# Patient Record
Sex: FEMALE | Race: BLACK OR AFRICAN AMERICAN | HISPANIC OR LATINO | URBAN - METROPOLITAN AREA
[De-identification: names, ages, dates, MRNs, and addresses within clinical notes are randomized per-mention and may not be internally consistent; named-entity substitution may affect disease eponyms.]

---

## 2022-05-27 ENCOUNTER — EMERGENCY (EMERGENCY)
Facility: HOSPITAL | Age: 52
LOS: 1 days | Discharge: HOME | End: 2022-05-27
Attending: EMERGENCY MEDICINE | Admitting: EMERGENCY MEDICINE
Payer: COMMERCIAL

## 2022-05-27 VITALS
TEMPERATURE: 98 F | SYSTOLIC BLOOD PRESSURE: 111 MMHG | HEART RATE: 71 BPM | DIASTOLIC BLOOD PRESSURE: 60 MMHG | OXYGEN SATURATION: 100 % | RESPIRATION RATE: 17 BRPM

## 2022-05-27 VITALS
SYSTOLIC BLOOD PRESSURE: 111 MMHG | TEMPERATURE: 97 F | DIASTOLIC BLOOD PRESSURE: 76 MMHG | HEART RATE: 88 BPM | RESPIRATION RATE: 18 BRPM | OXYGEN SATURATION: 98 %

## 2022-05-27 LAB
ALBUMIN SERPL ELPH-MCNC: 4.3 G/DL — SIGNIFICANT CHANGE UP (ref 3.3–5)
ALP SERPL-CCNC: 121 U/L — HIGH (ref 40–120)
ALT FLD-CCNC: 28 U/L — SIGNIFICANT CHANGE UP (ref 4–33)
ANION GAP SERPL CALC-SCNC: 9 MMOL/L — SIGNIFICANT CHANGE UP (ref 7–14)
AST SERPL-CCNC: 28 U/L — SIGNIFICANT CHANGE UP (ref 4–32)
BASOPHILS # BLD AUTO: 0.05 K/UL — SIGNIFICANT CHANGE UP (ref 0–0.2)
BASOPHILS NFR BLD AUTO: 0.6 % — SIGNIFICANT CHANGE UP (ref 0–2)
BILIRUB SERPL-MCNC: <0.2 MG/DL — SIGNIFICANT CHANGE UP (ref 0.2–1.2)
BUN SERPL-MCNC: 9 MG/DL — SIGNIFICANT CHANGE UP (ref 7–23)
CALCIUM SERPL-MCNC: 8.9 MG/DL — SIGNIFICANT CHANGE UP (ref 8.4–10.5)
CHLORIDE SERPL-SCNC: 110 MMOL/L — HIGH (ref 98–107)
CK SERPL-CCNC: 385 U/L — HIGH (ref 25–170)
CO2 SERPL-SCNC: 23 MMOL/L — SIGNIFICANT CHANGE UP (ref 22–31)
CREAT SERPL-MCNC: 0.66 MG/DL — SIGNIFICANT CHANGE UP (ref 0.5–1.3)
EGFR: 107 ML/MIN/1.73M2 — SIGNIFICANT CHANGE UP
EOSINOPHIL # BLD AUTO: 0.27 K/UL — SIGNIFICANT CHANGE UP (ref 0–0.5)
EOSINOPHIL NFR BLD AUTO: 3.3 % — SIGNIFICANT CHANGE UP (ref 0–6)
GLUCOSE SERPL-MCNC: 97 MG/DL — SIGNIFICANT CHANGE UP (ref 70–99)
HCT VFR BLD CALC: 37.9 % — SIGNIFICANT CHANGE UP (ref 34.5–45)
HGB BLD-MCNC: 12.4 G/DL — SIGNIFICANT CHANGE UP (ref 11.5–15.5)
IANC: 4.05 K/UL — SIGNIFICANT CHANGE UP (ref 1.8–7.4)
IMM GRANULOCYTES NFR BLD AUTO: 0.6 % — SIGNIFICANT CHANGE UP (ref 0–1.5)
LYMPHOCYTES # BLD AUTO: 3.31 K/UL — HIGH (ref 1–3.3)
LYMPHOCYTES # BLD AUTO: 40.9 % — SIGNIFICANT CHANGE UP (ref 13–44)
MCHC RBC-ENTMCNC: 29.3 PG — SIGNIFICANT CHANGE UP (ref 27–34)
MCHC RBC-ENTMCNC: 32.7 GM/DL — SIGNIFICANT CHANGE UP (ref 32–36)
MCV RBC AUTO: 89.6 FL — SIGNIFICANT CHANGE UP (ref 80–100)
MONOCYTES # BLD AUTO: 0.37 K/UL — SIGNIFICANT CHANGE UP (ref 0–0.9)
MONOCYTES NFR BLD AUTO: 4.6 % — SIGNIFICANT CHANGE UP (ref 2–14)
NEUTROPHILS # BLD AUTO: 4.05 K/UL — SIGNIFICANT CHANGE UP (ref 1.8–7.4)
NEUTROPHILS NFR BLD AUTO: 50 % — SIGNIFICANT CHANGE UP (ref 43–77)
NRBC # BLD: 0 /100 WBCS — SIGNIFICANT CHANGE UP
NRBC # FLD: 0 K/UL — SIGNIFICANT CHANGE UP
PLATELET # BLD AUTO: 313 K/UL — SIGNIFICANT CHANGE UP (ref 150–400)
POTASSIUM SERPL-MCNC: 4.4 MMOL/L — SIGNIFICANT CHANGE UP (ref 3.5–5.3)
POTASSIUM SERPL-SCNC: 4.4 MMOL/L — SIGNIFICANT CHANGE UP (ref 3.5–5.3)
PROT SERPL-MCNC: 7.4 G/DL — SIGNIFICANT CHANGE UP (ref 6–8.3)
RBC # BLD: 4.23 M/UL — SIGNIFICANT CHANGE UP (ref 3.8–5.2)
RBC # FLD: 15.6 % — HIGH (ref 10.3–14.5)
SODIUM SERPL-SCNC: 142 MMOL/L — SIGNIFICANT CHANGE UP (ref 135–145)
WBC # BLD: 8.1 K/UL — SIGNIFICANT CHANGE UP (ref 3.8–10.5)
WBC # FLD AUTO: 8.1 K/UL — SIGNIFICANT CHANGE UP (ref 3.8–10.5)

## 2022-05-27 PROCEDURE — 99285 EMERGENCY DEPT VISIT HI MDM: CPT

## 2022-05-27 PROCEDURE — 93971 EXTREMITY STUDY: CPT | Mod: 26,LT

## 2022-05-27 NOTE — ED PROVIDER NOTE - NSFOLLOWUPCLINICS_GEN_ALL_ED_FT
[facility]  Neurology  [address] [place] [address] [address] [place] [address]  Phone: [phone]  Fax:

## 2022-05-27 NOTE — ED PROVIDER NOTE - PHYSICAL EXAMINATION
Const: Well-nourished, Well-developed, appearing stated age.  Eyes: no conjunctival injection, and symmetrical lids.  HEENT: Head NCAT, no lesions. Atraumatic external nose and ears. Moist MM.  Neck: Symmetric, trachea midline.   RESP: Unlabored respiratory effort.  GI: Nontender/Nondistended,   MSK and skin: Large well healing bruise to left thigh with ttp to touch. Full rom of knee, no ttp, no swelling. Pt able to ambulate normally.   Neuro: CNs II-XII grossly intact. Motor & Sensation grossly intact.  Psych: Awake, Alert, & Oriented (AAO) x3. Appropriate mood and affect.

## 2022-05-27 NOTE — ED PROVIDER NOTE - MUSCULOSKELETAL, MLM
Spine appears normal, range of motion is not limited, no muscle or joint tenderness. Ecchymosis on dorsal aspect of left thigh, 15cm x 5 cm.  Non tender, soft, no fluctuance, no erythema by ecchymosis. FROM at all joints, neurovasc intact.

## 2022-05-27 NOTE — ED PROVIDER NOTE - OBJECTIVE STATEMENT
Pt is a 50 yo f with no pmh, on xarelto who presents to ed with 1 wk of left leg pain after pt fell while staking. Pt has been able to ambulate, but has been having thigh pain and bruising on the left since. 1 wk prior to this, pt was given Xarelto for possible dvt by pmd after pt complained of b/l feet numbness that resolved when she skates or runs. Otherwise denies cp, sob, no n/v/f/c. Pt is a 52 yo f with non contrib pmh who was recently started by her PMD on xarelto who presents to ed with 1 wk of left leg pain after pt fell while skating. Pt has been able to ambulate, but has been having thigh pain and bruising on the left since. 1 wk prior to this, pt was given Xarelto for possible dvt by pmd after pt complained of chronic b/l feet numbness that resolved when she skates or runs. Otherwise denies cp, sob, no n/v/f/c.  Apparently pt never had an official ultrasound done, and has been on AC without dx'd DVT. AC/leg pain (bilateral) predate fall.

## 2022-05-27 NOTE — ED PROVIDER NOTE - CLINICAL SUMMARY MEDICAL DECISION MAKING FREE TEXT BOX
Pt is a 50 yo f with no pmh, on xarelto who presents to ed with 1 wk of left leg pain after pt fell while staking. r/o dvt. 51 yr old F with non contrib pmh, on xarelto for apparently suspected DVT by PMD, who presents to ED with 1 wk of left leg pain after pt fell while skating. No e/o fracture.  Aside from ecchymosis exam is WNL. Unlikely DVT but will obtain duplex to support decision to stop AC since she has never had objective DVT findings. Pain control, reassess. Recc outpt vasc and neuro f/u for possible PVD vs neuropathy

## 2022-05-27 NOTE — ED ADULT TRIAGE NOTE - CHIEF COMPLAINT QUOTE
Patient fell on Sunday after she was roller skaTING. pT HAS C/O NUMBNESS IN FEET AND COLD. PT ALSO HAS A BRUISE OT HER LEFT THIGH SHE WANTS TO BE EVALUATED, PT IS ON XARELTO.

## 2022-05-27 NOTE — ED PROVIDER NOTE - PATIENT PORTAL LINK FT
You can access the FollowMyHealth Patient Portal offered by [facility] by registering at the following website: [email or web address]. By joining [employer]’s FollowMyHealth portal, you will also be able to view your health information using other applications (apps) compatible with our system.

## 2022-05-27 NOTE — ED PROVIDER NOTE - NS ED ROS FT
CONST: no fevers, no chills, +trauma  EYES: no pain, no blurry vision   ENT: no sore throat, no epistaxis, no rhinorrhea  CV: no chest pain, no palpitations, no orthopnea, no extremity pain or swelling  RESP: no shortness of breath, no cough, no sputum, no pleurisy, no wheezing  ABD: no abdominal pain, no nausea, no vomiting, no diarrhea, no black or bloody stool  : no dysuria, no hematuria, no frequency, no urgency  MSK: no back pain, no neck pain, no extremity pain  NEURO: no headache, no sensory disturbances, no focal weakness, no dizziness  SKIN: no diaphoresis, no rash

## 2022-05-27 NOTE — ED PROVIDER NOTE - PROGRESS NOTE DETAILS
Hiram Tomas, PGY1 Pt continues to feel well. no dvt on studies. discussed with pt to hold on Xarelto and follow with primary care. Hiram Tomas, PGY1 Pt continues to feel well. no dvt on studies. discussed with pt to hold on Xarelto and follow with primary care. Also to see vascular surgeon and/or neuro if BL LE sx continue.

## 2022-05-27 NOTE — ED PROVIDER NOTE - ATTENDING CONTRIBUTION TO CARE
Attending Attestation: Dr. Dye  I have personally performed a history and physical examination of the patient and discussed management with the resident as well as the patient.  I reviewed the resident's note and agree with the documented findings and plan of care.  I have authored and modified critical sections of the Provider Note, including but not limited to HPI, Physical Exam and MDM. 51 yr old F with non contrib pmh, on xarelto for apparently suspected DVT by PMD, who presents to ED with 1 wk of left leg pain after pt fell while skating. No e/o fracture.  Aside from ecchymosis exam is WNL. Unlikely DVT but will obtain duplex to support decision to stop AC since she has never had objective DVT findings. Pain control, reassess. Recc outpt vasc and neuro f/u for possible PVD vs neuropathy

## 2022-05-27 NOTE — ED PROVIDER NOTE - NSFOLLOWUPINSTRUCTIONS_ED_ALL_ED_FT
You were seen in the hospital for left leg pain.   While here your labs and studies showed no acute findings and no clots.   Please follow up with a primary care doctor. The hospital will call to help you make an appointment.   Please see your primary doctor in 2-3 days for follow-up care. Return to ER for any new or worsening symptoms including worsening pain, inability to walk, increased loss of sensation in your feet.   The number for neurology is also provided in case your symptoms do not resolve.

## 2022-05-27 NOTE — ED ADULT NURSE NOTE - OBJECTIVE STATEMENT
Break Shift RN: Pt received to rm 21 presents with bruising noted to back of left leg s/p fall on may 13th. Pt endorsing sensation of numbness to b/l feet. PT a&ox3, ambulatory at baseline, skin intact, respirations even and unlabored, abd soft and non-distended nontender to palpation. Pt reports being placed on blood thinners after experiencing numbness sensation. Pt denies hx blood clots, CP, SOB, dizziness, blurry vision, or any other symptoms. Pt + capillary refill <2 sec in b/l feet. Pt ft feel warm, upon palpation pt denies numbness. VSS, 20G IV placed in left arm, will await further orders and continue to monitor.

## 2022-07-13 ENCOUNTER — INPATIENT (INPATIENT)
Facility: HOSPITAL | Age: 52
LOS: 4 days | Discharge: HOME | End: 2022-07-18
Attending: HOSPITALIST | Admitting: HOSPITALIST

## 2022-07-13 VITALS
OXYGEN SATURATION: 100 % | DIASTOLIC BLOOD PRESSURE: 87 MMHG | TEMPERATURE: 97 F | HEART RATE: 61 BPM | SYSTOLIC BLOOD PRESSURE: 119 MMHG | RESPIRATION RATE: 18 BRPM

## 2022-07-13 DIAGNOSIS — Z90.49 ACQUIRED ABSENCE OF OTHER SPECIFIED PARTS OF DIGESTIVE TRACT: Chronic | ICD-10-CM

## 2022-07-13 DIAGNOSIS — Z98.84 BARIATRIC SURGERY STATUS: Chronic | ICD-10-CM

## 2022-07-13 LAB
ALBUMIN SERPL ELPH-MCNC: 4.2 G/DL — SIGNIFICANT CHANGE UP (ref 3.3–5)
ALP SERPL-CCNC: 115 U/L — SIGNIFICANT CHANGE UP (ref 40–120)
ALT FLD-CCNC: 19 U/L — SIGNIFICANT CHANGE UP (ref 4–33)
ANION GAP SERPL CALC-SCNC: 11 MMOL/L — SIGNIFICANT CHANGE UP (ref 7–14)
AST SERPL-CCNC: 21 U/L — SIGNIFICANT CHANGE UP (ref 4–32)
BASOPHILS # BLD AUTO: 0.05 K/UL — SIGNIFICANT CHANGE UP (ref 0–0.2)
BASOPHILS NFR BLD AUTO: 0.6 % — SIGNIFICANT CHANGE UP (ref 0–2)
BILIRUB SERPL-MCNC: <0.2 MG/DL — SIGNIFICANT CHANGE UP (ref 0.2–1.2)
BUN SERPL-MCNC: 11 MG/DL — SIGNIFICANT CHANGE UP (ref 7–23)
CALCIUM SERPL-MCNC: 8.9 MG/DL — SIGNIFICANT CHANGE UP (ref 8.4–10.5)
CHLORIDE SERPL-SCNC: 107 MMOL/L — SIGNIFICANT CHANGE UP (ref 98–107)
CO2 SERPL-SCNC: 24 MMOL/L — SIGNIFICANT CHANGE UP (ref 22–31)
CREAT SERPL-MCNC: 0.57 MG/DL — SIGNIFICANT CHANGE UP (ref 0.5–1.3)
CRP SERPL-MCNC: <3 MG/L — SIGNIFICANT CHANGE UP
EGFR: 111 ML/MIN/1.73M2 — SIGNIFICANT CHANGE UP
EOSINOPHIL # BLD AUTO: 0.15 K/UL — SIGNIFICANT CHANGE UP (ref 0–0.5)
EOSINOPHIL NFR BLD AUTO: 1.7 % — SIGNIFICANT CHANGE UP (ref 0–6)
ERYTHROCYTE [SEDIMENTATION RATE] IN BLOOD: 28 MM/HR — HIGH (ref 4–25)
GLUCOSE SERPL-MCNC: 89 MG/DL — SIGNIFICANT CHANGE UP (ref 70–99)
HCT VFR BLD CALC: 39 % — SIGNIFICANT CHANGE UP (ref 34.5–45)
HGB BLD-MCNC: 12.4 G/DL — SIGNIFICANT CHANGE UP (ref 11.5–15.5)
IANC: 4.42 K/UL — SIGNIFICANT CHANGE UP (ref 1.8–7.4)
IMM GRANULOCYTES NFR BLD AUTO: 0.2 % — SIGNIFICANT CHANGE UP (ref 0–1.5)
LYMPHOCYTES # BLD AUTO: 3.58 K/UL — HIGH (ref 1–3.3)
LYMPHOCYTES # BLD AUTO: 41.4 % — SIGNIFICANT CHANGE UP (ref 13–44)
MCHC RBC-ENTMCNC: 28.5 PG — SIGNIFICANT CHANGE UP (ref 27–34)
MCHC RBC-ENTMCNC: 31.8 GM/DL — LOW (ref 32–36)
MCV RBC AUTO: 89.7 FL — SIGNIFICANT CHANGE UP (ref 80–100)
MONOCYTES # BLD AUTO: 0.43 K/UL — SIGNIFICANT CHANGE UP (ref 0–0.9)
MONOCYTES NFR BLD AUTO: 5 % — SIGNIFICANT CHANGE UP (ref 2–14)
NEUTROPHILS # BLD AUTO: 4.42 K/UL — SIGNIFICANT CHANGE UP (ref 1.8–7.4)
NEUTROPHILS NFR BLD AUTO: 51.1 % — SIGNIFICANT CHANGE UP (ref 43–77)
NRBC # BLD: 0 /100 WBCS — SIGNIFICANT CHANGE UP
NRBC # FLD: 0 K/UL — SIGNIFICANT CHANGE UP
PLATELET # BLD AUTO: 289 K/UL — SIGNIFICANT CHANGE UP (ref 150–400)
POTASSIUM SERPL-MCNC: 3.9 MMOL/L — SIGNIFICANT CHANGE UP (ref 3.5–5.3)
POTASSIUM SERPL-SCNC: 3.9 MMOL/L — SIGNIFICANT CHANGE UP (ref 3.5–5.3)
PROT SERPL-MCNC: 8 G/DL — SIGNIFICANT CHANGE UP (ref 6–8.3)
RBC # BLD: 4.35 M/UL — SIGNIFICANT CHANGE UP (ref 3.8–5.2)
RBC # FLD: 14.9 % — HIGH (ref 10.3–14.5)
SODIUM SERPL-SCNC: 142 MMOL/L — SIGNIFICANT CHANGE UP (ref 135–145)
WBC # BLD: 8.65 K/UL — SIGNIFICANT CHANGE UP (ref 3.8–10.5)
WBC # FLD AUTO: 8.65 K/UL — SIGNIFICANT CHANGE UP (ref 3.8–10.5)

## 2022-07-13 PROCEDURE — 99220: CPT

## 2022-07-13 PROCEDURE — 71046 X-RAY EXAM CHEST 2 VIEWS: CPT | Mod: 26

## 2022-07-13 RX ORDER — NICOTINE POLACRILEX 2 MG
1 GUM BUCCAL DAILY
Refills: 0 | Status: DISCONTINUED | OUTPATIENT
Start: 2022-07-13 | End: 2022-07-18

## 2022-07-13 RX ORDER — ASPIRIN/CALCIUM CARB/MAGNESIUM 324 MG
81 TABLET ORAL DAILY
Refills: 0 | Status: DISCONTINUED | OUTPATIENT
Start: 2022-07-13 | End: 2022-07-14

## 2022-07-13 NOTE — ED CDU PROVIDER INITIAL DAY NOTE - OBJECTIVE STATEMENT
50 Y/O F PMH migraines states she has had blurry vision in her R eye one day prior to ED presentation which progressed to the loss of her upper visual field today. Pt was seen by Ophthalmology, CRP normal, pt otherwise neurologically intact. Pt has noted back pain and numbness of the R foot previously. Plan is CDU for MRI, Ophthalmology re-eval. Pt is a smoker (10 cigarettes per day on average as per Pt).

## 2022-07-13 NOTE — ED ADULT NURSE NOTE - OBJECTIVE STATEMENT
Patient A&Ox4 ambulatory c/o right eye blurriness and partial visual loss. Patient states she woke up with these symptoms but has been getting progressively worse through out the day. Respirations even and unlabored. Smile symmetrical, no facial droop observed. Patient with steady gait observed. Sensation and strength equal to B/L arms and legs. Patient denies cp, sob, dizziness, n/v/d, fevers and chills. 20G IV placed to right antecubital, labs collected and sent. Patient awaiting CT. Stretcher in lowest position, wheels locked, appropriate side rails in place, call bell in reach.

## 2022-07-13 NOTE — CONSULT NOTE ADULT - SUBJECTIVE AND OBJECTIVE BOX
[facility] DEPARTMENT OF OPHTHALMOLOGY - INITIAL ADULT CONSULT  ----------------------------------------------------------------------------------------------------  Blake Reynalibradoben, PGY-2  [phone], available on teams  ----------------------------------------------------------------------------------------------------    HPI:  50 yo F with PMH chronic migraines, no POH, pw vision changes right eye. Pt endorse noticing yesterday that she has blurry vision in her right eye. The vision progressively become worse today when she noticed she lost her superior VF OD. Endorse having a migraine H/A yesterday but not today. +floaters today but not flashes. Endorse stretching pain in EOM OD only 7/10 became worse today. Denies focal weakness, denies fever, chills, weight loss, night sweats. Denies double vision. No eye pain without EOM.     PAST MEDICAL & SURGICAL HISTORY:    Past Ocular History: wears progressives  Ophthalmic Medications: none  FAMILY HISTORY: none ophthalmic  Social History: smokes 0.5 PPD, denies alcohol    MEDICATIONS  (STANDING):    MEDICATIONS  (PRN):    Allergies & Intolerances: NKDA    Review of Systems:  Constitutional: No fever, chills  Eyes: ++blurry vision, NO flashes, +floaters, no FBS, erythema, discharge, double vision, OU  Neuro: No tremors  Cardiovascular: No chest pain, palpitations  Respiratory: No SOB, no cough  GI: No nausea, vomiting, abdominal pain  : No dysuria  Skin: no rash  Psych: no depression  Endocrine: no polyuria, polydipsia  Heme/lymph: no swelling    VITALS: T(C): 36.3 (07-13-22 @ 18:22)  T(F): 97.3 (07-13-22 @ 18:22), Max: 97.3 (07-13-22 @ 18:22)  HR: 61 (07-13-22 @ 18:22) (61 - 61)  BP: 119/87 (07-13-22 @ 18:22) (119/87 - 119/87)  RR:  (18 - 18)  SpO2:  (100% - 100%)  General: AAO x 3, appropriate mood and affect    Ophthalmology Exam:  Visual acuity (cc): OD 20/30 only in inferior VF, OS 20/20  Pupils: OD sluggish, ++RAPD, OS brisk  Ttono: OD 12 OS 13  Extraocular movements (EOMs): Full OU,  +pain, no diplopia  Confrontational Visual Field (CVF): OD superior altitudinal defect OS full  Color Plates: OD 12/12 in inferior VF, slow; OS 12/12  Red desaturation: OD 20% of OS    Pen Light Exam (PLE)  External: Flat OU  Lids/Lashes/Lacrimal Ducts: Flat OU    Sclera/Conjunctiva: W+Q OU  Cornea: Cl OU  Anterior Chamber: D+F OU    Iris: Flat OU  Lens: Cl OU    Fundus Exam: dilated with 1% tropicamide and 2.5% phenylephrine  Approval obtained from primary team for dilation  Patient aware that pupils can remained dilated for at least 4-6 hours  Exam performed with 20D lens    Vitreous: wnl OU  Disc, cup/disc: sharp and pink, 0.4 OU  Macula: wnl OU  Vessels: wnl OU  Periphery: wnl OU    Labs/Imaging:  ***   [facility] DEPARTMENT OF OPHTHALMOLOGY - INITIAL ADULT CONSULT  ----------------------------------------------------------------------------------------------------  Blake Reynalibradoben, PGY-2  [phone], available on teams  ----------------------------------------------------------------------------------------------------    HPI:  52 yo F with PMH chronic migraines, no POH, pw vision changes right eye. Pt endorse noticing yesterday that she has blurry vision in her right eye. The vision progressively become worse today when she noticed she lost her superior VF OD. Endorse having a migraine H/A yesterday but not today. +floaters today but not flashes. Endorse stretching pain in EOM OD only 7/10 became worse today. Denies focal weakness, denies fever, chills, weight loss, night sweats. Denies double vision. No eye pain without EOM.     PAST MEDICAL & SURGICAL HISTORY:    Past Ocular History: wears progressives  Ophthalmic Medications: none  FAMILY HISTORY: none ophthalmic  Social History: smokes 0.5 PPD, denies alcohol    MEDICATIONS  (STANDING):    MEDICATIONS  (PRN):    Allergies & Intolerances: NKDA    Review of Systems:  Constitutional: No fever, chills  Eyes: ++blurry vision, NO flashes, +floaters, no FBS, erythema, discharge, double vision, OU  Neuro: No tremors  Cardiovascular: No chest pain, palpitations  Respiratory: No SOB, no cough  GI: No nausea, vomiting, abdominal pain  : No dysuria  Skin: no rash  Psych: no depression  Endocrine: no polyuria, polydipsia  Heme/lymph: no swelling    VITALS: T(C): 36.3 (07-13-22 @ 18:22)  T(F): 97.3 (07-13-22 @ 18:22), Max: 97.3 (07-13-22 @ 18:22)  HR: 61 (07-13-22 @ 18:22) (61 - 61)  BP: 119/87 (07-13-22 @ 18:22) (119/87 - 119/87)  RR:  (18 - 18)  SpO2:  (100% - 100%)  General: AAO x 3, appropriate mood and affect    Ophthalmology Exam:  Visual acuity (cc): OD 20/30 only in inferior VF, OS 20/20  Pupils: OD sluggish, ++RAPD, OS brisk  Ttono: OD 12 OS 13  Extraocular movements (EOMs): Full OU,  +pain, no diplopia  Confrontational Visual Field (CVF): OD superior altitudinal defect OS full  Color Plates: OD 12/12 in inferior VF, slow; OS 12/12  Red desaturation: OD 20% of OS    Pen Light Exam (PLE)  External: Flat OU  Lids/Lashes/Lacrimal Ducts: Flat OU    Sclera/Conjunctiva: W+Q OU  Cornea: Cl OU  Anterior Chamber: D+F OU    Iris: Flat OU  Lens: Cl OU    Fundus Exam: dilated with 1% tropicamide and 2.5% phenylephrine  Approval obtained from primary team for dilation  Patient aware that pupils can remained dilated for at least 4-6 hours  Exam performed with 20D lens    Vitreous: wnl OU  Disc, cup/disc: sharp and pink, 0.1 OU, trace elevation OD  Macula: wnl OU  Vessels: mildly tortuous OU  Periphery: wnl OU    Labs/Imaging:  ***   [facility] DEPARTMENT OF OPHTHALMOLOGY - INITIAL ADULT CONSULT  ----------------------------------------------------------------------------------------------------  Blake Reynalibradoben, PGY-2  [phone], available on teams  ----------------------------------------------------------------------------------------------------    HPI:  50 yo F with PMH chronic migraines, HLD no POH, pw vision changes right eye. Pt endorse noticing yesterday that she has blurry vision in her right eye. The vision progressively become worse today when she noticed she lost her superior VF OD. Endorse having a migraine H/A yesterday but not today. +floaters today but not flashes. Endorse stretching pain in EOM OD only 7/10 became worse today. Denies focal weakness, denies fever, chills, weight loss, night sweats. Denies double vision. No eye pain without EOM. Denies jaw claudication, scalp tenderness, joint pain. Pt noted that her PMD started her on Xarelto after complaining of toe numbness for 3 weeks in May. On ASA and crestor too.     PAST MEDICAL & SURGICAL HISTORY:    Past Ocular History: wears progressives  Ophthalmic Medications: none  FAMILY HISTORY: none ophthalmic  Social History: smokes 0.5 PPD, denies alcohol    MEDICATIONS  (STANDING):    MEDICATIONS  (PRN):    Allergies & Intolerances: NKDA    Review of Systems:  Constitutional: No fever, chills  Eyes: ++blurry vision, NO flashes, +floaters, no FBS, erythema, discharge, double vision, OU  Neuro: No tremors  Cardiovascular: No chest pain, palpitations  Respiratory: No SOB, no cough  GI: No nausea, vomiting, abdominal pain  : No dysuria  Skin: no rash  Psych: no depression  Endocrine: no polyuria, polydipsia  Heme/lymph: no swelling    VITALS: T(C): 36.3 (07-13-22 @ 18:22)  T(F): 97.3 (07-13-22 @ 18:22), Max: 97.3 (07-13-22 @ 18:22)  HR: 61 (07-13-22 @ 18:22) (61 - 61)  BP: 119/87 (07-13-22 @ 18:22) (119/87 - 119/87)  RR:  (18 - 18)  SpO2:  (100% - 100%)  General: AAO x 3, appropriate mood and affect    Ophthalmology Exam:  Visual acuity (cc): OD 20/30 only in inferior VF, OS 20/20  Pupils: OD sluggish, ++RAPD, OS brisk  Ttono: OD 12 OS 13  Extraocular movements (EOMs): Full OU,  +pain, no diplopia  Confrontational Visual Field (CVF): OD superior altitudinal defect OS full  Color Plates: OD 12/12 in inferior VF, slow; OS 12/12  Red desaturation: OD 20% of OS    Pen Light Exam (PLE)  External: Flat OU, mild tenderness temp  Lids/Lashes/Lacrimal Ducts: Flat OU    Sclera/Conjunctiva: W+Q OU  Cornea: Cl OU  Anterior Chamber: D+F OU    Iris: Flat OU  Lens: Cl OU    Fundus Exam: dilated with 1% tropicamide and 2.5% phenylephrine  Approval obtained from primary team for dilation  Patient aware that pupils can remained dilated for at least 4-6 hours  Exam performed with 20D lens    Vitreous: wnl OU  Disc, cup/disc: sharp and pink, 0.1 OU, trace elevation OD  Macula: wnl OU  Vessels: mildly tortuous OU  Periphery: wnl OU    Labs/Imaging:    C-Reactive Protein, Serum (07.13.22 @ 20:28)    C-Reactive Protein, Serum: <3.0 mg/L    Platelet Count - Automated: 289 K/uL (07.13.22 @ 20:28)

## 2022-07-13 NOTE — ED ADULT TRIAGE NOTE - CHIEF COMPLAINT QUOTE
pt c/o right eye pain an partial visual loss. states woke up with these symptoms but has been getting progressively worse through out the day. sent by ophthalmologist to r/o mass vs lesions.

## 2022-07-13 NOTE — ED CDU PROVIDER INITIAL DAY NOTE - NSICDXFAMILYHX_GEN_ALL_CORE_FT
FAMILY HISTORY:  Mother  Still living? Unknown  Family history of type 2 diabetes mellitus, Age at diagnosis: Age Unknown  FH: hypertension, Age at diagnosis: Age Unknown

## 2022-07-13 NOTE — ED CDU PROVIDER INITIAL DAY NOTE - ATTENDING APP SHARED VISIT CONTRIBUTION OF CARE
Attending note:   After face to face evaluation of this patient, I concur with above noted hx, pe, and care plan for this patient.  Bejarano: 51 yof with hx of migraines complaining of one day of right sided headache and inability to see out of top of right eye. Pt normally wears glasses. Previous migraines are frontal and bilateral and not associated with visual issues. Pt seen by optometrist and sent to ED. Pt denies fever, trauma but has some pain with eye movement. PE shows pain with upward gaze of right eye and decreased vision from medial and lateral right upper fields. Pt seen by ophtho/neuro and recommend MRI for optic neuritis or other mass. Pt agreeable to CDU.

## 2022-07-13 NOTE — ED CDU PROVIDER INITIAL DAY NOTE - CRANIAL NERVE AND PUPILLARY EXAM
5/5 strength bilaterally in the upper and lower extremity. R eye with reduced visual acuity./cranial nerves 2-12 intact

## 2022-07-13 NOTE — ED PROVIDER NOTE - OBJECTIVE STATEMENT
51 yof with hx of migraines complaining of one day of right sided headache and inability to see out of top of right eye. Pt normally wears glasses. Previous migraines are frontal and bilateral and not associated with visual issues. Pt seen by optometrist and sent to ED. Pt denies fever, trauma but has some pain with eye movement.

## 2022-07-13 NOTE — ED ADULT NURSE NOTE - INTERVENTIONS DEFINITIONS
[place] to call system/Call bell, personal items and telephone within reach/Instruct patient to call for assistance/Physically safe environment: no spills, clutter or unnecessary equipment/Stretcher in lowest position, wheels locked, appropriate side rails in place/Monitor gait and stability/Reinforce activity limits and safety measures with patient and family

## 2022-07-13 NOTE — ED CDU PROVIDER INITIAL DAY NOTE - MEDICAL DECISION MAKING DETAILS
52 Y/O F PMH migraines states she has had blurry vision in her R eye one day prior to ED presentation which progressed to the loss of her upper visual field today. Pt was seen by Ophthalmology, CRP normal, pt otherwise neurologically intact. Plan is CDU for MRI, Neuroophthalmology eval, reassessment.

## 2022-07-13 NOTE — ED PROVIDER NOTE - CLINICAL SUMMARY MEDICAL DECISION MAKING FREE TEXT BOX
Visual field cut - right upper fields, ocular migraine vs optic neuritis vs retinal detachment  Ophtho called and evaluated pt, recommend CDU for MRI and labs.

## 2022-07-13 NOTE — CONSULT NOTE ADULT - ASSESSMENT
50 yo F with PMH chronic migraines, no POH, pw vision changes right eye. On exam, OD 20/30 only in inferior VF, OS 20/20, OD pupil sluggish, ++RAPD, OS brisk, IOP OD 12 OS 13, Extraocular movements Full OU, +pain, no diplopia, Confrontational Visual Field OD superior altitudinal defect OS full, Color Plates: OD 12/12 in inferior VF, slow; OS 12/12, Red desaturation: OD 20% of OS. Anterior exam unremarkable. Fundus exam notable for CDR 0.1 OU, possible trace disc elevation OD. Retina wnl OU.     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 Also on the ddx: atypical optic neuritis.  -Denies persistent headache, ROS for GCA negative; temporal arteries non-tender but also non palpable OU  -CRP wnl, ESR pending. Less likely AION (GCA) d/t age and neg ROS  -Pt is a smoker which puts her at risk for NAION, in addition to small cups (0.1) "disc at risk". Otherwise no SARTHAK.  -Would obtain MRI of the brain and orbits w/wo contrast  -Obtain serologies that can cause atypical optic neuritis: syphilis screen, Lyme, ACE, chest XR (sarcoid r/o)  -Discussed with patient the natural history and prognosis, pt will see his primary care physician for risk factor optimization (HTN, DM, SARTHAK, HLD)  -Ophthalmology will follow up workup    Discussed with Dr. Maharaj. Will DW neuro-ophthalmology in the AM.     Outpatient follow-up: Patient should follow-up with his/her ophthalmologist or with [facility] Department of Ophthalmology upon discharge at the address below     [facility] Department of Ophthalmology  [address] [place] [address]. [address]  [place] [address]  [phone]   52 yo F with PMH chronic migraines, HLD no POH, pw vision changes right eye. On exam, OD 20/30 only in inferior VF, OS 20/20, OD pupil sluggish, ++RAPD, OS brisk, IOP OD 12 OS 13, Extraocular movements Full OU, +pain, no diplopia, Confrontational Visual Field OD superior altitudinal defect OS full, Color Plates: OD 12/12 in inferior VF, slow; OS 12/12, Red desaturation: OD 20% of OS. Anterior exam unremarkable. Fundus exam notable for CDR 0.1 OU, possible trace disc elevation OD. Retina wnl OU.     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 Also on the ddx: atypical optic neuritis.  -Denies persistent headache, ROS for GCA negative; only mild tenderness right temple, pulses not palpated OU.  -CRP wnl, PLT wnl, ESR pending. Less likely AION (GCA) d/t age and neg ROS  -Pt is a smoker and has HLD which puts her at risk for NAION, in addition to small cups (0.1) "disc at risk". Otherwise no SARTHAK.  -Would obtain MRI of the brain and orbits + spine w/wo contrast  -Obtain serologies that can cause atypical optic neuritis: syphilis screen, Lyme, ACE, chest XR (sarcoid r/o)  -Discussed with patient the natural history and prognosis, pt will see his primary care physician for risk factor optimization (HTN, DM, SARTHAK, HLD)  -Ophthalmology will follow up workup    Discussed with Dr. Maharaj and Dr. Baum, neuro-ophthalmology.    Outpatient follow-up: Patient should follow-up with his/her ophthalmologist or with [facility] Department of Ophthalmology upon discharge at the address below     [facility] Department of Ophthalmology  [address] [place] [address]. [address]  [place] [address]  [phone]

## 2022-07-14 DIAGNOSIS — H46.9 UNSPECIFIED OPTIC NEURITIS: ICD-10-CM

## 2022-07-14 DIAGNOSIS — H54.7 UNSPECIFIED VISUAL LOSS: ICD-10-CM

## 2022-07-14 DIAGNOSIS — H53.9 UNSPECIFIED VISUAL DISTURBANCE: ICD-10-CM

## 2022-07-14 DIAGNOSIS — E78.5 HYPERLIPIDEMIA, UNSPECIFIED: ICD-10-CM

## 2022-07-14 DIAGNOSIS — G43.909 MIGRAINE, UNSPECIFIED, NOT INTRACTABLE, WITHOUT STATUS MIGRAINOSUS: ICD-10-CM

## 2022-07-14 DIAGNOSIS — Z29.9 ENCOUNTER FOR PROPHYLACTIC MEASURES, UNSPECIFIED: ICD-10-CM

## 2022-07-14 LAB
B BURGDOR C6 AB SER-ACNC: NEGATIVE — SIGNIFICANT CHANGE UP
B BURGDOR IGG+IGM SER-ACNC: 0.16 INDEX — SIGNIFICANT CHANGE UP (ref 0.01–0.89)
FOLATE SERPL-MCNC: 15.8 NG/ML — SIGNIFICANT CHANGE UP (ref 3.1–17.5)
HCG SERPL-ACNC: <5 MIU/ML — SIGNIFICANT CHANGE UP
SARS-COV-2 RNA SPEC QL NAA+PROBE: SIGNIFICANT CHANGE UP
VIT B12 SERPL-MCNC: 668 PG/ML — SIGNIFICANT CHANGE UP (ref 200–900)

## 2022-07-14 PROCEDURE — 99223 1ST HOSP IP/OBS HIGH 75: CPT | Mod: GC

## 2022-07-14 PROCEDURE — 72158 MRI LUMBAR SPINE W/O & W/DYE: CPT | Mod: 26,MA

## 2022-07-14 PROCEDURE — 99223 1ST HOSP IP/OBS HIGH 75: CPT

## 2022-07-14 PROCEDURE — 72157 MRI CHEST SPINE W/O & W/DYE: CPT | Mod: 26,MA

## 2022-07-14 PROCEDURE — 99221 1ST HOSP IP/OBS SF/LOW 40: CPT

## 2022-07-14 PROCEDURE — 99217: CPT

## 2022-07-14 PROCEDURE — 72156 MRI NECK SPINE W/O & W/DYE: CPT | Mod: 26,MA

## 2022-07-14 PROCEDURE — 70553 MRI BRAIN STEM W/O & W/DYE: CPT | Mod: 26,MA

## 2022-07-14 PROCEDURE — 70543 MRI ORBT/FAC/NCK W/O &W/DYE: CPT | Mod: 26,MA

## 2022-07-14 RX ORDER — QUETIAPINE FUMARATE 200 MG/1
50 TABLET, FILM COATED ORAL ONCE
Refills: 0 | Status: COMPLETED | OUTPATIENT
Start: 2022-07-14 | End: 2022-07-15

## 2022-07-14 RX ORDER — QUETIAPINE FUMARATE 200 MG/1
50 TABLET, FILM COATED ORAL ONCE
Refills: 0 | Status: COMPLETED | OUTPATIENT
Start: 2022-07-14 | End: 2022-07-14

## 2022-07-14 RX ORDER — NICOTINE POLACRILEX 2 MG
1 GUM BUCCAL DAILY
Refills: 0 | Status: DISCONTINUED | OUTPATIENT
Start: 2022-07-14 | End: 2022-07-15

## 2022-07-14 RX ORDER — LANOLIN ALCOHOL/MO/W.PET/CERES
3 CREAM (GRAM) TOPICAL ONCE
Refills: 0 | Status: COMPLETED | OUTPATIENT
Start: 2022-07-14 | End: 2022-07-14

## 2022-07-14 RX ORDER — PANTOPRAZOLE SODIUM 20 MG/1
40 TABLET, DELAYED RELEASE ORAL
Refills: 0 | Status: DISCONTINUED | OUTPATIENT
Start: 2022-07-14 | End: 2022-07-18

## 2022-07-14 RX ADMIN — Medication 116 MILLIGRAM(S): at 14:57

## 2022-07-14 RX ADMIN — Medication 1 TABLET(S): at 11:41

## 2022-07-14 RX ADMIN — Medication 81 MILLIGRAM(S): at 11:41

## 2022-07-14 RX ADMIN — Medication 1 PATCH: at 03:39

## 2022-07-14 RX ADMIN — Medication 3 MILLIGRAM(S): at 23:48

## 2022-07-14 RX ADMIN — QUETIAPINE FUMARATE 50 MILLIGRAM(S): 200 TABLET, FILM COATED ORAL at 03:38

## 2022-07-14 NOTE — H&P ADULT - PROBLEM SELECTOR PLAN 1
-IV Solumedrol 1000mg qd - Day 1 of 3  -Will continue to monitor's patient's visual loss and pain with eye movement  -Planning for lumbar Puncture for further w/u

## 2022-07-14 NOTE — H&P ADULT - ATTENDING COMMENTS
MRI reviewed - appreciate neuro and ophtho recs - highly suspicious for optic neuritis due to MS - will start solumedrol.  Hold ASA for now for LP.

## 2022-07-14 NOTE — ED ADULT NURSE REASSESSMENT NOTE - NS ED NURSE REASSESS COMMENT FT1
Patient resting on stretcher, respirations even and unlabored. Report given to MRI. Patient aware. Nicotine patch removed for safety. IV line intact and patent. Awaiting transport to MRI. Stretcher in lowest position, wheels locked, appropriate side rails in place, call bell in reach.

## 2022-07-14 NOTE — ED CDU PROVIDER SUBSEQUENT DAY NOTE - NEUROLOGICAL PRONATOR DRIFT
Left leg angiogram    Findings: High grade common femoral stenosis. Long segment occl;usion of SFA with reconstitution of above knee popliteal artery.  PT and peroneal run -off, occluded AT none

## 2022-07-14 NOTE — MEDICAL STUDENT ADULT H&P (EDUCATION) - NS MD HP STUD ROS GU FT
Pt denies: pain while urinating, changes in urine color, changes in urinary frequency, vaginal bleeding

## 2022-07-14 NOTE — PATIENT PROFILE ADULT - FALL HARM RISK - RISK INTERVENTIONS
Assistance OOB with selected safe patient handling equipment/Assistance with ambulation/Communicate Fall Risk and Risk Factors to all staff, patient, and family/Reinforce activity limits and safety measures with patient and family/Reorient to person, place and time as needed/Review medications for side effects contributing to fall risk/Sit up slowly, dangle for a short time, stand at bedside before walking/Toileting schedule using arm’s reach rule for commode and bathroom/Use of alarms - bed, chair and/or voice tab/Visual Cue: Yellow wristband/Bed in lowest position, wheels locked, appropriate side rails in place/Call bell, personal items and telephone in reach/Instruct patient to call for assistance before getting out of bed or chair/Non-slip footwear when patient is out of bed/[place] to call system/Physically safe environment - no spills, clutter or unnecessary equipment/Purposeful Proactive Rounding/Room/bathroom lighting operational, light cord in reach

## 2022-07-14 NOTE — H&P ADULT - NSHPSOCIALHISTORY_GEN_ALL_CORE
-Drinks alcohol socially   -Smoked for 30 years x 1/2 pack per day = 15 pack years  -No recreational drugs  -Works as   -Lives with family  -Is sexually active

## 2022-07-14 NOTE — CONSULT NOTE ADULT - SUBJECTIVE AND OBJECTIVE BOX
HPI: 52 yo F with PMH chronic migraines, HLD no POH, pw vision changes right eye. Pt endorse noticing yesterday that she has blurry vision in her right eye. The vision progressively become worse today when she noticed she lost her superior VF OD. Endorse having a migraine H/A yesterday but not today. +floaters today but not flashes. Endorse stretching pain in EOM OD only 7/10 became worse today. Denies numbness, tingling, focal weakness, double vision, urinary incontinence, no eye pain without EOM, jaw claudication, scalp tenderness, joint pain. Pt noted that her PMD started her on Xarelto after complaining of toe numbness for 3 weeks in May. Also, on ASA.      PAST MEDICAL & SURGICAL HISTORY:  Migraine      S/P gastric bypass      S/P cholecystectomy        FAMILY HISTORY:  FH: hypertension (Mother)    Family history of type 2 diabetes mellitus (Mother)        SOCIAL HISTORY: SOCIAL HISTORY:     Marital Status: (  )   (  ) Single  (  )   (  )      Occupation:      Lives: (  ) alone  (  ) with children   (  ) with spouse  (  ) with parents  (  ) other     Illicit Drug Use: (  ) never used  (  ) other _____     Tobacco Use:  (  ) never smoked  (  ) former smoker  (  ) current smoker  (  ) pack year  (  ) last cigarette date     Alcohol Use:      Sexual History:        MEDICATIONS  Home Medications:      MEDICATIONS  (STANDING):  aspirin enteric coated 81 milliGRAM(s) Oral daily  methylPREDNISolone sodium succinate IVPB 1000 milliGRAM(s) IV Intermittent daily  multivitamin 1 Tablet(s) Oral daily  nicotine -  14 mG/24Hr(s) Patch 1 Patch Transdermal daily    MEDICATIONS  (PRN):      ALLERGIES/INTOLERANCES:  Allergies  No Known Allergies    Intolerances      OBJECTIVE:  VITALS   Vital Signs Last 24 Hrs  T(C): 36.6 (14 Jul 2022 12:05), Max: 36.6 (14 Jul 2022 02:52)  T(F): 97.9 (14 Jul 2022 12:05), Max: 97.9 (14 Jul 2022 02:52)  HR: 69 (14 Jul 2022 12:05) (61 - 69)  BP: 100/64 (14 Jul 2022 12:05) (100/64 - 133/72)  BP(mean): --  RR: 16 (14 Jul 2022 12:05) (15 - 18)  SpO2: 98% (14 Jul 2022 12:05) (98% - 100%)    Parameters below as of 14 Jul 2022 12:05  Patient On (Oxygen Delivery Method): room air        PHYSICAL EXAM:  Neurological Exam:  MS: Awake, alert, oriented to person, place, month, year, president. Normal affect. Follows all commands.    Language: Speech is clear, fluent with good repetition & comprehension (able to name objects___)    CNs: PERRLA (R = 3mm, L = 3mm). VFF. EOMI no nystagmus, no diplopia. V1-3 intact to LT/pinprick, well developed masseter muscles b/l. No facial asymmetry b/l, Hearing grossly normal (rubbing fingers) b/l. Symmetric palate elevation in midline. Gag reflex deferred. Head turning & shoulder shrug intact b/l. Tongue midline, normal movements, no atrophy.    Fundoscopic: pale w/ sharp discs margins No vascular changes.      Motor: Normal muscle bulk & tone. No noticeable tremor or seizure.               Deltoid	Biceps	Triceps 	   R	5	5	5	5	 	  L	5	5	5	5			    	H-Flex	H-Ext	K-Flex	K-Ext	D-Flex	P-Flex  R	5	5	5	5	5	5		   L	5	5	5	5	5	5		     Sensation: Intact to LT/PP/Temp/Vibration/Position b/l throughout.     Cortical: Extinction on DSS (neglect): none    Reflexes:              Biceps(C5)       BR(C6)     Triceps(C7)               Patellar(L4)    Achilles(S1)    Plantar Resp  R	2	          2	             2		        2		    2		Down   L	2	          2	             2		        2		    2		Down     Coordination: intact rapid-alt movements. No dysmetria to FTN/HTS    Gait:  Normal Romberg. No postural instability. Normal stance and tandem gait.     LABORATORY:  CBC                       12.4   8.65  )-----------( 289      ( 13 Jul 2022 20:28 )             39.0     Chem 07-13    142  |  107  |  11  ----------------------------<  89  3.9   |  24  |  0.57    Ca    8.9      13 Jul 2022 20:28    TPro  8.0  /  Alb  4.2  /  TBili  <0.2  /  DBili  x   /  AST  21  /  ALT  19  /  AlkPhos  115  07-13    LFTs LIVER FUNCTIONS - ( 13 Jul 2022 20:28 )  Alb: 4.2 g/dL / Pro: 8.0 g/dL / ALK PHOS: 115 U/L / ALT: 19 U/L / AST: 21 U/L / GGT: x           Coagulopathy   Lipid Panel   A1c   Cardiac enzymes     U/A   CSF  Immunological  Other    STUDIES & IMAGING:  Studies (EKG, EEG, EMG, etc):     Radiology (XR, CT, MR, U/S, TTE/ARIANA):    MR brain:   Normal sulci and ventricles for patient's stated age. There multiple   FLAIR hyperintense lesions within the subcortical and periventricular   white matter. Several of the lesions appear ovoid in configuration,   perpendicular to the body of the lateral ventricles, and perivenular in   orientation, which is suspicious for a demyelinating process.  There is   no acute intraparenchymal hematoma, mass effect or midline shift. No   abnormal parenchymal enhancement. There is bilateral caudate   susceptibility signal which is nonspecific and may represent chronic   blood products or mineral deposition.    There is no diffusion abnormality to suggest acute or subacute   infarction. Major flow-voids at the base of the brain follow expected   course and contour.    No abnormal extra-axial fluid collections or leptomeningeal enhancement.    The calvarium is intact. Paranasal and tympanomastoid cavities appear   free of acute disease.      MRI ORBITS:    The visualized bony walls of both orbits are grossly normal.    The globes are normal in configuration. There is no area of abnormal   signal intensity or enhancement in the either eye, the optic nerves, the   extraocular muscles, or the lacrimal glands. The superior ophthalmic   veins are normal. The extra conal fat is intact. No mass is seen in   either orbit and the orbital apices are normal. The visualized optic   chiasm and suprasellar region was within normal limits. No gross   abnormality is seen in either cavernous sinus or in the sella turcica   region.    IMPRESSION:    BRAIN MRI: Mild to moderate extent of FLAIR hyperintense white matter   lesions in the periventricular and subcortical white matter. Several   lesions demonstrate perivenular configuration which is relatively   specific for multiple sclerosis. Differential diagnosis includes chronic   microvascular ischemic changes, migraine headaches or other infectious   inflammatory process.    MRI ORBITS: THE MR EVALUATION OF THE ORBITS IS UNREMARKABLE     HPI: 52 yo F with PMH chronic migraines, HLD no POH, pw vision changes right eye. Pt endorse noticing yesterday that she has blurry vision in her right eye. The vision progressively become worse today when she noticed she lost her superior VF OD. Endorse having a migraine H/A yesterday but not today. +floaters today but not flashes. Endorse stretching pain in EOM OD only 7/10 became worse today. Denies numbness, tingling, focal weakness, double vision, urinary incontinence, no eye pain without EOM, jaw claudication, scalp tenderness, joint pain. Pt noted that her PMD started her on Xarelto after complaining of toe numbness for 3 weeks in May. Also, on ASA.      PAST MEDICAL & SURGICAL HISTORY:  Migraine      S/P gastric bypass      S/P cholecystectomy        FAMILY HISTORY:  FH: hypertension (Mother)    Family history of type 2 diabetes mellitus (Mother)        SOCIAL HISTORY: SOCIAL HISTORY:     Marital Status: (  )   (  ) Single  (  )   (  )      Occupation:      Lives: (  ) alone  (  ) with children   (  ) with spouse  (  ) with parents  (  ) other     Illicit Drug Use: (  ) never used  (  ) other _____     Tobacco Use:  (  ) never smoked  (  ) former smoker  (  ) current smoker  (  ) pack year  (  ) last cigarette date     Alcohol Use:      Sexual History:        MEDICATIONS  Home Medications:      MEDICATIONS  (STANDING):  aspirin enteric coated 81 milliGRAM(s) Oral daily  methylPREDNISolone sodium succinate IVPB 1000 milliGRAM(s) IV Intermittent daily  multivitamin 1 Tablet(s) Oral daily  nicotine -  14 mG/24Hr(s) Patch 1 Patch Transdermal daily    MEDICATIONS  (PRN):      ALLERGIES/INTOLERANCES:  Allergies  No Known Allergies    Intolerances      OBJECTIVE:  VITALS   Vital Signs Last 24 Hrs  T(C): 36.6 (14 Jul 2022 12:05), Max: 36.6 (14 Jul 2022 02:52)  T(F): 97.9 (14 Jul 2022 12:05), Max: 97.9 (14 Jul 2022 02:52)  HR: 69 (14 Jul 2022 12:05) (61 - 69)  BP: 100/64 (14 Jul 2022 12:05) (100/64 - 133/72)  BP(mean): --  RR: 16 (14 Jul 2022 12:05) (15 - 18)  SpO2: 98% (14 Jul 2022 12:05) (98% - 100%)    Parameters below as of 14 Jul 2022 12:05  Patient On (Oxygen Delivery Method): room air        PHYSICAL EXAM:  Neurological Exam:  MS: Awake, alert, oriented to person, place, month, year, president. Normal affect. Follows all commands.    Language: Speech is clear, fluent with good repetition & comprehension (able to name objects)    CNs: PERRLA (R = 3mm, L = 3mm). R eye upper half visual field loss. EOMI, R eye pain with EOM, no nystagmus, no diplopia. V1-3 intact to LT/pinprick, well developed masseter muscles b/l. No facial asymmetry b/l, Hearing grossly normal (rubbing fingers) b/l. Symmetric palate elevation in midline. Gag reflex deferred. Head turning & shoulder shrug intact b/l. Tongue midline, normal movements, no atrophy.    Very mild tenderness of R temple region.     Fundoscopic: difficult to visualize    Motor: Normal muscle bulk & tone. No noticeable tremor or seizure.               Deltoid	Biceps	Triceps 	   R	5	5	5	5	 	  L	5	5	5	5			    	H-Flex	H-Ext	K-Flex	K-Ext	D-Flex	P-Flex  R	5	5	5	5	5	5		   L	5	5	5	5	5	5		     Sensation: Intact to LT/PP/Temp/Vibration/Position b/l throughout.     Reflexes:              Biceps(C5)       BR(C6)     Triceps(C7)               Patellar(L4)    Achilles(S1)    Plantar Resp  R	2	          2	             2		        2		    2		Down   L	2	          2	             2		        2		    2		Down     Coordination:. No dysmetria to FTN/HTS    Gait:  Normal Romberg. No postural instability. Normal stance and tandem gait.     LABORATORY:  CBC                       12.4   8.65  )-----------( 289      ( 13 Jul 2022 20:28 )             39.0     Chem 07-13    142  |  107  |  11  ----------------------------<  89  3.9   |  24  |  0.57    Ca    8.9      13 Jul 2022 20:28    TPro  8.0  /  Alb  4.2  /  TBili  <0.2  /  DBili  x   /  AST  21  /  ALT  19  /  AlkPhos  115  07-13    LFTs LIVER FUNCTIONS - ( 13 Jul 2022 20:28 )  Alb: 4.2 g/dL / Pro: 8.0 g/dL / ALK PHOS: 115 U/L / ALT: 19 U/L / AST: 21 U/L / GGT: x           Coagulopathy   Lipid Panel   A1c   Cardiac enzymes     U/A   CSF  Immunological  Other    STUDIES & IMAGING:  Studies (EKG, EEG, EMG, etc):     Radiology (XR, CT, MR, U/S, TTE/ARIANA):    MR brain:   Normal sulci and ventricles for patient's stated age. There multiple   FLAIR hyperintense lesions within the subcortical and periventricular   white matter. Several of the lesions appear ovoid in configuration,   perpendicular to the body of the lateral ventricles, and perivenular in   orientation, which is suspicious for a demyelinating process.  There is   no acute intraparenchymal hematoma, mass effect or midline shift. No   abnormal parenchymal enhancement. There is bilateral caudate   susceptibility signal which is nonspecific and may represent chronic   blood products or mineral deposition.    There is no diffusion abnormality to suggest acute or subacute   infarction. Major flow-voids at the base of the brain follow expected   course and contour.    No abnormal extra-axial fluid collections or leptomeningeal enhancement.    The calvarium is intact. Paranasal and tympanomastoid cavities appear   free of acute disease.      MRI ORBITS:    The visualized bony walls of both orbits are grossly normal.    The globes are normal in configuration. There is no area of abnormal   signal intensity or enhancement in the either eye, the optic nerves, the   extraocular muscles, or the lacrimal glands. The superior ophthalmic   veins are normal. The extra conal fat is intact. No mass is seen in   either orbit and the orbital apices are normal. The visualized optic   chiasm and suprasellar region was within normal limits. No gross   abnormality is seen in either cavernous sinus or in the sella turcica   region.    IMPRESSION:    BRAIN MRI: Mild to moderate extent of FLAIR hyperintense white matter   lesions in the periventricular and subcortical white matter. Several   lesions demonstrate perivenular configuration which is relatively   specific for multiple sclerosis. Differential diagnosis includes chronic   microvascular ischemic changes, migraine headaches or other infectious   inflammatory process.    MRI ORBITS: THE MR EVALUATION OF THE ORBITS IS UNREMARKABLE     HPI: 52 yo F with PMH chronic migraines, HLD, pw vision changes right eye. Pt states she noticed yesterday that she has blurry vision in her right eye and she noticed she lost her superior VF in R eye associated with floaters today. Endorse stretching pain in R eye with EOM 7/10 pain since yesterday. No prior hx of such symptoms.  Pt had her typical migraine yesterday but today states she had a regular frontal HA (not her typical migraine symptoms). Typical presentation of her migraines is posterior neck pain that radiates upwards bilaterally with associated photophobia, floaters bilaterally in eyes, with no loss of vision. Pt states 1 month ago she has a frontal HA (no migraine like symptoms) with bilateral feet numbness for about 1 weeks. Went away for few days and then came back again in both feet, eventually localized to R feet- intermittent symptoms. Pt saw her PCP who prescribed her xarelto (thinking bilateral feet tingling was a stroke) which she took for a few days during which time she rolled bladder (complicated by L leg hematoma) and fractured her L 2nd toe and had L foot surgery. Pt currently has numbness in L 2nd, 3rd and 4th toes 2/2 recent surgery but denies any other numbness, tingling, focal weakness,  urinary incontinence, no eye pain without EOM, jaw claudication, scalp tenderness, joint pain. Pt currently takes baby ASA daily for stroke prophylaxis. No hx of strokes in the past.     PAST MEDICAL & SURGICAL HISTORY:  Migraine      S/P gastric bypass      S/P cholecystectomy        FAMILY HISTORY:  FH: hypertension (Mother)    Family history of type 2 diabetes mellitus (Mother)        SOCIAL HISTORY: SOCIAL HISTORY:     Marital Status: (  )   (  ) Single  (  )   (  )      Occupation:      Lives: (  ) alone  (  ) with children   (  ) with spouse  (  ) with parents  (  ) other     Illicit Drug Use: (  ) never used  (  ) other _____     Tobacco Use:  (  ) never smoked  (  ) former smoker  (  ) current smoker  (  ) pack year  (  ) last cigarette date     Alcohol Use:      Sexual History:        MEDICATIONS  Home Medications:      MEDICATIONS  (STANDING):  aspirin enteric coated 81 milliGRAM(s) Oral daily  methylPREDNISolone sodium succinate IVPB 1000 milliGRAM(s) IV Intermittent daily  multivitamin 1 Tablet(s) Oral daily  nicotine -  14 mG/24Hr(s) Patch 1 Patch Transdermal daily    MEDICATIONS  (PRN):      ALLERGIES/INTOLERANCES:  Allergies  No Known Allergies    Intolerances      OBJECTIVE:  VITALS   Vital Signs Last 24 Hrs  T(C): 36.6 (14 Jul 2022 12:05), Max: 36.6 (14 Jul 2022 02:52)  T(F): 97.9 (14 Jul 2022 12:05), Max: 97.9 (14 Jul 2022 02:52)  HR: 69 (14 Jul 2022 12:05) (61 - 69)  BP: 100/64 (14 Jul 2022 12:05) (100/64 - 133/72)  BP(mean): --  RR: 16 (14 Jul 2022 12:05) (15 - 18)  SpO2: 98% (14 Jul 2022 12:05) (98% - 100%)    Parameters below as of 14 Jul 2022 12:05  Patient On (Oxygen Delivery Method): room air        PHYSICAL EXAM:  Neurological Exam:  MS: Awake, alert, oriented to person, place, month, year, president. Normal affect. Follows all commands.    Language: Speech is clear, fluent with good repetition & comprehension (able to name objects)    CNs: Pupils 5mm bilaterally dilated by optho. R eye upper half visual field loss. Red color desaturation in R eye. EOMI, R eye pain with EOM, no nystagmus, diplopia in all direction except in center of visual field when looking with both eyes. V1-3 intact to LT/pinprick, well developed masseter muscles b/l. No facial asymmetry b/l, Hearing grossly normal (rubbing fingers) b/l. Symmetric palate elevation in midline. Gag reflex deferred. Head turning & shoulder shrug intact b/l. Tongue midline, normal movements, no atrophy.    Very mild tenderness of R temple region.     Fundoscopic: Difficult to visualize.     Motor: Normal muscle bulk & tone. No noticeable tremor or seizure.               Deltoid	Biceps	Triceps 	   R	5	5	5	5	 	  L	5	5	5	5			    	H-Flex	H-Ext	K-Flex	K-Ext	D-Flex	P-Flex  R	5	5	5	5	5	5		   L	5	5	5	5	5	5		     Sensation: Intact to LT/PP/Temp/Vibration/Position b/l throughout.     Reflexes:              Biceps(C5)       BR(C6)     Triceps(C7)               Patellar(L4)    Achilles(S1)    Plantar Resp  R	2	          2	             2		        2		    2		Down   L	2	          2	             2		        2		    2		Down     Coordination:. No dysmetria to FTN/HTS    Gait:  Deferred    LABORATORY:  CBC                       12.4   8.65  )-----------( 289      ( 13 Jul 2022 20:28 )             39.0     Chem 07-13    142  |  107  |  11  ----------------------------<  89  3.9   |  24  |  0.57    Ca    8.9      13 Jul 2022 20:28    TPro  8.0  /  Alb  4.2  /  TBili  <0.2  /  DBili  x   /  AST  21  /  ALT  19  /  AlkPhos  115  07-13    LFTs LIVER FUNCTIONS - ( 13 Jul 2022 20:28 )  Alb: 4.2 g/dL / Pro: 8.0 g/dL / ALK PHOS: 115 U/L / ALT: 19 U/L / AST: 21 U/L / GGT: x           Coagulopathy   Lipid Panel   A1c   Cardiac enzymes     U/A   CSF  Immunological  Other    STUDIES & IMAGING:  Studies (EKG, EEG, EMG, etc):     Radiology (XR, CT, MR, U/S, TTE/ARIANA):    MR brain:   Normal sulci and ventricles for patient's stated age. There multiple   FLAIR hyperintense lesions within the subcortical and periventricular   white matter. Several of the lesions appear ovoid in configuration,   perpendicular to the body of the lateral ventricles, and perivenular in   orientation, which is suspicious for a demyelinating process.  There is   no acute intraparenchymal hematoma, mass effect or midline shift. No   abnormal parenchymal enhancement. There is bilateral caudate   susceptibility signal which is nonspecific and may represent chronic   blood products or mineral deposition.    There is no diffusion abnormality to suggest acute or subacute   infarction. Major flow-voids at the base of the brain follow expected   course and contour.    No abnormal extra-axial fluid collections or leptomeningeal enhancement.    The calvarium is intact. Paranasal and tympanomastoid cavities appear   free of acute disease.      MRI ORBITS:    The visualized bony walls of both orbits are grossly normal.    The globes are normal in configuration. There is no area of abnormal   signal intensity or enhancement in the either eye, the optic nerves, the   extraocular muscles, or the lacrimal glands. The superior ophthalmic   veins are normal. The extra conal fat is intact. No mass is seen in   either orbit and the orbital apices are normal. The visualized optic   chiasm and suprasellar region was within normal limits. No gross   abnormality is seen in either cavernous sinus or in the sella turcica   region.    IMPRESSION:    BRAIN MRI: Mild to moderate extent of FLAIR hyperintense white matter   lesions in the periventricular and subcortical white matter. Several   lesions demonstrate perivenular configuration which is relatively   specific for multiple sclerosis. Differential diagnosis includes chronic   microvascular ischemic changes, migraine headaches or other infectious   inflammatory process.    MRI ORBITS: THE MR EVALUATION OF THE ORBITS IS UNREMARKABLE    MR CTL spine w/o contrast:   MR cervical spine: No high-grade central canal stenosis. No abnormal cord   signal abnormality or enhancement.  MR thoracic spine: No high-grade central canal stenosis. Focal short   segment abnormal cord signal with possible enhancement at T9 which may   represent a focus of active demyelination.  MR lumbar spine: No high-grade central canal stenosis.   HPI: 52 yo F with PMH chronic migraines, HLD, pw vision changes right eye. Pt states she noticed yesterday that she has blurry vision in her right eye and she noticed she lost her superior VF in R eye associated with floaters today. Endorse stretching pain in R eye with EOM 7/10 pain since yesterday. No prior hx of such symptoms.  Pt had her typical migraine yesterday but today states she had a regular frontal HA (not her typical migraine symptoms). Typical presentation of her migraines is posterior neck pain that radiates upwards bilaterally with associated photophobia, floaters bilaterally in eyes, with no loss of vision. Pt states 1 month ago she has a frontal HA (no migraine like symptoms) with bilateral feet numbness for about 1 weeks. Went away for few days and then came back again in both feet, eventually localized to R feet- intermittent symptoms. Pt saw her PCP who prescribed her xarelto (thinking bilateral feet tingling was a stroke) which she took for a few days during which time she rolled bladder (complicated by L leg hematoma) and fractured her L 2nd toe and had L foot surgery. Pt currently has numbness in L 2nd, 3rd and 4th toes 2/2 recent surgery but denies any other numbness, tingling, focal weakness,  urinary incontinence, no eye pain without EOM, jaw claudication, scalp tenderness, joint pain. Pt currently takes baby ASA daily for stroke prophylaxis. No hx of strokes in the past.     PAST MEDICAL & SURGICAL HISTORY:  Migraine      S/P gastric bypass      S/P cholecystectomy        FAMILY HISTORY:  FH: hypertension (Mother)    Family history of type 2 diabetes mellitus (Mother)        SOCIAL HISTORY: SOCIAL HISTORY:     Marital Status: (  )   (  ) Single  (  )   (  )      Occupation:      Lives: (  ) alone  (  ) with children   (  ) with spouse  (  ) with parents  (  ) other     Illicit Drug Use: (  ) never used  (  ) other _____     Tobacco Use:  (  ) never smoked  (  ) former smoker  (  ) current smoker  (  ) pack year  (  ) last cigarette date     Alcohol Use:      Sexual History:        MEDICATIONS  Home Medications:      MEDICATIONS  (STANDING):  aspirin enteric coated 81 milliGRAM(s) Oral daily  methylPREDNISolone sodium succinate IVPB 1000 milliGRAM(s) IV Intermittent daily  multivitamin 1 Tablet(s) Oral daily  nicotine -  14 mG/24Hr(s) Patch 1 Patch Transdermal daily    MEDICATIONS  (PRN):      ALLERGIES/INTOLERANCES:  Allergies  No Known Allergies    Intolerances      OBJECTIVE:  VITALS   Vital Signs Last 24 Hrs  T(C): 36.6 (14 Jul 2022 12:05), Max: 36.6 (14 Jul 2022 02:52)  T(F): 97.9 (14 Jul 2022 12:05), Max: 97.9 (14 Jul 2022 02:52)  HR: 69 (14 Jul 2022 12:05) (61 - 69)  BP: 100/64 (14 Jul 2022 12:05) (100/64 - 133/72)  BP(mean): --  RR: 16 (14 Jul 2022 12:05) (15 - 18)  SpO2: 98% (14 Jul 2022 12:05) (98% - 100%)    Parameters below as of 14 Jul 2022 12:05  Patient On (Oxygen Delivery Method): room air        PHYSICAL EXAM:  Neurological Exam:  MS: Awake, alert, oriented to person, place, month, year, president. Normal affect. Follows all commands.    Language: Speech is clear, fluent with good repetition & comprehension (able to name objects)    CNs: Pupils 5mm bilaterally dilated by optho. R eye upper half visual field loss. Red color desaturation in R eye. EOMI, R eye pain with EOM, no nystagmus, diplopia in all direction except in center of visual field when looking with both eyes. V1-3 intact to LT/pinprick, well developed masseter muscles b/l. No facial asymmetry b/l, Hearing grossly normal (rubbing fingers) b/l. Symmetric palate elevation in midline. Gag reflex deferred. Head turning & shoulder shrug intact b/l. Tongue midline, normal movements, no atrophy.    Very mild tenderness of R temple region.     Fundoscopic: Difficult to visualize.     Motor: Normal muscle bulk & tone. No noticeable tremor or seizure.               Deltoid	Biceps	Triceps 	   R	5	5	5	5	 	  L	5	5	5	5			    	H-Flex	H-Ext	K-Flex	K-Ext	D-Flex	P-Flex  R	5	5	5	5	5	5		   L	5	5	5	5	5	5		     Sensation: Intact to LT/PP/Temp/Vibration/Position b/l throughout.     Reflexes:              Biceps(C5)       BR(C6)     Triceps(C7)               Patellar(L4)    Achilles(S1)    Plantar Resp  R	2	          2	             2		        2		    2		Down   L	2	          2	             2		        2		    2		Down     Coordination:. No dysmetria to FTN/HTS    Gait:  Deferred    LABORATORY:  CBC                       12.4   8.65  )-----------( 289      ( 13 Jul 2022 20:28 )             39.0     Chem 07-13    142  |  107  |  11  ----------------------------<  89  3.9   |  24  |  0.57    Ca    8.9      13 Jul 2022 20:28    TPro  8.0  /  Alb  4.2  /  TBili  <0.2  /  DBili  x   /  AST  21  /  ALT  19  /  AlkPhos  115  07-13    LFTs LIVER FUNCTIONS - ( 13 Jul 2022 20:28 )  Alb: 4.2 g/dL / Pro: 8.0 g/dL / ALK PHOS: 115 U/L / ALT: 19 U/L / AST: 21 U/L / GGT: x           Coagulopathy   Lipid Panel   A1c   Cardiac enzymes     U/A   CSF  Immunological  Other    STUDIES & IMAGING:  Studies (EKG, EEG, EMG, etc):     Radiology (XR, CT, MR, U/S, TTE/ARIANA):    MR brain:   Normal sulci and ventricles for patient's stated age. There multiple   FLAIR hyperintense lesions within the subcortical and periventricular   white matter. Several of the lesions appear ovoid in configuration,   perpendicular to the body of the lateral ventricles, and perivenular in   orientation, which is suspicious for a demyelinating process.  There is   no acute intraparenchymal hematoma, mass effect or midline shift. No   abnormal parenchymal enhancement. There is bilateral caudate   susceptibility signal which is nonspecific and may represent chronic   blood products or mineral deposition.    There is no diffusion abnormality to suggest acute or subacute   infarction. Major flow-voids at the base of the brain follow expected   course and contour.    No abnormal extra-axial fluid collections or leptomeningeal enhancement.    The calvarium is intact. Paranasal and tympanomastoid cavities appear   free of acute disease.      MRI ORBITS:    The visualized bony walls of both orbits are grossly normal.    The globes are normal in configuration. There is no area of abnormal   signal intensity or enhancement in the either eye, the optic nerves, the   extraocular muscles, or the lacrimal glands. The superior ophthalmic   veins are normal. The extra conal fat is intact. No mass is seen in   either orbit and the orbital apices are normal. The visualized optic   chiasm and suprasellar region was within normal limits. No gross   abnormality is seen in either cavernous sinus or in the sella turcica   region.    IMPRESSION:    BRAIN MRI: Mild to moderate extent of FLAIR hyperintense white matter   lesions in the periventricular and subcortical white matter. Several   lesions demonstrate perivenular configuration which is relatively   specific for multiple sclerosis. Differential diagnosis includes chronic   microvascular ischemic changes, migraine headaches or other infectious   inflammatory process.    MRI ORBITS:  Upon further review there is T2 signal hyperintensity and enhancement   involving the nerve sheath andnerve of the intraorbital segment of the   right optic nerve consistent with optic neuritis and perineuritis.      MR CTL spine w and  w/o contrast:   MR cervical spine: No high-grade central canal stenosis. No abnormal cord   signal abnormality or enhancement.  MR thoracic spine: No high-grade central canal stenosis. Focal short   segment abnormal cord signal with possible enhancement at T9 which may   represent a focus of active demyelination.  MR lumbar spine: No high-grade central canal stenosis.   HPI: 52 yo F with PMH chronic migraines, HLD, pw vision changes right eye. Pt states she noticed yesterday that she has blurry vision in her right eye and she noticed she lost her superior VF in R eye associated with floaters today. Endorse stretching pain in R eye with EOM 7/10 pain since yesterday. No prior hx of such symptoms.  Pt had her typical migraine yesterday but today states she had a regular frontal HA (not her typical migraine symptoms). Typical presentation of her migraines is posterior neck pain that radiates upwards bilaterally with associated photophobia, floaters bilaterally in eyes, with no loss of vision. Pt states 1 month ago she has a frontal HA (no migraine like symptoms) with bilateral feet numbness for about 1 weeks. Went away for few days and then came back again in both feet, eventually localized to R feet- intermittent symptoms. Pt saw her PCP who prescribed her xarelto (thinking bilateral feet tingling was a stroke) which she took for a few days during which time she rolled bladder (complicated by L leg hematoma) and fractured her L 2nd toe and had L foot surgery. Pt currently has numbness in L 2nd, 3rd and 4th toes 2/2 recent surgery but denies any other numbness, tingling, focal weakness,  urinary incontinence, no eye pain without EOM, jaw claudication, scalp tenderness, joint pain. Pt currently takes baby ASA daily for stroke prophylaxis. No hx of strokes in the past.     PAST MEDICAL & SURGICAL HISTORY:  Migraine      S/P gastric bypass      S/P cholecystectomy        FAMILY HISTORY:  FH: hypertension (Mother)    Family history of type 2 diabetes mellitus (Mother)        SOCIAL HISTORY: SOCIAL HISTORY:     Marital Status: (  x)   (  ) Single  (  )   (  )      Occupation:      Lives: (  ) alone  (  ) with children   (  x) with spouse  (  ) with parents  (  ) other      MEDICATIONS  Home Medications:      MEDICATIONS  (STANDING):  aspirin enteric coated 81 milliGRAM(s) Oral daily  methylPREDNISolone sodium succinate IVPB 1000 milliGRAM(s) IV Intermittent daily  multivitamin 1 Tablet(s) Oral daily  nicotine -  14 mG/24Hr(s) Patch 1 Patch Transdermal daily    MEDICATIONS  (PRN):      ALLERGIES/INTOLERANCES:  Allergies  No Known Allergies    Intolerances      OBJECTIVE:  VITALS   Vital Signs Last 24 Hrs  T(C): 36.6 (14 Jul 2022 12:05), Max: 36.6 (14 Jul 2022 02:52)  T(F): 97.9 (14 Jul 2022 12:05), Max: 97.9 (14 Jul 2022 02:52)  HR: 69 (14 Jul 2022 12:05) (61 - 69)  BP: 100/64 (14 Jul 2022 12:05) (100/64 - 133/72)  BP(mean): --  RR: 16 (14 Jul 2022 12:05) (15 - 18)  SpO2: 98% (14 Jul 2022 12:05) (98% - 100%)    Parameters below as of 14 Jul 2022 12:05  Patient On (Oxygen Delivery Method): room air        PHYSICAL EXAM:  Constitutional: Female, appears stated age, in no apparent distress including pain  Head: Normocephalic & Atraumatic.  Respiratory: Breathing comfortably.  Extremities: No cyanosis, clubbing, or edema  Skin: no rash, no bruising     Neurological Exam:  MS: Awake, alert, oriented to person, place, month, year, president. Normal affect. Follows all commands.    Language: Speech is clear, fluent with good repetition & comprehension (able to name objects)    CNs: Pupils 5mm bilaterally dilated by optho. R eye upper half visual field loss. Red color desaturation in R eye. EOMI, R eye pain with EOM, no nystagmus, diplopia in all direction except in center of visual field when looking with both eyes. V1-3 intact to LT/pinprick, well developed masseter muscles b/l. No facial asymmetry b/l, Hearing grossly normal (rubbing fingers) b/l. Symmetric palate elevation in midline. Gag reflex deferred. Head turning & shoulder shrug intact b/l. Tongue midline, normal movements, no atrophy.    Very mild tenderness of R temple region.     Fundoscopic: Difficult to visualize.     Motor: Normal muscle bulk & tone. No noticeable tremor or seizure.               Deltoid	Biceps	Triceps 	   R	5	5	5	5	 	  L	5	5	5	5			    	H-Flex	H-Ext	K-Flex	K-Ext	D-Flex	P-Flex  R	5	5	5	5	5	5		   L	5	5	5	5	5	5		     Sensation: Intact to LT/PP/Temp/Vibration/Position b/l throughout.     Reflexes:              Biceps(C5)       BR(C6)     Triceps(C7)               Patellar(L4)    Achilles(S1)    Plantar Resp  R	2	          2	             2		        2		    2		Down   L	2	          2	             2		        2		    2		Down     Coordination:. No dysmetria to FTN/HTS    Gait:  Deferred    LABORATORY:  CBC                       12.4   8.65  )-----------( 289      ( 13 Jul 2022 20:28 )             39.0     Chem 07-13    142  |  107  |  11  ----------------------------<  89  3.9   |  24  |  0.57    Ca    8.9      13 Jul 2022 20:28    TPro  8.0  /  Alb  4.2  /  TBili  <0.2  /  DBili  x   /  AST  21  /  ALT  19  /  AlkPhos  115  07-13    LFTs LIVER FUNCTIONS - ( 13 Jul 2022 20:28 )  Alb: 4.2 g/dL / Pro: 8.0 g/dL / ALK PHOS: 115 U/L / ALT: 19 U/L / AST: 21 U/L / GGT: x           Coagulopathy   Lipid Panel   A1c   Cardiac enzymes     U/A   CSF  Immunological  Other    STUDIES & IMAGING:  Studies (EKG, EEG, EMG, etc):     Radiology (XR, CT, MR, U/S, TTE/ARIANA):    MR brain:   Normal sulci and ventricles for patient's stated age. There multiple   FLAIR hyperintense lesions within the subcortical and periventricular   white matter. Several of the lesions appear ovoid in configuration,   perpendicular to the body of the lateral ventricles, and perivenular in   orientation, which is suspicious for a demyelinating process.  There is   no acute intraparenchymal hematoma, mass effect or midline shift. No   abnormal parenchymal enhancement. There is bilateral caudate   susceptibility signal which is nonspecific and may represent chronic   blood products or mineral deposition.    There is no diffusion abnormality to suggest acute or subacute   infarction. Major flow-voids at the base of the brain follow expected   course and contour.    No abnormal extra-axial fluid collections or leptomeningeal enhancement.    The calvarium is intact. Paranasal and tympanomastoid cavities appear   free of acute disease.      MRI ORBITS:    The visualized bony walls of both orbits are grossly normal.    The globes are normal in configuration. There is no area of abnormal   signal intensity or enhancement in the either eye, the optic nerves, the   extraocular muscles, or the lacrimal glands. The superior ophthalmic   veins are normal. The extra conal fat is intact. No mass is seen in   either orbit and the orbital apices are normal. The visualized optic   chiasm and suprasellar region was within normal limits. No gross   abnormality is seen in either cavernous sinus or in the sella turcica   region.    IMPRESSION:    BRAIN MRI: Mild to moderate extent of FLAIR hyperintense white matter   lesions in the periventricular and subcortical white matter. Several   lesions demonstrate perivenular configuration which is relatively   specific for multiple sclerosis. Differential diagnosis includes chronic   microvascular ischemic changes, migraine headaches or other infectious   inflammatory process.    MRI ORBITS:  Upon further review there is T2 signal hyperintensity and enhancement   involving the nerve sheath andnerve of the intraorbital segment of the   right optic nerve consistent with optic neuritis and perineuritis.      MR CTL spine w and  w/o contrast:   MR cervical spine: No high-grade central canal stenosis. No abnormal cord   signal abnormality or enhancement.  MR thoracic spine: No high-grade central canal stenosis. Focal short   segment abnormal cord signal with possible enhancement at T9 which may   represent a focus of active demyelination.  MR lumbar spine: No high-grade central canal stenosis.

## 2022-07-14 NOTE — ED CDU PROVIDER SUBSEQUENT DAY NOTE - ATTENDING APP SHARED VISIT CONTRIBUTION OF CARE
I have personally performed a history and physical examination of the patient and discussed management with the PAUL as well as the patient.  I reviewed the PAUL's note and agree with the documented findings and plan of care.  I have authored and modified critical sections of the Provider Note, including but not limited to HPI, Physical Exam and MDM.    52 Y/O F PMH migraines states she has had blurry vision in her R eye one day prior to ED presentation which progressed to the loss of her upper visual field today. Pt was seen by Ophthalmology, CRP normal, pt otherwise neurologically intact. Pt has noted back pain and numbness of the R foot previously. Plan is CDU for MRI, Ophthalmology re-eval. Pt is a smoker (10 cigarettes per day on average as per Pt). Pt has been stable while in CDU, awaiting MRI.    52 Y/O F PMH migraines states she has had blurry vision in her R eye one day prior to ED presentation which progressed to the loss of her upper visual field today. Pt was seen by Ophthalmology, CRP normal, ESR 28. Pt otherwise neurologically intact, however complaining of intermittent paresthesias in alternating limbs. Pt has noted back pain and numbness of the R foot previously. No acute events during stay. MRI performed, pending results. Pt seen by Opthalmology and recommending solu-medrol 1gram qd x 3d. Concern for optic neuritis on their exam, recommended neurology consult and additional labs. Given neuro complaints and findings, concern for MS. Will discuss with neurology and likely admit.

## 2022-07-14 NOTE — ED CDU PROVIDER SUBSEQUENT DAY NOTE - MEDICAL DECISION MAKING DETAILS
50 Y/O F PMH migraines states she has had blurry vision in her R eye one day prior to ED presentation which progressed to the loss of her upper visual field today. Pt was seen by Ophthalmology, CRP normal, pt otherwise neurologically intact. Pt has noted back pain and numbness of the R foot previously. Plan is CDU for MRI, Ophthalmology re-eval. Pt is a smoker (10 cigarettes per day on average as per Pt). Plan is CDU placement for MRI, Ophthalmology reassessment. CXR with no signs of sarcoidosis.

## 2022-07-14 NOTE — MEDICAL STUDENT ADULT H&P (EDUCATION) - NS MD HP STUD MEDICATIONS FT
Aspirin 81 mg daily  Crestor 20 mg at night  Seroquel 50 mg  Dulcolax for Women  Vitamin C  Vitamin D  Zinc  Omega-3

## 2022-07-14 NOTE — H&P ADULT - NSHPLABSRESULTS_GEN_ALL_CORE
LABS:  cret                        12.4   8.65  )-----------( 289      ( 13 Jul 2022 20:28 )             39.0     07-13    142  |  107  |  11  ----------------------------<  89  3.9   |  24  |  0.57    Ca    8.9      13 Jul 2022 20:28    TPro  8.0  /  Alb  4.2  /  TBili  <0.2  /  DBili  x   /  AST  21  /  ALT  19  /  AlkPhos  115  07-13     BRAIN MRI: Mild to moderate extent of FLAIR hyperintense white matter   lesions in the periventricular and subcortical white matter. Several   lesions demonstrate perivenular configuration which is relatively   specific for multiple sclerosis. Differential diagnosis includes chronic   microvascular ischemic changes, migraine headaches or other infectious   inflammatory process.    Upon further review there is T2 signal hyperintensity and enhancement   involving the nerve sheath andnerve of the intraorbital segment of the   right optic nerve consistent with optic neuritis and perineuritis.

## 2022-07-14 NOTE — ED CDU PROVIDER DISPOSITION NOTE - CLINICAL COURSE
52 y/o female pmh migraines, hld c/o L eye blurry vision. Pt admits to blurry vision to L eye which progressed to visual loss in upper half of her field of vision. Pt was seen by Optho who rec to place pt in CDU for MRI brain/orbit. Pt was found to have signs concerning for optic neuritis on dilated exam and MR found white matter lesions concerning for MS. Pt received 1G solumedrol and is to be admitted for further workup

## 2022-07-14 NOTE — H&P ADULT - NSHPREVIEWOFSYSTEMS_GEN_ALL_CORE
REVIEW OF SYSTEMS  General: No weight loss	  HEENT: +diplopia, no neck stiffness  Respiratory and Thorax: No dyspnea, no increased work of breathing  Cardiovascular: No chest pain, no palpitations	  Gastrointestinal: No nausea, vomiting, diarrhea, constipation, or abdominal pain  Genitourinary: No dysuria, hematuria, or polyuria	  Musculoskeletal: No myalgias, no arthralgias	  Neurological: +headache, +bilateral numbness/tingling in feet, no weakness, no dizziness, no ataxia		  Hematology/Lymphatics: No fever, no chills, no night sweats [facility]

## 2022-07-14 NOTE — ED CDU PROVIDER SUBSEQUENT DAY NOTE - PROGRESS NOTE DETAILS
Dr. Dominguez: Case discussed with neurology for evaluation of optic neuritis after opthalmology's evaluation. Pending bedside eval. MRI concerning for signs of MS, pt given 1G solumedrol and will be admitted to medicine for further work up

## 2022-07-14 NOTE — CONSULT NOTE ADULT - ASSESSMENT
INCOMPLETE    HPI: 50 yo F with PMH chronic migraines, HLD no POH, pw vision changes right eye. Pt endorse noticing yesterday that she has blurry vision in her right eye. The vision progressively become worse today when she noticed she lost her superior VF OD. Endorse having a migraine H/A yesterday but not today. +floaters today but not flashes. Endorse stretching pain in EOM OD only 7/10 became worse today. Denies numbness, tingling, focal weakness, double vision, urinary incontinence, no eye pain without EOM, jaw claudication, scalp tenderness, joint pain. Pt noted that her PMD started her on Xarelto after complaining of toe numbness for 3 weeks in May. Also, on ASA.     Neuro exam revealed    BRAIN MRI: Mild to moderate extent of FLAIR hyperintense white matter lesions in the periventricular and subcortical white matter. Several lesions demonstrate perivenular configuration which is relatively specific for multiple sclerosis. Differential diagnosis includes chronic microvascular ischemic changes, migraine headaches or other infectious inflammatory process.  MRI ORBITS: unremarkable.    Impression: R eye pain with EOM and R eye vision loss may be 2/2 possible optic neuritis vs. other etiology. There is concern for MS given white matter changes in the periventricular and subcortical white matter.    Recommendations:  [ ] Appreciate optho consult  [ ] LP if attending approves: glucose, protein, cell count, cryptococcal, IgG,  MOG, myelin basic protein, NMO, NMO/aqp4 fACS, oligoclonal bands, CSF electrophoresis  [ ] Bloodwork: NMO ab, MOG ab, dsDNA, TOMMY, ESR, CRP  [ ] IV solumedrol 1g 3-5 days if neurology attending approves.   [ ] vitamin D, Ca supplementation, protonix 40mg daily if started on steroids,   [ ] monitor FS daily, avoid use of concurrent NSAIDS  [ ] PT/OT,   [ ] Fall precautions    To be discussed with neurology attending and will be formally staffed by attending during morning rounds. Recommendations will be finalized once signed by attending.        INCOMPLETE    HPI: 50 yo F with PMH chronic migraines, HLD no POH, pw vision changes right eye. Pt endorse noticing yesterday that she has blurry vision in her right eye. The vision progressively become worse today when she noticed she lost her superior VF OD. Endorse having a migraine H/A yesterday but not today. +floaters today but not flashes. Endorse stretching pain in EOM OD only 7/10 became worse today. Denies numbness, tingling, focal weakness, double vision, urinary incontinence, no eye pain without EOM, jaw claudication, scalp tenderness, joint pain. Pt noted that her PMD started her on Xarelto after complaining of toe numbness for 3 weeks in May. Also, on ASA.     Neuro exam revealed    BRAIN MRI: Mild to moderate extent of FLAIR hyperintense white matter lesions in the periventricular and subcortical white matter. Several lesions demonstrate perivenular configuration which is relatively specific for multiple sclerosis. Differential diagnosis includes chronic microvascular ischemic changes, migraine headaches or other infectious inflammatory process.  MRI ORBITS: unremarkable.    Impression: R eye pain with EOM and R eye vision loss may be 2/2 possible NAION (non-arteritic ischemic optic neuropathy) vs. atypical optic neuritis. There is concern for MS given white matter changes in the periventricular and subcortical white matter in brain.    CRP wnl,     Recommendations:  [ ] Appreciate optho consult- Pt is a smoker and has HLD which puts her at risk for NAION, in addition to small cups (0.1) "disc at risk". Otherwise no SARTHAK. Obtain serologies that can cause atypical optic neuritis: syphilis screen, Lyme, ACE, chest XR (sarcoid r/o)  [ ] MR T and L spine with and w/o contrast  [ ] LP if attending approves: glucose, protein, cell count, cryptococcal, IgG,  MOG, myelin basic protein, NMO, NMO/aqp4 fACS, oligoclonal bands, CSF electrophoresis  [ ] Bloodwork: NMO ab, MOG ab, dsDNA, TOMMY, ESR, CRP  [ ] IV solumedrol 1g 3-5 days if neurology attending approves.   [ ] vitamin D, Ca supplementation, protonix 40mg daily if started on steroids,   [ ] monitor FS daily, avoid use of concurrent NSAIDS  [ ] PT/OT,   [ ] Fall precautions    To be discussed with neurology attending and will be formally staffed by attending during morning rounds. Recommendations will be finalized once signed by attending.    INCOMPLETE    HPI: 52 yo F with PMH chronic migraines, HLD no POH, pw vision changes right eye. Pt endorse noticing yesterday that she has blurry vision in her right eye. The vision progressively become worse today when she noticed she lost her superior VF OD. Endorse having a migraine H/A yesterday but not today. +floaters today but not flashes. Endorse stretching pain in EOM OD only 7/10 became worse today. Denies numbness, tingling, focal weakness, double vision, urinary incontinence, no eye pain without EOM, jaw claudication, scalp tenderness, joint pain. Pt noted that her PMD started her on Xarelto after complaining of toe numbness for 3 weeks in May. Also, on ASA.     Neuro exam revealed PERRLA (R = 3mm, L = 3mm). R eye upper half visual field loss.  Very mild tenderness of R temple region.     BRAIN MRI: Mild to moderate extent of FLAIR hyperintense white matter lesions in the periventricular and subcortical white matter. Several lesions demonstrate perivenular configuration which is relatively specific for multiple sclerosis. Differential diagnosis includes chronic microvascular ischemic changes, migraine headaches or other infectious inflammatory process.  MRI ORBITS: unremarkable.    Impression: R eye pain with EOM and R eye vision loss may be 2/2 possible NAION (non-arteritic ischemic optic neuropathy) vs. atypical optic neuritis. There is concern for MS given white matter changes in the periventricular and subcortical white matter in brain.    CRP wnl,     Recommendations:  [ ] Appreciate optho consult- Pt is a smoker and has HLD which puts her at risk for NAION, in addition to small cups (0.1) "disc at risk". Otherwise no SARTHAK. Obtain serologies that can cause atypical optic neuritis: syphilis screen, Lyme, ACE, chest XR (sarcoid r/o)  [ ] MR T and L spine with and w/o contrast  [ ] LP if attending approves: glucose, protein, cell count, cryptococcal, IgG,  MOG, myelin basic protein, NMO, NMO/aqp4 fACS, oligoclonal bands, CSF electrophoresis  [ ] Bloodwork: NMO ab, MOG ab, dsDNA, TOMMY, ESR, CRP  [ ] IV solumedrol 1g 3-5 days if neurology attending approves.   [ ] vitamin D, Ca supplementation, protonix 40mg daily if started on steroids,   [ ] monitor FS daily, avoid use of concurrent NSAIDS  [ ] PT/OT,   [ ] Fall precautions    To be discussed with neurology attending and will be formally staffed by attending during morning rounds. Recommendations will be finalized once signed by attending.    HPI: HPI: 52 yo F with PMH chronic migraines, HLD, pw vision changes right eye. Pt states she noticed yesterday that she has blurry vision in her right eye and she noticed she lost her superior VF in R eye associated with floaters today. Endorse stretching pain in R eye with EOM 7/10 pain since yesterday. No prior hx of such symptoms.  Pt had her typical migraine yesterday but today states she had a regular frontal HA (not her typical migraine symptoms). Typical presentation of her migraines is posterior neck pain that radiates upwards bilaterally with associated photophobia, floaters bilaterally in eyes, with no loss of vision. Pt states 1 month ago she has a frontal HA (no migraine like symptoms) with bilateral feet numbness for about 1 weeks. Went away for few days and then came back again in both feet, eventually localized to R feet- intermittent symptoms. Pt saw her PCP who prescribed her xarelto (thinking bilateral feet tingling was a stroke) which she took for a few days during which time she rolled bladder (complicated by L leg hematoma) and fractured her L 2nd toe and had L foot surgery. Pt currently has numbness in L 2nd, 3rd and 4th toes 2/2 recent surgery but denies any other numbness, tingling, focal weakness,  urinary incontinence, no eye pain without EOM, jaw claudication, scalp tenderness, joint pain. Pt currently takes baby ASA daily for stroke prophylaxis. No hx of strokes in the past.    Neuro exam revealed PERRLA (R = 3mm, L = 3mm). R eye upper half visual field loss.  Very mild tenderness of R temple region.     BRAIN MRI: Mild to moderate extent of FLAIR hyperintense white matter lesions in the periventricular and subcortical white matter. Several lesions demonstrate perivenular configuration which is relatively specific for multiple sclerosis. Differential diagnosis includes chronic microvascular ischemic changes, migraine headaches or other infectious inflammatory process.  MRI ORBITS: unremarkable.    MR CTL spine w/o contrast:   MR cervical spine: No high-grade central canal stenosis. No abnormal cord signal abnormality or enhancement.  MR thoracic spine: No high-grade central canal stenosis. Focal short segment abnormal cord signal with possible enhancement at T9 which may represent a focus of active demyelination.  MR lumbar spine: No high-grade central canal stenosis.    Impression: R eye pain with EOM and R eye vision loss may be 2/2 possible NAION (non-arteritic ischemic optic neuropathy) vs. atypical optic neuritis. There is concern for MS given white matter changes in the periventricular and subcortical white matter in brain.    CRP wnl,     Recommendations:  [ ] Appreciate optho consult- Pt is a smoker and has HLD which puts her at risk for NAION, in addition to small cups (0.1) "disc at risk". Otherwise no SARTHAK. Obtain serologies that can cause atypical optic neuritis: syphilis screen, Lyme, ACE, chest XR (sarcoid r/o)  [ ] MR C T and L spine with and w/o contrast  [ ] LP if attending approves: glucose, protein, cell count, cryptococcal, IgG,  MOG, myelin basic protein, NMO, NMO/aqp4 fACS, oligoclonal bands, CSF electrophoresis  [ ] Bloodwork: NMO ab, MOG ab, dsDNA, TOMMY, ESR, CRP  [ ] IV solumedrol 1g 3-5 days.   [ ] vitamin D, Ca supplementation, protonix 40mg daily if started on steroids,   [ ] monitor FS daily, avoid use of concurrent NSAIDS  [ ] PT/OT,   [ ] Fall precautions    To be discussed with neurology attending and will be formally staffed by attending during morning rounds. Recommendations will be finalized once signed by attending.    HPI: HPI: 50 yo F with PMH chronic migraines, HLD, pw vision changes right eye. Pt states she noticed yesterday that she has blurry vision in her right eye and she noticed she lost her superior VF in R eye associated with floaters today. Endorse stretching pain in R eye with EOM 7/10 pain since yesterday. No prior hx of such symptoms.  Pt had her typical migraine yesterday but today states she had a regular frontal HA (not her typical migraine symptoms). Typical presentation of her migraines is posterior neck pain that radiates upwards bilaterally with associated photophobia, floaters bilaterally in eyes, with no loss of vision. Pt states 1 month ago she has a frontal HA (no migraine like symptoms) with bilateral feet numbness for about 1 weeks. Went away for few days and then came back again in both feet, eventually localized to R feet- intermittent symptoms. Pt saw her PCP who prescribed her xarelto (thinking bilateral feet tingling was a stroke) which she took for a few days during which time she rolled bladder (complicated by L leg hematoma) and fractured her L 2nd toe and had L foot surgery. Pt currently has numbness in L 2nd, 3rd and 4th toes 2/2 recent surgery but denies any other numbness, tingling, focal weakness,  urinary incontinence, no eye pain without EOM, jaw claudication, scalp tenderness, joint pain. Pt currently takes baby ASA daily for stroke prophylaxis. No hx of strokes in the past.    Neuro exam revealedPupils 5mm bilaterally dilated by optho. R eye upper half visual field loss. Red color desaturation in R eye. EOMI, R eye pain with EOM, no nystagmus, diplopia in all direction except in center of visual field when looking with both eyes.  Very mild tenderness of R temple region.     BRAIN MRI: Mild to moderate extent of FLAIR hyperintense white matter lesions in the periventricular and subcortical white matter. Several lesions demonstrate perivenular configuration which is relatively specific for multiple sclerosis. Differential diagnosis includes chronic microvascular ischemic changes, migraine headaches or other infectious inflammatory process.  MRI ORBITS: unremarkable.    MR CTL spine w/o contrast:   MR cervical spine: No high-grade central canal stenosis. No abnormal cord signal abnormality or enhancement.  MR thoracic spine: No high-grade central canal stenosis. Focal short segment abnormal cord signal with possible enhancement at T9 which may represent a focus of active demyelination.  MR lumbar spine: No high-grade central canal stenosis.    Impression: R eye pain with EOM and R eye vision loss may be 2/2 possible NAION (non-arteritic ischemic optic neuropathy) vs. atypical optic neuritis. There is concern for MS given white matter changes in the periventricular and subcortical white matter in brain.    CRP wnl,     Recommendations:  [ ] Appreciate optho consult- Pt is a smoker and has HLD which puts her at risk for NAION, in addition to small cups (0.1) "disc at risk". Otherwise no SARTHAK. Obtain serologies that can cause atypical optic neuritis: syphilis screen, Lyme, ACE, chest XR (sarcoid r/o)  [ ] MR C T and L spine with and w/o contrast  [ ] LP if attending approves: glucose, protein, cell count, cryptococcal, IgG,  MOG, myelin basic protein, NMO, NMO/aqp4 fACS, oligoclonal bands, CSF electrophoresis  [ ] Bloodwork: NMO ab, MOG ab, dsDNA, TOMMY, ESR, CRP  [ ] IV solumedrol 1g 3-5 days.   [ ] vitamin D, Ca supplementation, protonix 40mg daily if started on steroids,   [ ] monitor FS daily, avoid use of concurrent NSAIDS  [ ] PT/OT,   [ ] Fall precautions    To be discussed with neurology attending and will be formally staffed by attending during morning rounds. Recommendations will be finalized once signed by attending.    HPI: HPI: 52 yo F with PMH chronic migraines, HLD, pw vision changes right eye. Pt states she noticed yesterday that she has blurry vision in her right eye and she noticed she lost her superior VF in R eye associated with floaters today. Endorse stretching pain in R eye with EOM 7/10 pain since yesterday. No prior hx of such symptoms.  Pt had her typical migraine yesterday but today states she had a regular frontal HA (not her typical migraine symptoms). Typical presentation of her migraines is posterior neck pain that radiates upwards bilaterally with associated photophobia, floaters bilaterally in eyes, with no loss of vision. Pt states 1 month ago she has a frontal HA (no migraine like symptoms) with bilateral feet numbness for about 1 weeks. Went away for few days and then came back again in both feet, eventually localized to R feet- intermittent symptoms. Pt saw her PCP who prescribed her xarelto (thinking bilateral feet tingling was a stroke) which she took for a few days during which time she rolled bladder (complicated by L leg hematoma) and fractured her L 2nd toe and had L foot surgery. Pt currently has numbness in L 2nd, 3rd and 4th toes 2/2 recent surgery but denies any other numbness, tingling, focal weakness,  urinary incontinence, no eye pain without EOM, jaw claudication, scalp tenderness, joint pain. Pt currently takes baby ASA daily for stroke prophylaxis. No hx of strokes in the past.    Neuro exam revealedPupils 5mm bilaterally dilated by optho. R eye upper half visual field loss. Red color desaturation in R eye. EOMI, R eye pain with EOM, no nystagmus, diplopia in all direction except in center of visual field when looking with both eyes.  Very mild tenderness of R temple region.     BRAIN MRI: Mild to moderate extent of FLAIR hyperintense white matter lesions in the periventricular and subcortical white matter. Several lesions demonstrate perivenular configuration which is relatively specific for multiple sclerosis. Differential diagnosis includes chronic microvascular ischemic changes, migraine headaches or other infectious inflammatory process.  MRI ORBITS: unremarkable.    MR CTL spine w/o contrast:   MR cervical spine: No high-grade central canal stenosis. No abnormal cord signal abnormality or enhancement.  MR thoracic spine: No high-grade central canal stenosis. Focal short segment abnormal cord signal with possible enhancement at T9 which may represent a focus of active demyelination.  MR lumbar spine: No high-grade central canal stenosis.    Impression: R eye pain with EOM and R eye vision loss may be 2/2 possible NAION (non-arteritic ischemic optic neuropathy) vs. atypical optic neuritis. There is concern for MS given white matter changes in the periventricular and subcortical white matter in brain.    CRP wnl,     Recommendations:  [ ] Appreciate optho consult- Pt is a smoker and has HLD which puts her at risk for NAION, in addition to small cups (0.1) "disc at risk". Otherwise no SARTHAK. Obtain serologies that can cause atypical optic neuritis: syphilis screen, Lyme, ACE, chest XR (sarcoid r/o)  [ ] MR C T and L spine with contrast  [ ] LP if attending approves: glucose, protein, cell count, cryptococcal, IgG,  MOG, myelin basic protein, NMO, NMO/aqp4 fACS, oligoclonal bands, CSF electrophoresis  [ ] Bloodwork: NMO ab, MOG ab, dsDNA, TOMMY, ESR, CRP  [ ] IV solumedrol 1g 3-5 days.   [ ] vitamin D, Ca supplementation, protonix 40mg daily if started on steroids,   [ ] monitor FS daily, avoid use of concurrent NSAIDS  [ ] PT/OT,   [ ] Fall precautions    To be discussed with neurology attending and will be formally staffed by attending during morning rounds. Recommendations will be finalized once signed by attending.    HPI: HPI: 52 yo F with PMH chronic migraines, HLD, pw vision changes right eye. Pt states she noticed yesterday that she has blurry vision in her right eye and she noticed she lost her superior VF in R eye associated with floaters today. Endorse stretching pain in R eye with EOM 7/10 pain since yesterday. No prior hx of such symptoms.  Pt had her typical migraine yesterday but today states she had a regular frontal HA (not her typical migraine symptoms). Typical presentation of her migraines is posterior neck pain that radiates upwards bilaterally with associated photophobia, floaters bilaterally in eyes, with no loss of vision. Pt states 1 month ago she has a frontal HA (no migraine like symptoms) with bilateral feet numbness for about 1 weeks. Went away for few days and then came back again in both feet, eventually localized to R feet- intermittent symptoms. Pt saw her PCP who prescribed her xarelto (thinking bilateral feet tingling was a stroke) which she took for a few days during which time she rolled bladder (complicated by L leg hematoma) and fractured her L 2nd toe and had L foot surgery. Pt currently has numbness in L 2nd, 3rd and 4th toes 2/2 recent surgery but denies any other numbness, tingling, focal weakness,  urinary incontinence, no eye pain without EOM, jaw claudication, scalp tenderness, joint pain. Pt currently takes baby ASA daily for stroke prophylaxis. No hx of strokes in the past.    Neuro exam revealedPupils 5mm bilaterally dilated by optho. R eye upper half visual field loss. Red color desaturation in R eye. EOMI, R eye pain with EOM, no nystagmus, diplopia in all direction except in center of visual field when looking with both eyes.  Very mild tenderness of R temple region.     BRAIN MRI: Mild to moderate extent of FLAIR hyperintense white matter lesions in the periventricular and subcortical white matter. Several lesions demonstrate perivenular configuration which is relatively specific for multiple sclerosis. Differential diagnosis includes chronic microvascular ischemic changes, migraine headaches or other infectious inflammatory process.  MRI ORBITS: unremarkable.    MR CTL spine w/o contrast:   MR cervical spine: No high-grade central canal stenosis. No abnormal cord signal abnormality or enhancement.  MR thoracic spine: No high-grade central canal stenosis. Focal short segment abnormal cord signal with possible enhancement at T9 which may represent a focus of active demyelination.  MR lumbar spine: No high-grade central canal stenosis.    Impression: R eye pain with EOM and R eye vision loss may be 2/2 possible NAION (non-arteritic ischemic optic neuropathy) vs. atypical optic neuritis. There is concern for MS given white matter changes in the periventricular and subcortical white matter in brain. Will need with MR with contrast to check for active demyelination.     CRP wnl,     Recommendations:  [ ] Appreciate optho consult- Pt is a smoker and has HLD which puts her at risk for NAION, in addition to small cups (0.1) "disc at risk". Otherwise no SARTHAK. Obtain serologies that can cause atypical optic neuritis: syphilis screen, Lyme, ACE, chest XR (sarcoid r/o)  [ ] MR C T and L spine with contrast  [ ] LP if attending approves: glucose, protein, cell count, cryptococcal, IgG,  MOG, myelin basic protein, NMO, NMO/aqp4 fACS, oligoclonal bands, CSF electrophoresis  [ ] Bloodwork: NMO ab, MOG ab, dsDNA, TOMMY, ESR, CRP  [ ] IV solumedrol 1g 3-5 days.   [ ] vitamin D, Ca supplementation, protonix 40mg daily if started on steroids,   [ ] monitor FS daily, avoid use of concurrent NSAIDS  [ ] PT/OT,   [ ] Fall precautions    To be discussed with neurology attending and will be formally staffed by attending during morning rounds. Recommendations will be finalized once signed by attending.    HPI: HPI: 50 yo F with PMH chronic migraines, HLD, pw vision changes right eye. Pt states she noticed yesterday that she has blurry vision in her right eye and she noticed she lost her superior VF in R eye associated with floaters today. Endorse stretching pain in R eye with EOM 7/10 pain since yesterday. No prior hx of such symptoms.  Pt had her typical migraine yesterday but today states she had a regular frontal HA (not her typical migraine symptoms). Typical presentation of her migraines is posterior neck pain that radiates upwards bilaterally with associated photophobia, floaters bilaterally in eyes, with no loss of vision. Pt states 1 month ago she has a frontal HA (no migraine like symptoms) with bilateral feet numbness for about 1 weeks. Went away for few days and then came back again in both feet, eventually localized to R feet- intermittent symptoms. Pt saw her PCP who prescribed her xarelto (thinking bilateral feet tingling was a stroke) which she took for a few days during which time she rolled bladder (complicated by L leg hematoma) and fractured her L 2nd toe and had L foot surgery. Pt currently has numbness in L 2nd, 3rd and 4th toes 2/2 recent surgery but denies any other numbness, tingling, focal weakness,  urinary incontinence, no eye pain without EOM, jaw claudication, scalp tenderness, joint pain. Pt currently takes baby ASA daily for stroke prophylaxis. No hx of strokes in the past.    Neuro exam revealedPupils 5mm bilaterally dilated by optho. R eye upper half visual field loss. Red color desaturation in R eye. EOMI, R eye pain with EOM, no nystagmus, diplopia in all direction except in center of visual field when looking with both eyes.  Very mild tenderness of R temple region.     BRAIN MRI: Mild to moderate extent of FLAIR hyperintense white matter lesions in the periventricular and subcortical white matter. Several lesions demonstrate perivenular configuration which is relatively specific for multiple sclerosis. Differential diagnosis includes chronic microvascular ischemic changes, migraine headaches or other infectious inflammatory process.  MRI ORBITS: Upon further review there is T2 signal hyperintensity and enhancement involving the nerve sheath andnerve of the intraorbital segment of the right optic nerve consistent with optic neuritis and perineuritis.      MR CTL spine w and w/o contrast:   MR cervical spine: No high-grade central canal stenosis. No abnormal cord signal abnormality or enhancement.  MR thoracic spine: No high-grade central canal stenosis. Focal short segment abnormal cord signal with possible enhancement at T9 which may represent a focus of active demyelination.  MR lumbar spine: No high-grade central canal stenosis.    Impression: R eye pain with EOM and R eye vision loss may be 2/2 possible NAION (non-arteritic ischemic optic neuropathy) vs. atypical optic neuritis. There is concern for MS given white matter changes in the periventricular and subcortical white matter in brain.     CRP wnl,     Recommendations:  [ ] Appreciate optho consult- Pt is a smoker and has HLD which puts her at risk for NAION, in addition to small cups (0.1) "disc at risk". Otherwise no SARTHAK. Obtain serologies that can cause atypical optic neuritis: syphilis screen, Lyme, ACE, chest XR (sarcoid r/o)  [ ] LP if attending approves: glucose, protein, cell count, cryptococcal, IgG,  MOG, myelin basic protein, NMO, NMO/aqp4 fACS, oligoclonal bands, CSF electrophoresis  [ ] Bloodwork: NMO ab, MOG ab, dsDNA, TOMMY, ESR, CRP  [ ] IV solumedrol 1g 3-5 days.   [ ] vitamin D, Ca supplementation, protonix 40mg daily if started on steroids,   [ ] monitor FS daily, avoid use of concurrent NSAIDS  [ ] PT/OT,   [ ] Fall precautions    To be discussed with neurology attending and will be formally staffed by attending during morning rounds. Recommendations will be finalized once signed by attending.    HPI: HPI: 52 yo F with PMH chronic migraines, HLD, pw vision changes right eye. Pt states she noticed yesterday that she has blurry vision in her right eye and she noticed she lost her superior VF in R eye associated with floaters today. Endorse stretching pain in R eye with EOM 7/10 pain since yesterday. No prior hx of such symptoms.  Pt had her typical migraine yesterday but today states she had a regular frontal HA (not her typical migraine symptoms). Typical presentation of her migraines is posterior neck pain that radiates upwards bilaterally with associated photophobia, floaters bilaterally in eyes, with no loss of vision. Pt states 1 month ago she has a frontal HA (no migraine like symptoms) with bilateral feet numbness for about 1 weeks. Went away for few days and then came back again in both feet, eventually localized to R feet- intermittent symptoms. Pt saw her PCP who prescribed her xarelto (thinking bilateral feet tingling was a stroke) which she took for a few days during which time she rolled bladder (complicated by L leg hematoma) and fractured her L 2nd toe and had L foot surgery. Pt currently has numbness in L 2nd, 3rd and 4th toes 2/2 recent surgery but denies any other numbness, tingling, focal weakness,  urinary incontinence, no eye pain without EOM, jaw claudication, scalp tenderness, joint pain. Pt currently takes baby ASA daily for stroke prophylaxis. No hx of strokes in the past.    Neuro exam revealedPupils 5mm bilaterally dilated by optho. R eye upper half visual field loss. Red color desaturation in R eye. EOMI, R eye pain with EOM, no nystagmus, diplopia in all direction except in center of visual field when looking with both eyes.  Very mild tenderness of R temple region.     BRAIN MRI: Mild to moderate extent of FLAIR hyperintense white matter lesions in the periventricular and subcortical white matter. Several lesions demonstrate perivenular configuration which is relatively specific for multiple sclerosis. Differential diagnosis includes chronic microvascular ischemic changes, migraine headaches or other infectious inflammatory process.  MRI ORBITS: Upon further review there is T2 signal hyperintensity and enhancement involving the nerve sheath andnerve of the intraorbital segment of the right optic nerve consistent with optic neuritis and perineuritis.      MR CTL spine w and w/o contrast:   MR cervical spine: No high-grade central canal stenosis. No abnormal cord signal abnormality or enhancement.  MR thoracic spine: No high-grade central canal stenosis. Focal short segment abnormal cord signal with possible enhancement at T9 which may represent a focus of active demyelination.  MR lumbar spine: No high-grade central canal stenosis.    Impression: R eye pain with EOM and R eye vision loss may be 2/2 possible NAION (non-arteritic ischemic optic neuropathy) vs. atypical optic neuritis. There is concern for MS given white matter changes in the periventricular and subcortical white matter in brain.     CRP wnl,     Recommendations:  [ ] Appreciate optho consult- Pt is a smoker and has HLD which puts her at risk for NAION, in addition to small cups (0.1) "disc at risk". Otherwise no SARTHAK. Obtain serologies that can cause atypical optic neuritis: syphilis screen, Lyme, ACE, chest XR (sarcoid r/o)  [ ] LP if attending approves: glucose, protein, cell count, cryptococcal, IgG,  MOG, myelin basic protein, NMO, NMO/aqp4 fACS, oligoclonal bands, CSF electrophoresis  [ ] Bloodwork: NMO ab, MOG ab, dsDNA, TOMMY, ESR, CRP  [ ] IV solumedrol 1g 5 days.   [ ] vitamin D serum level, Ca supplementation, protonix 40mg daily if started on steroids,   [ ] monitor FS daily, avoid use of concurrent NSAIDS  [ ] PT/OT,   [ ] Fall precautions    To be discussed with neurology attending and will be formally staffed by attending during morning rounds. Recommendations will be finalized once signed by attending.    HPI: HPI: 52 yo F with PMH chronic migraines, HLD, pw vision changes right eye. Pt states she noticed yesterday that she has blurry vision in her right eye and she noticed she lost her superior VF in R eye associated with floaters today. Endorse stretching pain in R eye with EOM 7/10 pain since yesterday. No prior hx of such symptoms.  Pt had her typical migraine yesterday but today states she had a regular frontal HA (not her typical migraine symptoms). Typical presentation of her migraines is posterior neck pain that radiates upwards bilaterally with associated photophobia, floaters bilaterally in eyes, with no loss of vision. Pt states 1 month ago she has a frontal HA (no migraine like symptoms) with bilateral feet numbness for about 1 weeks. Went away for few days and then came back again in both feet, eventually localized to R feet- intermittent symptoms. Pt saw her PCP who prescribed her xarelto (thinking bilateral feet tingling was a stroke) which she took for a few days during which time she rolled bladder (complicated by L leg hematoma) and fractured her L 2nd toe and had L foot surgery. Pt currently has numbness in L 2nd, 3rd and 4th toes 2/2 recent surgery but denies any other numbness, tingling, focal weakness,  urinary incontinence, no eye pain without EOM, jaw claudication, scalp tenderness, joint pain. Pt currently takes baby ASA daily for stroke prophylaxis. No hx of strokes in the past.    Neuro exam revealedPupils 5mm bilaterally dilated by optho. R eye upper half visual field loss. Red color desaturation in R eye. EOMI, R eye pain with EOM, no nystagmus, diplopia in all direction except in center of visual field when looking with both eyes.  Very mild tenderness of R temple region.     BRAIN MRI: Mild to moderate extent of FLAIR hyperintense white matter lesions in the periventricular and subcortical white matter. Several lesions demonstrate perivenular configuration which is relatively specific for multiple sclerosis. Differential diagnosis includes chronic microvascular ischemic changes, migraine headaches or other infectious inflammatory process.  MRI ORBITS: Upon further review there is T2 signal hyperintensity and enhancement involving the nerve sheath andnerve of the intraorbital segment of the right optic nerve consistent with optic neuritis and perineuritis.      MR CTL spine w and w/o contrast:   MR cervical spine: No high-grade central canal stenosis. No abnormal cord signal abnormality or enhancement.  MR thoracic spine: No high-grade central canal stenosis. Focal short segment abnormal cord signal with possible enhancement at T9 which may represent a focus of active demyelination.  MR lumbar spine: No high-grade central canal stenosis.    Impression: R eye pain with EOM and R eye vision loss may be 2/2 possible NAION (non-arteritic ischemic optic neuropathy) vs. atypical optic neuritis. There is concern for MS given white matter changes in the periventricular and subcortical white matter in brain.     CRP wnl,     Recommendations:  [ ] Appreciate optho consult- Pt is a smoker and has HLD which puts her at risk for NAION, in addition to small cups (0.1) "disc at risk". Otherwise no SARTHAK. Obtain serologies that can cause atypical optic neuritis: syphilis screen, Lyme, ACE, chest XR (sarcoid r/o)  [] Will need dedicated MR brain T2 flair with sagittal, coronal and axial cross sections to assess lesions in brain.    [ ] Will defer LP for now (lets wait for MR brain T2 flair study) but if needed later the following LP studies: glucose, protein, cell count, cryptococcal, IgG,  MOG, myelin basic protein, NMO, NMO/aqp4 fACS, oligoclonal bands, CSF electrophoresis  [ ] Bloodwork: NMO ab, MOG ab, dsDNA, TOMMY, ESR, CRP  [ ] IV solumedrol 1g 5 days.   [ ] vitamin D serum level, Ca supplementation, protonix 40mg daily if started on steroids,   [ ] monitor FS daily, avoid use of concurrent NSAIDS  [ ] PT/OT,   [ ] Fall precautions    To be discussed with neurology attending and will be formally staffed by attending during morning rounds. Recommendations will be finalized once signed by attending.    HPI: HPI: 50 yo F with PMH chronic migraines, HLD, pw vision changes right eye. Pt states she noticed yesterday that she has blurry vision in her right eye and she noticed she lost her superior VF in R eye associated with floaters today. Endorse stretching pain in R eye with EOM 7/10 pain since yesterday. No prior hx of such symptoms.  Pt had her typical migraine yesterday but today states she had a regular frontal HA (not her typical migraine symptoms). Typical presentation of her migraines is posterior neck pain that radiates upwards bilaterally with associated photophobia, floaters bilaterally in eyes, with no loss of vision. Pt states 1 month ago she has a frontal HA (no migraine like symptoms) with bilateral feet numbness for about 1 weeks. Went away for few days and then came back again in both feet, eventually localized to R feet- intermittent symptoms. Pt saw her PCP who prescribed her xarelto (thinking bilateral feet tingling was a stroke) which she took for a few days during which time she rolled bladder (complicated by L leg hematoma) and fractured her L 2nd toe and had L foot surgery. Pt currently has numbness in L 2nd, 3rd and 4th toes 2/2 recent surgery but denies any other numbness, tingling, focal weakness,  urinary incontinence, no eye pain without EOM, jaw claudication, scalp tenderness, joint pain. Pt currently takes baby ASA daily for stroke prophylaxis. No hx of strokes in the past.    Neuro exam revealedPupils 5mm bilaterally dilated by optho. R eye upper half visual field loss. Red color desaturation in R eye. EOMI, R eye pain with EOM, no nystagmus, diplopia in all direction except in center of visual field when looking with both eyes.  Very mild tenderness of R temple region.     BRAIN MRI: Mild to moderate extent of FLAIR hyperintense white matter lesions in the periventricular and subcortical white matter. Several lesions demonstrate perivenular configuration which is relatively specific for multiple sclerosis. Differential diagnosis includes chronic microvascular ischemic changes, migraine headaches or other infectious inflammatory process.  MRI ORBITS: Upon further review there is T2 signal hyperintensity and enhancement involving the nerve sheath andnerve of the intraorbital segment of the right optic nerve consistent with optic neuritis and perineuritis.      MR CTL spine w and w/o contrast:   MR cervical spine: No high-grade central canal stenosis. No abnormal cord signal abnormality or enhancement.  MR thoracic spine: No high-grade central canal stenosis. Focal short segment abnormal cord signal with possible enhancement at T9 which may represent a focus of active demyelination.  MR lumbar spine: No high-grade central canal stenosis.    Impression: R eye pain with EOM and R eye vision loss may be 2/2 possible NAION (non-arteritic ischemic optic neuropathy) vs. atypical optic neuritis. There is concern for MS given white matter changes in the periventricular and subcortical white matter in brain.     CRP wnl,     Recommendations:  [ ] Appreciate optho consult- Pt is a smoker and has HLD which puts her at risk for NAION, in addition to small cups (0.1) "disc at risk". Otherwise no SARTHAK. Obtain serologies that can cause atypical optic neuritis: syphilis screen, Lyme, ACE, chest XR (sarcoid r/o)  [] Will need dedicated MR brain T2 flair with sagittal, coronal and axial cross sections to assess lesions in brain.    [ ] Will defer LP for now (lets wait for MR brain T2 flair study) but if needed later the following LP studies: glucose, protein, cell count, cryptococcal, IgG,  MOG, myelin basic protein, NMO, NMO/aqp4 fACS, oligoclonal bands, CSF electrophoresis  [ ] Bloodwork: NMO ab, MOG ab, dsDNA, TOMMY, ESR, CRP  [ ] IV solumedrol 1g 5 days.   [ ] vitamin D serum level, Ca supplementation, protonix 40mg daily if started on steroids,   [ ] monitor FS daily, avoid use of concurrent NSAIDS  [ ] PT/OT,   [ ] Fall precautions    Patient can follow up with MS specialist, Dr. Trujillo, at [address] [place] [address] after discharge. Please instruct the patient to call [phone] to schedule this appointment.     To be discussed with neurology attending and will be formally staffed by attending during morning rounds. Recommendations will be finalized once signed by attending.

## 2022-07-14 NOTE — H&P ADULT - HISTORY OF PRESENT ILLNESS
Pt is 51yF with pmhx migraines and HLD and a paternal family history of vision loss presenting with acute onset R eye pain and blurry vision loss. She awoke 7/12 w/cloudy vision, first believing it to be "sleep in [her] eyes." She awoke 7/13 with the same thing, and her vision became darker and darker, until her vision became so blurry that she could not see. Her vision loss affected her ability to see at her job, and she came to the ED today. She described the feeling as soreness, rating it at a 7 of 10. The pain does not radiate anywhere. Moving her R eye makes the pain worse, up to 10/10. In her left eye she has no vision loss or pain. She has also been experiencing headaches, which began this morning.

## 2022-07-14 NOTE — MEDICAL STUDENT ADULT H&P (EDUCATION) - NS MD HP STUD HX OF PRESENT ILLNESS FT
Isabel Dangelo is a 52 yo Woman with minimal medical history and a paternal family history of vision loss and corneal leaking presenting with acute onset R eye pain and blurry vision loss. She awoke 7/12 w/cloudy vision, first believing it to be "sleep in [her] eyes." She awoke 7/13 with the same thing, and her vision became darker and darker, until her vision became so blurry that she could not see. Her vision loss affected her ability to see at her job, and she came to the ED today. She described the feeling as soreness, rating it at a 7 of 10. The pain does not radiate anywhere. Moving her  R eye makes the pain worse. She has also been experiencing headaches, which began this morning. She is concerned that she lost her vision, but is unsure what could be causing her symptoms.

## 2022-07-14 NOTE — CONSULT NOTE ADULT - ATTENDING COMMENTS
Exam:  Visual acuity: OD 20/400; OS 20/25.  Unable to check for RAPD - mydriatics given.  +Red desaturation - OD.  Fundus - discs normal color, disc margins sharp; no papillitis, No disc pallor.       A/P  Ms. Dangelo is a 52 yo woman with right optic neuritis - retrobulbar neuritis.  IV methylprednisolone 1000 mg daily for 5 days.   Work up:  Anti-MOG Ab  NMO/AQP4 Ab  SSA/SSB  TOMMY  ds-DNA  ANCA  ESR  CRP  Complement C3, C4  Cryoglobulins  Urine and serum Ca++  Lyme  ACE level  Syphilis screen  Hepatitis panel  Vitamin B12  Vitamin D    Thank you    D/W patient and family

## 2022-07-14 NOTE — PROGRESS NOTE ADULT - SUBJECTIVE AND OBJECTIVE BOX
[facility] DEPARTMENT OF OPHTHALMOLOGY  ------------------------------------------------------------------------------    Interval History: No acute events overnight. Today patient continuing to complain of superior visual field loss OD. Also has no improvement in OD pain with eye movements.     MEDICATIONS  (STANDING):  methylPREDNISolone sodium succinate IVPB 1000 milliGRAM(s) IV Intermittent every 24 hours  multivitamin 1 Tablet(s) Oral daily  nicotine -  14 mG/24Hr(s) Patch 1 Patch Transdermal daily  pantoprazole    Tablet 40 milliGRAM(s) Oral before breakfast    MEDICATIONS  (PRN):      VITALS: T(C): 36.6 (07-14-22 @ 18:32)  T(F): 97.8 (07-14-22 @ 18:32), Max: 97.9 (07-14-22 @ 02:52)  HR: 70 (07-14-22 @ 18:32) (62 - 74)  BP: 108/63 (07-14-22 @ 18:32) (100/64 - 133/72)  RR:  (15 - 18)  SpO2:  (98% - 100%)  Wt(kg): --  General: AAO x 3, appropriate mood and affect    Ophthalmology Exam:  Visual acuity (sc): 20/60 OD, NIPH; 20/20 OS  Pupils: + APD OD, Round and reactive OS  Ttono: 11 OD, 12 OS  Extraocular movements (EOMs): Full OU, pain with EOM in all gazes OD  Confrontational Visual Field (CVF): VF deficit in superior quadrants OD, Full OS  Color Plates: 6/12 OD, 12/12 OS    Pen Light Exam (PLE)  External: Flat OU  Lids/Lashes/Lacrimal Ducts: Flat OU    Sclera/Conjunctiva: W+Q OU  Cornea: Cl OU  Anterior Chamber: D+F OU    Iris: Flat OU  Lens: Cl OU    Fundus Exam: dilated with 1% tropicamide and 2.5% phenylephrine  Approval obtained from primary team for dilation  Patient aware that pupils can remained dilated for at least 4-6 hours  Exam performed with 20D lens    Vitreous: wnl OU  Disc, cup/disc: sharp and pink, 0.1 OU, trace elevation OD  Macula: wnl OU  Vessels: mildly tortuous OU  Periphery: wnl OU    IMAGING:    ACC: [id number] EXAM:  MR SPINE LUMBAR WAW                         ACC: [id number] EXAM:  MR SPINE CERVICAL WAW IC                        ACC: [id number] EXAM:  MR SPINE THORACIC WAW IC                          PROCEDURE DATE:  07/14/2022      IMPRESSION:  MR cervical spine: No high-grade central canal stenosis. No abnormal cord   signal abnormality or enhancement.  MR thoracic spine: No high-grade central canal stenosis. Focal short   segment abnormal cord signal with possible enhancement at T9 which may   represent a focus of active demyelination.  MR lumbar spine: No high-grade central canal stenosis.        ACC: [id number] EXAM:  MR BRAIN WAW                         ACC: [id number] EXAM:  MR ORBITS ONLY WAWI              IMPRESSION:    BRAIN MRI: Mild to moderate extent of FLAIR hyperintense white matter   lesions in the periventricular and subcortical white matter. Several   lesions demonstrate perivenular configuration which is relatively   specific for multiple sclerosis. Differential diagnosis includes chronic   microvascular ischemic changes, migraine headaches or other infectious   inflammatory process.                  *** ADDENDUM***    Upon further review there is T2 signal hyperintensity and enhancement   involving the nerve sheath andnerve of the intraorbital segment of the   right optic nerve consistent with optic neuritis and perineuritis.   [facility] DEPARTMENT OF OPHTHALMOLOGY  ------------------------------------------------------------------------------    Interval History: No acute events overnight. Today patient continuing to complain of superior visual field loss OD. Also has no improvement in OD pain with eye movements.  Follow up for possible optic neuritis OD.    MEDICATIONS  (STANDING):  methylPREDNISolone sodium succinate IVPB 1000 milliGRAM(s) IV Intermittent every 24 hours  multivitamin 1 Tablet(s) Oral daily  nicotine -  14 mG/24Hr(s) Patch 1 Patch Transdermal daily  pantoprazole    Tablet 40 milliGRAM(s) Oral before breakfast    MEDICATIONS  (PRN):      VITALS: T(C): 36.6 (07-14-22 @ 18:32)  T(F): 97.8 (07-14-22 @ 18:32), Max: 97.9 (07-14-22 @ 02:52)  HR: 70 (07-14-22 @ 18:32) (62 - 74)  BP: 108/63 (07-14-22 @ 18:32) (100/64 - 133/72)  RR:  (15 - 18)  SpO2:  (98% - 100%)  Wt(kg): --  General: AAO x 3, appropriate mood and affect    Ophthalmology Exam:  Visual acuity (sc): 20/60 OD, NIPH; 20/20 OS  Pupils: + APD OD, Round and reactive OS  Ttono: 11 OD, 12 OS  Extraocular movements (EOMs): Full OU, pain with EOM in all gazes OD  Confrontational Visual Field (CVF): VF deficit in superior quadrants, full inferiorly OD, Full OS  Color Plates: 6/12 OD, 12/12 OS    Pen Light Exam (PLE)  External: Flat OU  Lids/Lashes/Lacrimal Ducts: Flat OU    Sclera/Conjunctiva: W+Q OU  Cornea: Cl OU  Anterior Chamber: D+F OU    Iris: Flat OU  Lens: Cl OU    Fundus Exam: dilated with 1% tropicamide and 2.5% phenylephrine  Approval obtained from primary team for dilation  Patient aware that pupils can remained dilated for at least 4-6 hours  Exam performed with 20D lens    Vitreous: wnl OU  Disc, cup/disc: sharp and pink, 0.1 OU, trace elevation OD  Macula: wnl OU  Vessels: mildly tortuous OU  Periphery: wnl OU    IMAGING:    ACC: [id number] EXAM:  MR SPINE LUMBAR WAW IC                        ACC: [id number] EXAM:  MR SPINE CERVICAL WAW IC                        ACC: [id number] EXAM:  MR SPINE THORACIC WAW IC                          PROCEDURE DATE:  07/14/2022      IMPRESSION:  MR cervical spine: No high-grade central canal stenosis. No abnormal cord   signal abnormality or enhancement.  MR thoracic spine: No high-grade central canal stenosis. Focal short   segment abnormal cord signal with possible enhancement at T9 which may   represent a focus of active demyelination.  MR lumbar spine: No high-grade central canal stenosis.        ACC: [id number] EXAM:  MR BRAIN WAW IC                        ACC: [id number] EXAM:  MR ORBITS ONLY WAWI              IMPRESSION:    BRAIN MRI: Mild to moderate extent of FLAIR hyperintense white matter   lesions in the periventricular and subcortical white matter. Several   lesions demonstrate perivenular configuration which is relatively   specific for multiple sclerosis. Differential diagnosis includes chronic   microvascular ischemic changes, migraine headaches or other infectious   inflammatory process.                  *** ADDENDUM***    Upon further review there is T2 signal hyperintensity and enhancement   involving the nerve sheath andnerve of the intraorbital segment of the   right optic nerve consistent with optic neuritis and perineuritis.

## 2022-07-14 NOTE — PROGRESS NOTE ADULT - ATTENDING COMMENTS
I have interviewed and examined the patient and reviewed the residents note including the history, exam, assessment, and plan.  I agree with the residents assessment and plan.    52 yo F with PMH chronic migraines, HLD no POH, pw vision changes right eye. Pt complaining of superior VF deficit OD and pain with EOM OD. + APD OD with color vision deficit OD. MR imaging consistent with optic neuritis and perineuritis OD and demyelinating neurological disease in brain and spine.     1. R optic neuritis in setting of demyelinating neurological disease  - Pt with above symptoms indicative of optic neuritis  - Denies persistent headache, ROS for GCA negative; only mild tenderness right temple.  - CRP wnl, ESR wnl corrected for age.  - MRI imaging revealing lesions c/w optic neuritis and perineuritis OD. Demyelinating lesions noted in brain and thoracic spine  - Appreciate neurology consult. Management of demyelinating lesions in brain and spine per neurology  - S/p 1 dose IV solumedrol 1g, further steroid course per neurology. Decision re: LP per neurology  - F/u NMO and MOG Ab  - F/u atypical optic neuritis workup: syphilis screen, Lyme, ACE, chest XR (sarcoid r/o)  - Start GI prophylaxis for high dose steroids  - Pt instructed to inform primary team if she notices any change in vision  - Findings and recommendations d/w patient and primary team and Dr. Baum (neuro ophtho), plan per neuro ophtho/neuro/primary team  - will follow    Patricia Sena MD

## 2022-07-14 NOTE — ED CDU PROVIDER SUBSEQUENT DAY NOTE - HISTORY
52 Y/O F PMH migraines states she has had blurry vision in her R eye one day prior to ED presentation which progressed to the loss of her upper visual field today. Pt was seen by Ophthalmology, CRP normal, pt otherwise neurologically intact. Pt has noted back pain and numbness of the R foot previously. Plan is CDU for MRI, Ophthalmology re-eval. Pt is a smoker (10 cigarettes per day on average as per Pt). Pt has been stable while in CDU, awaiting MRI.

## 2022-07-14 NOTE — H&P ADULT - ASSESSMENT
Pt is 51yF w/ pmhx migraines and HLD p/w two days of R eye pain and vision loss. Physical exam significant for R eye visual field deficits and R eye pain with extraocular movement. MR Brain showing periventricular hyperintensity and perivenular configuration consistent with MS, and MR Orbits showing enhancement of R optic nerve consistent with optic neuritis. Patient's presentation and objective findings consistent with Atypical Optic Neuritis, with ddx also including NAION.

## 2022-07-14 NOTE — MEDICAL STUDENT ADULT H&P (EDUCATION) - NS MD HP STUD SOCIAL HX FT
Up to date w/vaccinations  COVID vaccine 2x Pfizer, no interest in booster  Pt does not own firearms and wears her seatbelt while in vehicle

## 2022-07-14 NOTE — PROGRESS NOTE ADULT - ASSESSMENT
52 yo F with PMH chronic migraines, HLD no POH, pw vision changes right eye. Pt complaining of superior VF deficit OD and pain with EOM OD. + APD OD with color vision deficit OD. MR imaging consistent with optic neuritis and perineuritis OD and demyelinating neurological disease in brain and spine.     1. Optic neuritis in setting of demyelinating neurological disease  - Pt with above symptoms indicative of optic neuritis  - Denies persistent headache, ROS for GCA negative; only mild tenderness right temple.  - CRP wnl, ESR wnl corrected for age.  - MRI imaging revealing lesions c/w optic neuritis and perineuritis OD. Demyelinating lesions noted in brain and thoracic spine  - Appreciate neurology consult. Management of demyelinating lesions in brain and spine per neurology  - S/p 1 dose IV solumedrol 1g, further steroid course per neurology. Decision re: LP per neurology  - F/u NMO and MOG Ab  - F/u atypical optic neuritis workup: syphilis screen, Lyme, ACE, chest XR (sarcoid r/o)  - Start GI prophylaxis for high dose steroids  - Pt instructed to inform primary team if she notices any change in vision  - Findings and recommendations d/w patient and primary team.     Seen and discussed with Dr. Sena, attending. D/w Dr. Baum, neuro-ophthalmology.    Outpatient follow-up: Patient should follow-up with his/her ophthalmologist or with [facility] Department of Ophthalmology upon discharge at the address below     [facility] Department of Ophthalmology  [address] [place] [address]. [address]  [place] [address]  [phone] 52 yo F with PMH chronic migraines, HLD no POH, pw vision changes right eye. Pt complaining of superior VF deficit OD and pain with EOM OD. + APD OD with color vision deficit OD. MR imaging consistent with optic neuritis and perineuritis OD and demyelinating neurological disease in brain and spine.     1. R optic neuritis in setting of demyelinating neurological disease  - Pt with above symptoms indicative of optic neuritis  - Denies persistent headache, ROS for GCA negative; only mild tenderness right temple.  - CRP wnl, ESR wnl corrected for age.  - MRI imaging revealing lesions c/w optic neuritis and perineuritis OD. Demyelinating lesions noted in brain and thoracic spine  - Appreciate neurology consult. Management of demyelinating lesions in brain and spine per neurology  - S/p 1 dose IV solumedrol 1g, further steroid course per neurology. Decision re: LP per neurology  - F/u NMO and MOG Ab  - F/u atypical optic neuritis workup: syphilis screen, Lyme, ACE, chest XR (sarcoid r/o)  - Start GI prophylaxis for high dose steroids  - Pt instructed to inform primary team if she notices any change in vision  - Findings and recommendations d/w patient and primary team.     Seen and discussed with Dr. Sena, attending. D/w Dr. Baum, neuro-ophthalmology.    Outpatient follow-up: Patient should follow-up with his/her ophthalmologist or with [facility] Department of Ophthalmology upon discharge at the address below     [facility] Department of Ophthalmology  [address] [place] [address]. [address]  [place] [address]  [phone] 52 yo F with PMH chronic migraines, HLD no POH, pw vision changes right eye. Pt complaining of superior VF deficit OD and pain with EOM OD. + APD OD with color vision deficit OD. MR imaging consistent with optic neuritis and perineuritis OD and demyelinating neurological disease in brain and spine.     1. R optic neuritis in setting of demyelinating neurological disease  - Pt with above symptoms indicative of optic neuritis  - Denies persistent headache, ROS for GCA negative; only mild tenderness right temple.  - CRP wnl, ESR wnl corrected for age.  - MRI imaging revealing lesions c/w optic neuritis and perineuritis OD. Demyelinating lesions noted in brain and thoracic spine  - Appreciate neurology consult. Management of demyelinating lesions in brain and spine per neurology  - S/p 1 dose IV solumedrol 1g, further steroid course per neurology. Decision re: LP per neurology  - F/u NMO and MOG Ab  - F/u atypical optic neuritis workup: syphilis screen, Lyme, ACE, chest XR (sarcoid r/o)  - Start GI prophylaxis for high dose steroids  - Pt instructed to inform primary team if she notices any change in vision  - Findings and recommendations d/w patient and primary team and Dr. Baum (neuro ophtho), plan per neuro ophtho/neuro/primary team  - will follow    Seen and discussed with Dr. Sena, attending. D/w Dr. Baum, neuro-ophthalmology.    Outpatient follow-up: Patient should follow-up with his/her ophthalmologist or with [facility] Department of Ophthalmology upon discharge at the address below     [facility] Department of Ophthalmology  [address] [place] [address]. [address]  [place] [address]  [phone]

## 2022-07-14 NOTE — ED CDU PROVIDER SUBSEQUENT DAY NOTE - CRANIAL NERVE AND PUPILLARY EXAM
5/5 strength and intact sensation bilaterally in upper and lower extremity/cranial nerves 2-12 intact 5/5 strength and intact sensation bilaterally in upper and lower extremity. R upper, outer visual field deficit/cranial nerves 2-12 intact/PERIPHERAL VISION NOT INTACT/extra-ocular movements intact

## 2022-07-14 NOTE — ED ADULT NURSE REASSESSMENT NOTE - NS ED NURSE REASSESS COMMENT FT1
pt a&ox4 with no new complaints at this time. pt medicated as per md orders. pt respirations even and unlabored with no accessory muscle use. 20g to the RAC with no redness or swelling. pt in NAD and resting in stretcher.

## 2022-07-14 NOTE — H&P ADULT - NSHPPHYSICALEXAM_GEN_ALL_CORE
Physical Exam:  · Constitutional	NAD  · Eyes	EOMI, 10/10 pain upon movement of R eye in all directions, 5/5 eyelid opening resistance b/l  · ENMT	Equal tongue movement equal b/l, b/l soft palate elevation  · Respiratory	Lungs CTA  · Cardiovascular	RRR  · GI	Bowel sounds present in all 4 quad, NT/ND, no pain on palpation, midline scar visible  · Vascular	B/l DP pulses appreciated  · Neurological	5/5 strength b/l UE  · Musculoskeletal	L knee pain from prior fall  · Psychiatric	Appropriate affect Physical Exam:  · Constitutional - NAD  · HEENT - EOMI, 10/10 pain upon movement of R eye in all directions, 5/5 eyelid opening resistance b/l Equal tongue movement equal b/l, b/l soft palate elevation  · Respiratory: CTAB b/l  · Cardiovascular: RRR, no M/R/G  · GI: Midline scar visible, NTND, no palpable masses  · Extremities: No LE edema b/l  · Neurological- AAOX4, eyes dilated (from earlier ophthalmic evaluation) 5/5 strength b/l UE and LE  · Psychiatric - Appropriate affect  · Skin - No cyanosis or pallor

## 2022-07-15 LAB
ACE SERPL-CCNC: 31 U/L — SIGNIFICANT CHANGE UP (ref 14–82)
ANION GAP SERPL CALC-SCNC: 12 MMOL/L — SIGNIFICANT CHANGE UP (ref 7–14)
BUN SERPL-MCNC: 12 MG/DL — SIGNIFICANT CHANGE UP (ref 7–23)
CALCIUM SERPL-MCNC: 9.2 MG/DL — SIGNIFICANT CHANGE UP (ref 8.4–10.5)
CHLORIDE SERPL-SCNC: 112 MMOL/L — HIGH (ref 98–107)
CO2 SERPL-SCNC: 19 MMOL/L — LOW (ref 22–31)
CREAT SERPL-MCNC: 0.43 MG/DL — LOW (ref 0.5–1.3)
EGFR: 118 ML/MIN/1.73M2 — SIGNIFICANT CHANGE UP
GLUCOSE SERPL-MCNC: 145 MG/DL — HIGH (ref 70–99)
HCT VFR BLD CALC: 36.4 % — SIGNIFICANT CHANGE UP (ref 34.5–45)
HGB BLD-MCNC: 11.9 G/DL — SIGNIFICANT CHANGE UP (ref 11.5–15.5)
MAGNESIUM SERPL-MCNC: 1.9 MG/DL — SIGNIFICANT CHANGE UP (ref 1.6–2.6)
MCHC RBC-ENTMCNC: 29 PG — SIGNIFICANT CHANGE UP (ref 27–34)
MCHC RBC-ENTMCNC: 32.7 GM/DL — SIGNIFICANT CHANGE UP (ref 32–36)
MCV RBC AUTO: 88.6 FL — SIGNIFICANT CHANGE UP (ref 80–100)
NRBC # BLD: 0 /100 WBCS — SIGNIFICANT CHANGE UP
NRBC # FLD: 0 K/UL — SIGNIFICANT CHANGE UP
PHOSPHATE SERPL-MCNC: 3.2 MG/DL — SIGNIFICANT CHANGE UP (ref 2.5–4.5)
PLATELET # BLD AUTO: 277 K/UL — SIGNIFICANT CHANGE UP (ref 150–400)
POTASSIUM SERPL-MCNC: 3.9 MMOL/L — SIGNIFICANT CHANGE UP (ref 3.5–5.3)
POTASSIUM SERPL-SCNC: 3.9 MMOL/L — SIGNIFICANT CHANGE UP (ref 3.5–5.3)
RBC # BLD: 4.11 M/UL — SIGNIFICANT CHANGE UP (ref 3.8–5.2)
RBC # FLD: 14.7 % — HIGH (ref 10.3–14.5)
SODIUM SERPL-SCNC: 143 MMOL/L — SIGNIFICANT CHANGE UP (ref 135–145)
WBC # BLD: 18.58 K/UL — HIGH (ref 3.8–10.5)
WBC # FLD AUTO: 18.58 K/UL — HIGH (ref 3.8–10.5)

## 2022-07-15 PROCEDURE — 99233 SBSQ HOSP IP/OBS HIGH 50: CPT | Mod: GC

## 2022-07-15 RX ORDER — DEXTROSE 50 % IN WATER 50 %
12.5 SYRINGE (ML) INTRAVENOUS ONCE
Refills: 0 | Status: DISCONTINUED | OUTPATIENT
Start: 2022-07-15 | End: 2022-07-18

## 2022-07-15 RX ORDER — IBUPROFEN 200 MG
400 TABLET ORAL EVERY 4 HOURS
Refills: 0 | Status: DISCONTINUED | OUTPATIENT
Start: 2022-07-15 | End: 2022-07-15

## 2022-07-15 RX ORDER — SODIUM CHLORIDE 9 MG/ML
1000 INJECTION, SOLUTION INTRAVENOUS
Refills: 0 | Status: DISCONTINUED | OUTPATIENT
Start: 2022-07-15 | End: 2022-07-18

## 2022-07-15 RX ORDER — DEXTROSE 50 % IN WATER 50 %
25 SYRINGE (ML) INTRAVENOUS ONCE
Refills: 0 | Status: DISCONTINUED | OUTPATIENT
Start: 2022-07-15 | End: 2022-07-18

## 2022-07-15 RX ORDER — INSULIN LISPRO 100/ML
VIAL (ML) SUBCUTANEOUS
Refills: 0 | Status: DISCONTINUED | OUTPATIENT
Start: 2022-07-15 | End: 2022-07-18

## 2022-07-15 RX ORDER — DEXTROSE 50 % IN WATER 50 %
15 SYRINGE (ML) INTRAVENOUS ONCE
Refills: 0 | Status: DISCONTINUED | OUTPATIENT
Start: 2022-07-15 | End: 2022-07-18

## 2022-07-15 RX ORDER — GLUCAGON INJECTION, SOLUTION 0.5 MG/.1ML
1 INJECTION, SOLUTION SUBCUTANEOUS ONCE
Refills: 0 | Status: DISCONTINUED | OUTPATIENT
Start: 2022-07-15 | End: 2022-07-18

## 2022-07-15 RX ORDER — IBUPROFEN 200 MG
400 TABLET ORAL EVERY 4 HOURS
Refills: 0 | Status: DISCONTINUED | OUTPATIENT
Start: 2022-07-15 | End: 2022-07-18

## 2022-07-15 RX ORDER — INSULIN LISPRO 100/ML
VIAL (ML) SUBCUTANEOUS AT BEDTIME
Refills: 0 | Status: DISCONTINUED | OUTPATIENT
Start: 2022-07-15 | End: 2022-07-18

## 2022-07-15 RX ADMIN — Medication 400 MILLIGRAM(S): at 13:30

## 2022-07-15 RX ADMIN — PANTOPRAZOLE SODIUM 40 MILLIGRAM(S): 20 TABLET, DELAYED RELEASE ORAL at 05:39

## 2022-07-15 RX ADMIN — Medication 400 MILLIGRAM(S): at 07:41

## 2022-07-15 RX ADMIN — Medication 116 MILLIGRAM(S): at 13:00

## 2022-07-15 RX ADMIN — Medication 400 MILLIGRAM(S): at 13:00

## 2022-07-15 RX ADMIN — Medication 2: at 21:42

## 2022-07-15 RX ADMIN — QUETIAPINE FUMARATE 50 MILLIGRAM(S): 200 TABLET, FILM COATED ORAL at 21:33

## 2022-07-15 RX ADMIN — Medication 400 MILLIGRAM(S): at 08:20

## 2022-07-15 RX ADMIN — Medication 1 PATCH: at 19:12

## 2022-07-15 RX ADMIN — Medication 1 TABLET(S): at 12:11

## 2022-07-15 RX ADMIN — Medication 1 PATCH: at 18:07

## 2022-07-15 RX ADMIN — Medication 3: at 18:06

## 2022-07-15 RX ADMIN — Medication 2: at 13:00

## 2022-07-15 NOTE — PROGRESS NOTE ADULT - SUBJECTIVE AND OBJECTIVE BOX
Interval History:    Vital Signs (24 Hrs):  T(C): 36.3 (07-15-22 @ 05:17), Max: 36.6 (07-14-22 @ 12:05)  HR: 84 (07-15-22 @ 05:17) (69 - 89)  BP: 113/60 (07-15-22 @ 05:17) (100/64 - 121/75)  RR: 17 (07-15-22 @ 05:17) (15 - 17)  SpO2: 98% (07-15-22 @ 05:17) (98% - 100%)      LABS:  cret                        12.4   8.65  )-----------( 289      ( 13 Jul 2022 20:28 )             39.0     07-13    142  |  107  |  11  ----------------------------<  89  3.9   |  24  |  0.57    Ca    8.9      13 Jul 2022 20:28    TPro  8.0  /  Alb  4.2  /  TBili  <0.2  /  DBili  x   /  AST  21  /  ALT  19  /  AlkPhos  115  07-13       Interval History:    Vital Signs (24 Hrs):  T(C): 36.3 (07-15-22 @ 05:17), Max: 36.6 (07-14-22 @ 12:05)  HR: 84 (07-15-22 @ 05:17) (69 - 89)  BP: 113/60 (07-15-22 @ 05:17) (100/64 - 121/75)  RR: 17 (07-15-22 @ 05:17) (15 - 17)  SpO2: 98% (07-15-22 @ 05:17) (98% - 100%)    Physical Exam:  Constitutional - NAD  · HEENT - EOMI but in all movement directions  · Respiratory: CTAB b/l  · Cardiovascular: RRR, no M/R/G  · GI: Midline scar visible, NTND, no palpable masses  · Extremities: No LE edema b/l  · Neurological- AAOX4, Afferent R pupillary defect, Direct and Consensual Response when light shone into L pupil, diminished visual fields in R eye, 5/5 strength b/l UE and LE  · Psychiatric - Appropriate affect  · Skin - No cyanosis or pallor    LABS:  cret                        11.9   18.58 )-----------( 277      ( 15 Jul 2022 07:43 )             36.4     07-15    143  |  112<H>  |  12  ----------------------------<  145<H>  3.9   |  19<L>  |  0.43<L>    Ca    9.2      15 Jul 2022 07:43  Phos  3.2     07-15  Mg     1.90     07-15    TPro  8.0  /  Alb  4.2  /  TBili  <0.2  /  DBili  x   /  AST  21  /  ALT  19  /  AlkPhos  115  07-13     BRAIN MRI: Mild to moderate extent of FLAIR hyperintense white matter   lesions in the periventricular and subcortical white matter. Several   lesions demonstrate perivenular configuration which is relatively   specific for multiple sclerosis. Differential diagnosis includes chronic   microvascular ischemic changes, migraine headaches or other infectious   inflammatory process.    Upon further review there is T2 signal hyperintensity and enhancement   involving the nerve sheath andnerve of the intraorbital segment of the   right optic nerve consistent with optic neuritis and perineuritis.

## 2022-07-15 NOTE — PROGRESS NOTE ADULT - ASSESSMENT
Pt is 51yF w/ pmhx migraines and HLD p/w two days of R eye pain and vision loss. Physical exam significant for R eye visual field deficits and R eye pain with extraocular movement. MR Brain showing periventricular hyperintensity and perivenular configuration consistent with MS, and MR Orbits showing enhancement of R optic nerve consistent with optic neuritis. Patient's presentation and objective findings consistent with Atypical Optic Neuritis, with ddx also including NAION. Patient is on Solumedrol 1000 Day 2 of 5 and has been symptomatically improving.

## 2022-07-15 NOTE — MEDICAL STUDENT PROGRESS NOTE(EDUCATION) - NS MD HP STUD ASPLAN PLAN FT
Problem 1: Optic neuritis, likely 2/2 MS  Plan:  ·	Continue Solumedrol 1000 mg in 50 mL NaCl 0.9%  ·	Dedicated MR brain T2/FLAIR to assess brain lesions  ·	LP after MR for: glucose, protein, cell count, cryptococcal, IgG,  MOG, myelin basic protein, NMO, NMO/aqp4 fACS, oligoclonal bands, CSF electrophoresis (per Neuro)  ·	Pending antibodies for r/o NMO/MOG  ·	Neuro outpt for MS regimen    Problem 2: Migraines  Plan:  ·	Motrin 400 mg PRN q4  ·	Continue Rizatriptan 10 mg per home regimen    Problem 3: Hyperglycemia  Plan:  ·	Likely increased from Solumedrol, administer ISS    Problem 3: Hypertriglyceridemia  Plan:  ·	Order statin     Problem 4: Dry eye/dry mouth  Plan:  ·	Pending Sjogrens syndrome antibodies    Problem 5: Smoking history  Plan:   ·	Nicotine patches

## 2022-07-15 NOTE — OCCUPATIONAL THERAPY INITIAL EVALUATION ADULT - VISUAL ACUITY
Pt. states when Left eye occluded, pt with difficulty seeing from Right eye- blurry and double vision at times. RN aware.

## 2022-07-15 NOTE — OCCUPATIONAL THERAPY INITIAL EVALUATION ADULT - ADDITIONAL COMMENTS
(See continued from above) MR Head w/wo IV Contrast on 7/14/22 displayed mild to moderate extent of FLAIR hyperintense white matter lesions in the periventricular and subcortical white matter. Several lesions demonstrate perivenular configuration which is relatively specific for multiple sclerosis. Differential diagnosis includes chronic microvascular ischemic changes, migraine headaches or other infectious inflammatory process.

## 2022-07-15 NOTE — OCCUPATIONAL THERAPY INITIAL EVALUATION ADULT - MD ORDER
Occupational Therapy (OT) to evaluate and treat. Occupational Therapy (OT) to evaluate and treat. Per RIMMA Tse, pt is okay to participate in OT evaluation and perform activity as tolerated.

## 2022-07-15 NOTE — MEDICAL STUDENT PROGRESS NOTE(EDUCATION) - NS MD HP STUD ASPLAN ASSES FT
Isabel Dangelo is a 52 yo Woman with a history of migraines who presented with blurry R-sided vision loss, headache, and R eye pain worsened by EOM movement admitted with optic neuritis 2/2 a probable MS diagnosis.

## 2022-07-15 NOTE — PROGRESS NOTE ADULT - ASSESSMENT
52 yo F with PMH chronic migraines, HLD no POH, pw vision changes right eye. Pt complaining of superior VF deficit OD and pain with EOM OD. + APD OD with color vision deficit OD. MR imaging consistent with optic neuritis and perineuritis OD and demyelinating neurological disease in brain and spine.     1. R optic neuritis in setting of demyelinating neurological disease  - Pt with above symptoms indicative of optic neuritis  - VA is improved by one line OD today, color plates also slightly improved OD today  - Denies persistent headache, ROS for GCA negative; only intermittent mild headache near right temple. Pt has hx of migraines  - CRP wnl, ESR wnl corrected for age.  - MRI imaging revealing lesions c/w optic neuritis and perineuritis OD. Demyelinating lesions noted in brain and thoracic spine  - Appreciate neurology consult. Management of demyelinating lesions in brain and spine per neurology  - C/w IV solumedrol per neurology. Decision re: LP per neurology  - F/u NMO and MOG Ab  - F/u atypical optic neuritis workup: syphilis screen, Lyme, ACE, chest XR (sarcoid r/o). So far, ACE wnl, Lyme negative  - Start GI prophylaxis for high dose steroids  - Pt instructed to inform primary team if she notices any change in vision  - Findings and recommendations d/w patient and primary team.     To be d/w Dr. Baum, neuro-ophthalmology.    Outpatient follow-up: Patient should follow-up with his/her ophthalmologist or with [facility] Department of Ophthalmology upon discharge at the address below     [facility] Department of Ophthalmology  [address] [place] [address]. [address]  [place] [address]  [phone]

## 2022-07-15 NOTE — PROGRESS NOTE ADULT - PROBLEM SELECTOR PLAN 1
-IV Solumedrol 1000mg qd - Day 2 of 5  -Will continue to monitor's patient's visual loss and pain with eye movement  -Planning for dedicated T2 FlAIR MR Brain - per neuro, will consider LP based on results

## 2022-07-15 NOTE — MEDICAL STUDENT PROGRESS NOTE(EDUCATION) - SUBJECTIVE AND OBJECTIVE BOX
Rick Garcia, MS3  ---------------------------------------------------------------------------------------------  Patient is a 51y old Woman who presents with a chief complaint of R-sided blurry vision loss, headache, and R eye pain worsened with EOM movement.      SUBJECTIVE / OVERNIGHT EVENTS:   Patient began experiencing jaw pain around 6:30 AM, consistent with the jaw pain she experiences during migraines. She requested pain medication for the impending migraine. She also shared that she typically takes Rizatriptan 10 mg when she has migraines.   Additionally, she stated that her vision has improved in her R eye.   ADDITIONAL REVIEW OF SYSTEMS: Pt was not currently experiencing toe numbness, but says it typically occurs upon sitting down. It feels like pins and needles. It is not associated with color changes and does not occur more frequently in cold weather.    MEDICATIONS  (STANDING):  dextrose 5%. 1000 milliLiter(s) (50 mL/Hr) IV Continuous <Continuous>  dextrose 5%. 1000 milliLiter(s) (100 mL/Hr) IV Continuous <Continuous>  dextrose 50% Injectable 25 Gram(s) IV Push once  dextrose 50% Injectable 12.5 Gram(s) IV Push once  dextrose 50% Injectable 25 Gram(s) IV Push once  glucagon  Injectable 1 milliGRAM(s) IntraMuscular once  insulin lispro (ADMELOG) corrective regimen sliding scale   SubCutaneous three times a day before meals  insulin lispro (ADMELOG) corrective regimen sliding scale   SubCutaneous at bedtime  methylPREDNISolone sodium succinate IVPB 1000 milliGRAM(s) IV Intermittent every 24 hours  multivitamin 1 Tablet(s) Oral daily  nicotine -  14 mG/24Hr(s) Patch 1 Patch Transdermal daily  pantoprazole    Tablet 40 milliGRAM(s) Oral before breakfast  QUEtiapine 50 milliGRAM(s) Oral once    MEDICATIONS  (PRN):  dextrose Oral Gel 15 Gram(s) Oral once PRN Blood Glucose LESS THAN 70 milliGRAM(s)/deciliter  ibuprofen  Tablet. 400 milliGRAM(s) Oral every 4 hours PRN Mild Pain (1 - 3), Moderate Pain (4 - 6), Severe Pain (7 - 10)      CAPILLARY BLOOD GLUCOSE      POCT Blood Glucose.: 202 mg/dL (15 Jul 2022 12:25)    PHYSICAL EXAM:  Vital Signs Last 24 Hrs  T(C): 36.3 (15 Jul 2022 05:17), Max: 36.6 (14 Jul 2022 15:08)  T(F): 97.4 (15 Jul 2022 05:17), Max: 97.8 (14 Jul 2022 15:08)  HR: 84 (15 Jul 2022 05:17) (70 - 89)  BP: 113/60 (15 Jul 2022 05:17) (104/66 - 121/75)  BP(mean): --  RR: 17 (15 Jul 2022 05:17) (15 - 17)  SpO2: 98% (15 Jul 2022 05:17) (98% - 100%)    Parameters below as of 15 Jul 2022 05:17  Patient On (Oxygen Delivery Method): room air    General: in visible discomfort due to pain  HEENT: pain on horizontal eye movement  Chest/Lung: CTA  Heart: RRR, subjective extra sounds that will be rechecked with resident  Abdomen: NT/ND, no pain on palpation     LABS:                        11.9   18.58 )-----------( 277      ( 15 Jul 2022 07:43 )             36.4     07-15    143  |  112<H>  |  12  ----------------------------<  145<H>  3.9   |  19<L>  |  0.43<L>    Ca    9.2      15 Jul 2022 07:43  Phos  3.2     07-15  Mg     1.90     07-15    TPro  8.0  /  Alb  4.2  /  TBili  <0.2  /  DBili  x   /  AST  21  /  ALT  19  /  AlkPhos  115  07-13

## 2022-07-15 NOTE — PROGRESS NOTE ADULT - SUBJECTIVE AND OBJECTIVE BOX
[facility] DEPARTMENT OF OPHTHALMOLOGY  ------------------------------------------------------------------------------    Interval History: No acute events overnight. Today patient reporting continued pain with EOM OD and superior VF loss OD.     MEDICATIONS  (STANDING):  dextrose 5%. 1000 milliLiter(s) (50 mL/Hr) IV Continuous <Continuous>  dextrose 5%. 1000 milliLiter(s) (100 mL/Hr) IV Continuous <Continuous>  dextrose 50% Injectable 25 Gram(s) IV Push once  dextrose 50% Injectable 12.5 Gram(s) IV Push once  dextrose 50% Injectable 25 Gram(s) IV Push once  glucagon  Injectable 1 milliGRAM(s) IntraMuscular once  insulin lispro (ADMELOG) corrective regimen sliding scale   SubCutaneous three times a day before meals  insulin lispro (ADMELOG) corrective regimen sliding scale   SubCutaneous at bedtime  methylPREDNISolone sodium succinate IVPB 1000 milliGRAM(s) IV Intermittent every 24 hours  multivitamin 1 Tablet(s) Oral daily  nicotine -  14 mG/24Hr(s) Patch 1 Patch Transdermal daily  pantoprazole    Tablet 40 milliGRAM(s) Oral before breakfast  QUEtiapine 50 milliGRAM(s) Oral once    MEDICATIONS  (PRN):  dextrose Oral Gel 15 Gram(s) Oral once PRN Blood Glucose LESS THAN 70 milliGRAM(s)/deciliter  ibuprofen  Tablet. 400 milliGRAM(s) Oral every 4 hours PRN Mild Pain (1 - 3), Moderate Pain (4 - 6), Severe Pain (7 - 10)      VITALS: T(C): 36.3 (07-15-22 @ 05:17)  T(F): 97.4 (07-15-22 @ 05:17), Max: 97.8 (07-14-22 @ 15:08)  HR: 84 (07-15-22 @ 05:17) (70 - 89)  BP: 113/60 (07-15-22 @ 05:17) (104/66 - 121/75)  RR:  (15 - 17)  SpO2:  (98% - 100%)  Wt(kg): --  General: AAO x 3, appropriate mood and affect    Ophthalmology Exam:  Visual acuity (sc): 20/40 OD, NIPH; 20/20 OS  Pupils: + APD OD, round and reactive OS  Extraocular movements (EOMs): Full OU, pain in all gazes OD  Confrontational Visual Field (CVF): Superior VF deficit OD, Full OS  Color Plates: 9/12 OD, 12/12 OS    Pen Light Exam (PLE)  External: Flat OU  Lids/Lashes/Lacrimal Ducts: Flat OU    Sclera/Conjunctiva: W+Q OU  Cornea: Cl OU  Anterior Chamber: D+F OU    Iris: Flat OU  Lens: Cl OU   [facility] DEPARTMENT OF OPHTHALMOLOGY  ------------------------------------------------------------------------------    Interval History: No acute events overnight. Today patient reporting continued pain with EOM OD and superior VF loss OD.     MEDICATIONS  (STANDING):  dextrose 5%. 1000 milliLiter(s) (50 mL/Hr) IV Continuous <Continuous>  dextrose 5%. 1000 milliLiter(s) (100 mL/Hr) IV Continuous <Continuous>  dextrose 50% Injectable 25 Gram(s) IV Push once  dextrose 50% Injectable 12.5 Gram(s) IV Push once  dextrose 50% Injectable 25 Gram(s) IV Push once  glucagon  Injectable 1 milliGRAM(s) IntraMuscular once  insulin lispro (ADMELOG) corrective regimen sliding scale   SubCutaneous three times a day before meals  insulin lispro (ADMELOG) corrective regimen sliding scale   SubCutaneous at bedtime  methylPREDNISolone sodium succinate IVPB 1000 milliGRAM(s) IV Intermittent every 24 hours  multivitamin 1 Tablet(s) Oral daily  nicotine -  14 mG/24Hr(s) Patch 1 Patch Transdermal daily  pantoprazole    Tablet 40 milliGRAM(s) Oral before breakfast  QUEtiapine 50 milliGRAM(s) Oral once    MEDICATIONS  (PRN):  dextrose Oral Gel 15 Gram(s) Oral once PRN Blood Glucose LESS THAN 70 milliGRAM(s)/deciliter  ibuprofen  Tablet. 400 milliGRAM(s) Oral every 4 hours PRN Mild Pain (1 - 3), Moderate Pain (4 - 6), Severe Pain (7 - 10)      VITALS: T(C): 36.3 (07-15-22 @ 05:17)  T(F): 97.4 (07-15-22 @ 05:17), Max: 97.8 (07-14-22 @ 15:08)  HR: 84 (07-15-22 @ 05:17) (70 - 89)  BP: 113/60 (07-15-22 @ 05:17) (104/66 - 121/75)  RR:  (15 - 17)  SpO2:  (98% - 100%)  Wt(kg): --  General: AAO x 3, appropriate mood and affect    Ophthalmology Exam:  Visual acuity (sc): 20/40 OD, NIPH; 20/20 OS  Pupils: + APD OD, round and reactive OS  Extraocular movements (EOMs): Full OU, pain in all gazes OD  Confrontational Visual Field (CVF): Superior VF deficit OD, Full OS  Color Plates: 9/12 OD, 12/12 OS    Pen Light Exam (PLE)  External: Flat OU  Lids/Lashes/Lacrimal Ducts: Flat OU    Sclera/Conjunctiva: W+Q OU  Cornea: Cl OU  Anterior Chamber: D+F OU    Iris: Flat OU  Lens: Cl OU    IMAGING:    ACC: [id number] EXAM:  MR SPINE LUMBAR WAW IC                        ACC: [id number] EXAM:  MR SPINE CERVICAL WAW IC                        ACC: [id number] EXAM:  MR SPINE THORACIC WAW IC                          PROCEDURE DATE:  07/14/2022      IMPRESSION:  MR cervical spine: No high-grade central canal stenosis. No abnormal cord   signal abnormality or enhancement.  MR thoracic spine: No high-grade central canal stenosis. Focal short   segment abnormal cord signal with possible enhancement at T9 which may   represent a focus of active demyelination.  MR lumbar spine: No high-grade central canal stenosis.        ACC: [id number] EXAM:  MR BRAIN WAW IC                        ACC: [id number] EXAM:  MR ORBITS ONLY WAWI              IMPRESSION:    BRAIN MRI: Mild to moderate extent of FLAIR hyperintense white matter   lesions in the periventricular and subcortical white matter. Several   lesions demonstrate perivenular configuration which is relatively   specific for multiple sclerosis. Differential diagnosis includes chronic   microvascular ischemic changes, migraine headaches or other infectious   inflammatory process.                  *** ADDENDUM***    Upon further review there is T2 signal hyperintensity and enhancement   involving the nerve sheath andnerve of the intraorbital segment of the   right optic nerve consistent with optic neuritis and perineuritis.

## 2022-07-15 NOTE — OCCUPATIONAL THERAPY INITIAL EVALUATION ADULT - VISUAL ASSESSMENT: TRACKING
Pt able to visually track and scan; however, pt reports when "eye fatigue and pain" onset it becomes more challenging and can feel like a 'strain.' RN aware.

## 2022-07-15 NOTE — PHYSICAL THERAPY INITIAL EVALUATION ADULT - GROSSLY INTACT, SENSORY
reports occasional numbness in feet/toes which was happening prior to admission/Left UE/Right UE/Left LE/Right LE/Grossly Intact

## 2022-07-15 NOTE — PHYSICAL THERAPY INITIAL EVALUATION ADULT - PERTINENT HX OF CURRENT PROBLEM, REHAB EVAL
52 y/o Female with PMH chronic migraines, HLD, p/w vision changes right eye. A/w concern for MS and optic neuritis

## 2022-07-16 LAB
ANION GAP SERPL CALC-SCNC: 12 MMOL/L — SIGNIFICANT CHANGE UP (ref 7–14)
BUN SERPL-MCNC: 11 MG/DL — SIGNIFICANT CHANGE UP (ref 7–23)
CALCIUM SERPL-MCNC: 9 MG/DL — SIGNIFICANT CHANGE UP (ref 8.4–10.5)
CHLORIDE SERPL-SCNC: 112 MMOL/L — HIGH (ref 98–107)
CO2 SERPL-SCNC: 20 MMOL/L — LOW (ref 22–31)
CREAT SERPL-MCNC: 0.48 MG/DL — LOW (ref 0.5–1.3)
DSDNA AB FLD-ACNC: <0.2 AI — SIGNIFICANT CHANGE UP
EGFR: 115 ML/MIN/1.73M2 — SIGNIFICANT CHANGE UP
ENA SS-A AB FLD IA-ACNC: <0.2 AI — SIGNIFICANT CHANGE UP
GLUCOSE SERPL-MCNC: 118 MG/DL — HIGH (ref 70–99)
HCT VFR BLD CALC: 37.1 % — SIGNIFICANT CHANGE UP (ref 34.5–45)
HGB BLD-MCNC: 12 G/DL — SIGNIFICANT CHANGE UP (ref 11.5–15.5)
MAGNESIUM SERPL-MCNC: 2.2 MG/DL — SIGNIFICANT CHANGE UP (ref 1.6–2.6)
MCHC RBC-ENTMCNC: 28.8 PG — SIGNIFICANT CHANGE UP (ref 27–34)
MCHC RBC-ENTMCNC: 32.3 GM/DL — SIGNIFICANT CHANGE UP (ref 32–36)
MCV RBC AUTO: 89 FL — SIGNIFICANT CHANGE UP (ref 80–100)
NRBC # BLD: 0 /100 WBCS — SIGNIFICANT CHANGE UP
NRBC # FLD: 0 K/UL — SIGNIFICANT CHANGE UP
PHOSPHATE SERPL-MCNC: 3.1 MG/DL — SIGNIFICANT CHANGE UP (ref 2.5–4.5)
PLATELET # BLD AUTO: 310 K/UL — SIGNIFICANT CHANGE UP (ref 150–400)
POTASSIUM SERPL-MCNC: 3.9 MMOL/L — SIGNIFICANT CHANGE UP (ref 3.5–5.3)
POTASSIUM SERPL-SCNC: 3.9 MMOL/L — SIGNIFICANT CHANGE UP (ref 3.5–5.3)
RBC # BLD: 4.17 M/UL — SIGNIFICANT CHANGE UP (ref 3.8–5.2)
RBC # FLD: 15.2 % — HIGH (ref 10.3–14.5)
SODIUM SERPL-SCNC: 144 MMOL/L — SIGNIFICANT CHANGE UP (ref 135–145)
T PALLIDUM AB TITR SER: NEGATIVE — SIGNIFICANT CHANGE UP
WBC # BLD: 23.37 K/UL — HIGH (ref 3.8–10.5)
WBC # FLD AUTO: 23.37 K/UL — HIGH (ref 3.8–10.5)

## 2022-07-16 PROCEDURE — 99233 SBSQ HOSP IP/OBS HIGH 50: CPT | Mod: GC

## 2022-07-16 PROCEDURE — 99232 SBSQ HOSP IP/OBS MODERATE 35: CPT

## 2022-07-16 RX ORDER — SENNA PLUS 8.6 MG/1
2 TABLET ORAL AT BEDTIME
Refills: 0 | Status: DISCONTINUED | OUTPATIENT
Start: 2022-07-16 | End: 2022-07-18

## 2022-07-16 RX ORDER — LANOLIN ALCOHOL/MO/W.PET/CERES
3 CREAM (GRAM) TOPICAL AT BEDTIME
Refills: 0 | Status: DISCONTINUED | OUTPATIENT
Start: 2022-07-16 | End: 2022-07-18

## 2022-07-16 RX ADMIN — Medication 2: at 08:37

## 2022-07-16 RX ADMIN — Medication 1 PATCH: at 17:25

## 2022-07-16 RX ADMIN — Medication 2: at 18:02

## 2022-07-16 RX ADMIN — Medication 116 MILLIGRAM(S): at 14:01

## 2022-07-16 RX ADMIN — Medication 1 TABLET(S): at 14:02

## 2022-07-16 RX ADMIN — Medication 1 PATCH: at 17:57

## 2022-07-16 RX ADMIN — SENNA PLUS 2 TABLET(S): 8.6 TABLET ORAL at 21:39

## 2022-07-16 RX ADMIN — Medication 1 PATCH: at 07:30

## 2022-07-16 RX ADMIN — Medication 400 MILLIGRAM(S): at 06:00

## 2022-07-16 RX ADMIN — PANTOPRAZOLE SODIUM 40 MILLIGRAM(S): 20 TABLET, DELAYED RELEASE ORAL at 06:01

## 2022-07-16 RX ADMIN — Medication 3 MILLIGRAM(S): at 21:45

## 2022-07-16 RX ADMIN — Medication 400 MILLIGRAM(S): at 06:50

## 2022-07-16 RX ADMIN — Medication 1 PATCH: at 19:30

## 2022-07-16 NOTE — PROGRESS NOTE ADULT - ASSESSMENT
INCOMPLETE >>    51 year old female w/ PMHx chronic migraines, HLD, p/w vision changes in right eye. Patient c/o superior visual field deficits in right eye and pain w/ EOM in right eye. 7/16/22 OD eye examination notable for +RAPD, red color desaturation, and ~20/50 visual acuity. MRI head and orbit w/wo 7/14/22: several lesions w/ periventricular configuration c/f multiple sclerosis, unremarkable orbits. MRI C/T/L w/wo 7/14/22: dorsal short segment signal abnormality T9 w/ possible enhancement c/f active demyelination.     Impression: Right eye pain w/ EOM w/ red color desaturation and APD c/w right optic neuritis -- retrobulbar neuritis. Will evaluate for underlying demyelinating disease, including multiple sclerosis.    Recommendations:    Diagnostics:  [] Anti-MOG Ab, NMO/AQP4 Ab, SSA/SSB, TOMMY, ds-DNA, ANCA, ESR, CRP, Complement C3, C4, Cryoglobulins, Urine and serum Ca++, Lyme, ACE level, Syphilis screen, Hepatitis panel, Vitamin B12, Vitamin D  [] LP studies: glucose, protein, cell count, cryptococcal, IgG,  MOG, myelin basic protein, NMO, NMO/aqp4 fACS, oligoclonal bands, CSF electrophoresis    Medications:  [] IV methylprednisolone 1,000mg QD for 5 days  [] Followed by oral prednisone (1 mg/kg per day) for 11 days with a four-day taper << INCOMPLETE  [] GI prophylaxis as per primary team    Management:  [] PT/OT  [] f/u Ophthalmology recommendations      Patient can follow up with MS specialist, Dr. Trujillo, at [address] [place] [address] after discharge. Please instruct the patient to call [phone] to schedule this appointment.     << INCOMPLETE 51 year old female w/ PMHx chronic migraines, HLD, p/w vision changes in right eye. Patient c/o superior visual field deficits in right eye and pain w/ EOM in right eye. 7/16/22 OD eye examination notable for +RAPD, red color desaturation, and ~20/50 visual acuity. MRI head and orbit w/wo 7/14/22: several lesions w/ periventricular configuration c/f multiple sclerosis, unremarkable orbits. MRI C/T/L w/wo 7/14/22: dorsal short segment signal abnormality T9 w/ possible enhancement c/f active demyelination.     Impression: Right eye pain w/ EOM w/ red color desaturation and APD c/w right optic neuritis -- retrobulbar neuritis. Will evaluate for underlying demyelinating disease, including multiple sclerosis.    Recommendations:    Diagnostics:  [] Anti-MOG Ab, NMO/AQP4 Ab, SSA/SSB, TOMMY, ds-DNA, ANCA, ESR, CRP, Complement C3, C4, Cryoglobulins, Urine and serum Ca++, Lyme, ACE level, Syphilis screen, Hepatitis panel, Vitamin B12, Vitamin D  [] LP studies: glucose, protein, cell count, cryptococcal, IgG,  MOG, myelin basic protein, NMO, NMO/aqp4 fACS, oligoclonal bands, CSF electrophoresis    Medications:  [] IV methylprednisolone 1,000mg QD for 5 days  [] INCOMPLETE >> Followed by oral prednisone (1 mg/kg per day) for 11 days with a four-day taper << INCOMPLETE  [] GI prophylaxis as per primary team    Management:  [] PT/OT  [] f/u Ophthalmology recommendations      Patient can follow up with MS specialist, Dr. Trujillo, at [address] [place] [address] after discharge. Please instruct the patient to call [phone] to schedule this appointment.  51 year old female w/ PMHx chronic migraines, HLD, p/w vision changes in right eye. Patient c/o superior visual field deficits in right eye and pain w/ EOM in right eye. 7/16/22 OD eye examination notable for +RAPD, red color desaturation, and ~20/50 visual acuity. MRI head and orbit w/wo 7/14/22: several lesions w/ periventricular configuration c/f multiple sclerosis, unremarkable orbits. MRI C/T/L w/wo 7/14/22: dorsal short segment signal abnormality T9 w/ possible enhancement c/f active demyelination.     Impression: Right eye pain w/ EOM w/ red color desaturation and APD c/w right optic neuritis -- retrobulbar neuritis. Will evaluate for underlying demyelinating disease, including multiple sclerosis.    Recommendations:    Diagnostics:  [] Anti-MOG Ab, NMO/AQP4 Ab, SSA/SSB, TOMMY, ds-DNA, ANCA, ESR, CRP, Complement C3, C4, Cryoglobulins, Urine and serum Ca++, Lyme, ACE level, Syphilis screen, Hepatitis panel, Vitamin B12, Vitamin D.  [] Will need dedicated MR brain T2 flair with sagittal, coronal and axial cross sections to assess lesions in brain.    [] Will defer LP for now (let's wait for MR brain T2 flair study) but if needed later the following LP studies: glucose, protein, cell count, cryptococcal, IgG, MOG, myelin basic protein, NMO, NMO/AQP4 FACS, oligoclonal bands, CSF electrophoresis.    Medications:  [] IV methylprednisolone 1,000mg QD for 5 days (7/14-7/18)  [] Followed by oral prednisone (1 mg/kg per day) for 11 days with a four-day taper  [] GI prophylaxis as per primary team    Management:  [] PT/OT  [] f/u Ophthalmology recommendations  [] Monitor FS daily      Patient can follow up with MS specialist, Dr. Trujillo, at [address] [place] [address] after discharge. Please instruct the patient to call [phone] to schedule this appointment.  51 year old female w/ PMHx chronic migraines, HLD, p/w vision changes in right eye. Patient c/o superior visual field deficits in right eye and pain w/ EOM in right eye. 7/16/22 OD eye examination notable for +RAPD, red color desaturation, and ~20/50 visual acuity. MRI head and orbit w/wo 7/14/22: several lesions w/ periventricular configuration c/f multiple sclerosis, unremarkable orbits. MRI C/T/L w/wo 7/14/22: dorsal short segment signal abnormality T9 w/ possible enhancement c/f active demyelination.     Impression: Right eye pain w/ EOM w/ red color desaturation and APD c/w right optic neuritis -- retrobulbar neuritis. Will evaluate for underlying demyelinating disease, including multiple sclerosis.    Recommendations:    Diagnostics:  [] Anti-MOG Ab, NMO/AQP4 Ab, SSA/SSB, TOMMY, ds-DNA, ANCA, ESR, CRP, Complement C3, C4, Cryoglobulins, Urine and serum Ca++, Lyme, ACE level, Syphilis screen, Hepatitis panel, Vitamin B12, Vitamin D.  [] Will need dedicated MR brain T2 flair with sagittal, coronal and axial cross sections to assess for the presence of corpus callosal lesions.    [] Will defer LP for now (let's wait for MR brain T2 flair study) but if needed later the following LP studies: glucose, protein, cell count, cryptococcal, IgG, MOG, myelin basic protein, NMO, NMO/AQP4 FACS, oligoclonal bands, CSF electrophoresis.    Medications:  [] IV methylprednisolone 1,000mg QD for 5 days (7/14-7/18)  [] Followed by oral prednisone (1 mg/kg per day) for 11 days with a four-day taper  [] GI prophylaxis as per primary team    Management:  [] PT/OT  [] f/u Ophthalmology recommendations  [] Monitor FS daily      Patient can follow up with MS specialist, Dr. Trujillo, at [address] [place] [address] after discharge. Please instruct the patient to call [phone] to schedule this appointment.  51 year old female w/ PMHx chronic migraines, HLD, p/w vision changes in right eye. Patient c/o superior visual field deficits in right eye and pain w/ EOM in right eye. 7/16/22 OD eye examination notable for +RAPD, red color desaturation, and ~20/50 visual acuity. MRI head and orbit w/wo 7/14/22: several lesions w/ periventricular configuration c/f multiple sclerosis, unremarkable orbits. MRI C/T/L w/wo 7/14/22: dorsal short segment signal abnormality T9 w/ possible enhancement c/f active demyelination.     Impression: Right eye pain w/ EOM w/ red color desaturation and APD c/w right optic neuritis -- retrobulbar neuritis. Will evaluate for underlying demyelinating disease, including multiple sclerosis.    Recommendations:    Diagnostics:  [] Anti-MOG Ab, NMO/AQP4 Ab, SSA/SSB, TOMMY, ds-DNA, ANCA, ESR, CRP, Complement C3, C4, Cryoglobulins, Urine and serum Ca++, Lyme, ACE level, Syphilis screen, Hepatitis panel, Vitamin B12, Vitamin D.  [] Will need dedicated MR brain T2 flair with sagittal, coronal and axial cross sections to assess for the presence of corpus callosal lesions.    [] Will defer LP for now (let's wait for MR brain T2 flair study) but if needed later the following LP studies: glucose, protein, cell count, cryptococcal, IgG, MOG, myelin basic protein, NMO, NMO/AQP4 FACS, oligoclonal bands, CSF electrophoresis.    Medications:  [] IV methylprednisolone 1,000mg QD for 5 days (7/14-7/18)  [] Followed by oral prednisone 60mg x 4 days, 40mg x 4 days, 20mg x 2 days (total 12 days)  [] GI prophylaxis as per primary team    Management:  [] PT/OT  [] f/u Ophthalmology recommendations  [] Monitor FS daily      Patient can follow up with MS specialist, Dr. Trujillo, at [address] [place] [address] after discharge. Please instruct the patient to call [phone] to schedule this appointment.

## 2022-07-16 NOTE — PROGRESS NOTE ADULT - PROBLEM SELECTOR PLAN 2
-Patient not on migraine ppx at home - will continue to monitor patient for migraines -Patient not on migraine ppx at home - will continue to monitor patient for migraines  -PRN Rizatriptan 10 q2 (max dose 30/24 hr) for migraine pain

## 2022-07-16 NOTE — PROGRESS NOTE ADULT - SUBJECTIVE AND OBJECTIVE BOX
Neurology  Progress Note  07-16-22    Name: YOCASTA zurita    Interval History / ROS: ***    Medications:  MEDICATIONS  (STANDING):  dextrose 5%. 1000 milliLiter(s) (50 mL/Hr) IV Continuous <Continuous>  dextrose 5%. 1000 milliLiter(s) (100 mL/Hr) IV Continuous <Continuous>  dextrose 50% Injectable 25 Gram(s) IV Push once  dextrose 50% Injectable 12.5 Gram(s) IV Push once  dextrose 50% Injectable 25 Gram(s) IV Push once  glucagon  Injectable 1 milliGRAM(s) IntraMuscular once  insulin lispro (ADMELOG) corrective regimen sliding scale   SubCutaneous three times a day before meals  insulin lispro (ADMELOG) corrective regimen sliding scale   SubCutaneous at bedtime  multivitamin 1 Tablet(s) Oral daily  nicotine -  14 mG/24Hr(s) Patch 1 Patch Transdermal daily  pantoprazole    Tablet 40 milliGRAM(s) Oral before breakfast    MEDICATIONS  (PRN):  dextrose Oral Gel 15 Gram(s) Oral once PRN Blood Glucose LESS THAN 70 milliGRAM(s)/deciliter  ibuprofen  Tablet. 400 milliGRAM(s) Oral every 4 hours PRN Mild Pain (1 - 3), Moderate Pain (4 - 6), Severe Pain (7 - 10)  Rizatriptan 10 mg tablet 10 milliGRAM(s)   Oral every 2 hours PRN Migraine Headache      Vitals:  T(C): 36.4 (07-16-22 @ 12:28), Max: 36.6 (07-15-22 @ 20:34)  HR: 77 (07-16-22 @ 12:28) (67 - 77)  BP: 124/78 (07-16-22 @ 12:28) (124/78 - 136/82)  RR: 17 (07-16-22 @ 12:28) (16 - 17)  SpO2: 100% (07-16-22 @ 12:28) (98% - 100%)      Neurologic Examination: INCOMPLETE  ***    Labs:                        12.0   23.37 )-----------( 310      ( 16 Jul 2022 07:00 )             37.1     07-16    144  |  112<H>  |  11  ----------------------------<  118<H>  3.9   |  20<L>  |  0.48<L>    Ca    9.0      16 Jul 2022 07:00  Phos  3.1     07-16  Mg     2.20     07-16      CAPILLARY BLOOD GLUCOSE      POCT Blood Glucose.: 89 mg/dL (16 Jul 2022 12:15)     MR Head and Orbits w/wo IV Cont (07.14.22 @ 06:54)    FINDINGS:    Normal sulci and ventricles for patient's stated age. There multiple   FLAIR hyperintense lesions within the subcortical and periventricular   white matter. Several of the lesions appear ovoid in configuration,   perpendicular to the body of the lateral ventricles, and perivenular in   orientation, which is suspicious for a demyelinating process.  There is   no acute intraparenchymal hematoma, mass effect or midline shift. No   abnormal parenchymal enhancement. There is bilateral caudate   susceptibility signal which is nonspecific and may represent chronic   blood products or mineral deposition.    There is no diffusion abnormality to suggest acute or subacute   infarction. Major flow-voids at the base of the brain follow expected   course and contour.    No abnormal extra-axial fluid collections or leptomeningeal enhancement.    The calvarium is intact. Paranasal and tympanomastoid cavities appear   free of acute disease.      MRI ORBITS:    The visualized bony walls of both orbits are grossly normal.    The globes are normal in configuration. There is no area of abnormal   signal intensity or enhancement in the either eye, the optic nerves, the   extraocular muscles, or the lacrimal glands. The superior ophthalmic   veins are normal. The extra conal fat is intact. No mass is seen in   either orbit and the orbital apices are normal. The visualized optic   chiasm and suprasellar region was within normal limits. No gross   abnormality is seen in either cavernous sinus or in the sella turcica   region.    IMPRESSION:    BRAIN MRI: Mild to moderate extent of FLAIR hyperintense white matter   lesions in the periventricular and subcortical white matter. Several   lesions demonstrate perivenular configuration which is relatively   specific for multiple sclerosis. Differential diagnosis includes chronic   microvascular ischemic changes, migraine headaches or other infectious   inflammatory process.    MRI ORBITS: THE MR EVALUATION OF THE ORBITS IS UNREMARKABLE.       Radiology:    MR Cervical, Thoracic, Lumbar Spine w/wo IV Cont (07.14.22 @ 06:54)    ACC: [id number] EXAM:  MR SPINE LUMBAR WAW IC                        ACC: [id number] EXAM:  MR SPINE CERVICAL WAW IC                        ACC: [id number] EXAM:  MR SPINE THORACIC WAW IC                          PROCEDURE DATE:  07/14/2022          INTERPRETATION:  MR CERVICAL SPINE WITHOUT CONTRAST  MR THORACIC SPINE WITHOUT CONTRAST  MR LUMBAR SPINE WITHOUT CONTRAST    CLINICAL HISTORY: Demyelinating disease. Right visual field defect.    TECHNIQUE: Multiphasic sagittal non-contrast magnetic resonance imaging   of the cervical, thoracic, and lumbar spine was performed. Axial   sequences were also obtained.    CONTRAST: 7.5 mL of Gadavist. 0 mL was discarded.    COMPARISON: None.    FINDINGS:    Cervical Spine:  Normal cervical lordosis is maintained. The vertebral   body heights and alignment are maintained. No focal STIR hyperintense or   enhancing marrow replacing lesion. There is no significant disc bulge or   herniation. There is no significant cervical spinal canal stenosis. There   is no significant cervical spinal cord signal abnormality or enhancement.   Small perineural cysts at the right C6-C7 and C7-T1 neural foramina.    Thoracic Spine:  Normal thoracic kyphosis is maintained. The vertebral   body heights and alignment are maintained. No focal STIR hyperintense or   enhancing marrow replacing lesion. A few mild multilevel disc herniations   are present which indents the thecal sac. No associated high-grade   central canal stenosis. There is dorsal short segment cord signal   abnormality at T9 (7; 10 with possible corresponding enhancement (11; 9)   which may represent an area of active demyelination.    Lumbar Spine:  Normal lumbar lordosis is maintained. The vertebral body   heights and alignment are maintained. Nofocal STIR hyperintense or   enhancing marrow replacing lesion. There is no significant disc bulge or   herniation. There is no significant lumbar spinal canal stenosis. A   normal-appearing conus medullaris terminates at T12-L1. The cauda equina   demonstrates a normal configuration.    There is a 1.0 cm T2 hyperintense right hepatic lesion which may   represent hepatic cysts versus hemangioma.    IMPRESSION:  MR cervical spine: No high-grade central canal stenosis. No abnormal cord   signal abnormality or enhancement.    MR thoracic spine: No high-grade central canal stenosis. Focal short   segment abnormal cord signal with possible enhancement at T9 which may   represent a focus of active demyelination.    MR lumbar spine: No high-grade central canal stenosis.  Neurology  Progress Note  07-16-22    Name: YOCASTA Olivarezy    Interval History / ROS: no acute events overnight, still has some pain w/ EOM in right eye.    Medications:  MEDICATIONS  (STANDING):  dextrose 5%. 1000 milliLiter(s) (50 mL/Hr) IV Continuous <Continuous>  dextrose 5%. 1000 milliLiter(s) (100 mL/Hr) IV Continuous <Continuous>  dextrose 50% Injectable 25 Gram(s) IV Push once  dextrose 50% Injectable 12.5 Gram(s) IV Push once  dextrose 50% Injectable 25 Gram(s) IV Push once  glucagon  Injectable 1 milliGRAM(s) IntraMuscular once  insulin lispro (ADMELOG) corrective regimen sliding scale   SubCutaneous three times a day before meals  insulin lispro (ADMELOG) corrective regimen sliding scale   SubCutaneous at bedtime  multivitamin 1 Tablet(s) Oral daily  nicotine -  14 mG/24Hr(s) Patch 1 Patch Transdermal daily  pantoprazole    Tablet 40 milliGRAM(s) Oral before breakfast    MEDICATIONS  (PRN):  dextrose Oral Gel 15 Gram(s) Oral once PRN Blood Glucose LESS THAN 70 milliGRAM(s)/deciliter  ibuprofen  Tablet. 400 milliGRAM(s) Oral every 4 hours PRN Mild Pain (1 - 3), Moderate Pain (4 - 6), Severe Pain (7 - 10)  Rizatriptan 10 mg tablet 10 milliGRAM(s)   Oral every 2 hours PRN Migraine Headache      Vitals:  T(C): 36.4 (07-16-22 @ 12:28), Max: 36.6 (07-15-22 @ 20:34)  HR: 77 (07-16-22 @ 12:28) (67 - 77)  BP: 124/78 (07-16-22 @ 12:28) (124/78 - 136/82)  RR: 17 (07-16-22 @ 12:28) (16 - 17)  SpO2: 100% (07-16-22 @ 12:28) (98% - 100%)    Neurological Examination:    Mental status: Awake, alert, fluent speech, follows commands.  Cranial Nerves: EOM grossly intact; visual acuity 20/50 in OD; red color desaturation in OD; pupils equal round and reactive bilaterally; relative afferent pupillary defect in OD; V1-3 intact, no facial asymmetry, no dysarthria  Motor: No drift x 4. 5/5 strength to b/l deltoids, biceps, triceps, hand , hip flexion.  Sensation: Intact to LT x 4      Labs:                        12.0   23.37 )-----------( 310      ( 16 Jul 2022 07:00 )             37.1     07-16    144  |  112<H>  |  11  ----------------------------<  118<H>  3.9   |  20<L>  |  0.48<L>    Ca    9.0      16 Jul 2022 07:00  Phos  3.1     07-16  Mg     2.20     07-16      CAPILLARY BLOOD GLUCOSE      POCT Blood Glucose.: 89 mg/dL (16 Jul 2022 12:15)     MR Head and Orbits w/wo IV Cont (07.14.22 @ 06:54)    FINDINGS:    Normal sulci and ventricles for patient's stated age. There multiple   FLAIR hyperintense lesions within the subcortical and periventricular   white matter. Several of the lesions appear ovoid in configuration,   perpendicular to the body of the lateral ventricles, and perivenular in   orientation, which is suspicious for a demyelinating process.  There is   no acute intraparenchymal hematoma, mass effect or midline shift. No   abnormal parenchymal enhancement. There is bilateral caudate   susceptibility signal which is nonspecific and may represent chronic   blood products or mineral deposition.    There is no diffusion abnormality to suggest acute or subacute   infarction. Major flow-voids at the base of the brain follow expected   course and contour.    No abnormal extra-axial fluid collections or leptomeningeal enhancement.    The calvarium is intact. Paranasal and tympanomastoid cavities appear   free of acute disease.      MRI ORBITS:    The visualized bony walls of both orbits are grossly normal.    The globes are normal in configuration. There is no area of abnormal   signal intensity or enhancement in the either eye, the optic nerves, the   extraocular muscles, or the lacrimal glands. The superior ophthalmic   veins are normal. The extra conal fat is intact. No mass is seen in   either orbit and the orbital apices are normal. The visualized optic   chiasm and suprasellar region was within normal limits. No gross   abnormality is seen in either cavernous sinus or in the sella turcica   region.    IMPRESSION:    BRAIN MRI: Mild to moderate extent of FLAIR hyperintense white matter   lesions in the periventricular and subcortical white matter. Several   lesions demonstrate perivenular configuration which is relatively   specific for multiple sclerosis. Differential diagnosis includes chronic   microvascular ischemic changes, migraine headaches or other infectious   inflammatory process.    MRI ORBITS: THE MR EVALUATION OF THE ORBITS IS UNREMARKABLE.       Radiology:    MR Cervical, Thoracic, Lumbar Spine w/wo IV Cont (07.14.22 @ 06:54)    ACC: [id number] EXAM:  MR SPINE LUMBAR WAW IC                        ACC: [id number] EXAM:  MR SPINE CERVICAL WAW IC                        ACC: [id number] EXAM:  MR SPINE THORACIC WAW IC                          PROCEDURE DATE:  07/14/2022          INTERPRETATION:  MR CERVICAL SPINE WITHOUT CONTRAST  MR THORACIC SPINE WITHOUT CONTRAST  MR LUMBAR SPINE WITHOUT CONTRAST    CLINICAL HISTORY: Demyelinating disease. Right visual field defect.    TECHNIQUE: Multiphasic sagittal non-contrast magnetic resonance imaging   of the cervical, thoracic, and lumbar spine was performed. Axial   sequences were also obtained.    CONTRAST: 7.5 mL of Gadavist. 0 mL was discarded.    COMPARISON: None.    FINDINGS:    Cervical Spine:  Normal cervical lordosis is maintained. The vertebral   body heights and alignment are maintained. No focal STIR hyperintense or   enhancing marrow replacing lesion. There is no significant disc bulge or   herniation. There is no significant cervical spinal canal stenosis. There   is no significant cervical spinal cord signal abnormality or enhancement.   Small perineural cysts at the right C6-C7 and C7-T1 neural foramina.    Thoracic Spine:  Normal thoracic kyphosis is maintained. The vertebral   body heights and alignment are maintained. No focal STIR hyperintense or   enhancing marrow replacing lesion. A few mild multilevel disc herniations   are present which indents the thecal sac. No associated high-grade   central canal stenosis. There is dorsal short segment cord signal   abnormality at T9 (7; 10 with possible corresponding enhancement (11; 9)   which may represent an area of active demyelination.    Lumbar Spine:  Normal lumbar lordosis is maintained. The vertebral body   heights and alignment are maintained. Nofocal STIR hyperintense or   enhancing marrow replacing lesion. There is no significant disc bulge or   herniation. There is no significant lumbar spinal canal stenosis. A   normal-appearing conus medullaris terminates at T12-L1. The cauda equina   demonstrates a normal configuration.    There is a 1.0 cm T2 hyperintense right hepatic lesion which may   represent hepatic cysts versus hemangioma.    IMPRESSION:  MR cervical spine: No high-grade central canal stenosis. No abnormal cord   signal abnormality or enhancement.    MR thoracic spine: No high-grade central canal stenosis. Focal short   segment abnormal cord signal with possible enhancement at T9 which may   represent a focus of active demyelination.    MR lumbar spine: No high-grade central canal stenosis.  Neurology  Progress Note  07-16-22    Name: YOCASTA Olivarezy    Interval History / ROS: no acute events overnight, still has some pain w/ EOM in right eye.  Blurred vision is improving. No paresthesias.     Medications:  MEDICATIONS  (STANDING):  dextrose 5%. 1000 milliLiter(s) (50 mL/Hr) IV Continuous <Continuous>  dextrose 5%. 1000 milliLiter(s) (100 mL/Hr) IV Continuous <Continuous>  dextrose 50% Injectable 25 Gram(s) IV Push once  dextrose 50% Injectable 12.5 Gram(s) IV Push once  dextrose 50% Injectable 25 Gram(s) IV Push once  glucagon  Injectable 1 milliGRAM(s) IntraMuscular once  insulin lispro (ADMELOG) corrective regimen sliding scale   SubCutaneous three times a day before meals  insulin lispro (ADMELOG) corrective regimen sliding scale   SubCutaneous at bedtime  multivitamin 1 Tablet(s) Oral daily  nicotine -  14 mG/24Hr(s) Patch 1 Patch Transdermal daily  pantoprazole    Tablet 40 milliGRAM(s) Oral before breakfast    MEDICATIONS  (PRN):  dextrose Oral Gel 15 Gram(s) Oral once PRN Blood Glucose LESS THAN 70 milliGRAM(s)/deciliter  ibuprofen  Tablet. 400 milliGRAM(s) Oral every 4 hours PRN Mild Pain (1 - 3), Moderate Pain (4 - 6), Severe Pain (7 - 10)  Rizatriptan 10 mg tablet 10 milliGRAM(s)   Oral every 2 hours PRN Migraine Headache      Vitals:  T(C): 36.4 (07-16-22 @ 12:28), Max: 36.6 (07-15-22 @ 20:34)  HR: 77 (07-16-22 @ 12:28) (67 - 77)  BP: 124/78 (07-16-22 @ 12:28) (124/78 - 136/82)  RR: 17 (07-16-22 @ 12:28) (16 - 17)  SpO2: 100% (07-16-22 @ 12:28) (98% - 100%)    Neurological Examination:    Mental status: Awake, alert, fluent speech, follows commands.  Cranial Nerves: EOM grossly intact; visual acuity 20/50 in OD; red color desaturation in OD; pupils equal round and reactive bilaterally; relative afferent pupillary defect in OD; V1-3 intact, no facial asymmetry, no dysarthria  Motor: No drift x 4. 5/5 strength to b/l deltoids, biceps, triceps, hand , hip flexion.  Sensation: Intact to LT x 4      Labs:                        12.0   23.37 )-----------( 310      ( 16 Jul 2022 07:00 )             37.1     07-16    144  |  112<H>  |  11  ----------------------------<  118<H>  3.9   |  20<L>  |  0.48<L>    Ca    9.0      16 Jul 2022 07:00  Phos  3.1     07-16  Mg     2.20     07-16      CAPILLARY BLOOD GLUCOSE      POCT Blood Glucose.: 89 mg/dL (16 Jul 2022 12:15)     MR Head and Orbits w/wo IV Cont (07.14.22 @ 06:54)    FINDINGS:    Normal sulci and ventricles for patient's stated age. There multiple   FLAIR hyperintense lesions within the subcortical and periventricular   white matter. Several of the lesions appear ovoid in configuration,   perpendicular to the body of the lateral ventricles, and perivenular in   orientation, which is suspicious for a demyelinating process.  There is   no acute intraparenchymal hematoma, mass effect or midline shift. No   abnormal parenchymal enhancement. There is bilateral caudate   susceptibility signal which is nonspecific and may represent chronic   blood products or mineral deposition.    There is no diffusion abnormality to suggest acute or subacute   infarction. Major flow-voids at the base of the brain follow expected   course and contour.    No abnormal extra-axial fluid collections or leptomeningeal enhancement.    The calvarium is intact. Paranasal and tympanomastoid cavities appear   free of acute disease.      MRI ORBITS:    The visualized bony walls of both orbits are grossly normal.    The globes are normal in configuration. There is no area of abnormal   signal intensity or enhancement in the either eye, the optic nerves, the   extraocular muscles, or the lacrimal glands. The superior ophthalmic   veins are normal. The extra conal fat is intact. No mass is seen in   either orbit and the orbital apices are normal. The visualized optic   chiasm and suprasellar region was within normal limits. No gross   abnormality is seen in either cavernous sinus or in the sella turcica   region.    IMPRESSION:    BRAIN MRI: Mild to moderate extent of FLAIR hyperintense white matter   lesions in the periventricular and subcortical white matter. Several   lesions demonstrate perivenular configuration which is relatively   specific for multiple sclerosis. Differential diagnosis includes chronic   microvascular ischemic changes, migraine headaches or other infectious   inflammatory process.    MRI ORBITS: THE MR EVALUATION OF THE ORBITS IS UNREMARKABLE.       Radiology:    MR Cervical, Thoracic, Lumbar Spine w/wo IV Cont (07.14.22 @ 06:54)    ACC: [id number] EXAM:  MR SPINE LUMBAR WAW IC                        ACC: [id number] EXAM:  MR SPINE CERVICAL WAW IC                        ACC: [id number] EXAM:  MR SPINE THORACIC WAW IC                          PROCEDURE DATE:  07/14/2022          INTERPRETATION:  MR CERVICAL SPINE WITHOUT CONTRAST  MR THORACIC SPINE WITHOUT CONTRAST  MR LUMBAR SPINE WITHOUT CONTRAST    CLINICAL HISTORY: Demyelinating disease. Right visual field defect.    TECHNIQUE: Multiphasic sagittal non-contrast magnetic resonance imaging   of the cervical, thoracic, and lumbar spine was performed. Axial   sequences were also obtained.    CONTRAST: 7.5 mL of Gadavist. 0 mL was discarded.    COMPARISON: None.    FINDINGS:    Cervical Spine:  Normal cervical lordosis is maintained. The vertebral   body heights and alignment are maintained. No focal STIR hyperintense or   enhancing marrow replacing lesion. There is no significant disc bulge or   herniation. There is no significant cervical spinal canal stenosis. There   is no significant cervical spinal cord signal abnormality or enhancement.   Small perineural cysts at the right C6-C7 and C7-T1 neural foramina.    Thoracic Spine:  Normal thoracic kyphosis is maintained. The vertebral   body heights and alignment are maintained. No focal STIR hyperintense or   enhancing marrow replacing lesion. A few mild multilevel disc herniations   are present which indents the thecal sac. No associated high-grade   central canal stenosis. There is dorsal short segment cord signal   abnormality at T9 (7; 10 with possible corresponding enhancement (11; 9)   which may represent an area of active demyelination.    Lumbar Spine:  Normal lumbar lordosis is maintained. The vertebral body   heights and alignment are maintained. Nofocal STIR hyperintense or   enhancing marrow replacing lesion. There is no significant disc bulge or   herniation. There is no significant lumbar spinal canal stenosis. A   normal-appearing conus medullaris terminates at T12-L1. The cauda equina   demonstrates a normal configuration.    There is a 1.0 cm T2 hyperintense right hepatic lesion which may   represent hepatic cysts versus hemangioma.    IMPRESSION:  MR cervical spine: No high-grade central canal stenosis. No abnormal cord   signal abnormality or enhancement.    MR thoracic spine: No high-grade central canal stenosis. Focal short   segment abnormal cord signal with possible enhancement at T9 which may   represent a focus of active demyelination.    MR lumbar spine: No high-grade central canal stenosis.

## 2022-07-16 NOTE — PROGRESS NOTE ADULT - SUBJECTIVE AND OBJECTIVE BOX
[facility] DEPARTMENT OF OPHTHALMOLOGY  ------------------------------------------------------------------------------  Jorge Lai MD PGY 3  [phone]  ------------------------------------------------------------------------------    Interval History: No acute events overnight. Reports subjective improvement in superior field loss in right eye. Still with pain with EOMs    MEDICATIONS  (STANDING):  dextrose 5%. 1000 milliLiter(s) (50 mL/Hr) IV Continuous <Continuous>  dextrose 5%. 1000 milliLiter(s) (100 mL/Hr) IV Continuous <Continuous>  dextrose 50% Injectable 25 Gram(s) IV Push once  dextrose 50% Injectable 12.5 Gram(s) IV Push once  dextrose 50% Injectable 25 Gram(s) IV Push once  glucagon  Injectable 1 milliGRAM(s) IntraMuscular once  insulin lispro (ADMELOG) corrective regimen sliding scale   SubCutaneous three times a day before meals  insulin lispro (ADMELOG) corrective regimen sliding scale   SubCutaneous at bedtime  methylPREDNISolone sodium succinate IVPB 1000 milliGRAM(s) IV Intermittent every 24 hours  multivitamin 1 Tablet(s) Oral daily  nicotine -  14 mG/24Hr(s) Patch 1 Patch Transdermal daily  pantoprazole    Tablet 40 milliGRAM(s) Oral before breakfast    MEDICATIONS  (PRN):  dextrose Oral Gel 15 Gram(s) Oral once PRN Blood Glucose LESS THAN 70 milliGRAM(s)/deciliter  ibuprofen  Tablet. 400 milliGRAM(s) Oral every 4 hours PRN Mild Pain (1 - 3), Moderate Pain (4 - 6), Severe Pain (7 - 10)      VITALS: T(C): 36.6 (07-16-22 @ 05:47)  T(F): 97.8 (07-16-22 @ 05:47), Max: 97.8 (07-15-22 @ 20:34)  HR: 72 (07-16-22 @ 05:47) (67 - 89)  BP: 136/82 (07-16-22 @ 05:47) (114/64 - 136/82)  RR:  (16 - 17)  SpO2:  (98% - 99%)  Wt(kg): --  General: AAO x 3, appropriate mood and affect    Ophthalmology Exam:  Visual acuity (sc): 20/30 OD, 29/20 OS  Pupils: PERRL OU, no APD  Ttono: 16 OU  Extraocular movements (EOMs): Full OU, +pain, no diplopia  Confrontational Visual Field (CVF): Superior field loss OD, Full OS  Color Plates: 9/12 OD, 12/12 OS    Pen Light Exam (PLE)  External: Flat OU  Lids/Lashes/Lacrimal Ducts: Flat OU    Sclera/Conjunctiva: W+Q OU  Cornea: Cl OU  Anterior Chamber: D+F OU    Iris: Flat OU  Lens: Cl OU   [facility] DEPARTMENT OF OPHTHALMOLOGY  ------------------------------------------------------------------------------  Jorge Lai MD PGY 3  [phone]  ------------------------------------------------------------------------------    Interval History: No acute events overnight. Reports subjective improvement in superior field loss in right eye. Still with pain with EOMs    MEDICATIONS  (STANDING):  dextrose 5%. 1000 milliLiter(s) (50 mL/Hr) IV Continuous <Continuous>  dextrose 5%. 1000 milliLiter(s) (100 mL/Hr) IV Continuous <Continuous>  dextrose 50% Injectable 25 Gram(s) IV Push once  dextrose 50% Injectable 12.5 Gram(s) IV Push once  dextrose 50% Injectable 25 Gram(s) IV Push once  glucagon  Injectable 1 milliGRAM(s) IntraMuscular once  insulin lispro (ADMELOG) corrective regimen sliding scale   SubCutaneous three times a day before meals  insulin lispro (ADMELOG) corrective regimen sliding scale   SubCutaneous at bedtime  methylPREDNISolone sodium succinate IVPB 1000 milliGRAM(s) IV Intermittent every 24 hours  multivitamin 1 Tablet(s) Oral daily  nicotine -  14 mG/24Hr(s) Patch 1 Patch Transdermal daily  pantoprazole    Tablet 40 milliGRAM(s) Oral before breakfast    MEDICATIONS  (PRN):  dextrose Oral Gel 15 Gram(s) Oral once PRN Blood Glucose LESS THAN 70 milliGRAM(s)/deciliter  ibuprofen  Tablet. 400 milliGRAM(s) Oral every 4 hours PRN Mild Pain (1 - 3), Moderate Pain (4 - 6), Severe Pain (7 - 10)      VITALS: T(C): 36.6 (07-16-22 @ 05:47)  T(F): 97.8 (07-16-22 @ 05:47), Max: 97.8 (07-15-22 @ 20:34)  HR: 72 (07-16-22 @ 05:47) (67 - 89)  BP: 136/82 (07-16-22 @ 05:47) (114/64 - 136/82)  RR:  (16 - 17)  SpO2:  (98% - 99%)  Wt(kg): --  General: AAO x 3, appropriate mood and affect    Ophthalmology Exam:  Visual acuity (sc): 20/30 OD, 20/20 OS  Pupils: PERRL OU, +APD OD  Ttono: 16 OU  Extraocular movements (EOMs): Full OU, +pain, no diplopia  Confrontational Visual Field (CVF): Superior field loss OD, Full OS  Color Plates: 9/12 OD, 12/12 OS    Pen Light Exam (PLE)  External: Flat OU  Lids/Lashes/Lacrimal Ducts: Flat OU    Sclera/Conjunctiva: W+Q OU  Cornea: Cl OU  Anterior Chamber: D+F OU    Iris: Flat OU  Lens: Cl OU

## 2022-07-16 NOTE — PROGRESS NOTE ADULT - SUBJECTIVE AND OBJECTIVE BOX
Interval History: No acute events overnight    Vital Signs (24 Hrs):  T(C): 36.3 (07-15-22 @ 05:17), Max: 36.6 (07-14-22 @ 12:05)  HR: 84 (07-15-22 @ 05:17) (69 - 89)  BP: 113/60 (07-15-22 @ 05:17) (100/64 - 121/75)  RR: 17 (07-15-22 @ 05:17) (15 - 17)  SpO2: 98% (07-15-22 @ 05:17) (98% - 100%)    Physical Exam:  Constitutional - NAD  · HEENT - EOMI but in all movement directions  · Respiratory: CTAB b/l  · Cardiovascular: RRR, no M/R/G  · GI: Midline scar visible, NTND, no palpable masses  · Extremities: No LE edema b/l  · Neurological- AAOX4, Afferent R pupillary defect, Direct and Consensual Response when light shone into L pupil, diminished visual fields in R eye, 5/5 strength b/l UE and LE  · Psychiatric - Appropriate affect  · Skin - No cyanosis or pallor    LABS:  cret                        11.9   18.58 )-----------( 277      ( 15 Jul 2022 07:43 )             36.4     07-15    143  |  112<H>  |  12  ----------------------------<  145<H>  3.9   |  19<L>  |  0.43<L>    Ca    9.2      15 Jul 2022 07:43  Phos  3.2     07-15  Mg     1.90     07-15    TPro  8.0  /  Alb  4.2  /  TBili  <0.2  /  DBili  x   /  AST  21  /  ALT  19  /  AlkPhos  115  07-13     BRAIN MRI: Mild to moderate extent of FLAIR hyperintense white matter   lesions in the periventricular and subcortical white matter. Several   lesions demonstrate perivenular configuration which is relatively   specific for multiple sclerosis. Differential diagnosis includes chronic   microvascular ischemic changes, migraine headaches or other infectious   inflammatory process.    Upon further review there is T2 signal hyperintensity and enhancement   involving the nerve sheath andnerve of the intraorbital segment of the   right optic nerve consistent with optic neuritis and perineuritis.       Interval History: No acute events overnight. Patient reports improved right eye vision (bottom half of visual field is better than upper half) and less pain with right eye movement. She denies fever, chills, headache, weakness, tremors, numbness/tingling, chest pain, dyspnea, or polyuria/dysuria/hematuria    Vital Signs (24 Hrs):  T(C): 36.5 (07-16-22 @ 21:30), Max: 36.6 (07-16-22 @ 05:47)  HR: 88 (07-16-22 @ 21:30) (72 - 88)  BP: 124/74 (07-16-22 @ 21:30) (124/74 - 136/82)  RR: 16 (07-16-22 @ 21:30) (16 - 17)  SpO2: 98% (07-16-22 @ 21:30) (98% - 100%)      Physical Exam:  Constitutional - NAD  · HEENT - EOMI but in all movement directions  · Respiratory: CTAB b/l  · Cardiovascular: RRR, no M/R/G  · GI: Midline scar visible, NTND, no palpable masses  · Extremities: No LE edema b/l  · Neurological- AAOX4, Afferent R pupillary defect, Direct and Consensual Response when light shone into L pupil, diminished visual fields in R eye, 5/5 strength b/l UE and LE  · Psychiatric - Appropriate affect  · Skin - No cyanosis or pallor    LABS:  cret                        12.0   23.37 )-----------( 310      ( 16 Jul 2022 07:00 )             37.1     07-16    144  |  112<H>  |  11  ----------------------------<  118<H>  3.9   |  20<L>  |  0.48<L>    Ca    9.0      16 Jul 2022 07:00  Phos  3.1     07-16  Mg     2.20     07-16     BRAIN MRI: Mild to moderate extent of FLAIR hyperintense white matter   lesions in the periventricular and subcortical white matter. Several   lesions demonstrate perivenular configuration which is relatively   specific for multiple sclerosis. Differential diagnosis includes chronic   microvascular ischemic changes, migraine headaches or other infectious   inflammatory process.    Upon further review there is T2 signal hyperintensity and enhancement   involving the nerve sheath andnerve of the intraorbital segment of the   right optic nerve consistent with optic neuritis and perineuritis.

## 2022-07-16 NOTE — PROGRESS NOTE ADULT - ASSESSMENT
Pt is 51yF w/ pmhx migraines and HLD p/w two days of R eye pain and vision loss. Physical exam significant for R eye visual field deficits and R eye pain with extraocular movement. MR Brain showing periventricular hyperintensity and perivenular configuration consistent with MS, and MR Orbits showing enhancement of R optic nerve consistent with optic neuritis. Patient's presentation and objective findings consistent with Atypical Optic Neuritis, with ddx also including NAION. Patient is on Solumedrol 1000 Day 2 of 5 and has been symptomatically improving. Pt is 51yF w/ pmhx migraines and HLD p/w two days of R eye pain and vision loss. Physical exam significant for R eye visual field deficits and R eye pain with extraocular movement. MR Brain showing periventricular hyperintensity and perivenular configuration consistent with MS, and MR Orbits showing enhancement of R optic nerve consistent with optic neuritis. Patient's presentation and objective findings consistent with Atypical Optic Neuritis, with ddx also including NAION. Patient is on Solumedrol 1000 Day 3 of 5 and has been symptomatically improving. Awaiting dedicated T2 FLAIR MR BRAIN to determine further management recs.

## 2022-07-16 NOTE — PROGRESS NOTE ADULT - ATTENDING COMMENTS
Ms. Dangelo is a 52 yo woman with right optic neuritis - retrobulbar neuritis.  DDx: first attach of multiple sclerosis and will work up for other causes as above.     Thank you

## 2022-07-16 NOTE — PROGRESS NOTE ADULT - PROBLEM SELECTOR PLAN 1
-IV Solumedrol 1000mg qd - Day 2 of 5  -Will continue to monitor's patient's visual loss and pain with eye movement  -Planning for dedicated T2 FlAIR MR Brain - per neuro, will consider LP based on results -IV Solumedrol 1000mg qd - Day 3 of 5 (7/14-7/18 planned course)  -Will continue to monitor's patient's visual loss and pain with eye movement  -Planning for dedicated T2 FlAIR MR Brain - per neuro, will consider LP based on results

## 2022-07-16 NOTE — PROGRESS NOTE ADULT - ASSESSMENT
50 yo F with PMH chronic migraines, HLD no POH, pw vision changes right eye. Pt complaining of superior VF deficit OD and pain with EOM OD. + APD OD with color vision deficit OD. MR imaging consistent with optic neuritis and perineuritis OD and demyelinating neurological disease in brain and spine.     1. R optic neuritis in setting of demyelinating neurological disease  - Pt with above symptoms indicative of optic neuritis  - VA is improved by one line OD today to 20/30, color plates stable at 9/12. Subjective improvement in superior field loss  - Denies persistent headache, ROS for GCA negative; only intermittent mild headache near right temple. Pt has hx of migraines  - CRP wnl, ESR wnl corrected for age.  - MRI imaging revealing lesions c/w optic neuritis and perineuritis OD. Demyelinating lesions noted in brain and thoracic spine  - Appreciate neurology consult. Management of demyelinating lesions in brain and spine per neurology  - C/w IV solumedrol per neurology. Decision re: LP per neurology  - F/u NMO and MOG Ab  - F/u atypical optic neuritis workup: ACE, chest XR (sarcoid r/o).   - Currently negative:, ACE wnl, Lyme negative, syphilis  - GI prophylaxis for high dose steroids  - Pt instructed to inform primary team if she notices any change in vision  - Findings and recommendations d/w patient and primary team.     Discussed with Dr. Baum, neuro-ophthalmology.    Outpatient follow-up: Patient should follow-up with his/her ophthalmologist or with [facility] Department of Ophthalmology upon discharge at the address below     [facility] Department of Ophthalmology  [address] [place] [address]. [place] [address]  [phone]

## 2022-07-16 NOTE — PROGRESS NOTE ADULT - ATTENDING COMMENTS
vision with slight improvement - continue with steroids, monitor FS and provide ISS.  await MRI and LP early next week.

## 2022-07-17 ENCOUNTER — TRANSCRIPTION ENCOUNTER (OUTPATIENT)
Age: 52
End: 2022-07-17

## 2022-07-17 DIAGNOSIS — R07.9 CHEST PAIN, UNSPECIFIED: ICD-10-CM

## 2022-07-17 LAB
ANION GAP SERPL CALC-SCNC: 12 MMOL/L — SIGNIFICANT CHANGE UP (ref 7–14)
BUN SERPL-MCNC: 13 MG/DL — SIGNIFICANT CHANGE UP (ref 7–23)
CALCIUM SERPL-MCNC: 8.5 MG/DL — SIGNIFICANT CHANGE UP (ref 8.4–10.5)
CHLORIDE SERPL-SCNC: 109 MMOL/L — HIGH (ref 98–107)
CO2 SERPL-SCNC: 21 MMOL/L — LOW (ref 22–31)
CREAT SERPL-MCNC: 0.51 MG/DL — SIGNIFICANT CHANGE UP (ref 0.5–1.3)
EGFR: 114 ML/MIN/1.73M2 — SIGNIFICANT CHANGE UP
GLUCOSE SERPL-MCNC: 116 MG/DL — HIGH (ref 70–99)
HCT VFR BLD CALC: 33.9 % — LOW (ref 34.5–45)
HGB BLD-MCNC: 10.9 G/DL — LOW (ref 11.5–15.5)
MAGNESIUM SERPL-MCNC: 2.2 MG/DL — SIGNIFICANT CHANGE UP (ref 1.6–2.6)
MCHC RBC-ENTMCNC: 28.4 PG — SIGNIFICANT CHANGE UP (ref 27–34)
MCHC RBC-ENTMCNC: 32.2 GM/DL — SIGNIFICANT CHANGE UP (ref 32–36)
MCV RBC AUTO: 88.3 FL — SIGNIFICANT CHANGE UP (ref 80–100)
NRBC # BLD: 0 /100 WBCS — SIGNIFICANT CHANGE UP
NRBC # FLD: 0 K/UL — SIGNIFICANT CHANGE UP
PHOSPHATE SERPL-MCNC: 3.7 MG/DL — SIGNIFICANT CHANGE UP (ref 2.5–4.5)
PLATELET # BLD AUTO: 267 K/UL — SIGNIFICANT CHANGE UP (ref 150–400)
POTASSIUM SERPL-MCNC: 3.9 MMOL/L — SIGNIFICANT CHANGE UP (ref 3.5–5.3)
POTASSIUM SERPL-SCNC: 3.9 MMOL/L — SIGNIFICANT CHANGE UP (ref 3.5–5.3)
RBC # BLD: 3.84 M/UL — SIGNIFICANT CHANGE UP (ref 3.8–5.2)
RBC # FLD: 15.6 % — HIGH (ref 10.3–14.5)
SODIUM SERPL-SCNC: 142 MMOL/L — SIGNIFICANT CHANGE UP (ref 135–145)
WBC # BLD: 17.39 K/UL — HIGH (ref 3.8–10.5)
WBC # FLD AUTO: 17.39 K/UL — HIGH (ref 3.8–10.5)

## 2022-07-17 PROCEDURE — 99233 SBSQ HOSP IP/OBS HIGH 50: CPT | Mod: GC

## 2022-07-17 PROCEDURE — 93010 ELECTROCARDIOGRAM REPORT: CPT

## 2022-07-17 RX ORDER — QUETIAPINE FUMARATE 200 MG/1
50 TABLET, FILM COATED ORAL AT BEDTIME
Refills: 0 | Status: DISCONTINUED | OUTPATIENT
Start: 2022-07-17 | End: 2022-07-18

## 2022-07-17 RX ORDER — POLYETHYLENE GLYCOL 3350 17 G/17G
17 POWDER, FOR SOLUTION ORAL
Refills: 0 | Status: DISCONTINUED | OUTPATIENT
Start: 2022-07-17 | End: 2022-07-18

## 2022-07-17 RX ADMIN — Medication 1 TABLET(S): at 11:56

## 2022-07-17 RX ADMIN — Medication 400 MILLIGRAM(S): at 05:10

## 2022-07-17 RX ADMIN — SENNA PLUS 2 TABLET(S): 8.6 TABLET ORAL at 21:54

## 2022-07-17 RX ADMIN — Medication 400 MILLIGRAM(S): at 04:16

## 2022-07-17 RX ADMIN — PANTOPRAZOLE SODIUM 40 MILLIGRAM(S): 20 TABLET, DELAYED RELEASE ORAL at 06:11

## 2022-07-17 RX ADMIN — POLYETHYLENE GLYCOL 3350 17 GRAM(S): 17 POWDER, FOR SOLUTION ORAL at 17:55

## 2022-07-17 RX ADMIN — Medication 1: at 17:55

## 2022-07-17 RX ADMIN — Medication 1 PATCH: at 11:56

## 2022-07-17 RX ADMIN — Medication 58 MILLIGRAM(S): at 16:05

## 2022-07-17 RX ADMIN — Medication 1 PATCH: at 18:34

## 2022-07-17 RX ADMIN — QUETIAPINE FUMARATE 50 MILLIGRAM(S): 200 TABLET, FILM COATED ORAL at 21:49

## 2022-07-17 NOTE — DISCHARGE NOTE PROVIDER - NSDCCPCAREPLAN_GEN_ALL_CORE_FT
PRINCIPAL DISCHARGE DIAGNOSIS  Diagnosis: Optic neuritis  Assessment and Plan of Treatment: You came to the hospital because you had trouble seeing out of your right eye and pain with right eye movement. You were seen by our neurology and ophthalmology consult teams, and had imaging of the brain and spine performed. MRI of the brain showed inflammation of the optic nerve, called optic neuritis. We treated you with 5 days of intravenous Solumedrol 1000mg (a steroid) to control the inflammation. You shold follow up with your primary doctor and neurologist in 1-2 weeks.       PRINCIPAL DISCHARGE DIAGNOSIS  Diagnosis: Optic neuritis  Assessment and Plan of Treatment: You came to the hospital because you had trouble seeing out of your right eye and pain with right eye movement. You were seen by our neurology and ophthalmology consult teams, and had imaging of the brain and spine performed. MRI of the brain showed inflammation of the optic nerve, called optic neuritis. We treated you with 5 days of intravenous Solumedrol 1000mg (a steroid) to control the inflammation. You will be discharged with a steriod taper that will last 10 day. For the first 4 days (7/19-7/22) you should take 3 pills (total of 60mg). The next 4 days (7/23-7/26) you should take 2 pills (total of 40mg). The last 2 days (7/27-7/28) you should take 1 pill (20mg). You should also take pantaprazole and Vitamin D daily. You shold follow up with your primary doctor, opthamologist, and neurologist in 1-2 weeks.       PRINCIPAL DISCHARGE DIAGNOSIS  Diagnosis: Optic neuritis  Assessment and Plan of Treatment: You came to the hospital because you had trouble seeing out of your right eye and pain with right eye movement. You were seen by our neurology and ophthalmology consult teams, and had imaging of the brain and spine performed. MRI of the brain showed inflammation of the optic nerve, called optic neuritis. We treated you with 5 days of intravenous Solumedrol 1000mg (a steroid) to control the inflammation. You will be discharged with a steriod taper that will last 10 days as follows:  7/19-7/22: Prednisone 20mg 3 pills daily (total of 60mg per day). The 7/23-7/26: Prednisone 20mg  2 pills (total of 40mg per day).  7/27-7/28: Prednisone 20mg  1 pill (total 20mg per day.   You should also take pantaprazole and Vitamin D daily. You shold follow up with your primary doctor, opthamologist, and neurologist in 1-2 weeks for ongoing management.       PRINCIPAL DISCHARGE DIAGNOSIS  Diagnosis: Optic neuritis  Assessment and Plan of Treatment: You came to the hospital because you had trouble seeing out of your right eye and pain with right eye movement. You were seen by our neurology and ophthalmology consult teams, and had imaging of the brain and spine performed. MRI of the brain showed inflammation of the optic nerve, called optic neuritis. We treated you with 5 days of intravenous Solumedrol 1000mg (a steroid) to control the inflammation, and you had improvement in your vision.. You will be discharged with a steriod taper that will last 10 days as follows:  7/19-7/22: Prednisone 20mg 3 pills daily (total of 60mg per day). The 7/23-7/26: Prednisone 20mg  2 pills (total of 40mg per day).  7/27-7/28: Prednisone 20mg  1 pill (total 20mg per day.   You should also take pantaprazole and Vitamin D daily. You shold follow up with your primary doctor, opthamologist, and neurologist in 1-2 weeks for ongoing management.

## 2022-07-17 NOTE — PROVIDER CONTACT NOTE (OTHER) - ACTION/TREATMENT ORDERED:
pt is examined by Dr. Victoria at bedside. EKG done and evaluated by MD. given prn Motrin 400 mg po. will continue monitoring.

## 2022-07-17 NOTE — DISCHARGE NOTE PROVIDER - NSDCFUADDAPPT_GEN_ALL_CORE_FT
Follow up with Neurology and Ophthalmology within 2 weeks. Contact information is provided for scheduling.

## 2022-07-17 NOTE — PROGRESS NOTE ADULT - ATTENDING COMMENTS
continue with solumedrol - continue to monitor FS.  chest pressure likely related to anxiety - resume home dose of seroquel.  MRI pending.

## 2022-07-17 NOTE — DISCHARGE NOTE PROVIDER - NSDCMRMEDTOKEN_GEN_ALL_CORE_FT
aspirin 81 mg oral tablet: 1 tab(s) orally once a day  Crestor 20 mg oral tablet: 1 tab(s) orally once a day (at bedtime)  Dulcolax Laxative 5 mg oral delayed release tablet: 1 tab(s) orally once a day, As Needed  rizatriptan 10 mg oral tablet, disintegrating: 10 milligram(s) orally every 2 hours, As Needed  SEROquel 50 mg oral tablet: 1 tab(s) orally once a day (at bedtime), As Needed   aspirin 81 mg oral tablet: 1 tab(s) orally once a day  Crestor 20 mg oral tablet: 1 tab(s) orally once a day (at bedtime)  Dulcolax Laxative 5 mg oral delayed release tablet: 1 tab(s) orally once a day, As Needed  Multiple Vitamins oral tablet: 1 tab(s) orally once a day  pantoprazole 40 mg oral delayed release tablet: 1 tab(s) orally once a day (before a meal)  pantoprazole 40 mg oral delayed release tablet: 1 tab(s) orally once a day   predniSONE 20 mg oral tablet: 2 tab(s) orally once a day (40mg total)    From 7/23 - 7/26/22  predniSONE 20 mg oral tablet: 1 tab(s) orally once a day (total 20mg)    7/27 - 7/28/22  predniSONE 20 mg oral tablet: Take 3 tabs once daily (total of 60 mg/day)    From 7/19-7/22/22    rizatriptan 10 mg oral tablet, disintegrating: 10 milligram(s) orally every 2 hours, As Needed  SEROquel 50 mg oral tablet: 1 tab(s) orally once a day (at bedtime), As Needed  Vitamin D3 50 mcg (2000 intl units) oral tablet: 1 tab(s) orally once a day    aspirin 81 mg oral tablet: 1 tab(s) orally once a day  Crestor 20 mg oral tablet: 1 tab(s) orally once a day (at bedtime)  Dulcolax Laxative 5 mg oral delayed release tablet: 1 tab(s) orally once a day, As Needed  Multiple Vitamins oral tablet: 1 tab(s) orally once a day  nicotine 14 mg/24 hr transdermal film, extended release: 1 patch transdermal once a day   pantoprazole 40 mg oral delayed release tablet: 1 tab(s) orally once a day   polyethylene glycol 3350 oral powder for reconstitution: 17 gram(s) orally 2 times a day  predniSONE 20 mg oral tablet: 2 tab(s) orally once a day (40mg total)    From 7/23 - 7/26/22  predniSONE 20 mg oral tablet: 1 tab(s) orally once a day (total 20mg)    7/27 - 7/28/22  predniSONE 20 mg oral tablet: Take 3 tabs once daily (total of 60 mg/day)    From 7/19-7/22/22    rizatriptan 10 mg oral tablet, disintegrating: 10 milligram(s) orally every 2 hours, As Needed  senna leaf extract oral tablet: 2 tab(s) orally once a day (at bedtime)  SEROquel 50 mg oral tablet: 1 tab(s) orally once a day (at bedtime), As Needed  Vitamin D3 50 mcg (2000 intl units) oral tablet: 1 tab(s) orally once a day

## 2022-07-17 NOTE — DISCHARGE NOTE PROVIDER - NSDCQMPCI_CARD_ALL_CORE
Please see ultrasound report under imaging tab for details on ultrasound performed today.    Lizzeth Fuentes MD  , OB/GYN  Maternal-Fetal Medicine  [email or web address]  [phone] (Academic office)  [phone] (Pager)     No

## 2022-07-17 NOTE — DISCHARGE NOTE PROVIDER - YES NO FOR MLM POSITIVE OR NEGATIVE COVID RESULT
Quality 130: Documentation Of Current Medications In The Medical Record: Current Medications Documented
Quality 111:Pneumonia Vaccination Status For Older Adults: Pneumococcal Vaccination Previously Received
Detail Level: Detailed
,

## 2022-07-17 NOTE — DISCHARGE NOTE PROVIDER - NSFOLLOWUPCLINICS_GEN_ALL_ED_FT
[facility] - Ophthalmology  Ophthalmology  [address], [facility] [address] [facility] [address]  [place] [address]  Phone: [phone]  Fax:   Follow Up Time: 2 weeks

## 2022-07-17 NOTE — PROGRESS NOTE ADULT - PROBLEM SELECTOR PLAN 1
-IV Solumedrol 1000mg qd - Day 3 of 5 (7/14-7/18 planned course)  -Will continue to monitor's patient's visual loss and pain with eye movement  -Planning for dedicated T2 FlAIR MR Brain - per neuro, will consider LP based on results

## 2022-07-17 NOTE — PROGRESS NOTE ADULT - ASSESSMENT
Pt is 51yF w/ pmhx migraines and HLD p/w two days of R eye pain and vision loss. Physical exam significant for R eye visual field deficits and R eye pain with extraocular movement. MR Brain showing periventricular hyperintensity and perivenular configuration consistent with MS, and MR Orbits showing enhancement of R optic nerve consistent with optic neuritis. Patient's presentation and objective findings consistent with Atypical Optic Neuritis, with ddx also including NAION. Patient is on Solumedrol 1000 Day 3 of 5 and has been symptomatically improving. Awaiting dedicated T2 FLAIR MR BRAIN to determine further management recs.

## 2022-07-17 NOTE — DISCHARGE NOTE PROVIDER - NSDCCPTREATMENT_GEN_ALL_CORE_FT
PRINCIPAL PROCEDURE  Procedure: MRI head wo then w contrast  Findings and Treatment: NTERPRETATION:  Clinical indication: Right eye vision loss. Follow-up   lesions.  MRI of the brain was performed using sagittal T1, axial T1 T2 T2 FLAIR   diffusion and susceptibility weighted sequence. Coronal T2 FLAIR sequence   was performed as well. The patient was injected with approximately 90 cc   of gadavist IV with 1 cc of contrast discarded. Sagittal coronal and   axial T1-weighted sequence performed.  This exam is compared with prior brain MRI performed on July 17, 2022.  Abnormal T2 prolongation in the periventricular white matter region is   again seen. Quite a few these lesions have a Ronquillo finger type of   appearance which is very suggestive of underlying demyelinating disease   such as MS. Other possibilities include areas infection inflammation or   ischemia. No abnormal enhancing component or restricted diffusion is seen   to suggest active MS lesions.  Evaluation of the diffusion weighted sequence demonstrates no abnormal   areas of restricted diffusion to suggest acute infarct.  There is no acute hemorrhage identified.  No significant shift or herniation seen.  The large vessels demonstrate normal flow voids.  The visualized paranasal sinuses mastoid and middle ear regions appear   clear.  IMPRESSION: Abnormal T2 prolongation in the periventricular white matter   region as described above.        SECONDARY PROCEDURE  Procedure: MRI of orbit with contrast  Findings and Treatment: NTERPRETATION:  CLINICAL INFORMATION: History of migraines. Right   superior visual field loss. Demyelinating disease.  TECHNIQUE: Multiplanar, multisequence MR imaging of the brain and orbits   was performed.  7.5 cc of Gadavist was injected. 0 mL was discarded.  COMPARISON: None  FINDINGS:  Normal sulci and ventricles for patient's stated age. There multiple   FLAIR hyperintense lesions within the subcortical and periventricular   white matter. Several of the lesions appear ovoid in configuration,   perpendicular to the body of the lateral ventricles, and perivenular in   orientation, which is suspicious for a demyelinating process.  There is   no acute intraparenchymal hematoma, mass effect or midline shift. No   abnormal parenchymal enhancement. There is bilateral caudate   susceptibility signal which is nonspecific and may represent chronic   blood products or mineral deposition.  There is no diffusion abnormality to suggest acute or subacute   infarction. Major flow-voids at the base of the brain follow expected   course and contour.  No abnormal extra-axial fluid collections or leptomeningeal enhancement.  The calvarium is intact. Paranasal and tympanomastoid cavities appear   free of acute disease.  MRI ORBITS:  The visualized bony walls of both orbits are grossly normal.  The globes are normal in configuration. There is no area of abnormal   signal intensity or enhancement in the either eye, the optic nerves, the   extraocular muscles, or the lacrimal glands. The superior ophthalmic   veins are normal. The extra conal fat is intact. No mass is seen in   either orbit and the orbital apices are normal. The visualized optic   chiasm and suprasellar region was within normal limits. No gross   abnormality is seen in either cavernous sinus or in the sella turcica   region.  IMPRESSION:  BRAIN MRI: Mild to moderate extent of FLAIR hyperintense white matter   lesions in the periventricular and subcortical white matter. Several   lesions demonstrate perivenular configuration which is

## 2022-07-17 NOTE — PROGRESS NOTE ADULT - ASSESSMENT
50 yo F with PMH chronic migraines, HLD no POH, pw vision changes right eye. Pt complaining of superior VF deficit OD and pain with EOM OD. + APD OD with color vision deficit OD. MR imaging consistent with optic neuritis and perineuritis OD and demyelinating neurological disease in brain and spine.     1. R optic neuritis in setting of demyelinating neurological disease  - Pt with above symptoms indicative of optic neuritis  - subjective improvement in superior field defect. Stable 9/12 color plates, stable vision 20/40.   - Denies persistent headache, ROS for GCA negative; only intermittent mild headache near right temple. Pt has hx of migraines  - CRP wnl, ESR wnl corrected for age.  - MRI imaging revealing lesions c/w optic neuritis and perineuritis OD. Demyelinating lesions noted in brain and thoracic spine  - Appreciate neurology consult. Management of demyelinating lesions in brain and spine per neurology  - C/w IV solumedrol per neurology. Decision re: LP per neurology  - F/u NMO and MOG Ab  - negative:, ACE wnl, Lyme negative, syphilis, CXR  - GI prophylaxis for high dose steroids  - Pt instructed to inform primary team if she notices any change in vision  - Findings and recommendations d/w patient and primary team.       Outpatient follow-up: Patient should follow-up with his/her ophthalmologist or with [facility] Department of Ophthalmology upon discharge at the address below     [facility] Department of Ophthalmology  [address] [place] [address]. [address]  [place] [address]  [phone]

## 2022-07-17 NOTE — DISCHARGE NOTE PROVIDER - HOSPITAL COURSE
Pt is 51yF with pmhx migraines and HLD and a paternal family history of vision loss presenting with acute onset R eye pain and blurry vision loss. She awoke 7/12 w/cloudy vision, first believing it to be "sleep in [her] eyes." She awoke 7/13 with the same thing, and her vision became darker and darker, until her vision became so blurry that she could not see. Her vision loss affected her ability to see at her job, and she came to the ED today. She described the feeling as soreness, rating it at a 7 of 10. The pain does not radiate anywhere. Moving her R eye makes the pain worse, up to 10/10. In her left eye she has no vision loss or pain. She has also been experiencing headaches, which began this morning. In ED MRI showed T2 enhancement in Right optic nerve consistent with Optic Neuritis as well as periventricular white matter lesions concerning for MS. Neurology and Ophthalmology were also consulted and followed patient throughout hospitalization.    Pt admitted for management of Optic Neuritis and further workup. Patient received 5 days of IV Solumedrol 1000mg (7/14-7/18). Pt's pain was managed with PRN Ibuprofen. A dedicated T2 FLAIR MRI w/wo contrast was performed, showing ____________.    At time of discharge, patient is hemodynamically stable and reports improved vision in right eye with decreased pain with movement. Patient will f/u outpatient with primary doctor and neurologist. Pt is 51yF with pmhx migraines and HLD and a paternal family history of vision loss presenting with acute onset R eye pain and blurry vision loss. She awoke 7/12 w/cloudy vision, first believing it to be "sleep in [her] eyes." She awoke 7/13 with the same thing, and her vision became darker and darker, until her vision became so blurry that she could not see. Her vision loss affected her ability to see at her job, and she came to the ED today. She described the feeling as soreness, rating it at a 7 of 10. The pain does not radiate anywhere. Moving her R eye makes the pain worse, up to 10/10. In her left eye she has no vision loss or pain. She has also been experiencing headaches, which began this morning. In ED MRI showed T2 enhancement in Right optic nerve consistent with Optic Neuritis as well as periventricular white matter lesions concerning for MS. Neurology and Ophthalmology were also consulted and followed patient throughout hospitalization.    Pt admitted for management of Optic Neuritis and further workup. Patient received 5 days of IV Solumedrol 1000mg (7/14-7/18). Pt's pain was managed with PRN Ibuprofen. A dedicated T2 FLAIR MRI w/wo contrast was performed, showing Abnormal T2 prolongation in the periventricular white matter region .    At time of discharge, patient is hemodynamically stable and reports improved vision in right eye with decreased pain with movement. Patient will f/u outpatient with primary doctor and neurologist. Pt is 51yF with pmhx migraines and HLD and a paternal family history of vision loss presenting with acute onset R eye pain and blurry vision loss. She awoke 7/12 w/cloudy vision, first believing it to be "sleep in [her] eyes." She awoke 7/13 with the same thing, and her vision became darker and darker, until her vision became so blurry that she could not see. Her vision loss affected her ability to see at her job, and she came to the ED today. She described the feeling as soreness, rating it at a 7 of 10. The pain does not radiate anywhere. Moving her R eye makes the pain worse, up to 10/10. In her left eye she has no vision loss or pain. She has also been experiencing headaches, which began this morning. In ED MRI showed T2 enhancement in Right optic nerve consistent with Optic Neuritis as well as periventricular white matter lesions concerning for MS. Neurology and Ophthalmology were also consulted and followed patient throughout hospitalization.    Pt admitted for management of Optic Neuritis and further workup. Patient received 5 days of IV Solumedrol 1000mg (7/14-7/18) and was d/c with steroid taper for 10 days.  Pt's pain was managed with PRN Ibuprofen. A dedicated T2 FLAIR MRI w/wo contrast was performed, showing Abnormal T2 prolongation in the periventricular white matter region .    At time of discharge, patient is hemodynamically stable and reports improved vision in right eye with decreased pain with movement. Patient will f/u outpatient with primary doctor and neurologist. Pt is 51yF with pmhx migraines and HLD and a paternal family history of vision loss presenting with acute onset R eye pain and blurry vision loss. She awoke 7/12 w/cloudy vision, first believing it to be "sleep in [her] eyes." She awoke 7/13 with the same thing, and her vision became darker and darker, until her vision became so blurry that she could not see. Her vision loss affected her ability to see at her job, and she came to the ED today. She described the feeling as soreness, rating it at a 7 of 10. The pain does not radiate anywhere. Moving her R eye makes the pain worse, up to 10/10. In her left eye she has no vision loss or pain. She has also been experiencing headaches, which began this morning. In ED MRI showed T2 enhancement in Right optic nerve consistent with Optic Neuritis as well as periventricular white matter lesions concerning for MS. Neurology and Ophthalmology were also consulted and followed patient throughout hospitalization.    Pt admitted for management of Optic Neuritis and further workup. Patient received 5 days of IV Solumedrol 1000mg (7/14-7/18) and was d/c with steroid taper for 10 days.  Pt's pain was managed with PRN Ibuprofen. A dedicated T2 FLAIR MRI w/wo contrast was performed, showing Abnormal T2 prolongation in the periventricular white matter region. Neuro recommended for outpatient follow up.     At time of discharge, patient is hemodynamically stable and reports improved vision in right eye with decreased pain with movement. Patient will f/u outpatient with primary doctor and neurologist. Pt is 51yF with pmhx migraines and HLD and a paternal family history of vision loss presenting with acute onset R eye pain and blurry vision loss. She awoke 7/12 w/cloudy vision, first believing it to be "sleep in [her] eyes." She awoke 7/13 with the same thing, and her vision became darker and darker, until her vision became so blurry that she could not see. Her vision loss affected her ability to see at her job, and she came to the ED today. She described the feeling as soreness, rating it at a 7 of 10. The pain does not radiate anywhere. Moving her R eye makes the pain worse, up to 10/10. In her left eye she has no vision loss or pain. She has also been experiencing headaches, which began this morning. In ED MRI showed T2 enhancement in Right optic nerve consistent with Optic Neuritis as well as periventricular white matter lesions concerning for MS. Neurology and Ophthalmology were also consulted and followed patient throughout hospitalization.    Pt admitted for management of Optic Neuritis and further workup. Patient received 5 days of IV Solumedrol 1000mg (7/14-7/18) and was d/c with steroid taper for 10 days.  Pt's pain was managed with PRN Ibuprofen. A dedicated T2 FLAIR MRI w/wo contrast was performed, showing Abnormal T2 prolongation in the periventricular white matter region. Neuro recommended for outpatient follow up.     At time of discharge, patient is hemodynamically stable and reports improved vision in right eye with decreased pain with movement. Patient will f/u outpatient with primary doctor and neurologist.. Pt is 51yF with pmhx migraines and HLD and a paternal family history of vision loss presenting with acute onset R eye pain and blurry vision loss. She awoke 7/12 w/cloudy vision, first believing it to be "sleep in [her] eyes." She awoke 7/13 with the same thing, and her vision became darker and darker, until her vision became so blurry that she could not see. Her vision loss affected her ability to see at her job, and she came to the ED today. She described the feeling as soreness, rating it at a 7 of 10. The pain does not radiate anywhere. Moving her R eye makes the pain worse, up to 10/10. In her left eye she has no vision loss or pain. She has also been experiencing headaches, which began this morning. In ED MRI showed T2 enhancement in Right optic nerve consistent with Optic Neuritis as well as periventricular white matter lesions concerning for MS. Neurology and Ophthalmology were also consulted and followed patient throughout hospitalization.    Pt admitted for management of Optic Neuritis and further workup. Patient received 5 days of IV Solumedrol 1000mg (7/14-7/18) and was d/c with steroid taper for 10 days.  Pt's pain was managed with PRN Ibuprofen. A dedicated T2 FLAIR MRI w/wo contrast was performed, showing Abnormal T2 prolongation in the periventricular white matter region. Neuro recommended for outpatient follow up.     At time of discharge, patient is hemodynamically stable and reports improved vision in right eye with decreased pain with movement. Patient will f/u outpatient with primary doctor and neurologist...

## 2022-07-17 NOTE — CHART NOTE - NSCHARTNOTEFT_GEN_A_CORE
Patient complaining of chest pain, evaluated at bed side. Patient reports 8/10 substernal chest tightness that is worse with inspiration and radiates to her back. Reports that she has not been able to sleep all night and that the pain started 4 hours ago after she came back from the shower. Reports that it is relieved by turning to her side. Worsened with deep inspiration and if she lies flat. Patient reports that she is very anxious because she did not get her normal seroquel. Pain was reproducible on physical exam, exam otherwise unremarkable. Patient reported feeling better after talking to her.    - Will f/u EKG  - Standing ibuprofen given Patient complaining of chest pain, evaluated at bed side. Patient reports 8/10 substernal chest tightness that is worse with inspiration and radiates to her back. Reports that she has not been able to sleep all night and that the pain started 4 hours ago after she came back from the shower. Reports that it is relieved by turning to her side. Worsened with deep inspiration and if she lies flat. Patient reports that she is very anxious because she did not get her normal seroquel. Pain was reproducible on physical exam, exam otherwise unremarkable. Patient reported feeling much better after "talking to someone" and chest tightness decreased after she focused on breathing. EKG done.    - EKG reviewed: WNL, sinus bradycardia  - Standing ibuprofen given

## 2022-07-17 NOTE — DISCHARGE NOTE PROVIDER - NSDCFUSCHEDAPPT_GEN_ALL_CORE_FT
Odell Baum  [facility]  [address]ALM [address] [facility] [address] [facility] [address] [facility]  Scheduled Appointment: 07/25/2022    Lydia Trujillo  [facility]  NEUROLOGY [address] [facility]  Scheduled Appointment: 08/04/2022    Thea Trujilloasha  [facility]  NEUROLOGY [address]  Scheduled Appointment: 10/05/2022     Thea Trujilloasha  [facility]  NEUROLOGY [address] [facility]  Scheduled Appointment: 09/01/2022    Odell Baum  [facility]  OPHTHALM [address] [facility] [address]  Scheduled Appointment: 09/13/2022    Thea Trujilloasha  [facility]  NEUROLOGY [address]  Scheduled Appointment: 10/05/2022

## 2022-07-17 NOTE — DISCHARGE NOTE PROVIDER - CARE PROVIDER_API CALL
Lydia Trujillo)  Neurology  [address] [place] [id number]  Phone: [phone]  Fax: [phone]  Follow Up Time: 2 weeks

## 2022-07-17 NOTE — PROGRESS NOTE ADULT - PROBLEM SELECTOR PLAN 2
Pt complaining of CP overnight, likely 2/2 gas.   -EKG wnl, will check trop with AM labs   -Simethicone PRN   -Bowel regimen

## 2022-07-17 NOTE — PROGRESS NOTE ADULT - SUBJECTIVE AND OBJECTIVE BOX
**********************************************  Mayra Goncalves, PGY-3  Internal Medicine   **********************************************     Patient is a 51y old  Female who presents with a chief complaint of Unilateral vision loss and eye pain (16 Jul 2022 14:53)    SUBJECTIVE / OVERNIGHT EVENTS: Overnight, patient complaining of CP, EKG SR. Examined at bedside this AM, states that the chest pressure has significantly improved with the passing of gas and walking around. Denies lightheadedness/dizziness, diaphoresis, palpitations, radiation of pain, n/v/d. Patient states that her R eye pain is also improving.     OBJECTIVE:  Vital Signs Last 24 Hrs  T(C): 36.6 (17 Jul 2022 06:01), Max: 36.8 (17 Jul 2022 03:41)  T(F): 97.9 (17 Jul 2022 06:01), Max: 98.3 (17 Jul 2022 03:41)  HR: 57 (17 Jul 2022 06:01) (57 - 88)  BP: 141/72 (17 Jul 2022 06:01) (124/74 - 141/72)  BP(mean): --  RR: 16 (17 Jul 2022 06:01) (16 - 17)  SpO2: 99% (17 Jul 2022 06:01) (98% - 100%)    Parameters below as of 17 Jul 2022 06:01  Patient On (Oxygen Delivery Method): room air        I&O's Summary    Physical Exam:     General: No acute distress, well-appearing    Eyes: PERRL, EOMI     ENT: MMM, no oropharyngeal lesions or erythema appreciated     Pulm: No increased WOB. CTAB. No wheezing.     CV: RRR. S1&S2+. No M/R/G appreciated.     Abdomen: +BS. Soft, NTND. No organomegaly.     MSK: Nml ROM    Extremities: No peripheral edema or cyanosis.     Neuro: A&Ox3, no focal deficits     Skin: Warm and dry. No visible rash.       Labs:  CAPILLARY BLOOD GLUCOSE      POCT Blood Glucose.: 122 mg/dL (17 Jul 2022 08:45)  POCT Blood Glucose.: 233 mg/dL (16 Jul 2022 21:28)  POCT Blood Glucose.: 222 mg/dL (16 Jul 2022 17:52)  POCT Blood Glucose.: 89 mg/dL (16 Jul 2022 12:15)                          10.9   17.39 )-----------( 267      ( 17 Jul 2022 05:56 )             33.9     07-17    142  |  109<H>  |  13  ----------------------------<  116<H>  3.9   |  21<L>  |  0.51    Ca    8.5      17 Jul 2022 05:56  Phos  3.7     07-17  Mg     2.20     07-17                Imaging Personally Reviewed:      MEDICATIONS  (STANDING):  dextrose 5%. 1000 milliLiter(s) (50 mL/Hr) IV Continuous <Continuous>  dextrose 5%. 1000 milliLiter(s) (100 mL/Hr) IV Continuous <Continuous>  dextrose 50% Injectable 25 Gram(s) IV Push once  dextrose 50% Injectable 12.5 Gram(s) IV Push once  dextrose 50% Injectable 25 Gram(s) IV Push once  glucagon  Injectable 1 milliGRAM(s) IntraMuscular once  insulin lispro (ADMELOG) corrective regimen sliding scale   SubCutaneous three times a day before meals  insulin lispro (ADMELOG) corrective regimen sliding scale   SubCutaneous at bedtime  melatonin 3 milliGRAM(s) Oral at bedtime  methylPREDNISolone sodium succinate IVPB 1000 milliGRAM(s) IV Intermittent every 24 hours  multivitamin 1 Tablet(s) Oral daily  nicotine -  14 mG/24Hr(s) Patch 1 Patch Transdermal daily  pantoprazole    Tablet 40 milliGRAM(s) Oral before breakfast  polyethylene glycol 3350 17 Gram(s) Oral two times a day  QUEtiapine 50 milliGRAM(s) Oral at bedtime  senna 2 Tablet(s) Oral at bedtime    MEDICATIONS  (PRN):  dextrose Oral Gel 15 Gram(s) Oral once PRN Blood Glucose LESS THAN 70 milliGRAM(s)/deciliter  ibuprofen  Tablet. 400 milliGRAM(s) Oral every 4 hours PRN Mild Pain (1 - 3), Moderate Pain (4 - 6), Severe Pain (7 - 10)  Rizatriptan 10 mg tablet 10 milliGRAM(s) 1 Tablet(s) Oral every 2 hours PRN Migraine Headache

## 2022-07-17 NOTE — PROGRESS NOTE ADULT - SUBJECTIVE AND OBJECTIVE BOX
[facility] DEPARTMENT OF OPHTHALMOLOGY  ------------------------------------------------------------------------------  Jessica Ernst MD PGY-3  ------------------------------------------------------------------------------    Interval History: No acute events overnight. No changes in vision today. Still with graying of superior hemifield OD. Pain with lateral gazes OD.     MEDICATIONS  (STANDING):  dextrose 5%. 1000 milliLiter(s) (50 mL/Hr) IV Continuous <Continuous>  dextrose 5%. 1000 milliLiter(s) (100 mL/Hr) IV Continuous <Continuous>  dextrose 50% Injectable 25 Gram(s) IV Push once  dextrose 50% Injectable 12.5 Gram(s) IV Push once  dextrose 50% Injectable 25 Gram(s) IV Push once  glucagon  Injectable 1 milliGRAM(s) IntraMuscular once  insulin lispro (ADMELOG) corrective regimen sliding scale   SubCutaneous three times a day before meals  insulin lispro (ADMELOG) corrective regimen sliding scale   SubCutaneous at bedtime  melatonin 3 milliGRAM(s) Oral at bedtime  methylPREDNISolone sodium succinate IVPB 1000 milliGRAM(s) IV Intermittent every 24 hours  multivitamin 1 Tablet(s) Oral daily  nicotine -  14 mG/24Hr(s) Patch 1 Patch Transdermal daily  pantoprazole    Tablet 40 milliGRAM(s) Oral before breakfast  polyethylene glycol 3350 17 Gram(s) Oral two times a day  QUEtiapine 50 milliGRAM(s) Oral at bedtime  senna 2 Tablet(s) Oral at bedtime    MEDICATIONS  (PRN):  dextrose Oral Gel 15 Gram(s) Oral once PRN Blood Glucose LESS THAN 70 milliGRAM(s)/deciliter  ibuprofen  Tablet. 400 milliGRAM(s) Oral every 4 hours PRN Mild Pain (1 - 3), Moderate Pain (4 - 6), Severe Pain (7 - 10)  Rizatriptan 10 mg tablet 10 milliGRAM(s) 1 Tablet(s) Oral every 2 hours PRN Migraine Headache      VITALS: T(C): 36.6 (07-17-22 @ 06:01)  T(F): 97.9 (07-17-22 @ 06:01), Max: 98.3 (07-17-22 @ 03:41)  HR: 57 (07-17-22 @ 06:01) (57 - 88)  BP: 141/72 (07-17-22 @ 06:01) (124/74 - 141/72)  RR:  (16 - 17)  SpO2:  (98% - 100%)  Wt(kg): --  General: AAOx 3    Ophthalmology Exam:  Visual acuity (cc): 20/40 PHNI OD, 20/20 OS  Pupils: PERRL OU, +APD OD  Ttono: 15, 14  Extraocular movements (EOMs): Full OU, +pain with lateral gazes, no diplopia  Confrontational Visual Field (CVF): Superior field loss OD, Full OS  Color Plates: 9/12 (cannot read red numbers) OD, 12/12 OS    Pen Light Exam (PLE)  External: Flat OU  Lids/Lashes/Lacrimal Ducts: Flat OU    Sclera/Conjunctiva: W+Q OU  Cornea: Cl OU  Anterior Chamber: D+F OU    Iris: Flat OU  Lens: Cl OU

## 2022-07-18 ENCOUNTER — TRANSCRIPTION ENCOUNTER (OUTPATIENT)
Age: 52
End: 2022-07-18

## 2022-07-18 VITALS
DIASTOLIC BLOOD PRESSURE: 75 MMHG | SYSTOLIC BLOOD PRESSURE: 134 MMHG | RESPIRATION RATE: 18 BRPM | TEMPERATURE: 98 F | HEART RATE: 62 BPM | OXYGEN SATURATION: 98 %

## 2022-07-18 LAB
ANION GAP SERPL CALC-SCNC: 12 MMOL/L — SIGNIFICANT CHANGE UP (ref 7–14)
BUN SERPL-MCNC: 12 MG/DL — SIGNIFICANT CHANGE UP (ref 7–23)
CALCIUM SERPL-MCNC: 8.7 MG/DL — SIGNIFICANT CHANGE UP (ref 8.4–10.5)
CHLORIDE SERPL-SCNC: 108 MMOL/L — HIGH (ref 98–107)
CO2 SERPL-SCNC: 24 MMOL/L — SIGNIFICANT CHANGE UP (ref 22–31)
CREAT SERPL-MCNC: 0.5 MG/DL — SIGNIFICANT CHANGE UP (ref 0.5–1.3)
DSDNA AB SER-ACNC: <12 IU/ML — SIGNIFICANT CHANGE UP
EGFR: 114 ML/MIN/1.73M2 — SIGNIFICANT CHANGE UP
GLUCOSE SERPL-MCNC: 117 MG/DL — HIGH (ref 70–99)
HCT VFR BLD CALC: 34.9 % — SIGNIFICANT CHANGE UP (ref 34.5–45)
HGB BLD-MCNC: 11.7 G/DL — SIGNIFICANT CHANGE UP (ref 11.5–15.5)
MAGNESIUM SERPL-MCNC: 2.3 MG/DL — SIGNIFICANT CHANGE UP (ref 1.6–2.6)
MCHC RBC-ENTMCNC: 28.9 PG — SIGNIFICANT CHANGE UP (ref 27–34)
MCHC RBC-ENTMCNC: 33.5 GM/DL — SIGNIFICANT CHANGE UP (ref 32–36)
MCV RBC AUTO: 86.2 FL — SIGNIFICANT CHANGE UP (ref 80–100)
NRBC # BLD: 0 /100 WBCS — SIGNIFICANT CHANGE UP
NRBC # FLD: 0 K/UL — SIGNIFICANT CHANGE UP
PHOSPHATE SERPL-MCNC: 4 MG/DL — SIGNIFICANT CHANGE UP (ref 2.5–4.5)
PLATELET # BLD AUTO: 271 K/UL — SIGNIFICANT CHANGE UP (ref 150–400)
POTASSIUM SERPL-MCNC: 4.3 MMOL/L — SIGNIFICANT CHANGE UP (ref 3.5–5.3)
POTASSIUM SERPL-SCNC: 4.3 MMOL/L — SIGNIFICANT CHANGE UP (ref 3.5–5.3)
RBC # BLD: 4.05 M/UL — SIGNIFICANT CHANGE UP (ref 3.8–5.2)
RBC # FLD: 15.1 % — HIGH (ref 10.3–14.5)
SODIUM SERPL-SCNC: 144 MMOL/L — SIGNIFICANT CHANGE UP (ref 135–145)
WBC # BLD: 11.97 K/UL — HIGH (ref 3.8–10.5)
WBC # FLD AUTO: 11.97 K/UL — HIGH (ref 3.8–10.5)

## 2022-07-18 PROCEDURE — 99239 HOSP IP/OBS DSCHRG MGMT >30: CPT | Mod: GC

## 2022-07-18 PROCEDURE — 70553 MRI BRAIN STEM W/O & W/DYE: CPT | Mod: 26

## 2022-07-18 PROCEDURE — 99233 SBSQ HOSP IP/OBS HIGH 50: CPT

## 2022-07-18 RX ORDER — POLYETHYLENE GLYCOL 3350 17 G/17G
17 POWDER, FOR SOLUTION ORAL
Qty: 1020 | Refills: 0
Start: 2022-07-18 | End: 2022-08-16

## 2022-07-18 RX ORDER — PANTOPRAZOLE SODIUM 20 MG/1
1 TABLET, DELAYED RELEASE ORAL
Qty: 30 | Refills: 0
Start: 2022-07-18 | End: 2022-08-16

## 2022-07-18 RX ORDER — CHOLECALCIFEROL (VITAMIN D3) 125 MCG
1 CAPSULE ORAL
Qty: 30 | Refills: 0
Start: 2022-07-18 | End: 2022-08-16

## 2022-07-18 RX ORDER — SENNA PLUS 8.6 MG/1
2 TABLET ORAL
Qty: 60 | Refills: 0
Start: 2022-07-18 | End: 2022-08-16

## 2022-07-18 RX ORDER — POLYETHYLENE GLYCOL 3350 17 G/17G
17 POWDER, FOR SOLUTION ORAL
Qty: 1020 | Refills: 0 | DISCHARGE
Start: 2022-07-18 | End: 2022-08-16

## 2022-07-18 RX ORDER — PANTOPRAZOLE SODIUM 20 MG/1
1 TABLET, DELAYED RELEASE ORAL
Qty: 0 | Refills: 0 | DISCHARGE
Start: 2022-07-18

## 2022-07-18 RX ORDER — NICOTINE POLACRILEX 2 MG
1 GUM BUCCAL
Qty: 3 | Refills: 0
Start: 2022-07-18 | End: 2022-07-20

## 2022-07-18 RX ADMIN — POLYETHYLENE GLYCOL 3350 17 GRAM(S): 17 POWDER, FOR SOLUTION ORAL at 18:07

## 2022-07-18 RX ADMIN — Medication 400 MILLIGRAM(S): at 07:10

## 2022-07-18 RX ADMIN — Medication 58 MILLIGRAM(S): at 15:33

## 2022-07-18 RX ADMIN — Medication 1 PATCH: at 18:37

## 2022-07-18 RX ADMIN — POLYETHYLENE GLYCOL 3350 17 GRAM(S): 17 POWDER, FOR SOLUTION ORAL at 05:25

## 2022-07-18 RX ADMIN — Medication 1 TABLET(S): at 11:19

## 2022-07-18 RX ADMIN — Medication 1 PATCH: at 12:50

## 2022-07-18 RX ADMIN — Medication 400 MILLIGRAM(S): at 06:10

## 2022-07-18 RX ADMIN — PANTOPRAZOLE SODIUM 40 MILLIGRAM(S): 20 TABLET, DELAYED RELEASE ORAL at 05:25

## 2022-07-18 NOTE — PROGRESS NOTE ADULT - PROBLEM SELECTOR PLAN 1
-IV Solumedrol 1000mg qd - Day 3 of 5 (7/14-7/18 planned course)  -Will continue to monitor's patient's visual loss and pain with eye movement  -Planning for dedicated T2 FlAIR MR Brain - per neuro, will consider LP based on results -IV Solumedrol 1000mg qd - Day 5 of 5 (7/14-7/18 planned course)  -Will continue to monitor's patient's visual loss and pain with eye movement  -Received dedicated T2 FlAIR MR Brain - per neuro, will consider LP based on results

## 2022-07-18 NOTE — PROGRESS NOTE ADULT - SUBJECTIVE AND OBJECTIVE BOX
Interval History:  Patient seen and examined at the bedside. No acute events overnight.     ROS: Pertinent positives in HPI, all other ROS were reviewed and are negative.      MEDICATIONS  (STANDING):  dextrose 5%. 1000 milliLiter(s) (100 mL/Hr) IV Continuous <Continuous>  dextrose 5%. 1000 milliLiter(s) (50 mL/Hr) IV Continuous <Continuous>  dextrose 50% Injectable 25 Gram(s) IV Push once  dextrose 50% Injectable 12.5 Gram(s) IV Push once  dextrose 50% Injectable 25 Gram(s) IV Push once  glucagon  Injectable 1 milliGRAM(s) IntraMuscular once  insulin lispro (ADMELOG) corrective regimen sliding scale   SubCutaneous three times a day before meals  insulin lispro (ADMELOG) corrective regimen sliding scale   SubCutaneous at bedtime  melatonin 3 milliGRAM(s) Oral at bedtime  methylPREDNISolone sodium succinate IVPB 1000 milliGRAM(s) IV Intermittent every 24 hours  multivitamin 1 Tablet(s) Oral daily  nicotine -  14 mG/24Hr(s) Patch 1 Patch Transdermal daily  pantoprazole    Tablet 40 milliGRAM(s) Oral before breakfast  polyethylene glycol 3350 17 Gram(s) Oral two times a day  QUEtiapine 50 milliGRAM(s) Oral at bedtime  senna 2 Tablet(s) Oral at bedtime    MEDICATIONS  (PRN):  dextrose Oral Gel 15 Gram(s) Oral once PRN Blood Glucose LESS THAN 70 milliGRAM(s)/deciliter  ibuprofen  Tablet. 400 milliGRAM(s) Oral every 4 hours PRN Mild Pain (1 - 3), Moderate Pain (4 - 6), Severe Pain (7 - 10)  Rizatriptan 10 mg tablet 10 milliGRAM(s) 1 Tablet(s) Oral every 2 hours PRN Migraine Headache    Allergies  No Known Allergies    Intolerances      Vital Signs Last 24 Hrs  T(C): 36.4 (18 Jul 2022 05:25), Max: 36.5 (17 Jul 2022 21:45)  T(F): 97.6 (18 Jul 2022 05:25), Max: 97.7 (17 Jul 2022 21:45)  HR: 60 (18 Jul 2022 05:25) (60 - 66)  BP: 146/89 (18 Jul 2022 05:25) (141/76 - 146/89)  BP(mean): --  RR: 17 (18 Jul 2022 05:25) (17 - 17)  SpO2: 96% (18 Jul 2022 05:25) (96% - 97%)    Parameters below as of 18 Jul 2022 05:25  Patient On (Oxygen Delivery Method): room air        NEUROLOGICAL EXAM:  MS: AAOX3, fluent, attends b/l; recent and remote memory intact; normal attention, language and fund of knowledge.   CN: R medial-superior visual field cut, decreased visual acuity OD, EOMI,+ Relative APD noted on R, no DAREN, no APD,  V1-3 intact, no facial asymmetry,  Motor: Strength: 5/5 4x. Tone: normal. Bulk: normal  Sensation: intact to LT 4x.   Coordination: intact FTN  Gait: Deferred       Labs:   cbc                      11.7   11.97 )-----------( 271      ( 18 Jul 2022 05:20 )             34.9     [id number]    144  |  108<H>  |  12  ----------------------------<  117<H>  4.3   |  24  |  0.50    Ca    8.7      18 Jul 2022 05:20  Phos  4.0     07-18  Mg     2.30     07-18      Coags  Lipids  A1C  CardiacMarkers    LFTs  UA

## 2022-07-18 NOTE — OCCUPATIONAL THERAPY INITIAL EVALUATION ADULT - PRECAUTIONS/LIMITATIONS, REHAB EVAL
impaired superior quadrant right eye, right field cut/vision precautions
fall precautions/vision precautions

## 2022-07-18 NOTE — PROGRESS NOTE ADULT - SUBJECTIVE AND OBJECTIVE BOX
PROGRESS NOTE:   Authored by Sugar Bejarano DO , PGY1     Patient is a 51y old  Female who presents with a chief complaint of Unilateral vision loss and eye pain (17 Jul 2022 11:05)      SUBJECTIVE / OVERNIGHT EVENTS:    All other review of systems is negative unless indicated above.    MEDICATIONS  (STANDING):  dextrose 5%. 1000 milliLiter(s) (100 mL/Hr) IV Continuous <Continuous>  dextrose 5%. 1000 milliLiter(s) (50 mL/Hr) IV Continuous <Continuous>  dextrose 50% Injectable 25 Gram(s) IV Push once  dextrose 50% Injectable 12.5 Gram(s) IV Push once  dextrose 50% Injectable 25 Gram(s) IV Push once  glucagon  Injectable 1 milliGRAM(s) IntraMuscular once  insulin lispro (ADMELOG) corrective regimen sliding scale   SubCutaneous three times a day before meals  insulin lispro (ADMELOG) corrective regimen sliding scale   SubCutaneous at bedtime  melatonin 3 milliGRAM(s) Oral at bedtime  methylPREDNISolone sodium succinate IVPB 1000 milliGRAM(s) IV Intermittent every 24 hours  multivitamin 1 Tablet(s) Oral daily  nicotine -  14 mG/24Hr(s) Patch 1 Patch Transdermal daily  pantoprazole    Tablet 40 milliGRAM(s) Oral before breakfast  polyethylene glycol 3350 17 Gram(s) Oral two times a day  QUEtiapine 50 milliGRAM(s) Oral at bedtime  senna 2 Tablet(s) Oral at bedtime    MEDICATIONS  (PRN):  dextrose Oral Gel 15 Gram(s) Oral once PRN Blood Glucose LESS THAN 70 milliGRAM(s)/deciliter  ibuprofen  Tablet. 400 milliGRAM(s) Oral every 4 hours PRN Mild Pain (1 - 3), Moderate Pain (4 - 6), Severe Pain (7 - 10)  Rizatriptan 10 mg tablet 10 milliGRAM(s) 1 Tablet(s) Oral every 2 hours PRN Migraine Headache      CAPILLARY BLOOD GLUCOSE      POCT Blood Glucose.: 148 mg/dL (17 Jul 2022 21:47)  POCT Blood Glucose.: 162 mg/dL (17 Jul 2022 17:46)  POCT Blood Glucose.: 116 mg/dL (17 Jul 2022 12:42)    I&O's Summary      PHYSICAL EXAM:  Vital Signs Last 24 Hrs  T(C): 36.4 (18 Jul 2022 05:25), Max: 36.8 (17 Jul 2022 13:10)  T(F): 97.6 (18 Jul 2022 05:25), Max: 98.2 (17 Jul 2022 13:10)  HR: 60 (18 Jul 2022 05:25) (60 - 66)  BP: 146/89 (18 Jul 2022 05:25) (137/78 - 146/89)  BP(mean): --  RR: 17 (18 Jul 2022 05:25) (17 - 17)  SpO2: 96% (18 Jul 2022 05:25) (96% - 99%)    Parameters below as of 18 Jul 2022 05:25  Patient On (Oxygen Delivery Method): room air        GENERAL: No acute distress, well-developed  HEAD:  Atraumatic, Normocephalic  EYES: EOMI, PERRLA, conjunctiva and sclera clear  NECK: Supple, no lymphadenopathy, no JVD  CHEST/LUNG: CTAB; No wheezes, rales, or rhonchi  HEART: Regular rate and rhythm; No murmurs, rubs, or gallops  ABDOMEN: Soft, non-tender, non-distended; normal bowel sounds, no organomegaly  EXTREMITIES:  2+ peripheral pulses b/l, No clubbing, cyanosis, or edema  NEUROLOGY: A&O x 3, no focal deficits  SKIN: No rashes or lesions    LABS:                        11.7   11.97 )-----------( 271      ( 18 Jul 2022 05:20 )             34.9     07-18    144  |  108<H>  |  12  ----------------------------<  117<H>  4.3   |  24  |  0.50    Ca    8.7      18 Jul 2022 05:20  Phos  4.0     07-18  Mg     2.30     07-18                  RADIOLOGY & ADDITIONAL TESTS:  Results Reviewed:   Imaging Personally Reviewed:  Electrocardiogram Personally Reviewed:    COORDINATION OF CARE:  Care Discussed with Consultants/Other Providers [Y/N]:  Prior or Outpatient Records Reviewed [Y/N]:   PROGRESS NOTE:   Authored by Sugar Bejarano DO , PGY1     Patient is a 51y old  Female who presents with a chief complaint of Unilateral vision loss and eye pain (17 Jul 2022 11:05)      SUBJECTIVE / OVERNIGHT EVENTS:    No acute events overnight. Pt states that her vision is gradually improving and she is no longer experiencing eye pain. Pt denies HA, CP, SOB, abdominal pain, dry mouth or dry eyes.    All other review of systems is negative unless indicated above.    MEDICATIONS  (STANDING):  dextrose 5%. 1000 milliLiter(s) (100 mL/Hr) IV Continuous <Continuous>  dextrose 5%. 1000 milliLiter(s) (50 mL/Hr) IV Continuous <Continuous>  dextrose 50% Injectable 25 Gram(s) IV Push once  dextrose 50% Injectable 12.5 Gram(s) IV Push once  dextrose 50% Injectable 25 Gram(s) IV Push once  glucagon  Injectable 1 milliGRAM(s) IntraMuscular once  insulin lispro (ADMELOG) corrective regimen sliding scale   SubCutaneous three times a day before meals  insulin lispro (ADMELOG) corrective regimen sliding scale   SubCutaneous at bedtime  melatonin 3 milliGRAM(s) Oral at bedtime  methylPREDNISolone sodium succinate IVPB 1000 milliGRAM(s) IV Intermittent every 24 hours  multivitamin 1 Tablet(s) Oral daily  nicotine -  14 mG/24Hr(s) Patch 1 Patch Transdermal daily  pantoprazole    Tablet 40 milliGRAM(s) Oral before breakfast  polyethylene glycol 3350 17 Gram(s) Oral two times a day  QUEtiapine 50 milliGRAM(s) Oral at bedtime  senna 2 Tablet(s) Oral at bedtime    MEDICATIONS  (PRN):  dextrose Oral Gel 15 Gram(s) Oral once PRN Blood Glucose LESS THAN 70 milliGRAM(s)/deciliter  ibuprofen  Tablet. 400 milliGRAM(s) Oral every 4 hours PRN Mild Pain (1 - 3), Moderate Pain (4 - 6), Severe Pain (7 - 10)  Rizatriptan 10 mg tablet 10 milliGRAM(s) 1 Tablet(s) Oral every 2 hours PRN Migraine Headache      CAPILLARY BLOOD GLUCOSE      POCT Blood Glucose.: 148 mg/dL (17 Jul 2022 21:47)  POCT Blood Glucose.: 162 mg/dL (17 Jul 2022 17:46)  POCT Blood Glucose.: 116 mg/dL (17 Jul 2022 12:42)    I&O's Summary      PHYSICAL EXAM:  Vital Signs Last 24 Hrs  T(C): 36.4 (18 Jul 2022 05:25), Max: 36.8 (17 Jul 2022 13:10)  T(F): 97.6 (18 Jul 2022 05:25), Max: 98.2 (17 Jul 2022 13:10)  HR: 60 (18 Jul 2022 05:25) (60 - 66)  BP: 146/89 (18 Jul 2022 05:25) (137/78 - 146/89)  BP(mean): --  RR: 17 (18 Jul 2022 05:25) (17 - 17)  SpO2: 96% (18 Jul 2022 05:25) (96% - 99%)    Parameters below as of 18 Jul 2022 05:25  Patient On (Oxygen Delivery Method): room air        GENERAL: No acute distress, well-developed  HEAD:  Atraumatic, Normocephalic  EYES: EOMI, PERRLA, conjunctiva and sclera clear  NECK: Supple, no lymphadenopathy, no JVD  CHEST/LUNG: CTAB; No wheezes, rales, or rhonchi  HEART: Regular rate and rhythm; No murmurs, rubs, or gallops  ABDOMEN: Soft, non-tender, non-distended; normal bowel sounds, no organomegaly  EXTREMITIES:  2+ peripheral pulses b/l, No clubbing, cyanosis, or edema  NEUROLOGY: A&O x 3, no focal deficits  SKIN: No rashes or lesions    LABS:                        11.7   11.97 )-----------( 271      ( 18 Jul 2022 05:20 )             34.9     07-18    144  |  108<H>  |  12  ----------------------------<  117<H>  4.3   |  24  |  0.50    Ca    8.7      18 Jul 2022 05:20  Phos  4.0     07-18  Mg     2.30     07-18                  RADIOLOGY & ADDITIONAL TESTS:  Results Reviewed:   Imaging Personally Reviewed:  Electrocardiogram Personally Reviewed:    COORDINATION OF CARE:  Care Discussed with Consultants/Other Providers [Y/N]:  Prior or Outpatient Records Reviewed [Y/N]:

## 2022-07-18 NOTE — PROGRESS NOTE ADULT - PROBLEM SELECTOR PLAN 4
-C/w home Atorvastatin
-C/w home Atorvastatin
DVT PPX: Held for possible LP  Diet: Solid  Dispo: Pending Clinical Improvement
DVT PPX: Held for possible LP  Diet: Solid  Dispo: Pending Clinical Improvement

## 2022-07-18 NOTE — DISCHARGE NOTE NURSING/CASE MANAGEMENT/SOCIAL WORK - NSDCPEFALRISK_GEN_ALL_CORE
For information on Fall & Injury Prevention, visit: [email or web address] OR  [email or web address] OR  https://www.cdc.gov/steadi/patient.html

## 2022-07-18 NOTE — OCCUPATIONAL THERAPY INITIAL EVALUATION ADULT - LIVES WITH, PROFILE
Pt. reports she lives with her  and daughter in an apartment building with 5 steps to enter. Once inside, pt. reports apartment is located on the main level. Per pt., she has a bathtub in her bathroom.
 and dtr in an apt with 5 steps to manage./children/spouse

## 2022-07-18 NOTE — PROGRESS NOTE ADULT - ATTENDING COMMENTS
Pt seen and evaluated on the am of 7/18/22    51yF w/ pmhx migraines and HLD p/w two days of R eye pain and vision loss. S/p MR Brain showing periventricular hyperintensity and perivenular configuration consistent with MS, and MR Orbits showing enhancement of R optic nerve consistent with optic neuritis.   Now s/p dedicated T2 FLAIR MR BRAIN to determine further management personally reviewed and showed: Abnormal T2 prolongation in the periventricular white matter region again seen. Quite a few these lesions have a Ronquillo finger type of appearance which is very suggestive of underlying demyelinating disease such as MS. Other possibilities include areas infection inflammation or   ischemia. No abnormal enhancing component or restricted diffusion is seen   to suggest active MS lesions.    -Now s/ IV steroid course. Will transition to oral prednisone taper per neuro :60mg x 4 days, 40mg x 4 days, 20mg x 2 days (total 10 days)  -PPI ppx  -monitor neuro exam  -outpt f/u w/ neuro for further management and to f/u on remaining labs for w/u of alternative causes of underlying demyelinating disease  Dc planning for today. Further details outlined in discharge documentation. Spent 36 min in discharge planning and coordination.

## 2022-07-18 NOTE — DISCHARGE NOTE NURSING/CASE MANAGEMENT/SOCIAL WORK - PATIENT PORTAL LINK FT
You can access the FollowMyHealth Patient Portal offered by [facility] by registering at the following website: [email or web address]. By joining [facility]’s FollowMyHealth portal, you will also be able to view your health information using other applications (apps) compatible with our system.

## 2022-07-18 NOTE — PROGRESS NOTE ADULT - PROVIDER SPECIALTY LIST ADULT
Internal Medicine
Neurology
Ophthalmology
Ophthalmology
Internal Medicine
Ophthalmology
Neurology
Ophthalmology
Internal Medicine
Internal Medicine

## 2022-07-18 NOTE — PROGRESS NOTE ADULT - PROBLEM SELECTOR PLAN 5
DVT PPX: Held for possible LP  Diet: Solid  Dispo: Pending Clinical Improvement DVT PPX:Improve score low  Diet: Solid

## 2022-07-18 NOTE — PROGRESS NOTE ADULT - REASON FOR ADMISSION
Unilateral vision loss and eye pain

## 2022-07-18 NOTE — PROGRESS NOTE ADULT - ASSESSMENT
51 year old female w/ PMHx chronic migraines, HLD, p/w vision changes in right eye. Patient c/o superior visual field deficits in right eye and pain w/ EOM in right eye. 7/16/22 OD eye examination notable for +RAPD, red color desaturation, and ~20/50 visual acuity. MRI head and orbit w/wo 7/14/22: several lesions w/ periventricular configuration c/f multiple sclerosis, unremarkable orbits. MRI C/T/L w/wo 7/14/22: dorsal short segment signal abnormality T9 w/ possible enhancement c/f active demyelination.     Impression: Right eye pain w/ EOM w/ red color desaturation and APD c/w right optic neuritis -- retrobulbar neuritis. Will evaluate for underlying demyelinating disease, including multiple sclerosis.    Recommendations:    Diagnostics:  [] Anti-MOG Ab, NMO/AQP4 Ab, SSA/SSB, TOMMY, ds-DNA, ANCA, ESR, CRP, Complement C3, C4, Cryoglobulins, Urine and serum Ca++, Lyme, ACE level, Syphilis screen, Hepatitis panel, Vitamin B12, Vitamin D.  [] IV steroid course completed   [] Start oral prednisone 60mg x 4 days, 40mg x 4 days, 20mg x 2 days (total 12 days)  [] Prescribe PPI while on steroids   [] Cleared for discharge from neurology standpoint   [] Follow up with neurology, Dr. Trujillo outpatient     Case discussed with neurology attending Dr. Trujillo  51 year old female w/ PMHx chronic migraines, HLD, p/w vision changes in right eye. Patient c/o superior visual field deficits in right eye and pain w/ EOM in right eye. 7/16/22 OD eye examination notable for +RAPD, red color desaturation, and ~20/50 visual acuity. MRI head and orbit w/wo 7/14/22: several lesions w/ periventricular configuration c/f multiple sclerosis, unremarkable orbits. MRI C/T/L w/wo 7/14/22: dorsal short segment signal abnormality T9 w/ possible enhancement c/f active demyelination.     Impression: Right eye pain w/ EOM w/ red color desaturation and APD c/w right optic neuritis -- retrobulbar neuritis. Will evaluate for underlying demyelinating disease, including multiple sclerosis.    Recommendations:    Diagnostics:  [] Anti-MOG Ab, NMO/AQP4 Ab, SSA/SSB, TOMMY, ds-DNA, ANCA, ESR, CRP, Complement C3, C4, Cryoglobulins, Urine and serum Ca++, Lyme, ACE level, Syphilis screen, Hepatitis panel, Vitamin B12, Vitamin D.  [] IV steroid course completed   [] Start oral prednisone 60mg x 4 days, 40mg x 4 days, 20mg x 2 days (total 10 days)  [] Prescribe PPI while on steroids   [] Cleared for discharge from neurology standpoint   [] Follow up with neurology, Dr. Trujillo outpatient     Case discussed with neurology attending Dr. Trujillo

## 2022-07-18 NOTE — PROGRESS NOTE ADULT - PROBLEM SELECTOR PLAN 3
-Patient not on migraine ppx at home - will continue to monitor patient for migraines  -PRN Rizatriptan 10 q2 (max dose 30/24 hr) for migraine pain
-C/w home Atorvastatin
-C/w home Atorvastatin
-Patient not on migraine ppx at home - will continue to monitor patient for migraines  -PRN Rizatriptan 10 q2 (max dose 30/24 hr) for migraine pain

## 2022-07-18 NOTE — OCCUPATIONAL THERAPY INITIAL EVALUATION ADULT - LEVEL OF INDEPENDENCE: SIT/SUPINE, REHAB EVAL
independent
Not assessed. Pt left sitting at edge of bed. No acute distress. Call bell in reach. All lines intact and precautions maintained. RN, made aware.

## 2022-07-18 NOTE — OCCUPATIONAL THERAPY INITIAL EVALUATION ADULT - GENERAL OBSERVATIONS, REHAB EVAL
Pt. received semisupine in bed. No acute distress. Patient agreed to evaluation from Occupational Therapist. +IV.
Patient is alert and following directions.

## 2022-07-18 NOTE — OCCUPATIONAL THERAPY INITIAL EVALUATION ADULT - ANTICIPATED DISCHARGE DISPOSITION, OT EVAL
Out-pt OT/vision therapy for right eye./home w/ outpatient
Patient appears to be at/close to baseline functional status, recommend home with no acute OT services required at this time. Pt. will be discharged from inpatient OT services. If any change in pt.'s status noted, please request new consult. Recommend pt. to follow up with vision specialist/neuro ophthalmologist.

## 2022-07-18 NOTE — OCCUPATIONAL THERAPY INITIAL EVALUATION ADULT - PERTINENT HX OF CURRENT PROBLEM, REHAB EVAL
Pt is a 51 year old female with hx of migraines and HLD, who presented to [facility] on 7/14/22 with acute onset Right eye pain and blurry vision loss gradually worsening since 7/12/22. (See continued below)
Pt is a 51 year old female with hx of migraines and HLD, who presented to [facility] on 7/14/22 with acute onset Right eye pain and blurry vision loss gradually worsening since 7/12/22.

## 2022-07-18 NOTE — PROGRESS NOTE ADULT - ATTENDING COMMENTS
51 year old female with hx of migraine headaches, HLD, p/w 4 day hx of progressive vision loss OD (predominately involving superior quadrant). Exam significant for RAPD OD with red color desaturation and MRI orbits confirmed evidence of right optic intraorbital optic neuritis/perineuritis.   No dedicated brain MRI obtained. MRI C/T spine with evidence of dorsal cord lesion at T9 with subtle contrast enhancement. Of note, she reportedly experienced 1 week of bilateral foot numbness in 5/2022, that then resolved. She denies any other significant episodic neurological symptoms.     Today is day 5/5 IV solumderol. She reports improvement in vision, though not 100 %.    Neurological exam significant for right superior quadrant visual field defect and RAPD OD. Otherwise no other cranial nerve abnormalities or motor weakness. Gait is normal.       Imp:- Right optic neuritis, Thoracic cord myelitis    INCOMPLETE NOTE 51 year old female with hx of migraine headaches, HLD, p/w 4 day hx of progressive vision loss OD (predominately involving superior quadrant). Exam significant for RAPD OD, superior visual field cut OD with red color desaturation OD and MRI orbits confirmed evidence of right intraorbital segment optic neuritis/perineuritis.   No dedicated brain MRI obtained. MRI C/T spine with evidence of dorsal cord lesion at T9 with subtle contrast enhancement. Of note, she reportedly experienced 1 week of bilateral foot numbness in 5/2022, that then resolved. She denies any other significant episodic neurological symptoms. NO recent vaccination. NO recent infections.     Today is day 5/5 IV solumderol. She reports improvement in vision, though not 100 %.    Neurological exam significant for right superior quadrant visual field cut and RAPD OD. Otherwise no other cranial nerve abnormalities or motor weakness. Gait is normal.     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 Also need to await results of further blood work up to determine diagnosis.     Counselled on the natural course of optic neuritis and reassured her that she should continue to see improvement in her vision in the following weeks to come.     Rec:  [] MRI brain w/w/o (please include coronal, axial and sagittal T2 FLAIR sequences)  [] Will follow up on labs. Please check TOMMY, ANCA, RF. Please also check Vitamin D level   [] Completed 5/5 day IV steroids 7/18. Will start oral prednisone taper tomorrow  60 gm x 4 days, 40 mg x 4 days, 20 mg x 4 days then stop (with PPI)  [] Will decide on need for LP as outpatient  [] Recommend Vitamin D3 2000 IU daily    Will follow up on discharge in 2 weeks

## 2022-07-19 PROBLEM — Z00.00 ENCOUNTER FOR PREVENTIVE HEALTH EXAMINATION: Status: ACTIVE | Noted: 2022-07-19

## 2022-07-20 LAB
AQP4 H2O CHANNEL AB SERPL IA-ACNC: NEGATIVE — SIGNIFICANT CHANGE UP
MOG AB SER QL CBA IFA: NEGATIVE — SIGNIFICANT CHANGE UP

## 2022-07-25 ENCOUNTER — APPOINTMENT (OUTPATIENT)
Dept: OPHTHALMOLOGY | Facility: CLINIC | Age: 52
End: 2022-07-25

## 2022-07-25 ENCOUNTER — NON-APPOINTMENT (OUTPATIENT)
Age: 52
End: 2022-07-25

## 2022-07-25 PROCEDURE — 99204 OFFICE O/P NEW MOD 45 MIN: CPT

## 2022-07-25 PROCEDURE — 92133 CPTRZD OPH DX IMG PST SGM ON: CPT

## 2022-07-25 PROCEDURE — 92015 DETERMINE REFRACTIVE STATE: CPT

## 2022-07-25 PROCEDURE — 92083 EXTENDED VISUAL FIELD XM: CPT

## 2022-08-01 RX ORDER — ROSUVASTATIN CALCIUM 5 MG/1
1 TABLET ORAL
Qty: 0 | Refills: 0 | DISCHARGE

## 2022-08-01 RX ORDER — ASPIRIN/CALCIUM CARB/MAGNESIUM 324 MG
1 TABLET ORAL
Qty: 0 | Refills: 0 | DISCHARGE

## 2022-08-04 ENCOUNTER — APPOINTMENT (OUTPATIENT)
Dept: NEUROLOGY | Facility: CLINIC | Age: 52
End: 2022-08-04

## 2022-08-04 VITALS
SYSTOLIC BLOOD PRESSURE: 107 MMHG | WEIGHT: 200 LBS | DIASTOLIC BLOOD PRESSURE: 81 MMHG | HEART RATE: 82 BPM | BODY MASS INDEX: 30.31 KG/M2 | HEIGHT: 68 IN

## 2022-08-04 DIAGNOSIS — Z72.0 TOBACCO USE: ICD-10-CM

## 2022-08-04 DIAGNOSIS — F17.200 NICOTINE DEPENDENCE, UNSPECIFIED, UNCOMPLICATED: ICD-10-CM

## 2022-08-04 PROCEDURE — 99215 OFFICE O/P EST HI 40 MIN: CPT

## 2022-08-04 PROCEDURE — 99417 PROLNG OP E/M EACH 15 MIN: CPT

## 2022-08-04 RX ORDER — ACETAMINOPHEN AND CODEINE PHOSPHATE 300; 30 MG/1; MG/1
300-30 TABLET ORAL
Qty: 16 | Refills: 0 | Status: COMPLETED | COMMUNITY
Start: 2022-05-31

## 2022-08-04 RX ORDER — PANTOPRAZOLE 40 MG/1
40 TABLET, DELAYED RELEASE ORAL
Qty: 30 | Refills: 0 | Status: DISCONTINUED | COMMUNITY
Start: 2022-07-18

## 2022-08-04 RX ORDER — CYCLOBENZAPRINE HYDROCHLORIDE 10 MG/1
10 TABLET, FILM COATED ORAL
Qty: 28 | Refills: 0 | Status: DISCONTINUED | COMMUNITY
Start: 2022-05-24

## 2022-08-04 RX ORDER — IBUPROFEN 600 MG/1
600 TABLET, FILM COATED ORAL
Qty: 56 | Refills: 0 | Status: ACTIVE | COMMUNITY
Start: 2022-05-24

## 2022-08-04 RX ORDER — CIPROFLOXACIN HYDROCHLORIDE 500 MG/1
500 TABLET, FILM COATED ORAL
Qty: 14 | Refills: 0 | Status: COMPLETED | COMMUNITY
Start: 2022-05-31

## 2022-08-04 RX ORDER — ROSUVASTATIN CALCIUM 20 MG/1
20 TABLET, FILM COATED ORAL
Qty: 30 | Refills: 0 | Status: ACTIVE | COMMUNITY
Start: 2022-05-15

## 2022-08-04 RX ORDER — RIVAROXABAN 2.5 MG/1
2.5 TABLET, FILM COATED ORAL
Qty: 60 | Refills: 0 | Status: COMPLETED | COMMUNITY
Start: 2022-05-15

## 2022-08-04 RX ORDER — I-VITE, TAB 1000-60-2MG (60/BT) 300MCG-200
TAB ORAL
Qty: 30 | Refills: 0 | Status: DISCONTINUED | COMMUNITY
Start: 2022-05-15

## 2022-08-04 RX ORDER — POLYETHYLENE GLYCOL 3350 17 G/17G
17 POWDER, FOR SOLUTION ORAL
Qty: 1020 | Refills: 0 | Status: ACTIVE | COMMUNITY
Start: 2022-07-18

## 2022-08-04 RX ORDER — ASPIRIN 81 MG/1
81 TABLET, COATED ORAL
Qty: 90 | Refills: 0 | Status: ACTIVE | COMMUNITY
Start: 2022-05-15

## 2022-08-04 RX ORDER — OMEPRAZOLE 40 MG/1
40 CAPSULE, DELAYED RELEASE ORAL
Qty: 30 | Refills: 0 | Status: ACTIVE | COMMUNITY
Start: 2022-08-04 | End: 1900-01-01

## 2022-08-04 RX ORDER — SENNOSIDES 8.6 MG TABLETS 8.6 MG/1
8.6 TABLET ORAL
Qty: 60 | Refills: 0 | Status: ACTIVE | COMMUNITY
Start: 2022-07-18

## 2022-08-04 RX ORDER — MULTIVIT-MIN/FOLIC/VIT K/LYCOP 400-300MCG
50 MCG TABLET ORAL
Qty: 30 | Refills: 0 | Status: ACTIVE | COMMUNITY
Start: 2022-07-18

## 2022-08-04 RX ORDER — PREDNISONE 20 MG/1
20 TABLET ORAL
Qty: 12 | Refills: 0 | Status: COMPLETED | COMMUNITY
Start: 2022-07-18

## 2022-08-04 NOTE — HISTORY OF PRESENT ILLNESS
[FreeTextEntry1] : HPI (initial visit Aug 04, 2022)- Isabel Dangelo is a 51 year old female with pmhx of migraines and HLD, admitted to [facility] 7/14/2022-7/18/2022 for 4 day hx of progressive vision loss in right eye (predominately involving superior quadrant) and associated pain with eye movements. Her exam was notable for RAPD OD,  superior visual field cut OD with red color desaturation OD. Of note, she also reportedly experienced 1 week of bilateral foot numbness in 5/2022, that then resolved. She denies any other significant episodic neurological symptoms. NO recent vaccination. NO recent infections. Ophthalmology and neurology were consulted. \par \par MRI orbits 7/14/2022 confirmed evidence of right intraorbital segment optic neuritis/perineuritis. \par No dedicated brain MRI initially obtained. \par MRI C/T 7/14/2022 spine with evidence of dorsal cord lesion at T9 with subtle contrast enhancement. Normal cervical spine. \par \par Pt admitted for management of Optic Neuritis and further workup. Patient received 5 days of IV Solumedrol 1000mg (7/14-7/18) with symptom improvement and and was d/c with steroid taper for 10 days. \par \par A dedicated T2 FLAIR MRI w/wo contrast was performed 7/18/2022 showing Abnormal T2 prolongation in the periventricular white matter region with seay finger appearance and + T1 dark holes. No infratentorial lesions. \par \par Labs:- ACE neg, SSA/SSB neg, Lyme neg, Syphilis neg, serum NMO and MOG ab neg. DsDNA neg. \par \par Interim Hx  - She reports since the hospitalization the toes on the left have been constantly numb.Also experiencing cramps in legs. Seen by Dr. Baum 7/25/2022- [facility] 20/200 OD and 20/70 OS, 2+ RAPD, superior altitudinal defect OD. She completed prednisone taper 7/29/2022. Vision is better- 30% better. \par She has a hx of migraine headaches since 2015 - occipital and radiates to top of head. + Phonophobia, + Photophobia. + N/V. Headaches are frequent- almost daily. lasted 3-4 hours. Always at night. Headaches were better on steroids. Ibuprofen helps. She works at a [employer].\par \par

## 2022-08-04 NOTE — PHYSICAL EXAM
[FreeTextEntry1] : PHYSICAL EXAM\par Constitutional: Alert, no acute distress \par Psychiatric: appropriate affect and mood\par Pulmonary: No respiratory distress, stable on room air\par \par NEUROLOGICAL EXAM\par Mental status: The patient is alert, attentive, and conversational memory intact\par Speech/language: Clear and fluent with intact comprehension\par Cranial nerves:\par CN II: Superior visual field defect OD, VA 20/70 OD and 20/25 OS (pocket Snellen chart) + RAPD OD.. \par CN III, IV, VI: EOMI, no nystagmus, no ptosis\par CN V: Facial sensation is intact to pinprick in all 3 divisions bilaterally.\par CN VII: Face is symmetric with normal eye closure and smile.\par CN VII: Hearing is normal to rubbing fingers\par CN IX, X: Palate elevates symmetrically. Phonation is normal.\par CN XI: Head turning and shoulder shrug are intact\par CN XII: Tongue is midline with normal movements and no atrophy.\par Motor: Strength is full bilaterally. 5/5 muscle power in bilateral UE and LE.\par Reflexes: Reflexes are 3+ and symmetric at the biceps, brachioradialis and patellar, and +1 ankles. Plantar responses are flexor.\par Sensory: Intact sensations to light touch in upper and lower extremities. \par Coordination: Rapid alternating movements and fine finger movements are intact. There is no dysmetria on finger-to-nose. There are no abnormal or extraneous movements. \par Gait/Stance: Posture is normal. Gait is steady with normal steps, base, arm swing, and turning. Heel and toe walking are normal. Tandem gait is normal. Romberg is absent.\par \par \par \par \par

## 2022-08-04 NOTE — ASSESSMENT
[FreeTextEntry1] : Assessment/Plan:\par  51 year old female with recent diagnosis of right optic neuritis 7/14/2022 s/p IV steroids and oral prednisone taper with 30% symptom improvement. Work up significant for enhancement of intraorbital right ON, periventricular white  matter lesions on brain MRI (non enhancing, abutting lateral ventricles with Ronquillo finger appearance and + T1 dark holes) and short segment enhancing T9 cord lesion. Of note, patient reports a hx of numbness in bilateral feet x 1 week in 5/2022 that resolved. Given her clinical and radiological hx, diagnosis is most consistent with relapsing multiple sclerosis. \par \par I discussed the pathophysiology of Multiple Sclerosis, its disease course and management. We discussed the different options for disease modifying therapy. I explained to her that the goal of treatment is to prevent any new clinical relapses, new lesions on MRI scans and to slow down disease progression/disability. In addition to discussing disease modifying therapies, we reviewed available therapies for symptomatic management for spasticity, neuropathic pain, bladder symptoms, fatigue etc. I also stressed on the importance of healthy eating habits, routine physical therapy and vitamin D supplementation.\par \par Given persistent visual disturbances OD- will given her another trial of a 12 day course of oral prednisone. Counselled her on the natural course of optic neuritis and explained to her that recovery could take up to weeks or months. \par \par \par Plan: \par 1. Diagnostic Plan/Imaging: Plan to repeat MRI brain, cervical and thoracic spine w/w/o in 3 month following start of DMT. \par \par 2. Disease Modifying therapy plan: \par Will order pre-DMT labs. Will review DMT options in further detail at next visit\par Given persistent symptoms of vision loss OD- will give another course of prednisone taper- 60 mg x 4 days, 40 mg x 4 days, then 20 mg x 4 days. Advised to take omeprazole 40 mg QD.\par \par 3. Symptomatic therapy plan:\par () Muscle spasms- Stretching exercises. Consider baclofen at next visit.\par () Vitamin D3 and B12 supplementations \par \par 4. Migraine headaches:\par Freq: Daily\par [] Over the counter preventative therapies discussed, which include Magnesium 400 mg QD (discussed potential side effect of diarrhea), riboflavin 400 mg QD and Coenzyme Q10 100 mg daily.\par [] Start sumatriptan 50 mg tablet, take 1 tablet at the onset of the headache. Can repeat dose in 2 hours if needed. Limit dose to 2 tab/day and 5 tab/week. Discussed the side effects drowsiness and nausea/diarrhea.\par \par Tobacco Cessation: Tobacco has been shown to have direct links to MS disease activity. Smoking in MS increases the timing of and occurrence of progressive disability. Long-term outcomes are better for people who quit smoking compared to active smokers.\par \par \par Return to clinic 2 weeks (TEB is okay)\par \par Available imaging studies and/or blood work up were reviewed. A detailed chart review was completed prior to visit.\par \par The above plan was discussed with YOCASTA PATRICK in great detail.  YOCASTA DARNELL verbalized understanding and agrees with plan as detailed above. Patient was provided education and counselling on current diagnosis/symptoms, diagnostic work up, treatment options and potential side effects of any prescribed therapy/therapies. She was advised to call our clinic at [phone] for any new or worsening symptoms, or with any questions or concerns. In case of acute onset of neurological symptoms or worsening presentation, patient was advised to present to nearest emergency room for further evaluation. YOCASTAJUAN ANTONIO PATRICK expressed understanding and all her questions/concerns were addressed.\par \par Lydia Trujillo M.D\par

## 2022-08-04 NOTE — END OF VISIT
[Time Spent: ___ minutes] : I have spent [unfilled] minutes of time on the encounter. Gabapentin Pregnancy And Lactation Text: This medication is Pregnancy Category C and isn't considered safe during pregnancy. It is excreted in breast milk.

## 2022-08-04 NOTE — DATA REVIEWED
[de-identified] : MRI orbits 7/14/2022 confirmed evidence of right intraorbital segment optic neuritis/perineuritis. \par No dedicated brain MRI initially obtained. \par MRI C/T 7/14/2022 spine with evidence of dorsal cord lesion at T9 with subtle contrast enhancement. Normal cervical spine. \par \par \par A dedicated T2 FLAIR MRI w/wo contrast was performed 7/18/2022 showing Abnormal T2 prolongation in the periventricular white matter region with seay finger appearance and + T1 dark holes. No infratentorial lesions.

## 2022-08-05 PROBLEM — G43.909 MIGRAINE, UNSPECIFIED, NOT INTRACTABLE, WITHOUT STATUS MIGRAINOSUS: Chronic | Status: ACTIVE | Noted: 2022-07-13

## 2022-08-05 LAB
25(OH)D3 SERPL-MCNC: 25.6 NG/ML
ALBUMIN SERPL ELPH-MCNC: 4.1 G/DL
ALP BLD-CCNC: 118 U/L
ALT SERPL-CCNC: 38 U/L
AST SERPL-CCNC: 28 U/L
BASOPHILS # BLD AUTO: 0.06 K/UL
BASOPHILS NFR BLD AUTO: 0.9 %
BILIRUB DIRECT SERPL-MCNC: 0.1 MG/DL
BILIRUB INDIRECT SERPL-MCNC: 0.1 MG/DL
BILIRUB SERPL-MCNC: <0.2 MG/DL
BUN SERPL-MCNC: 9 MG/DL
CREAT SERPL-MCNC: 0.66 MG/DL
DEPRECATED KAPPA LC FREE/LAMBDA SER: 1.44 RATIO
EGFR: 107 ML/MIN/1.73M2
EOSINOPHIL # BLD AUTO: 0.21 K/UL
EOSINOPHIL NFR BLD AUTO: 3.1 %
HBV CORE IGG+IGM SER QL: NONREACTIVE
HBV CORE IGM SER QL: NONREACTIVE
HBV SURFACE AB SER QL: NONREACTIVE
HBV SURFACE AG SER QL: NONREACTIVE
HCT VFR BLD CALC: 38.8 %
HGB BLD-MCNC: 12.4 G/DL
IGA SER QL IEP: 194 MG/DL
IGG SER QL IEP: 873 MG/DL
IGM SER QL IEP: 183 MG/DL
IMM GRANULOCYTES NFR BLD AUTO: 0.4 %
KAPPA LC CSF-MCNC: 1.51 MG/DL
KAPPA LC SERPL-MCNC: 2.18 MG/DL
LYMPHOCYTES # BLD AUTO: 2.49 K/UL
LYMPHOCYTES NFR BLD AUTO: 37.1 %
MAN DIFF?: NORMAL
MCHC RBC-ENTMCNC: 28.4 PG
MCHC RBC-ENTMCNC: 32 GM/DL
MCV RBC AUTO: 89 FL
MONOCYTES # BLD AUTO: 0.31 K/UL
MONOCYTES NFR BLD AUTO: 4.6 %
NEUTROPHILS # BLD AUTO: 3.61 K/UL
NEUTROPHILS NFR BLD AUTO: 53.9 %
PLATELET # BLD AUTO: 280 K/UL
PROT SERPL-MCNC: 6.7 G/DL
RBC # BLD: 4.36 M/UL
RBC # FLD: 15.5 %
VZV AB TITR SER: POSITIVE
VZV IGG SER IF-ACNC: 621.1 INDEX
WBC # FLD AUTO: 6.71 K/UL

## 2022-08-08 LAB
M TB IFN-G BLD-IMP: NEGATIVE
QUANTIFERON TB PLUS MITOGEN MINUS NIL: >10 IU/ML
QUANTIFERON TB PLUS NIL: 0.02 IU/ML
QUANTIFERON TB PLUS TB1 MINUS NIL: 0.02 IU/ML
QUANTIFERON TB PLUS TB2 MINUS NIL: 0.02 IU/ML
VZV IGM SER IF-ACNC: 1.14 INDEX

## 2022-08-12 LAB
JCV INDEX: 2.11
STRATIFY JCV ANTIBODY: POSITIVE

## 2022-08-15 ENCOUNTER — NON-APPOINTMENT (OUTPATIENT)
Age: 52
End: 2022-08-15

## 2022-08-15 ENCOUNTER — APPOINTMENT (OUTPATIENT)
Dept: OPHTHALMOLOGY | Facility: CLINIC | Age: 52
End: 2022-08-15

## 2022-08-15 PROCEDURE — 92083 EXTENDED VISUAL FIELD XM: CPT

## 2022-08-15 PROCEDURE — 92133 CPTRZD OPH DX IMG PST SGM ON: CPT

## 2022-08-15 PROCEDURE — 99214 OFFICE O/P EST MOD 30 MIN: CPT

## 2022-08-16 NOTE — ED PROVIDER NOTE - TEMPLATE, MLM
Winlevi Pregnancy And Lactation Text: This medication is considered safe during pregnancy and breastfeeding. Abdominal Pain, N/V/D

## 2022-09-01 ENCOUNTER — APPOINTMENT (OUTPATIENT)
Dept: NEUROLOGY | Facility: CLINIC | Age: 52
End: 2022-09-01

## 2022-09-01 ENCOUNTER — RX RENEWAL (OUTPATIENT)
Age: 52
End: 2022-09-01

## 2022-09-01 VITALS
SYSTOLIC BLOOD PRESSURE: 120 MMHG | WEIGHT: 208 LBS | DIASTOLIC BLOOD PRESSURE: 75 MMHG | HEIGHT: 68 IN | BODY MASS INDEX: 31.52 KG/M2 | HEART RATE: 94 BPM

## 2022-09-01 DIAGNOSIS — E55.9 VITAMIN D DEFICIENCY, UNSPECIFIED: ICD-10-CM

## 2022-09-01 PROCEDURE — 99214 OFFICE O/P EST MOD 30 MIN: CPT

## 2022-09-01 RX ORDER — PREDNISONE 20 MG/1
20 TABLET ORAL
Qty: 24 | Refills: 0 | Status: DISCONTINUED | COMMUNITY
Start: 2022-08-04 | End: 2022-09-01

## 2022-09-01 RX ORDER — SUMATRIPTAN 50 MG/1
50 TABLET, FILM COATED ORAL
Qty: 15 | Refills: 3 | Status: DISCONTINUED | COMMUNITY
Start: 2022-08-04 | End: 2022-09-01

## 2022-09-01 NOTE — ASSESSMENT
[FreeTextEntry1] : Assessment/Plan:\par  51 year old female with recent diagnosis of right optic neuritis 7/14/2022 s/p IV steroids and oral prednisone taper with modest symptom improvement. Work up significant for enhancement of intraorbital right ON, periventricular white matter lesions on brain MRI (non enhancing, abutting lateral ventricles with Ronquillo finger appearance and + T1 black holes) and short segment enhancing T9 cord lesion. Of note, patient reports a hx of numbness in bilateral feet x 1 week in 5/2022 that resolved. Given her clinical and radiological hx, diagnosis is most consistent with relapsing multiple sclerosis. \par \par I discussed the pathophysiology of Multiple Sclerosis, its disease course and management. We discussed the different options for disease modifying therapy. I explained to her that the goal of treatment is to prevent any new clinical relapses, new lesions on MRI scans and to slow down disease progression/disability. In addition to discussing disease modifying therapies, we reviewed available therapies for symptomatic management for spasticity, neuropathic pain, bladder symptoms, fatigue etc. I also stressed on the importance of healthy eating habits, routine physical therapy and vitamin D supplementation.\par \par Her right vision loss has improved, though has persistent superior visual field defect OD. No brand new symptoms. Migraine headaches are still daily and debilitating. At this visit, we reviewed DMT options, and after a discussion regarding therapy options, patient felt most comfortable proceeding with Ocrelizumab infusion.\par \par Plan: \par 1. Diagnostic Plan/Imaging: Plan to repeat MRI brain, cervical and thoracic spine w/w/o in 3 month following start of DMT. \par \par 2. Disease Modifying therapy plan:  JCV ab +\par MS DMT: Start Ocrevus (completed and submitted start form)\par -Ocrevus screening labs completed- will repeat VZV ab\par -Monitoring Ocrevus labs (CBC with diff, LFT) every 6 months while on therapy. . \par For Ocrevus we discussed the most commonly seen potential side effects in the clinical trials of upper respiratory infections, infusion reactions, skin infections, and the fact that there were 6 cases of breast cancer in the Ocrevus treated group as compared to none in the Rebif and placebo groups.  We discussed the breast cancer data in details as it is discussed in the Ocrevus prescribing information. \par Counseled on avoiding vaccination with live virus vaccine (recommended getting shingles vaccine before start of therapy)\par \par 3. Symptomatic therapy plan:\par () Muscle spasms- Stretching exercises. Can consider baclofen.\par () Vitamin D3 and B12 supplementations. Will prescribe high dose Vitamin D x 12 week and then transition to 2000 IU daily\par \par 4. Migraine headaches:\par Freq: Daily\par [] Will prescribe nortriptyline 20 mg qhs for preventative therapy. Reviewed side effects.\par [] Will prescribe Naproxen 500 mg BID PRN  x 3 days. Take with omeprazole. \par [] Will switch to another triptan, Rizatriptan 10 mg, as sumatriptan is not working. Can repeat dose in 2 hours if needed. Discussed the side effects drowsiness and nausea/diarrhea.\par [] Continue magnesium, riboflavin and coenzyme Q10\par \par Tobacco Cessation: Tobacco has been shown to have direct links to MS disease activity. Smoking in MS increases the timing of and occurrence of progressive disability. Long-term outcomes are better for people who quit smoking compared to active smokers.\par \par \par Return to clinic 3 months\par \par Available imaging studies and/or blood work up were reviewed. A detailed chart review was completed prior to visit.\par \par The above plan was discussed with YOCASTA PATRICK in great detail. YOCASTA PATRICK verbalized understanding and agrees with plan as detailed above. Patient was provided education and counselling on current diagnosis/symptoms, diagnostic work up, treatment options and potential side effects of any prescribed therapy/therapies. She was advised to call our clinic at [phone] for any new or worsening symptoms, or with any questions or concerns. In case of acute onset of neurological symptoms or worsening presentation, patient was advised to present to nearest emergency room for further evaluation. YOCASTA PATRICK expressed understanding and all her questions/concerns were addressed.\par \par Lydia Trujillo M.D

## 2022-09-01 NOTE — HISTORY OF PRESENT ILLNESS
[FreeTextEntry1] : INTERIM HX 09/01/2022: Followed up with Dr Baum 8/12/2022 and VA improved from 20/200 OD to 20/50 OD, but with persistent superior visual field loss. She completed the 2nd course of steroids as well. Migraines are still daily. Sumatriptan not working. No brand new symptoms. \par \par Pre-DMT labs (date)- \par CBC w/ diff WNL, \par LFT normal\par Bun and Cr normal, \par Quant TB neg,\par VZV IgG + and IgM + (index 1.14), -> ? past infection.  Denies any recent shingles infection or chicken pox. No recent known exposures. \par Hep B surface Ab -, \par HEp B surface antigen and cord antibody negative.\par JCV ab positive 2.11\par Vitamin D 25.6\par -------------------------------------------------------\par \par HPI (initial visit Aug 04, 2022)- Isabel Dangelo is a 51 year old female with pmhx of migraines and HLD, admitted to [facility] 7/14/2022-7/18/2022 for 4 day hx of progressive vision loss in right eye (predominately involving superior quadrant) and associated pain with eye movements. Her exam was notable for RAPD OD,  superior visual field cut OD with red color desaturation OD. Of note, she also reportedly experienced 1 week of bilateral foot numbness in 5/2022, that then resolved. She denies any other significant episodic neurological symptoms. NO recent vaccination. NO recent infections. Ophthalmology and neurology were consulted. \par \par MRI orbits 7/14/2022 confirmed evidence of right intraorbital segment optic neuritis/perineuritis. \par No dedicated brain MRI initially obtained. \par MRI C/T 7/14/2022 spine with evidence of dorsal cord lesion at T9 with subtle contrast enhancement. Normal cervical spine. \par \par Pt admitted for management of Optic Neuritis and further workup. Patient received 5 days of IV Solumedrol 1000mg (7/14-7/18) with symptom improvement and and was d/c with steroid taper for 10 days. \par \par A dedicated T2 FLAIR MRI w/wo contrast was performed 7/18/2022 showing Abnormal T2 prolongation in the periventricular white matter region with seay finger appearance and + T1 black holes. No infratentorial lesions. \par \par Labs:- ACE neg, SSA/SSB neg, Lyme neg, Syphilis neg, serum NMO and MOG ab neg. DsDNA neg. \par \par Interim Hx  - She reports since the hospitalization the toes on the left have been constantly numb.Also experiencing cramps in legs. Seen by Dr. Baum 7/25/2022- VA 20/200 OD and 20/70 OS, 2+ RAPD, superior altitudinal defect OD. She completed prednisone taper 7/29/2022. Vision is better- 30% better. \par She has a hx of migraine headaches since 2015 - occipital and radiates to top of head. + Phonophobia, + Photophobia. + N/V. Headaches are frequent- almost daily. lasted 3-4 hours. Always at night. Headaches were better on steroids. Ibuprofen helps. She works at a [employer].\par \par

## 2022-09-01 NOTE — DATA REVIEWED
[de-identified] : MRI orbits 7/14/2022 confirmed evidence of right intraorbital segment optic neuritis/perineuritis. \par No dedicated brain MRI initially obtained. \par MRI C/T 7/14/2022 spine with evidence of dorsal cord lesion at T9 with subtle contrast enhancement. Normal cervical spine. \par \par \par A dedicated T2 FLAIR MRI w/wo contrast was performed 7/18/2022 showing Abnormal T2 prolongation in the periventricular white matter region with seay finger appearance and + T1 dark holes. No infratentorial lesions.

## 2022-09-06 ENCOUNTER — RX RENEWAL (OUTPATIENT)
Age: 52
End: 2022-09-06

## 2022-09-06 LAB
VZV AB TITR SER: POSITIVE
VZV IGG SER IF-ACNC: 674.8 INDEX
VZV IGM SER IF-ACNC: 0.94 INDEX

## 2022-09-11 ENCOUNTER — FORM ENCOUNTER (OUTPATIENT)
Age: 52
End: 2022-09-11

## 2022-09-13 ENCOUNTER — NON-APPOINTMENT (OUTPATIENT)
Age: 52
End: 2022-09-13

## 2022-09-13 ENCOUNTER — APPOINTMENT (OUTPATIENT)
Dept: OPHTHALMOLOGY | Facility: CLINIC | Age: 52
End: 2022-09-13

## 2022-09-13 PROCEDURE — 92083 EXTENDED VISUAL FIELD XM: CPT

## 2022-09-13 PROCEDURE — 92133 CPTRZD OPH DX IMG PST SGM ON: CPT

## 2022-09-13 PROCEDURE — 99214 OFFICE O/P EST MOD 30 MIN: CPT

## 2022-09-23 ENCOUNTER — NON-APPOINTMENT (OUTPATIENT)
Age: 52
End: 2022-09-23

## 2022-10-05 ENCOUNTER — APPOINTMENT (OUTPATIENT)
Dept: NEUROLOGY | Facility: CLINIC | Age: 52
End: 2022-10-05

## 2022-10-05 VITALS
HEIGHT: 68 IN | WEIGHT: 210 LBS | RESPIRATION RATE: 16 BRPM | OXYGEN SATURATION: 97 % | SYSTOLIC BLOOD PRESSURE: 111 MMHG | TEMPERATURE: 97.6 F | DIASTOLIC BLOOD PRESSURE: 70 MMHG | BODY MASS INDEX: 31.83 KG/M2 | HEART RATE: 95 BPM

## 2022-10-05 PROCEDURE — 99214 OFFICE O/P EST MOD 30 MIN: CPT

## 2022-10-05 RX ORDER — NAPROXEN 500 MG/1
500 TABLET ORAL
Qty: 6 | Refills: 0 | Status: DISCONTINUED | COMMUNITY
Start: 2022-09-01 | End: 2022-10-05

## 2022-10-05 NOTE — ASSESSMENT
[FreeTextEntry1] : Assessment/Plan:\par  51 year old female with recent diagnosis of right optic neuritis 7/14/2022 s/p IV steroids and oral prednisone taper with modest symptom improvement. Work up significant for enhancement of intraorbital right ON, periventricular white matter lesions on brain MRI (non enhancing, abutting lateral ventricles with Ronquillo finger appearance and + T1 black holes) and short segment enhancing T9 cord lesion. Of note, patient reports a hx of numbness in bilateral feet x 1 week in 5/2022 that resolved. Given her clinical and radiological hx, diagnosis is most consistent with relapsing multiple sclerosis. \par \par I discussed the pathophysiology of Multiple Sclerosis, its disease course and management. We discussed the different options for disease modifying therapy. I explained to her that the goal of treatment is to prevent any new clinical relapses, new lesions on MRI scans and to slow down disease progression/disability. In addition to discussing disease modifying therapies, we reviewed available therapies for symptomatic management for spasticity, neuropathic pain, bladder symptoms, fatigue etc. I also stressed on the importance of healthy eating habits, routine physical therapy and vitamin D supplementation.\par \par Her right eye vision loss has continued to improve. No brand new symptoms. Migraine headaches have also improved. Pending approval of Ocrevus at this time. \par \par Relapsing Multiple sclerosis (dx'd 7/14/2022)\par 1st relapse 5/2022- B/l foot numbness, likely thoracic myelitis\par 2nd relapse 7/2022- R Optic neuritis\par Plan: \par 1. Diagnostic Plan/Imaging: Plan to repeat MRI brain, cervical and thoracic spine w/w/o in 3 month following start of DMT. \par \par 2. Disease Modifying therapy plan: JCV ab +\par MS DMT: Start Ocrevus (completed and submitted start form)\par -Ocrevus screening labs completed-\par -Monitoring Ocrevus labs (CBC with diff, LFT) every 6 months while on therapy.. \par For Ocrevus we discussed the most commonly seen potential side effects in the clinical trials of upper respiratory infections, infusion reactions, skin infections, and the fact that there were 6 cases of breast cancer in the Ocrevus treated group as compared to none in the Rebif and placebo groups. We discussed the breast cancer data in details as it is discussed in the Ocrevus prescribing information. \par Counseled on avoiding vaccination with live virus vaccine (recommended getting shingles vaccine before start of therapy)\par \par 3. Symptomatic therapy plan:\par () Muscle spasms- Stretching exercises. Can consider baclofen.\par () Vitamin D3 and B12 supplementations. Will prescribe high dose Vitamin D x 12 week and then transition to 2000 IU daily\par \par 4. Migraine headaches:\par Freq: Daily\par [] Continue nortriptyline 20 mg qhs for preventative therapy. Reviewed side effects.\par [] Rizatriptan 10 mg as needed, as sumatriptan is not working. Can repeat dose in 2 hours if needed. Discussed the side effects drowsiness and nausea/diarrhea.\par [] Continue magnesium, riboflavin and coenzyme Q10 supplementations\par \par Tobacco Cessation: Tobacco has been shown to have direct links to MS disease activity. Smoking in MS increases the timing of and occurrence of progressive disability. Long-term outcomes are better for people who quit smoking compared to active smokers.\par \par \par Return to clinic 3 months\par \par Available imaging studies and/or blood work up were reviewed. A detailed chart review was completed prior to visit.\par \par The above plan was discussed with YOCASTA PATRICK in great detail. YOCASTA PATRICK verbalized understanding and agrees with plan as detailed above. Patient was provided education and counselling on current diagnosis/symptoms, diagnostic work up, treatment options and potential side effects of any prescribed therapy/therapies. She was advised to call our clinic at [phone] for any new or worsening symptoms, or with any questions or concerns. In case of acute onset of neurological symptoms or worsening presentation, patient was advised to present to nearest emergency room for further evaluation. YOCASTA PATRICK expressed understanding and all her questions/concerns were addressed.\par \par Lydia Trujillo M.D.

## 2022-10-05 NOTE — HISTORY OF PRESENT ILLNESS
[FreeTextEntry1] : INTERIM HX 10/05/2022: Trouble sleeping at night. Migraines are better, once a week. Maxalt as needed and nortriptyline QD. Saw Dr. Baum 9/13/2022 and VA 20/60 OD and 20/30 OS, improvement in visual field on the right. No brand new symptoms. Occasional sees flashes of light in right eye and blue/white spots- this lasted 10 seconds at a time. Still pending Ocrevus approval. She is in good spirits. \par \par INTERIM HX 09/01/2022: Followed up with Dr Baum 8/12/2022 and VA improved from 20/200 OD to 20/50 OD, but with persistent superior visual field loss. She completed the 2nd course of steroids as well. Migraines are still daily. Sumatriptan not working. No brand new symptoms. \par \par Pre-DMT labs (date)- \par CBC w/ diff WNL, \par LFT normal\par Bun and Cr normal, \par Quant TB neg,\par VZV IgG + and IgM + (index 1.14), -> ? past infection.  Denies any recent shingles infection or chicken pox. No recent known exposures. \par Hep B surface Ab -, \par HEp B surface antigen and cord antibody negative.\par JCV ab positive 2.11\par Vitamin D 25.6\par -------------------------------------------------------\par \par HPI (initial visit Aug 04, 2022)- Isabel Dangelo is a 51 year old female with pmhx of migraines and HLD, admitted to [facility] 7/14/2022-7/18/2022 for 4 day hx of progressive vision loss in right eye (predominately involving superior quadrant) and associated pain with eye movements. Her exam was notable for RAPD OD,  superior visual field cut OD with red color desaturation OD. Of note, she also reportedly experienced 1 week of bilateral foot numbness in 5/2022, that then resolved. She denies any other significant episodic neurological symptoms. NO recent vaccination. NO recent infections. Ophthalmology and neurology were consulted. \par \par MRI orbits 7/14/2022 confirmed evidence of right intraorbital segment optic neuritis/perineuritis. \par No dedicated brain MRI initially obtained. \par MRI C/T 7/14/2022 spine with evidence of dorsal cord lesion at T9 with subtle contrast enhancement. Normal cervical spine. \par \par Pt admitted for management of Optic Neuritis and further workup. Patient received 5 days of IV Solumedrol 1000mg (7/14-7/18) with symptom improvement and and was d/c with steroid taper for 10 days. \par \par A dedicated T2 FLAIR MRI w/wo contrast was performed 7/18/2022 showing Abnormal T2 prolongation in the periventricular white matter region with seay finger appearance and + T1 black holes. No infratentorial lesions. \par \par Labs:- ACE neg, SSA/SSB neg, Lyme neg, Syphilis neg, serum NMO and MOG ab neg. DsDNA neg. \par \par Interim Hx  - She reports since the hospitalization the toes on the left have been constantly numb.Also experiencing cramps in legs. Seen by Dr. Baum 7/25/2022- VA 20/200 OD and 20/70 OS, 2+ RAPD, superior altitudinal defect OD. She completed prednisone taper 7/29/2022. Vision is better- 30% better. \par She has a hx of migraine headaches since 2015 - occipital and radiates to top of head. + Phonophobia, + Photophobia. + N/V. Headaches are frequent- almost daily. lasted 3-4 hours. Always at night. Headaches were better on steroids. Ibuprofen helps. She works at a car dealership.\par \par  67

## 2022-10-05 NOTE — DATA REVIEWED
[de-identified] : MRI orbits 7/14/2022 confirmed evidence of right intraorbital segment optic neuritis/perineuritis. \par No dedicated brain MRI initially obtained. \par MRI C/T 7/14/2022 spine with evidence of dorsal cord lesion at T9 with subtle contrast enhancement. Normal cervical spine. \par \par \par A dedicated T2 FLAIR MRI w/wo contrast was performed 7/18/2022 showing Abnormal T2 prolongation in the periventricular white matter region with seay finger appearance and + T1 dark holes. No infratentorial lesions.

## 2022-10-05 NOTE — PHYSICAL EXAM
[FreeTextEntry1] : PHYSICAL EXAM\par Constitutional: Alert, no acute distress \par Psychiatric: appropriate affect and mood\par Pulmonary: No respiratory distress, stable on room air\par \par NEUROLOGICAL EXAM\par Mental status: The patient is alert, attentive, and conversational memory intact\par Speech/language: Clear and fluent with intact comprehension\par Cranial nerves:\par CN II: Superior visual field defect OD, VA 20/40 OD and 20/20 OS (pocket Snellen chart) + RAPD OD.\par CN III, IV, VI: EOMI, no nystagmus, no ptosis\par CN V: Facial sensation is intact to pinprick in all 3 divisions bilaterally.\par CN VII: Face is symmetric with normal eye closure and smile.\par CN VII: Hearing is normal to rubbing fingers\par CN IX, X: Palate elevates symmetrically. Phonation is normal.\par CN XI: Head turning and shoulder shrug are intact\par CN XII: Tongue is midline with normal movements and no atrophy.\par Motor: Strength is full bilaterally. 5/5 muscle power in bilateral UE and LE.\par Reflexes: Reflexes are 3+ and symmetric at the biceps, brachioradialis and patellar, and +1 ankles. Plantar responses are flexor.\par Sensory: Intact sensations to light touch in upper and lower extremities. \par Coordination: Rapid alternating movements and fine finger movements are intact. There is no dysmetria on finger-to-nose. There are no abnormal or extraneous movements. \par Gait/Stance: Posture is normal. Gait is steady with normal steps, base, arm swing, and turning. Heel and toe walking are normal. Tandem gait is normal. Romberg is absent.\par \par \par \par \par

## 2022-10-06 RX ORDER — QUETIAPINE FUMARATE 50 MG/1
50 TABLET ORAL
Qty: 90 | Refills: 1 | Status: ACTIVE | COMMUNITY
Start: 2022-05-15 | End: 1900-01-01

## 2022-10-07 ENCOUNTER — RX RENEWAL (OUTPATIENT)
Age: 52
End: 2022-10-07

## 2022-10-13 ENCOUNTER — NON-APPOINTMENT (OUTPATIENT)
Age: 52
End: 2022-10-13

## 2022-10-14 ENCOUNTER — APPOINTMENT (OUTPATIENT)
Dept: NEUROLOGY | Facility: CLINIC | Age: 52
End: 2022-10-14

## 2022-10-14 VITALS
HEIGHT: 68 IN | RESPIRATION RATE: 16 BRPM | WEIGHT: 210 LBS | SYSTOLIC BLOOD PRESSURE: 127 MMHG | DIASTOLIC BLOOD PRESSURE: 90 MMHG | HEART RATE: 84 BPM | BODY MASS INDEX: 31.83 KG/M2

## 2022-10-14 DIAGNOSIS — M25.562 PAIN IN LEFT KNEE: ICD-10-CM

## 2022-10-14 PROCEDURE — 99214 OFFICE O/P EST MOD 30 MIN: CPT

## 2022-10-14 NOTE — HISTORY OF PRESENT ILLNESS
[FreeTextEntry1] : INTERIM HX 10/14/2022: PT returns for a follow up, reports new symptoms. Since our visit last week, she has been reporting daily migraine headaches, neck muscle soreness, body soreness, and especially left knee pain that is affecting her gait. She denies any significant weakness or new sensory symptoms. She has been doing yoga exercises.  \par Of note, PT got a call from insurance company and her Ocrevus has been approved. \par \par INTERIM HX 10/05/2022: Trouble sleeping at night. Migraines are better, once a week. Maxalt as needed and nortriptyline QD. Saw Dr. Baum 9/13/2022 and VA 20/60 OD and 20/30 OS, improvement in visual field on the right. No brand new symptoms. Occasional sees flashes of light in right eye and blue/white spots- this lasted 10 seconds at a time. Still pending Ocrevus approval. She is in good spirits. \par \par INTERIM HX 09/01/2022: Followed up with Dr Baum 8/12/2022 and VA improved from 20/200 OD to 20/50 OD, but with persistent superior visual field loss. She completed the 2nd course of steroids as well. Migraines are still daily. Sumatriptan not working. No brand new symptoms. \par \par Pre-DMT labs (date)- \par CBC w/ diff WNL, \par LFT normal\par Bun and Cr normal, \par Quant TB neg,\par VZV IgG + and IgM + (index 1.14), -> ? past infection.  Denies any recent shingles infection or chicken pox. No recent known exposures. \par Hep B surface Ab -, \par HEp B surface antigen and cord antibody negative.\par JCV ab positive 2.11\par Vitamin D 25.6\par -------------------------------------------------------\par \par HPI (initial visit Aug 04, 2022)- Isabel Dangelo is a 51 year old female with pmhx of migraines and HLD, admitted to [facility] 7/14/2022-7/18/2022 for 4 day hx of progressive vision loss in right eye (predominately involving superior quadrant) and associated pain with eye movements. Her exam was notable for RAPD OD,  superior visual field cut OD with red color desaturation OD. Of note, she also reportedly experienced 1 week of bilateral foot numbness in 5/2022, that then resolved. She denies any other significant episodic neurological symptoms. NO recent vaccination. NO recent infections. Ophthalmology and neurology were consulted. \par \par MRI orbits 7/14/2022 confirmed evidence of right intraorbital segment optic neuritis/perineuritis. \par No dedicated brain MRI initially obtained. \par MRI C/T 7/14/2022 spine with evidence of dorsal cord lesion at T9 with subtle contrast enhancement. Normal cervical spine. \par \par Pt admitted for management of Optic Neuritis and further workup. Patient received 5 days of IV Solumedrol 1000mg (7/14-7/18) with symptom improvement and and was d/c with steroid taper for 10 days. \par \par A dedicated T2 FLAIR MRI w/wo contrast was performed 7/18/2022 showing Abnormal T2 prolongation in the periventricular white matter region with seay finger appearance and + T1 black holes. No infratentorial lesions. \par \par Labs:- ACE neg, SSA/SSB neg, Lyme neg, Syphilis neg, serum NMO and MOG ab neg. DsDNA neg. \par \par Interim Hx  - She reports since the hospitalization the toes on the left have been constantly numb.Also experiencing cramps in legs. Seen by Dr. Baum 7/25/2022- VA 20/200 OD and 20/70 OS, 2+ RAPD, superior altitudinal defect OD. She completed prednisone taper 7/29/2022. Vision is better- 30% better. \par She has a hx of migraine headaches since 2015 - occipital and radiates to top of head. + Phonophobia, + Photophobia. + N/V. Headaches are frequent- almost daily. lasted 3-4 hours. Always at night. Headaches were better on steroids. Ibuprofen helps. She works at a [employer].\par \par

## 2022-10-14 NOTE — PHYSICAL EXAM
[FreeTextEntry1] : No dysarthria\par No facial asymmetry\par EOMI\par VA 20/40 OD and 20/30 OS\par PERRL with mild RAPD OD\par Sensations intact to light touch and pinprick all over\par Motor strength 5/5 in all muscle groups, some limitation in left leg 2/2 knee pain\par Reflexes 2+ and symmetric\par No Babinski or clonus\par Romberg negative\par Limping on the left due to left knee pain and avoids straightening out the leg.

## 2022-10-14 NOTE — DATA REVIEWED
[de-identified] : MRI orbits 7/14/2022 confirmed evidence of right intraorbital segment optic neuritis/perineuritis. \par No dedicated brain MRI initially obtained. \par MRI C/T 7/14/2022 spine with evidence of dorsal cord lesion at T9 with subtle contrast enhancement. Normal cervical spine. \par \par \par A dedicated T2 FLAIR MRI w/wo contrast was performed 7/18/2022 showing Abnormal T2 prolongation in the periventricular white matter region with seay finger appearance and + T1 dark holes. No infratentorial lesions.

## 2022-10-14 NOTE — ASSESSMENT
[FreeTextEntry1] : Assessment/Plan:\par  51 year old female with recent diagnosis of right optic neuritis 7/14/2022 s/p IV steroids and oral prednisone taper with modest symptom improvement. Work up significant for enhancement of intraorbital right ON, periventricular white matter lesions on brain MRI (non enhancing, abutting lateral ventricles with Ronquillo finger appearance and + T1 black holes) and short segment enhancing T9 cord lesion. Of note, patient reports a hx of numbness in bilateral feet x 1 week in 5/2022 that resolved. Given her clinical and radiological hx, diagnosis is most consistent with relapsing multiple sclerosis. \par \par Relapsing Multiple sclerosis (dx'd 7/14/2022)\par 1st relapse 5/2022- B/l foot numbness, likely thoracic myelitis\par 2nd relapse 7/2022- R Optic neuritis\par \par *She returns for an acute visit for new symptoms- worsening migraine headaches, diffuse body pain/soreness, left knee pain. Neurological exam without any focal signs. I reassured pt that I think not think her symptoms were MS related, but rather related to migraines, stress and likely ortho issues. I suspect she sprained left knee during one of her exercises. I recommend the following at this time; PT referral, rest, icing, leg elevation, Ibuprofen for pain. Will also send a script for medrol dose pack and flexeril. Reviewed side effects with patient. I also agree with her seeing a therapist for mental health issues*\par \par Plan: \par 1. Diagnostic Plan/Imaging: Plan to repeat MRI brain, cervical and thoracic spine w/w/o in 3 month following start of DMT. \par \par 2. Disease Modifying therapy plan: JCV ab +\par MS DMT: Start Ocrevus (completed and submitted start form) - OCREVUS APPROVED-\par -Ocrevus screening labs completed-\par -Monitoring Ocrevus labs (CBC with diff, LFT) every 6 months while on therapy.. \par For Ocrevus we discussed the most commonly seen potential side effects in the clinical trials of upper respiratory infections, infusion reactions, skin infections, and the fact that there were 6 cases of breast cancer in the Ocrevus treated group as compared to none in the Rebif and placebo groups. We discussed the breast cancer data in details as it is discussed in the Ocrevus prescribing information. \par Counseled on avoiding vaccination with live virus vaccine (recommended getting shingles vaccine before start of therapy)\par \par 3. Symptomatic therapy plan:\par () Muscle spasms- Stretching exercises. Can consider baclofen.\par () Vitamin D3 and B12 supplementations. Will prescribe high dose Vitamin D x 12 week and then transition to 2000 IU daily\par \par 4. Migraine headaches:\par Freq: Daily\par [] Continue nortriptyline 20 mg qhs for preventative therapy. Reviewed side effects.\par [] Rizatriptan 10 mg as needed, as sumatriptan is not working. Can repeat dose in 2 hours if needed. Discussed the side effects drowsiness and nausea/diarrhea.\par [] Continue magnesium, riboflavin and coenzyme Q10 supplementations\par \par Tobacco Cessation: Tobacco has been shown to have direct links to MS disease activity. Smoking in MS increases the timing of and occurrence of progressive disability. Long-term outcomes are better for people who quit smoking compared to active smokers.\par \par \par Return to clinic 3 months\par \par Available imaging studies and/or blood work up were reviewed. A detailed chart review was completed prior to visit.\par \par The above plan was discussed with YOCASTA PATRICK in great detail. YOCASTA PATRICK verbalized understanding and agrees with plan as detailed above. Patient was provided education and counselling on current diagnosis/symptoms, diagnostic work up, treatment options and potential side effects of any prescribed therapy/therapies. She was advised to call our clinic at [phone] for any new or worsening symptoms, or with any questions or concerns. In case of acute onset of neurological symptoms or worsening presentation, patient was advised to present to nearest emergency room for further evaluation. YOCASTA PATRICK expressed understanding and all her questions/concerns were addressed.\par \par Lydia Trujillo M.D.

## 2022-11-09 ENCOUNTER — APPOINTMENT (OUTPATIENT)
Dept: NEUROLOGY | Facility: CLINIC | Age: 52
End: 2022-11-09

## 2022-11-09 PROCEDURE — 96415 CHEMO IV INFUSION ADDL HR: CPT

## 2022-11-09 PROCEDURE — S0028: CPT

## 2022-11-09 PROCEDURE — 96413 CHEMO IV INFUSION 1 HR: CPT

## 2022-11-16 ENCOUNTER — APPOINTMENT (OUTPATIENT)
Dept: OPHTHALMOLOGY | Facility: CLINIC | Age: 52
End: 2022-11-16

## 2022-11-16 ENCOUNTER — NON-APPOINTMENT (OUTPATIENT)
Age: 52
End: 2022-11-16

## 2022-11-16 PROCEDURE — 99214 OFFICE O/P EST MOD 30 MIN: CPT

## 2022-11-16 PROCEDURE — 92133 CPTRZD OPH DX IMG PST SGM ON: CPT

## 2022-11-16 PROCEDURE — 92083 EXTENDED VISUAL FIELD XM: CPT

## 2022-11-17 NOTE — ED ADULT NURSE NOTE - NS ED PATIENT SAFETY CONCERN
Physical Therapy Discharge Summary    Reason for therapy discharge:    Discharged to home.    Progress towards therapy goal(s). See goals on Care Plan in [facility] electronic health record for goal details.  Goals met    Therapy recommendation(s):    No further therapy is recommended.                           No

## 2022-11-23 ENCOUNTER — APPOINTMENT (OUTPATIENT)
Dept: NEUROLOGY | Facility: CLINIC | Age: 52
End: 2022-11-23

## 2022-11-23 PROCEDURE — 96413 CHEMO IV INFUSION 1 HR: CPT

## 2022-11-23 PROCEDURE — 96415 CHEMO IV INFUSION ADDL HR: CPT

## 2022-11-23 PROCEDURE — S0028: CPT

## 2022-12-05 ENCOUNTER — RX RENEWAL (OUTPATIENT)
Age: 52
End: 2022-12-05

## 2022-12-05 ENCOUNTER — INPATIENT (INPATIENT)
Facility: HOSPITAL | Age: 52
LOS: 16 days | Discharge: HOME | End: 2022-12-22
Attending: INTERNAL MEDICINE | Admitting: INTERNAL MEDICINE
Payer: COMMERCIAL

## 2022-12-05 ENCOUNTER — NON-APPOINTMENT (OUTPATIENT)
Age: 52
End: 2022-12-05

## 2022-12-05 VITALS
OXYGEN SATURATION: 100 % | HEART RATE: 65 BPM | TEMPERATURE: 98 F | DIASTOLIC BLOOD PRESSURE: 73 MMHG | RESPIRATION RATE: 17 BRPM | SYSTOLIC BLOOD PRESSURE: 135 MMHG

## 2022-12-05 DIAGNOSIS — E78.5 HYPERLIPIDEMIA, UNSPECIFIED: ICD-10-CM

## 2022-12-05 DIAGNOSIS — H46.9 UNSPECIFIED OPTIC NEURITIS: ICD-10-CM

## 2022-12-05 DIAGNOSIS — Z98.84 BARIATRIC SURGERY STATUS: Chronic | ICD-10-CM

## 2022-12-05 DIAGNOSIS — K59.00 CONSTIPATION, UNSPECIFIED: ICD-10-CM

## 2022-12-05 DIAGNOSIS — G35 MULTIPLE SCLEROSIS: ICD-10-CM

## 2022-12-05 DIAGNOSIS — Z90.49 ACQUIRED ABSENCE OF OTHER SPECIFIED PARTS OF DIGESTIVE TRACT: Chronic | ICD-10-CM

## 2022-12-05 DIAGNOSIS — G43.909 MIGRAINE, UNSPECIFIED, NOT INTRACTABLE, WITHOUT STATUS MIGRAINOSUS: ICD-10-CM

## 2022-12-05 LAB
A1C WITH ESTIMATED AVERAGE GLUCOSE RESULT: 6 % — HIGH (ref 4–5.6)
ALBUMIN SERPL ELPH-MCNC: 4.2 G/DL — SIGNIFICANT CHANGE UP (ref 3.3–5)
ALP SERPL-CCNC: 125 U/L — HIGH (ref 40–120)
ALT FLD-CCNC: 30 U/L — SIGNIFICANT CHANGE UP (ref 4–33)
ANION GAP SERPL CALC-SCNC: 15 MMOL/L — HIGH (ref 7–14)
APPEARANCE UR: CLEAR — SIGNIFICANT CHANGE UP
APPEARANCE UR: CLEAR — SIGNIFICANT CHANGE UP
AST SERPL-CCNC: 31 U/L — SIGNIFICANT CHANGE UP (ref 4–32)
BASE EXCESS BLDV CALC-SCNC: 0.8 MMOL/L — SIGNIFICANT CHANGE UP (ref -2–3)
BILIRUB SERPL-MCNC: <0.2 MG/DL — SIGNIFICANT CHANGE UP (ref 0.2–1.2)
BILIRUB UR-MCNC: NEGATIVE — SIGNIFICANT CHANGE UP
BILIRUB UR-MCNC: NEGATIVE — SIGNIFICANT CHANGE UP
BLOOD GAS VENOUS COMPREHENSIVE RESULT: SIGNIFICANT CHANGE UP
BUN SERPL-MCNC: 6 MG/DL — LOW (ref 7–23)
CALCIUM SERPL-MCNC: 9.2 MG/DL — SIGNIFICANT CHANGE UP (ref 8.4–10.5)
CHLORIDE BLDV-SCNC: 112 MMOL/L — HIGH (ref 96–108)
CHLORIDE SERPL-SCNC: 109 MMOL/L — HIGH (ref 98–107)
CO2 BLDV-SCNC: 28 MMOL/L — HIGH (ref 22–26)
CO2 SERPL-SCNC: 19 MMOL/L — LOW (ref 22–31)
COLOR SPEC: YELLOW — SIGNIFICANT CHANGE UP
COLOR SPEC: YELLOW — SIGNIFICANT CHANGE UP
CREAT SERPL-MCNC: 0.48 MG/DL — LOW (ref 0.5–1.3)
DIFF PNL FLD: NEGATIVE — SIGNIFICANT CHANGE UP
DIFF PNL FLD: NEGATIVE — SIGNIFICANT CHANGE UP
EGFR: 115 ML/MIN/1.73M2 — SIGNIFICANT CHANGE UP
ESTIMATED AVERAGE GLUCOSE: 126 — SIGNIFICANT CHANGE UP
FLUAV AG NPH QL: SIGNIFICANT CHANGE UP
FLUBV AG NPH QL: SIGNIFICANT CHANGE UP
GAS PNL BLDV: 140 MMOL/L — SIGNIFICANT CHANGE UP (ref 136–145)
GLUCOSE BLDC GLUCOMTR-MCNC: 214 MG/DL — HIGH (ref 70–99)
GLUCOSE BLDV-MCNC: 77 MG/DL — SIGNIFICANT CHANGE UP (ref 70–99)
GLUCOSE SERPL-MCNC: 82 MG/DL — SIGNIFICANT CHANGE UP (ref 70–99)
GLUCOSE UR QL: ABNORMAL
GLUCOSE UR QL: NEGATIVE — SIGNIFICANT CHANGE UP
HCO3 BLDV-SCNC: 27 MMOL/L — SIGNIFICANT CHANGE UP (ref 22–29)
HCT VFR BLD CALC: 42.6 % — SIGNIFICANT CHANGE UP (ref 34.5–45)
HCT VFR BLDA CALC: 40 % — SIGNIFICANT CHANGE UP (ref 34.5–46.5)
HGB BLD CALC-MCNC: 13.2 G/DL — SIGNIFICANT CHANGE UP (ref 11.5–15.5)
HGB BLD-MCNC: 13.6 G/DL — SIGNIFICANT CHANGE UP (ref 11.5–15.5)
KETONES UR-MCNC: ABNORMAL
KETONES UR-MCNC: NEGATIVE — SIGNIFICANT CHANGE UP
LACTATE BLDV-MCNC: 1.1 MMOL/L — SIGNIFICANT CHANGE UP (ref 0.5–2)
LEUKOCYTE ESTERASE UR-ACNC: NEGATIVE — SIGNIFICANT CHANGE UP
LEUKOCYTE ESTERASE UR-ACNC: NEGATIVE — SIGNIFICANT CHANGE UP
MCHC RBC-ENTMCNC: 27.6 PG — SIGNIFICANT CHANGE UP (ref 27–34)
MCHC RBC-ENTMCNC: 31.9 GM/DL — LOW (ref 32–36)
MCV RBC AUTO: 86.6 FL — SIGNIFICANT CHANGE UP (ref 80–100)
NITRITE UR-MCNC: NEGATIVE — SIGNIFICANT CHANGE UP
NITRITE UR-MCNC: NEGATIVE — SIGNIFICANT CHANGE UP
NRBC # BLD: 0 /100 WBCS — SIGNIFICANT CHANGE UP (ref 0–0)
NRBC # FLD: 0 K/UL — SIGNIFICANT CHANGE UP (ref 0–0)
PCO2 BLDV: 46 MMHG — HIGH (ref 39–42)
PH BLDV: 7.37 — SIGNIFICANT CHANGE UP (ref 7.32–7.43)
PH UR: 6 — SIGNIFICANT CHANGE UP (ref 5–8)
PH UR: 6 — SIGNIFICANT CHANGE UP (ref 5–8)
PLATELET # BLD AUTO: 321 K/UL — SIGNIFICANT CHANGE UP (ref 150–400)
PO2 BLDV: 27 MMHG — SIGNIFICANT CHANGE UP
POTASSIUM BLDV-SCNC: 4.5 MMOL/L — SIGNIFICANT CHANGE UP (ref 3.5–5.1)
POTASSIUM SERPL-MCNC: 4.6 MMOL/L — SIGNIFICANT CHANGE UP (ref 3.5–5.3)
POTASSIUM SERPL-SCNC: 4.6 MMOL/L — SIGNIFICANT CHANGE UP (ref 3.5–5.3)
PROT SERPL-MCNC: 7.8 G/DL — SIGNIFICANT CHANGE UP (ref 6–8.3)
PROT UR-MCNC: ABNORMAL
PROT UR-MCNC: ABNORMAL
RBC # BLD: 4.92 M/UL — SIGNIFICANT CHANGE UP (ref 3.8–5.2)
RBC # FLD: 15.6 % — HIGH (ref 10.3–14.5)
RSV RNA NPH QL NAA+NON-PROBE: SIGNIFICANT CHANGE UP
SAO2 % BLDV: 36.9 % — SIGNIFICANT CHANGE UP
SARS-COV-2 RNA SPEC QL NAA+PROBE: SIGNIFICANT CHANGE UP
SODIUM SERPL-SCNC: 143 MMOL/L — SIGNIFICANT CHANGE UP (ref 135–145)
SP GR SPEC: 1.03 — SIGNIFICANT CHANGE UP (ref 1.01–1.05)
SP GR SPEC: 1.03 — SIGNIFICANT CHANGE UP (ref 1.01–1.05)
UROBILINOGEN FLD QL: ABNORMAL
UROBILINOGEN FLD QL: SIGNIFICANT CHANGE UP
WBC # BLD: 6.61 K/UL — SIGNIFICANT CHANGE UP (ref 3.8–10.5)
WBC # FLD AUTO: 6.61 K/UL — SIGNIFICANT CHANGE UP (ref 3.8–10.5)

## 2022-12-05 PROCEDURE — 99223 1ST HOSP IP/OBS HIGH 75: CPT

## 2022-12-05 PROCEDURE — 71046 X-RAY EXAM CHEST 2 VIEWS: CPT | Mod: 26

## 2022-12-05 PROCEDURE — 93010 ELECTROCARDIOGRAM REPORT: CPT

## 2022-12-05 PROCEDURE — 99285 EMERGENCY DEPT VISIT HI MDM: CPT

## 2022-12-05 RX ORDER — POLYETHYLENE GLYCOL 3350 17 G/17G
17 POWDER, FOR SOLUTION ORAL
Refills: 0 | Status: DISCONTINUED | OUTPATIENT
Start: 2022-12-05 | End: 2022-12-22

## 2022-12-05 RX ORDER — SODIUM CHLORIDE 9 MG/ML
1000 INJECTION, SOLUTION INTRAVENOUS
Refills: 0 | Status: DISCONTINUED | OUTPATIENT
Start: 2022-12-05 | End: 2022-12-22

## 2022-12-05 RX ORDER — DEXTROSE 50 % IN WATER 50 %
25 SYRINGE (ML) INTRAVENOUS ONCE
Refills: 0 | Status: DISCONTINUED | OUTPATIENT
Start: 2022-12-05 | End: 2022-12-22

## 2022-12-05 RX ORDER — ENOXAPARIN SODIUM 100 MG/ML
40 INJECTION SUBCUTANEOUS EVERY 24 HOURS
Refills: 0 | Status: DISCONTINUED | OUTPATIENT
Start: 2022-12-05 | End: 2022-12-08

## 2022-12-05 RX ORDER — ERGOCALCIFEROL 1.25 MG/1
50000 CAPSULE ORAL
Refills: 0 | Status: DISCONTINUED | OUTPATIENT
Start: 2022-12-05 | End: 2022-12-22

## 2022-12-05 RX ORDER — DEXTROSE 50 % IN WATER 50 %
12.5 SYRINGE (ML) INTRAVENOUS ONCE
Refills: 0 | Status: DISCONTINUED | OUTPATIENT
Start: 2022-12-05 | End: 2022-12-22

## 2022-12-05 RX ORDER — SENNA PLUS 8.6 MG/1
2 TABLET ORAL AT BEDTIME
Refills: 0 | Status: DISCONTINUED | OUTPATIENT
Start: 2022-12-05 | End: 2022-12-22

## 2022-12-05 RX ORDER — INSULIN LISPRO 100/ML
VIAL (ML) SUBCUTANEOUS AT BEDTIME
Refills: 0 | Status: DISCONTINUED | OUTPATIENT
Start: 2022-12-05 | End: 2022-12-13

## 2022-12-05 RX ORDER — ATORVASTATIN CALCIUM 80 MG/1
80 TABLET, FILM COATED ORAL AT BEDTIME
Refills: 0 | Status: DISCONTINUED | OUTPATIENT
Start: 2022-12-05 | End: 2022-12-22

## 2022-12-05 RX ORDER — IBUPROFEN 200 MG
800 TABLET ORAL
Refills: 0 | Status: DISCONTINUED | OUTPATIENT
Start: 2022-12-05 | End: 2022-12-05

## 2022-12-05 RX ORDER — DEXTROSE 50 % IN WATER 50 %
15 SYRINGE (ML) INTRAVENOUS ONCE
Refills: 0 | Status: DISCONTINUED | OUTPATIENT
Start: 2022-12-05 | End: 2022-12-22

## 2022-12-05 RX ORDER — NORTRIPTYLINE HYDROCHLORIDE 10 MG/1
1 CAPSULE ORAL
Qty: 0 | Refills: 0 | DISCHARGE

## 2022-12-05 RX ORDER — INSULIN LISPRO 100/ML
VIAL (ML) SUBCUTANEOUS
Refills: 0 | Status: DISCONTINUED | OUTPATIENT
Start: 2022-12-05 | End: 2022-12-13

## 2022-12-05 RX ORDER — OMEPRAZOLE 10 MG/1
1 CAPSULE, DELAYED RELEASE ORAL
Qty: 0 | Refills: 0 | DISCHARGE

## 2022-12-05 RX ORDER — ERGOCALCIFEROL 1.25 MG/1
1 CAPSULE ORAL
Qty: 0 | Refills: 0 | DISCHARGE

## 2022-12-05 RX ORDER — CYCLOBENZAPRINE HYDROCHLORIDE 10 MG/1
5 TABLET, FILM COATED ORAL THREE TIMES A DAY
Refills: 0 | Status: DISCONTINUED | OUTPATIENT
Start: 2022-12-05 | End: 2022-12-22

## 2022-12-05 RX ORDER — TRAZODONE HCL 50 MG
50 TABLET ORAL AT BEDTIME
Refills: 0 | Status: DISCONTINUED | OUTPATIENT
Start: 2022-12-05 | End: 2022-12-06

## 2022-12-05 RX ORDER — QUETIAPINE FUMARATE 200 MG/1
1 TABLET, FILM COATED ORAL
Qty: 0 | Refills: 0 | DISCHARGE

## 2022-12-05 RX ORDER — CYCLOBENZAPRINE HYDROCHLORIDE 10 MG/1
1 TABLET, FILM COATED ORAL
Qty: 0 | Refills: 0 | DISCHARGE

## 2022-12-05 RX ORDER — ASPIRIN/CALCIUM CARB/MAGNESIUM 324 MG
81 TABLET ORAL DAILY
Refills: 0 | Status: DISCONTINUED | OUTPATIENT
Start: 2022-12-05 | End: 2022-12-22

## 2022-12-05 RX ORDER — PANTOPRAZOLE SODIUM 20 MG/1
40 TABLET, DELAYED RELEASE ORAL
Refills: 0 | Status: DISCONTINUED | OUTPATIENT
Start: 2022-12-05 | End: 2022-12-22

## 2022-12-05 RX ORDER — NICOTINE POLACRILEX 2 MG
1 GUM BUCCAL DAILY
Refills: 0 | Status: DISCONTINUED | OUTPATIENT
Start: 2022-12-05 | End: 2022-12-22

## 2022-12-05 RX ORDER — NORTRIPTYLINE HYDROCHLORIDE 10 MG/1
10 CAPSULE ORAL DAILY
Refills: 0 | Status: DISCONTINUED | OUTPATIENT
Start: 2022-12-05 | End: 2022-12-22

## 2022-12-05 RX ORDER — GLUCAGON INJECTION, SOLUTION 0.5 MG/.1ML
1 INJECTION, SOLUTION SUBCUTANEOUS ONCE
Refills: 0 | Status: DISCONTINUED | OUTPATIENT
Start: 2022-12-05 | End: 2022-12-22

## 2022-12-05 RX ADMIN — Medication 58 MILLIGRAM(S): at 19:54

## 2022-12-05 RX ADMIN — Medication 50 MILLIGRAM(S): at 23:03

## 2022-12-05 RX ADMIN — ENOXAPARIN SODIUM 40 MILLIGRAM(S): 100 INJECTION SUBCUTANEOUS at 20:04

## 2022-12-05 RX ADMIN — Medication 1 PATCH: at 19:20

## 2022-12-05 RX ADMIN — ATORVASTATIN CALCIUM 80 MILLIGRAM(S): 80 TABLET, FILM COATED ORAL at 21:31

## 2022-12-05 RX ADMIN — Medication 1 DROP(S): at 18:15

## 2022-12-05 RX ADMIN — Medication 1 DROP(S): at 13:51

## 2022-12-05 RX ADMIN — Medication 1 PATCH: at 18:37

## 2022-12-05 RX ADMIN — Medication 104 MILLIGRAM(S): at 13:47

## 2022-12-05 RX ADMIN — SENNA PLUS 2 TABLET(S): 8.6 TABLET ORAL at 21:31

## 2022-12-05 NOTE — PATIENT PROFILE ADULT - DO YOU LACK THE NECESSARY SUPPORT TO HELP YOU COPE WITH LIFE CHALLENGES?
Virtual/Telephone Check-In    [facility]. 
no

## 2022-12-05 NOTE — PATIENT PROFILE ADULT - FALL HARM RISK - HARM RISK INTERVENTIONS

## 2022-12-05 NOTE — CONSULT NOTE ADULT - ASSESSMENT
July 2022 labs:    ACE neg, SSA/SSB neg, Lyme neg, Syphilis neg  MOG ab negative, NMO neg  DsDNA     Aug 2022 labs:   Quant IgA 194, IgM 183, IgG 873   K/L FLC ratio Ur 1.44  Quant neg  HB surf Ab and antigen neg,  core Ab and IgM neg,   SHANIKA virus pos  varicella IgG ab positive     Relapsing Multiple sclerosis (dx'd 7/14/2022)  1st relapse 5/2022- B/l foot numbness, likely thoracic myelitis  2nd relapse 7/2022- R Optic neuritis    Relapsing MS   Patient HELENA is a 53yo F PMHx chronic migraines, HLD, recent adm 7/2022 for vision deficits R superior quadrant, and pain in R eye EOM found with new dx of R optic neuritis, seay fingers with brain white matter changes, and 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 Was treated with IV solumedrol x5 days in July 2022. Followed up with Dr Baum and Dr Trujillo for which she was started on Ocrevus on 11/9 and second part of dose on 11/23. From 11/23, she began to have increasing prevalence of R eye floaters, film like sensation over vision, and flashing lights. Symptoms worsened since last thursday 12/1 with daily episodes of R eye vision becoming "white." Not currently on steroids. In July 2022 hospitalization, patient had 7/16/22 OD eye exam w/ +RAPD, red color desaturation, and ~20/50 visual acuity. Relapsing Multiple sclerosis with first relapse 5/2022- B/l foot numbness, likely thoracic myelitis. 2nd relapse 7/2022- R Optic neuritis    MRI head and orbit w/wo 7/14/22: several lesions w/ periventricular configuration c/f multiple sclerosis, right intraorbital segment optic neuritis/perineuritis.   MRI C/T/L w/wo 7/14/22: dorsal short segment signal abnormality T9 w/ possible enhancement c/f active demyelination.    July 2022 labs:    ACE neg, SSA/SSB neg, Lyme neg, Syphilis neg  MOG ab negative, NMO neg  DsDNA neg    Aug 2022 labs:   Quant IgA 194, IgM 183, IgG 873   K/L FLC ratio Ur 1.44  Quant neg  HB surf Ab and antigen neg,  core Ab and IgM neg,   SHANIKA virus pos  varicella IgG ab positive     Recommendations:  [ ] Solumedrol IV 1g x 5 days given significant worsening in vision. Will discuss taper after IV steroids shortly.  [ ] MR brain w/ and w/ contrast, MR C and T spine w/ and w/o contrast, MR orbit w/ w/o contrast  [x] optho consult appreciated  [ ] infectious workup: CBC, VBG w/ lactate, CXR, UA,   [ ] lipid profile, a1c      [ ] vitamin D, Ca supplementation, protonix 40mg daily,   [ ] routine outpatient optho eval for glaucoma, cataracts, elevated intraocular pressure  [ ] monitor FS daily, avoid use of concurrent NSAIDS  [ ] PT/OT     To be discussed with neurology attending Dr Trujillo and Dr Tatum, and will be formally staffed by attending during morning rounds. Recommendations will be finalized once signed by attending.     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 Was treated with IV solumedrol x5 days in July 2022. Followed up with Dr Baum and Dr Trujillo for which she was started on Ocrevus on 11/9 and second part of dose on 11/23. From 11/23, she began to have increasing prevalence of R eye floaters, film like sensation over vision, and flashing lights. Symptoms worsened since last thursday 12/1 with daily episodes of R eye vision becoming "white." Not currently on steroids. In July 2022 hospitalization, patient had 7/16/22 OD eye exam w/ +RAPD, red color desaturation, and ~20/50 visual acuity. Relapsing Multiple sclerosis with first relapse 5/2022- B/l foot numbness, likely thoracic myelitis. 2nd relapse 7/2022- R Optic neuritis    MRI head and orbit w/wo 7/14/22: several lesions w/ periventricular configuration c/f multiple sclerosis, right intraorbital segment optic neuritis/perineuritis.   MRI C/T/L w/wo 7/14/22: dorsal short segment signal abnormality T9 w/ possible enhancement c/f active demyelination.    July 2022 labs:    ACE neg, SSA/SSB neg, Lyme neg, Syphilis neg  MOG ab negative, NMO neg  DsDNA neg    Aug 2022 labs:   Quant IgA 194, IgM 183, IgG 873   K/L FLC ratio Ur 1.44  Quant neg  HB surf Ab and antigen neg,  core Ab and IgM neg,   SHANIKA virus pos  varicella IgG ab positive     Impression: Acute worsening of R vision concerning for optic neuritis from MS relapse. Of note, recently started ocrevus but likely not in full effect at this time.    Recommendations:  [ ] Solumedrol IV 1g x 5 days given significant worsening in vision. If she responds symptomatically by day 3/5, will consider administering remainder in outpatient setting. If she does not respond towards end of IV solumedrol course, will consider PLEX.  [ ] MR brain w/ and w/ contrast, MR orbit w/ w/o contrast. Can defer MR spine for now.  [x] optho consult appreciated  [ ] infectious workup: CBC, VBG w/ lactate, CXR, UA,   [ ] lipid profile, a1c      [ ] vitamin D, Ca supplementation, protonix 40mg daily,   [ ] routine outpatient optho eval for glaucoma, cataracts, elevated intraocular pressure  [ ] monitor FS daily, avoid use of concurrent NSAIDS  [ ] PT/OT     Discussed with neurology attending Dr Trujillo and to be discussed with Dr Tatum, and will be formally staffed by attending during morning rounds. Recommendations will be finalized once signed by attending.     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 Was treated with IV solumedrol x5 days in July 2022. Followed up with Dr Baum and Dr Trujillo for which she was started on Ocrevus on 11/9 and second part of dose on 11/23. From 11/23, she began to have increasing prevalence of R eye floaters, film like sensation over vision, and flashing lights. Symptoms worsened since last thursday 12/1 with daily episodes of R eye vision becoming "white." Not currently on steroids. In July 2022 hospitalization, patient had 7/16/22 OD eye exam w/ +RAPD, red color desaturation, and ~20/50 visual acuity. Relapsing Multiple sclerosis with first relapse 5/2022- B/l foot numbness, likely thoracic myelitis. 2nd relapse 7/2022- R Optic neuritis    MRI head and orbit w/wo 7/14/22: several lesions w/ periventricular configuration c/f multiple sclerosis, right intraorbital segment optic neuritis/perineuritis.   MRI C/T/L w/wo 7/14/22: dorsal short segment signal abnormality T9 w/ possible enhancement c/f active demyelination.    July 2022 labs:    ACE neg, SSA/SSB neg, Lyme neg, Syphilis neg  MOG ab negative, NMO neg  DsDNA neg    Aug 2022 labs:   Quant IgA 194, IgM 183, IgG 873   K/L FLC ratio Ur 1.44  Quant neg  HB surf Ab and antigen neg,  core Ab and IgM neg,   SHANIKA virus pos  varicella IgG ab positive     Impression: Acute worsening of R vision concerning for optic neuritis from MS relapse. Of note, recently started ocrevus but likely not in full effect at this time.    Recommendations:  [ ] Solumedrol IV 1g x 5 days given significant worsening in vision. If she responds symptomatically by day 3/5, will consider administering remainder in outpatient setting. If she does not respond towards end of IV solumedrol course, will consider PLEX.  [x] optho consult appreciated  [ ] infectious workup: CBC, VBG w/ lactate, CXR, UA,   [ ] lipid profile, a1c      [ ] vitamin D, Ca supplementation, protonix 40mg daily,   [ ] routine outpatient optho eval for glaucoma, cataracts, elevated intraocular pressure  [ ] monitor FS daily, avoid use of concurrent NSAIDS  [ ] PT/OT     Discussed with neurology attending Dr Trujillo and to be discussed with Dr Tatum, and will be formally staffed by attending during morning rounds. Recommendations will be finalized once signed by attending.

## 2022-12-05 NOTE — ED ADULT TRIAGE NOTE - CHIEF COMPLAINT QUOTE
COVID-19 Screening:    • Does the patient OR patient’s household members have any of the following symptoms?  o Temperature: Fever ?100.0°F or ?37.8°C?  No  o Respiratory symptoms: New or worsening cough, shortness of breath, difficulty breathing, or sore throat? No  o GI symptoms: New onset of nausea, vomiting or diarrhea?  No  o Miscellaneous: New onset of loss of taste or smell, chills, repeated shaking with chills, muscle pain, headache, congestion or runny nose?  No  • Has the patient or a household member tested positive for COVID-19 in the last 14 days?  No  • Has the patient or a household member been tested for COVID-19 and are waiting for the results?  No    Reason for Disposition  • Breast lump    Protocols used: BREAST SYMPTOMS (FEMALE) - AFTER [id number]    Spoke with [facility] - symptoms for 2 months, pain getting worse    Lump in left breast size of marble - lump seems to be getting more painful -- lump is hard and can be moved by your hand    If looking directly at breast face on, lump is at 3:00 o'clock position    Lump painful at 4-5/10 -- lump there all the time    Plan of care TBD by PCP if/when patient to be seen - advised may do telephone visit or video visit; did not confirm e-mail address    Advised PCP may want some diagnostic testing done before he sees patient or he might want to see her first then do diagnostic testing    Please call [facility] at [phone]    Sent to PCP/staff     pt with PMH of MS, recently placed on a new medication, states her L eye has been having cloudy vision and pain since Thursday.

## 2022-12-05 NOTE — PATIENT PROFILE ADULT - FUNCTIONAL ASSESSMENT - BASIC MOBILITY 6.
4-calculated by average/Not able to assess (calculate score using [facility] averaging method)

## 2022-12-05 NOTE — H&P ADULT - NEGATIVE ENMT SYMPTOMS
no hearing difficulty/no ear pain/no vertigo/no sinus symptoms/no nasal congestion/no nasal discharge/no throat pain/no dysphagia

## 2022-12-05 NOTE — H&P ADULT - MUSCULOSKELETAL
details… ROM intact/strength 5/5 bilateral upper extremities/strength 5/5 bilateral lower extremities

## 2022-12-05 NOTE — ED ADULT TRIAGE NOTE - NS ED TRIAGE AVPU SCALE
The medication list included in this document is our record of what you are currently taking, including any changes that were made at today's visit. If you find any differences when compared to your medications at home, or have any questions that were not answered at your visit, please contact the office. Keep a list of your medicines with you. List all of the prescription medicines, nonprescription medicines, supplements, natural remedies, and vitamins that you take. Tell your healthcare providers who treat you about all of the products you are taking. Your provider can provide you with a form to keep track of them. Just ask. Follow the directions that come with your medicine, including information about food or alcohol. Make sure you know how and when to take your medicine. Do not take more or less than you are supposed to take. Keep all medicines out of the reach of children. Store medicines according to the directions on the label. Monitor yourself. Learn to know how your body reacts to your new medicine and keep track of how it makes you feel before attempting (If your provider has allowed you to do so) to drive or go to work. Seek emergency medical attention if you think you have used too much of this medicine. An overdose of any prescription medicine can be fatal. Overdose symptoms may include extreme drowsiness, muscle weakness, confusion, cold and clammy skin, pinpoint pupils, shallow breathing, slow heart rate, fainting, or coma. Don't share prescription medicines with others, even when they seem to have the same symptoms. What may be good for you may be harmful to others. If you are no longer taking a prescribed medication and you have pills left please take your pills out of their original containers. Mix crushed pills with an undesirable substance, such as cat litter or used coffee grounds.  Put the mixture into a disposable container with a lid, such as an empty margarine tub, or into a sealable bag.  Cover up or remove any of your personal information on the empty containers by covering it with black permanent marker or duct tape. Place the sealed container with the mixture, and the empty drug containers, in the trash. If you use a medication that is in the form of a patch, dispose of used patches by folding them in half so that the sticky sides meet, and then flushing them down a toilet. They should not be placed in the household trash where children or pets can find them. If you have any questions, ask your provider or pharmacist for more information. Be sure to keep all appointments for provider visits or tests. We are committed to providing you with the best care possible. In order to help us achieve these goals please remember to bring all medications, herbal products, and over the counter supplements with you to each visit. If your provider has ordered testing for you, please be sure to follow up with our office if you have not received results within 7 days after the testing took place. *If you receive a survey after visiting one of our offices, please take time to share your experience concerning your physician office visit. These surveys are confidential and no health information about you is shared. We are eager to improve for you and we are counting on your feedback to help make that happen. Alert-The patient is alert, awake and responds to voice. The patient is oriented to time, place, and person. The triage nurse is able to obtain subjective information.

## 2022-12-05 NOTE — H&P ADULT - PROBLEM SELECTOR PLAN 1
Admit to Medicine, Neurology/ Ophth c/s in chart, IV steroid, Check MRI Currently with slight pain with EOM and decreased vision resembling previous optic neuritis. Previously treated with 1G/day steroids with outpatient tapering of prednisone  -Neurology C/S: Solumedrol IV 1g x 5 days given significant worsening in vision. If she responds symptomatically by day 3/5, will consider administering remainder in outpatient setting. If she does not respond towards end of IV solumedrol course, will consider PLEX.  - MR brain w/ and w/o contrast, MR orbit w/ w/o contrast. Can defer MR spine for now.  - infectious workup: CBC, VBG w/ lactate, CXR, UA,   - vitamin D, Ca supplementation, protonix 40mg daily,   - monitor FS daily, avoid use of concurrent NSAIDS  Ophth c/s: Recommendations per neuro

## 2022-12-05 NOTE — H&P ADULT - PROBLEM SELECTOR PLAN 2
continue trazodone qhs, nortryptline, rizatriptan prn Currently controlled  -Continue trazodone qhs, nortryptline, rizatriptan prn

## 2022-12-05 NOTE — H&P ADULT - ASSESSMENT
53 yo F w/ PMHx of Migraines, HLD, COVID in 12/21, dx'd with MS 6 months ago, hospitalized for Rt eye optic neuritis tx'd with IV steroids 51 yo F w/ PMHx of Migraines, HLD, COVID in 12/21, dx'd with MS 6 months ago, hospitalized for recurrent Rt eye optic neuritis now on high dose IV steroids

## 2022-12-05 NOTE — ED PROVIDER NOTE - CLINICAL SUMMARY MEDICAL DECISION MAKING FREE TEXT BOX
52-year-old female past medical history of MS presents for complaint concerning of ocular neuritis.  Admitted 6 months ago for similar symptoms treated with prolonged course of IV steroids.  Will discuss with neurology and ophthalmology.  Obtain CBC, CMP for baseline metabolic panel.  Likely admission for repeat IV steroid therapy.

## 2022-12-05 NOTE — ED ADULT NURSE NOTE - CHIEF COMPLAINT QUOTE
pt with PMH of MS, recently placed on a new medication, states her L eye has been having cloudy vision and pain since Thursday.

## 2022-12-05 NOTE — H&P ADULT - NSHPLABSRESULTS_GEN_ALL_CORE
143  |  109<H>  |  6<L>  ----------------------------<  82  4.6   |  19<L>  |  0.48<L>    Ca    9.2      05 Dec 2022 10:50    TPro  7.8  /  Alb  4.2  /  TBili  <0.2  /  DBili  x   /  AST  31  /  ALT  30  /  AlkPhos  125<H>                          13.6   6.61  )-----------( 321      ( 05 Dec 2022 10:50 )             42.6     LIVER FUNCTIONS - ( 05 Dec 2022 10:50 )  Alb: 4.2 g/dL / Pro: 7.8 g/dL / ALK PHOS: 125 U/L / ALT: 30 U/L / AST: 31 U/L / GGT: x             Urinalysis Basic - ( 05 Dec 2022 14:00 )    Color: Yellow / Appearance: Clear / S.031 / pH: x  Gluc: x / Ketone: Negative  / Bili: Negative / Urobili: 3 mg/dL   Blood: x / Protein: Trace / Nitrite: Negative   Leuk Esterase: Negative / RBC: x / WBC x   Sq Epi: x / Non Sq Epi: x / Bacteria: x    EKG: NSR,     Image: IMPRESSION: Mild left atelectasis.

## 2022-12-05 NOTE — H&P ADULT - NEGATIVE NEUROLOGICAL SYMPTOMS
no transient paralysis/no weakness/no paresthesias/no generalized seizures/no focal seizures/no syncope/no vertigo/no difficulty walking

## 2022-12-05 NOTE — H&P ADULT - GASTROINTESTINAL
soft/nontender/nondistended/normal active bowel sounds details… Cheek To Nose Interpolation Flap Division And Inset Text: Division and inset of the cheek to nose interpolation flap was performed to achieve optimal aesthetic result, restore normal anatomic appearance and avoid distortion of normal anatomy, expedite and facilitate wound healing, achieve optimal functional result and because linear closure either not possible or would produce suboptimal result. The patient was prepped and draped in the usual manner. The pedicle was infiltrated with local anesthesia. The pedicle was sectioned with a #15 blade. The pedicle was de-bulked and trimmed to match the shape of the defect. Hemostasis was achieved. The flap donor site and free margin of the flap were secured with deep buried sutures and the wound edges were re-approximated.

## 2022-12-05 NOTE — H&P ADULT - GEN GEN HX ROS MEA POS PC
gained 20 lbs while on steroids, c/o generalized weakness & always feels exhausted/weight gain/fatigue/weakness

## 2022-12-05 NOTE — H&P ADULT - RESPIRATORY
clear to auscultation bilaterally/no rales/no respiratory distress/no use of accessory muscles/breath sounds equal/good air movement/respirations non-labored

## 2022-12-05 NOTE — ED PROVIDER NOTE - PHYSICAL EXAMINATION
GEN - NAD; well appearing; A&O x3   HEAD - NC/AT   EYES- painful movement of the right eye with rightward and upward gaze, OD 20/200 OS 20/25 OU 20/25.  Right globe tender to palpation without scleral injection.  APD appreciated in the right pupil.  ENT: Airway patent, mmm  PULMONARY - CTA b/l, symmetric breath sounds. No W/R/R.  CARDIAC -s1s2, RRR, no M,G,R, No JVD  BACK - Normal  spine   EXTREMITIES - FROM, symmetric pulses, capillary refill < 2 seconds, no edema, 5/5 strength in b/l UE and LE  SKIN - no rash or bruising   NEUROLOGIC - alert, speech clear, no focal deficits, CN II-XII grossly intact, normal gait, sensation grossly intact  PSYCH -nl mood/affect, nl insight.

## 2022-12-05 NOTE — CONSULT NOTE ADULT - ATTENDING COMMENTS
Exam: Visual acuity (Lorenzo Chart) 20/800.  Unable to CF with VF testing.  No RAPD. Disc pallor on the right.   No signs of a myelopathy on exam.    A/P  Ms. Dangelo is a 51 yo woman with relapsing remitting multiple sclerosis with an attack of right optic neuritis.   I agree with work up and management as above.   Thank you.

## 2022-12-05 NOTE — ED ADULT NURSE NOTE - NSIMPLEMENTINTERV_GEN_ALL_ED
Implemented All Fall Risk Interventions:  [place] to call system. Call bell, personal items and telephone within reach. Instruct patient to call for assistance. Room bathroom lighting operational. Non-slip footwear when patient is off stretcher. Physically safe environment: no spills, clutter or unnecessary equipment. Stretcher in lowest position, wheels locked, appropriate side rails in place. Provide visual cue, wrist band, yellow gown, etc. Monitor gait and stability. Monitor for mental status changes and reorient to person, place, and time. Review medications for side effects contributing to fall risk. Reinforce activity limits and safety measures with patient and family.

## 2022-12-05 NOTE — CONSULT NOTE ADULT - SUBJECTIVE AND OBJECTIVE BOX
[facility] DEPARTMENT OF OPHTHALMOLOGY - INITIAL ADULT CONSULT  -----------------------------------------------------------------------------------------------------------------  Jorge Lai MD PGY 3  [phone]  -----------------------------------------------------------------------------------------------------------------    HPI: 52F with history of multiple sclerosis and right optic neuritis presents to the ED for 4-5 days of right visual changes. Patient reports having 3-4 days of intermittent blurry vision in the right eye lasting about 30-60mins and increasing pain with extraocular movements. Endorses significant and persistent blurry vision this morning. Last saw Dr Baum about 3 weeks ago with improvement. Recently had new infusion for Ocrevus. Also endorsing some numbness and tingling in her hands, feet, and lower legs.     PAST MEDICAL & SURGICAL HISTORY:  Migraine      S/P gastric bypass      S/P cholecystectomy        Past Ocular History: right optic neuritis secondary to multiple sclerosis   Ophthalmic Medications: none  FAMILY HISTORY:  FH: hypertension (Mother)    Family history of type 2 diabetes mellitus (Mother)      Social History: denies etoh/tobacco    MEDICATIONS  (STANDING):    MEDICATIONS  (PRN):    Allergies & Intolerances:     Review of Systems:  Constitutional: No fever, chills  Eyes: No blurry vision, flashes, floaters, FBS, erythema, discharge, double vision, OU  Neuro: No tremors  Cardiovascular: No chest pain, palpitations  Respiratory: No SOB, no cough  GI: No nausea, vomiting, abdominal pain  : No dysuria  Skin: no rash  Psych: no depression  Endocrine: no polyuria, polydipsia  Heme/lymph: no swelling    VITALS: T(C): 36.4 (12-05-22 @ 09:03)  T(F): 97.5 (12-05-22 @ 09:03), Max: 97.5 (12-05-22 @ 09:03)  HR: 65 (12-05-22 @ 09:03) (65 - 65)  BP: 135/73 (12-05-22 @ 09:03) (135/73 - 135/73)  RR:  (17 - 17)  SpO2:  (100% - 100%)  Wt(kg): --  General: AAO x 3, appropriate mood and affect    Ophthalmology Exam:  Visual acuity (sc): 20/400 OD, 20/25 OS  Pupils: PERRL OU, +RAPD OD  Ttono: 16 OD, 16 OS  Extraocular movements (EOMs): Full OU, +pain with supraduction and abduction  Confrontational Visual Field (CVF): Unable OD, full OS  Color Plates: 0/12 OD, 12/12 OS    Pen Light Exam (PLE)  External: Flat OU  Lids/Lashes/Lacrimal Ducts: Flat OU    Sclera/Conjunctiva: W+Q OU  Cornea: Cl OU  Anterior Chamber: D+F OU    Iris: Flat OU  Lens: Cl OU    Fundus Exam: dilated with 1% tropicamide and 2.5% phenylephrine  Approval obtained from primary team for dilation  Patient aware that pupils can remained dilated for at least 4-6 hours  Exam performed with 20D lens    Vitreous: wnl OU  Disc, cup/disc: sharp and pink, 0.4 OU  Macula: wnl OU  Vessels: wnl OU  Periphery: wnl OU

## 2022-12-05 NOTE — ED PROVIDER NOTE - OBJECTIVE STATEMENT
52-year-old female recent diagnosis of MS 6 months ago presents complaining of acute intermittent vision changes in her right eye with painful movement of the eye when looking up into the right.  Patient states she has recently started Ocrevus without resolution of her symptoms.  Denies any headache, nausea, vomiting, fever, chills.  Also endorses intermittent numbness in her bilateral hands localized to the palms without radiation.  No other current complaints at this time.

## 2022-12-05 NOTE — H&P ADULT - NSICDXPASTMEDICALHX_GEN_ALL_CORE_FT
PAST MEDICAL HISTORY:  HLD (hyperlipidemia)     Migraine     Multiple sclerosis     Optic neuritis

## 2022-12-05 NOTE — CONSULT NOTE ADULT - ASSESSMENT
INCOMPLETE NOTE, FINAL RECS TO FOLLOW    Assessment and Recommendations:  52y female w/ pmhx/ochx of  multiple sclerosis and right optic neuritis consulted for possible recurrent optic neuritis of the right side.  # Optic neuritis, right side, secondary to relapsing multiple sclerosis  - Vision on 11/16 with Dr Baum 20/50 right eye, 20/25 left eye with superior visual field loss and 4/12 on color plates; today 20/400 right eye, 20/25 left eye with worsening APD, total confrontation visual field loss, and 0/12 on color plates  - Endorses pain with EOMs  - Posterior segment exam ***  - Appreciate neuro eval  - Recommend ***  - Findings and plan discussed with patient and primary team.    Patient seen and discussed with  ***    Outpatient follow-up: Patient should follow-up with his/her ophthalmologist or with [facility] Department of Ophthalmology at the address below     [address] [place] [address]. [address]  [place] [address]  [phone]     INCOMPLETE NOTE, FINAL RECS TO FOLLOW    Assessment and Recommendations:  52y female w/ pmhx/ochx of  multiple sclerosis and right optic neuritis consulted for possible recurrent optic neuritis of the right side.  # Optic neuritis, right side, secondary to relapsing multiple sclerosis  - Vision on 11/16 with Dr Baum 20/50 right eye, 20/25 left eye with superior visual field loss and 4/12 on color plates; today 20/400 right eye, 20/25 left eye with worsening APD, total confrontation visual field loss, and 0/12 on color plates  - Endorses pain with EOMs  - Posterior segment exam without disc pallor of edema  - Appreciate neuro eval  - Recommend ***  - Findings and plan discussed with patient and primary team.    # Dry eyes, bilateral  - Continue artificial tears QID both eyes (ordered by ophtho)    Patient seen and discussed with  ***    Outpatient follow-up: Patient should follow-up with his/her ophthalmologist or with [facility] Department of Ophthalmology at the address below     [address] [place] [address]. [address]  [place], [place] [id number] [phone]     Assessment and Recommendations:  52y female w/ pmhx/ochx of  multiple sclerosis and right optic neuritis consulted for possible recurrent optic neuritis of the right side.  # Optic neuritis, right side, secondary to relapsing multiple sclerosis  - Vision on 11/16 with Dr Baum 20/50 right eye, 20/25 left eye with superior visual field loss and 4/12 on color plates; today 20/400 right eye, 20/25 left eye with worsening APD, total confrontation visual field loss, and 0/12 on color plates  - Endorses pain with EOMs  - Posterior segment exam without disc pallor of edema  - Appreciate neuro eval  - Recommend repeat MRI brain & orbits, cervical, and thoracic w/w/o  - Recommend 3-5 days of IV solumedrol 1g/day (can be divided into 4 doses) followed by PO taper  - Findings and plan discussed with patient and primary team.    # Dry eyes, bilateral  - Continue artificial tears QID both eyes (ordered by ophtho)    Patient discussed with Dr. Baum    Outpatient follow-up: Patient should follow-up with his/her ophthalmologist or with [facility] Department of Ophthalmology at the address below     [address]  [place] [address]  [phone]

## 2022-12-05 NOTE — CONSULT NOTE ADULT - SUBJECTIVE AND OBJECTIVE BOX
Neurology Consultation     HPI: Patient HELENA is a 53yo F PMHx chronic migraines, HLD, recent adm 7/2022 for vision deficits R superior quadrants, and pain in R eye 2/2 R optic neuritis from MS (also with thoracic cord myelitis), who presents to ED for L eye now with cloudy vision and pain since thursday.     In July 2022 hospitalization, patient had 7/16/22 OD eye exam w/ +RAPD, red color desaturation, and ~20/50 visual acuity. MRI head and orbit w/wo 7/14/22: several lesions w/ periventricular configuration c/f multiple sclerosis, right intraorbital segment optic neuritis/perineuritis. MRI C/T/L w/wo 7/14/22: dorsal short segment signal abnormality T9 w/ possible enhancement c/f active demyelination. She received 5 days of IV Solumedrol 1000mg (7/14-7/18) with symptom improvement and was d/c with steroid taper for 10 days.     Last Optho outpt visit 11/16/22: noted floaters OD (DFE normal), dry eye syndrome OU, refractive error, and optic neuritis OD. Had improvement in superior visual field R eye.   Last Neuro outpt visit 10/14/22: Had been reporting daily migraine headaches, neck muscle soreness, body soreness, and especially left knee pain that is affecting her gait. Patient was approved for ocrevus after screening labs were completed and started Ocrevus. Also had plan to repeat MRI brain, cervical and thoracic spine w/w/o con in 3 months following start of DMT.     PAST MEDICAL & SURGICAL HISTORY:  Migraine    S/P gastric bypass    S/P cholecystectomy    FAMILY HISTORY:  FH: hypertension (Mother)    Family history of type 2 diabetes mellitus (Mother)    SOCIAL HISTORY:     MEDICATIONS (HOME):  Home Medications:  aspirin 81 mg oral tablet: 1 tab(s) orally once a day (01 Aug 2022 08:42)  Crestor 20 mg oral tablet: 1 tab(s) orally once a day (at bedtime) (01 Aug 2022 08:42)  Dulcolax Laxative 5 mg oral delayed release tablet: 1 tab(s) orally once a day, As Needed (01 Aug 2022 08:42)  Multiple Vitamins oral tablet: 1 tab(s) orally once a day (01 Aug 2022 08:42)  rizatriptan 10 mg oral tablet, disintegrating: 10 milligram(s) orally every 2 hours, As Needed (01 Aug 2022 08:42)  SEROquel 50 mg oral tablet: 1 tab(s) orally once a day (at bedtime), As Needed (01 Aug 2022 08:42)    MEDICATIONS  (STANDING):    MEDICATIONS  (PRN):    ALLERGIES/INTOLERANCES:  Allergies  No Known Allergies    Intolerances    VITALS & EXAMINATION:  Vital Signs Last 24 Hrs  T(C): 36.4 (05 Dec 2022 09:03), Max: 36.4 (05 Dec 2022 09:03)  T(F): 97.5 (05 Dec 2022 09:03), Max: 97.5 (05 Dec 2022 09:03)  HR: 65 (05 Dec 2022 09:03) (65 - 65)  BP: 135/73 (05 Dec 2022 09:03) (135/73 - 135/73)  BP(mean): --  RR: 17 (05 Dec 2022 09:03) (17 - 17)  SpO2: 100% (05 Dec 2022 09:03) (100% - 100%)    Parameters below as of 05 Dec 2022 09:03  Patient On (Oxygen Delivery Method): room air         LABORATORY:  CBC   Chem       LFTs   Coagulopathy   Lipid Panel   A1c   Cardiac enzymes     U/A   CSF  Other    STUDIES & IMAGING: (EEG, CT, MR, U/S, TTE/ARIANA):    MR BRAIN WAW IC, MR ORBITS ONLY WAWIC                          *** ADDENDUM***    Upon further review there is T2 signal hyperintensity and enhancement   involving the nerve sheath and nerve of the intraorbital segment of the   right optic nerve consistent with optic neuritis and perineuritis.     *** END OF ADDENDUM***     FINDINGS:    Normal sulci and ventricles for patient's stated age. There multiple   FLAIR hyperintense lesions within the subcortical and periventricular   white matter. Several of the lesions appear ovoid in configuration,   perpendicular to the body of the lateral ventricles, and perivenular in   orientation, which is suspicious for a demyelinating process.  There is   no acute intraparenchymal hematoma, mass effect or midline shift. No   abnormal parenchymal enhancement. There is bilateral caudate   susceptibility signal which is nonspecific and may represent chronic   blood products or mineral deposition.    There is no diffusion abnormality to suggest acute or subacute   infarction. Major flow-voids at the base of the brain follow expected   course and contour.    No abnormal extra-axial fluid collections or leptomeningeal enhancement.    The calvarium is intact. Paranasal and tympanomastoid cavities appear   free of acute disease.      MRI ORBITS:    The visualized bony walls of both orbits are grossly normal.    The globes are normal in configuration. There is no area of abnormal   signal intensity or enhancement in the either eye, the optic nerves, the   extraocular muscles, or the lacrimal glands. The superior ophthalmic   veins are normal. The extra conal fat is intact. No mass is seen in   either orbit and the orbital apices are normal. The visualized optic   chiasm and suprasellar region was within normal limits. No gross   abnormality is seen in either cavernous sinus or in the sella turcica   region.    IMPRESSION:    BRAIN MRI: Mild to moderate extent of FLAIR hyperintense white matter   lesions in the periventricular and subcortical white matter. Several   lesions demonstrate perivenular configuration which is relatively   specific for multiple sclerosis. Differential diagnosis includes chronic   microvascular ischemic changes, migraine headaches or other infectious   inflammatory process.    MRI ORBITS: THE MR EVALUATION OF THE ORBITS IS UNREMARKABLE    --- End of Report ---    ***Please see the addendum at the top of this report. It may contain   additional important information or changes.****      ACC: [id number] EXAM:  MR SPINE LUMBAR WAW                         ACC: [id number] EXAM:  MR SPINE CERVICAL [facility]                        ACC: [id number] EXAM:  MR SPINE THORACIC WAW                           PROCEDURE DATE:  07/14/2022          INTERPRETATION:  MR CERVICAL SPINE WITHOUT CONTRAST  MR THORACIC SPINE WITHOUT CONTRAST  MR LUMBAR SPINE WITHOUT CONTRAST    CLINICAL HISTORY: Demyelinating disease. Right visual field defect.    TECHNIQUE: Multiphasic sagittal non-contrast magnetic resonance imaging   of the cervical, thoracic, and lumbar spine was performed. Axial   sequences were also obtained.    CONTRAST: 7.5 mL of Gadavist. 0 mL was discarded.    COMPARISON: None.    FINDINGS:    Cervical Spine:  Normal cervical lordosis is maintained. The vertebral   body heights and alignment are maintained. No focal STIR hyperintense or   enhancing marrow replacing lesion. There is no significant disc bulge or   herniation. There is no significant cervical spinal canal stenosis. There   is no significant cervical spinal cord signal abnormality or enhancement.   Small perineural cysts at the right C6-C7 and C7-T1 neural foramina.    Thoracic Spine:  Normal thoracic kyphosis is maintained. The vertebral   body heights and alignment are maintained. No focal STIR hyperintense or   enhancing marrow replacing lesion. A few mild multilevel disc herniations   are present which indents the thecal sac. No associated high-grade   central canal stenosis. There is dorsal short segment cord signal   abnormality at T9 (7; 10 with possible corresponding enhancement (11; 9)   which may represent an area of active demyelination.    Lumbar Spine:  Normal lumbar lordosis is maintained. The vertebral body   heights and alignment are maintained. Nofocal STIR hyperintense or   enhancing marrow replacing lesion. There is no significant disc bulge or   herniation. There is no significant lumbar spinal canal stenosis. A   normal-appearing conus medullaris terminates at T12-L1. The cauda equina   demonstrates a normal configuration.    There is a 1.0 cm T2 hyperintense right hepatic lesion which may   represent hepatic cysts versus hemangioma.    IMPRESSION:  MR cervical spine: No high-grade central canal stenosis. No abnormal cord   signal abnormality or enhancement.  MR thoracic spine: No high-grade central canal stenosis. Focal short   segment abnormal cord signal with possible enhancement at T9 which may   represent a focus of active demyelination.  MR lumbar spine: No high-grade central canal stenosis.    --- End of Report ---   Neurology Consultation     HPI: Patient HELENA is a 51yo F PMHx chronic migraines, HLD, recent adm 7/2022 for vision deficits R superior quadrants, and pain in R eye 2/2 R optic neuritis from MS (also with thoracic cord myelitis), who presents to ED for worsening vision in R eye. She states she was diagnosed with relapsing MS after developing R eye vision deficits and pain with extraocular movements in July. She was discharged after 5 days of IV solumedrol and followed up with Dr Baum and Dr Trujillo for which she was started on Ocrevus. She received the first part of the first dose on 11/9 and the second part on 11/23. From 11/23, she began to have increasing prevalence of R eye floaters, film like sensation over vision, and flashing lights. She made necessary adjustments to her computer monitor (blue screen, magnifier) but noted it was still getting increasingly difficult to read the screen. Since last thursday 12/1 she has been having daily episodes of R eye vision becoming "white" for which she came to ED. She also endorses headaches (chronic 2/2 migraines), body aches. No nausea, vomiting, speech changes, focal weakness. Has numbness of L foot and cramping of wrists, hands, thumbs, and ankle. Not currently on steroids. Next dose of ocrevus in March.    In July 2022 hospitalization, patient had 7/16/22 OD eye exam w/ +RAPD, red color desaturation, and ~20/50 visual acuity. MRI head and orbit w/wo 7/14/22: several lesions w/ periventricular configuration c/f multiple sclerosis, right intraorbital segment optic neuritis/perineuritis. MRI C/T/L w/wo 7/14/22: dorsal short segment signal abnormality T9 w/ possible enhancement c/f active demyelination. She received 5 days of IV Solumedrol 1000mg (7/14-7/18) with symptom improvement and was d/c with steroid taper for 10 days.     Last Optho outpt visit 11/16/22: noted floaters OD (DFE normal), dry eye syndrome OU, refractive error, and optic neuritis OD. Had improvement in superior visual field R eye.   Last Neuro outpt visit 10/14/22: Had been reporting daily migraine headaches, neck muscle soreness, body soreness, and especially left knee pain that is affecting her gait. Patient was approved for ocrevus after screening labs were completed and started Ocrevus. Also had plan to repeat MRI brain, cervical and thoracic spine w/w/o con in 3 months following start of DMT.     PAST MEDICAL & SURGICAL HISTORY:  Migraine    S/P gastric bypass    S/P cholecystectomy    FAMILY HISTORY:  FH: hypertension (Mother)    Family history of type 2 diabetes mellitus (Mother)    SOCIAL HISTORY: former smoker, quit after dx of MS    MEDICATIONS (HOME):  Home Medications:  aspirin 81 mg oral tablet: 1 tab(s) orally once a day (01 Aug 2022 08:42)  Crestor 20 mg oral tablet: 1 tab(s) orally once a day (at bedtime) (01 Aug 2022 08:42)  Dulcolax Laxative 5 mg oral delayed release tablet: 1 tab(s) orally once a day, As Needed (01 Aug 2022 08:42)  Multiple Vitamins oral tablet: 1 tab(s) orally once a day (01 Aug 2022 08:42)  rizatriptan 10 mg oral tablet, disintegrating: 10 milligram(s) orally every 2 hours, As Needed (01 Aug 2022 08:42)  SEROquel 50 mg oral tablet: 1 tab(s) orally once a day (at bedtime), As Needed (01 Aug 2022 08:42)    MEDICATIONS  (STANDING):    MEDICATIONS  (PRN):    ALLERGIES/INTOLERANCES:  Allergies  No Known Allergies    Intolerances    VITALS & EXAMINATION:  Vital Signs Last 24 Hrs  T(C): 36.4 (05 Dec 2022 09:03), Max: 36.4 (05 Dec 2022 09:03)  T(F): 97.5 (05 Dec 2022 09:03), Max: 97.5 (05 Dec 2022 09:03)  HR: 65 (05 Dec 2022 09:03) (65 - 65)  BP: 135/73 (05 Dec 2022 09:03) (135/73 - 135/73)  BP(mean): --  RR: 17 (05 Dec 2022 09:03) (17 - 17)  SpO2: 100% (05 Dec 2022 09:03) (100% - 100%)    Parameters below as of 05 Dec 2022 09:03  Patient On (Oxygen Delivery Method): room air    PHYSICAL EXAM:  Constitutional: Female, appears stated age, in no apparent distress including pain  Head: Normocephalic & Atraumatic.  Respiratory: Breathing comfortably.  Extremities: No cyanosis, clubbing   Skin: no rash, no bruising     Neurological Exam:  MS: Awake, alert, oriented to person, place, month, year, president. Normal affect. Follows all commands.  Language: Speech is clear, fluent with good repetition & comprehension (able to name objects)    CNs: Pupils 7mm bilaterally dilated by optho. R eye difficulty with vision in all quadrants when tested individually. With L eye, gross vision in quadrants intact with finger movement testing. Red color desaturation in R eye, also gross difficulty with vision in R eye. EOMI but with pain,  R eye pain with EOM, No nystagmus. V1-3 intact to LT. No facial asymmetry b/l, Hearing grossly normal (rubbing fingers) b/l. Symmetric palate elevation in midline. Gag reflex deferred. Head turning & shoulder shrug intact b/l. Tongue midline, normal movements, no atrophy.    Snellen chart, difficult to read even from 20/200 "E" with R eye.    Motor: Normal muscle bulk & tone. No noticeable tremor.              Deltoid	Biceps	Triceps 	   R	5	5	5	5	 	  L	5	5	5	5			    	H-Flex	H-Ext	K-Flex	K-Ext	D-Flex	P-Flex  R	5	5	5	5	5	5		   L	5	5	5	5	5	5		     Sensation: Intact to LT b/l throughout.     Reflexes:              Biceps(C5)       BR(C6)     Triceps(C7)               Patellar(L4)    Achilles(S1)    Plantar Resp  R	2	          2	             2		        2		    2		Down   L	2	          2	             2		        2		    2		Down     Coordination:. No dysmetria to FTN/HTS  Gait:  Deferred      LABORATORY:  cbc  chem    LFTs   Coagulopathy   Lipid Panel   A1c   Cardiac enzymes     U/A   CSF  Other    STUDIES & IMAGING: (EEG, CT, MR, U/S, TTE/ARIANA):    MR BRAIN WAW IC, MR ORBITS ONLY WAWIC                          *** ADDENDUM***    Upon further review there is T2 signal hyperintensity and enhancement   involving the nerve sheath and nerve of the intraorbital segment of the   right optic nerve consistent with optic neuritis and perineuritis.     *** END OF ADDENDUM***     FINDINGS:    Normal sulci and ventricles for patient's stated age. There multiple   FLAIR hyperintense lesions within the subcortical and periventricular   white matter. Several of the lesions appear ovoid in configuration,   perpendicular to the body of the lateral ventricles, and perivenular in   orientation, which is suspicious for a demyelinating process.  There is   no acute intraparenchymal hematoma, mass effect or midline shift. No   abnormal parenchymal enhancement. There is bilateral caudate   susceptibility signal which is nonspecific and may represent chronic   blood products or mineral deposition.    There is no diffusion abnormality to suggest acute or subacute   infarction. Major flow-voids at the base of the brain follow expected   course and contour.    No abnormal extra-axial fluid collections or leptomeningeal enhancement.    The calvarium is intact. Paranasal and tympanomastoid cavities appear   free of acute disease.      MRI ORBITS:    The visualized bony walls of both orbits are grossly normal.    The globes are normal in configuration. There is no area of abnormal   signal intensity or enhancement in the either eye, the optic nerves, the   extraocular muscles, or the lacrimal glands. The superior ophthalmic   veins are normal. The extra conal fat is intact. No mass is seen in   either orbit and the orbital apices are normal. The visualized optic   chiasm and suprasellar region was within normal limits. No gross   abnormality is seen in either cavernous sinus or in the sella turcica   region.    IMPRESSION:    BRAIN MRI: Mild to moderate extent of FLAIR hyperintense white matter   lesions in the periventricular and subcortical white matter. Several   lesions demonstrate perivenular configuration which is relatively   specific for multiple sclerosis. Differential diagnosis includes chronic   microvascular ischemic changes, migraine headaches or other infectious   inflammatory process.    MRI ORBITS: THE MR EVALUATION OF THE ORBITS IS UNREMARKABLE    --- End of Report ---    ***Please see the addendum at the top of this report. It may contain   additional important information or changes.****      ACC: [id number] EXAM:  MR SPINE LUMBAR WAW IC                        ACC: [id number] EXAM:  MR SPINE CERVICAL WAW IC                        ACC: [id number] EXAM:  MR SPINE THORACIC WAW                           PROCEDURE DATE:  07/14/2022          INTERPRETATION:  MR CERVICAL SPINE WITHOUT CONTRAST  MR THORACIC SPINE WITHOUT CONTRAST  MR LUMBAR SPINE WITHOUT CONTRAST    CLINICAL HISTORY: Demyelinating disease. Right visual field defect.    TECHNIQUE: Multiphasic sagittal non-contrast magnetic resonance imaging   of the cervical, thoracic, and lumbar spine was performed. Axial   sequences were also obtained.    CONTRAST: 7.5 mL of Gadavist. 0 mL was discarded.    COMPARISON: None.    FINDINGS:    Cervical Spine:  Normal cervical lordosis is maintained. The vertebral   body heights and alignment are maintained. No focal STIR hyperintense or   enhancing marrow replacing lesion. There is no significant disc bulge or   herniation. There is no significant cervical spinal canal stenosis. There   is no significant cervical spinal cord signal abnormality or enhancement.   Small perineural cysts at the right C6-C7 and C7-T1 neural foramina.    Thoracic Spine:  Normal thoracic kyphosis is maintained. The vertebral   body heights and alignment are maintained. No focal STIR hyperintense or   enhancing marrow replacing lesion. A few mild multilevel disc herniations   are present which indents the thecal sac. No associated high-grade   central canal stenosis. There is dorsal short segment cord signal   abnormality at T9 (7; 10 with possible corresponding enhancement (11; 9)   which may represent an area of active demyelination.    Lumbar Spine:  Normal lumbar lordosis is maintained. The vertebral body   heights and alignment are maintained. Nofocal STIR hyperintense or   enhancing marrow replacing lesion. There is no significant disc bulge or   herniation. There is no significant lumbar spinal canal stenosis. A   normal-appearing conus medullaris terminates at T12-L1. The cauda equina   demonstrates a normal configuration.    There is a 1.0 cm T2 hyperintense right hepatic lesion which may   represent hepatic cysts versus hemangioma.    IMPRESSION:  MR cervical spine: No high-grade central canal stenosis. No abnormal cord   signal abnormality or enhancement.  MR thoracic spine: No high-grade central canal stenosis. Focal short   segment abnormal cord signal with possible enhancement at T9 which may   represent a focus of active demyelination.  MR lumbar spine: No high-grade central canal stenosis.    --- End of Report ---

## 2022-12-05 NOTE — H&P ADULT - HISTORY OF PRESENT ILLNESS
51 yo F w/ PMHx of Migraines, HLD, COVID in 12/21, dx'd with MS 6 months ago, hospitalized for Rt eye optic neuritis tx'd with IV steroids x 5 days and then taper x 10 days. Pt now presents with cloudy vision in Rt eye since 12/1. Episodes would last ~30minutes, occurring ~3x per day until this morning. She woke up around 5:30am and has noticed the cloudy vision in Rt eye has been constant since. Pt also admits since this AM she has been experiencing pain (up to 10/10 severity) in her Rt eye with certain EOM's, like when looking laterally & superiorly, feels fine looking downward. Pt admits she had a similar presentation when she was hospitalized for optic neuritis. Pt admits she has a h/o ~2 migraines per week since she was 42 yo, usually they start as a tension in the back of her neck and radiate to the back of her head/ temples and jaw, lasting ~24 hrs. Improves with medications, rest and "blacking everything out". Pt c/o muscle pains, intermittent pain in her Lt knee & Lt shoulder at varying times for years now. Also has a stiff feeling in her wrists & thumbs. Since 10/16 she feels a pulling pain in her Rt sided occipital area, lasting ~30mins. Also since this past summer she has been feeling unsteady when getting up, has fallen twice then which she attributed to not seeing well then. Also since late 11/22 she has been experiencing b/l lower lateral back pain.

## 2022-12-06 ENCOUNTER — TRANSCRIPTION ENCOUNTER (OUTPATIENT)
Age: 52
End: 2022-12-06

## 2022-12-06 LAB
A1C WITH ESTIMATED AVERAGE GLUCOSE RESULT: 5.9 % — HIGH (ref 4–5.6)
ANION GAP SERPL CALC-SCNC: 13 MMOL/L — SIGNIFICANT CHANGE UP (ref 7–14)
BASE EXCESS BLDV CALC-SCNC: -2.8 MMOL/L — LOW (ref -2–3)
BUN SERPL-MCNC: 9 MG/DL — SIGNIFICANT CHANGE UP (ref 7–23)
CALCIUM SERPL-MCNC: 9.4 MG/DL — SIGNIFICANT CHANGE UP (ref 8.4–10.5)
CHLORIDE SERPL-SCNC: 108 MMOL/L — HIGH (ref 98–107)
CHOLEST SERPL-MCNC: 186 MG/DL — SIGNIFICANT CHANGE UP
CO2 BLDV-SCNC: 23 MMOL/L — SIGNIFICANT CHANGE UP (ref 22–26)
CO2 SERPL-SCNC: 20 MMOL/L — LOW (ref 22–31)
CREAT SERPL-MCNC: 0.48 MG/DL — LOW (ref 0.5–1.3)
EGFR: 115 ML/MIN/1.73M2 — SIGNIFICANT CHANGE UP
ESTIMATED AVERAGE GLUCOSE: 123 — SIGNIFICANT CHANGE UP
GLUCOSE BLDC GLUCOMTR-MCNC: 143 MG/DL — HIGH (ref 70–99)
GLUCOSE BLDC GLUCOMTR-MCNC: 146 MG/DL — HIGH (ref 70–99)
GLUCOSE BLDC GLUCOMTR-MCNC: 199 MG/DL — HIGH (ref 70–99)
GLUCOSE BLDC GLUCOMTR-MCNC: 271 MG/DL — HIGH (ref 70–99)
GLUCOSE SERPL-MCNC: 216 MG/DL — HIGH (ref 70–99)
HCO3 BLDV-SCNC: 22 MMOL/L — SIGNIFICANT CHANGE UP (ref 22–29)
HCT VFR BLD CALC: 37.1 % — SIGNIFICANT CHANGE UP (ref 34.5–45)
HDLC SERPL-MCNC: 60 MG/DL — SIGNIFICANT CHANGE UP
HGB BLD-MCNC: 12 G/DL — SIGNIFICANT CHANGE UP (ref 11.5–15.5)
LACTATE BLDV-MCNC: 3.2 MMOL/L — HIGH (ref 0.5–2)
LIPID PNL WITH DIRECT LDL SERPL: 113 MG/DL — HIGH
MAGNESIUM SERPL-MCNC: 1.9 MG/DL — SIGNIFICANT CHANGE UP (ref 1.6–2.6)
MCHC RBC-ENTMCNC: 27.8 PG — SIGNIFICANT CHANGE UP (ref 27–34)
MCHC RBC-ENTMCNC: 32.3 GM/DL — SIGNIFICANT CHANGE UP (ref 32–36)
MCV RBC AUTO: 85.9 FL — SIGNIFICANT CHANGE UP (ref 80–100)
NON HDL CHOLESTEROL: 126 MG/DL — SIGNIFICANT CHANGE UP
NRBC # BLD: 0 /100 WBCS — SIGNIFICANT CHANGE UP (ref 0–0)
NRBC # FLD: 0 K/UL — SIGNIFICANT CHANGE UP (ref 0–0)
PCO2 BLDV: 37 MMHG — LOW (ref 39–42)
PH BLDV: 7.38 — SIGNIFICANT CHANGE UP (ref 7.32–7.43)
PHOSPHATE SERPL-MCNC: 2.7 MG/DL — SIGNIFICANT CHANGE UP (ref 2.5–4.5)
PLATELET # BLD AUTO: 322 K/UL — SIGNIFICANT CHANGE UP (ref 150–400)
PO2 BLDV: 45 MMHG — SIGNIFICANT CHANGE UP
POTASSIUM SERPL-MCNC: 4 MMOL/L — SIGNIFICANT CHANGE UP (ref 3.5–5.3)
POTASSIUM SERPL-SCNC: 4 MMOL/L — SIGNIFICANT CHANGE UP (ref 3.5–5.3)
RBC # BLD: 4.32 M/UL — SIGNIFICANT CHANGE UP (ref 3.8–5.2)
RBC # FLD: 15.2 % — HIGH (ref 10.3–14.5)
SAO2 % BLDV: 73 % — SIGNIFICANT CHANGE UP
SODIUM SERPL-SCNC: 141 MMOL/L — SIGNIFICANT CHANGE UP (ref 135–145)
TRIGL SERPL-MCNC: 67 MG/DL — SIGNIFICANT CHANGE UP
WBC # BLD: 13.03 K/UL — HIGH (ref 3.8–10.5)
WBC # FLD AUTO: 13.03 K/UL — HIGH (ref 3.8–10.5)

## 2022-12-06 PROCEDURE — 99232 SBSQ HOSP IP/OBS MODERATE 35: CPT

## 2022-12-06 PROCEDURE — 99233 SBSQ HOSP IP/OBS HIGH 50: CPT

## 2022-12-06 PROCEDURE — 99223 1ST HOSP IP/OBS HIGH 75: CPT

## 2022-12-06 RX ORDER — ACETAMINOPHEN 500 MG
650 TABLET ORAL ONCE
Refills: 0 | Status: COMPLETED | OUTPATIENT
Start: 2022-12-06 | End: 2022-12-06

## 2022-12-06 RX ORDER — QUETIAPINE FUMARATE 200 MG/1
50 TABLET, FILM COATED ORAL
Refills: 0 | Status: DISCONTINUED | OUTPATIENT
Start: 2022-12-06 | End: 2022-12-22

## 2022-12-06 RX ADMIN — Medication 1 DROP(S): at 23:31

## 2022-12-06 RX ADMIN — ATORVASTATIN CALCIUM 80 MILLIGRAM(S): 80 TABLET, FILM COATED ORAL at 21:09

## 2022-12-06 RX ADMIN — Medication 1 PATCH: at 18:42

## 2022-12-06 RX ADMIN — NORTRIPTYLINE HYDROCHLORIDE 10 MILLIGRAM(S): 10 CAPSULE ORAL at 09:47

## 2022-12-06 RX ADMIN — Medication 2: at 12:15

## 2022-12-06 RX ADMIN — Medication 1 DROP(S): at 05:44

## 2022-12-06 RX ADMIN — QUETIAPINE FUMARATE 50 MILLIGRAM(S): 200 TABLET, FILM COATED ORAL at 20:36

## 2022-12-06 RX ADMIN — Medication 2: at 21:10

## 2022-12-06 RX ADMIN — Medication 1 DROP(S): at 00:36

## 2022-12-06 RX ADMIN — PANTOPRAZOLE SODIUM 40 MILLIGRAM(S): 20 TABLET, DELAYED RELEASE ORAL at 05:45

## 2022-12-06 RX ADMIN — Medication 1 DROP(S): at 12:16

## 2022-12-06 RX ADMIN — Medication 58 MILLIGRAM(S): at 18:12

## 2022-12-06 RX ADMIN — Medication 1 TABLET(S): at 12:16

## 2022-12-06 RX ADMIN — Medication 1 PATCH: at 08:05

## 2022-12-06 RX ADMIN — ENOXAPARIN SODIUM 40 MILLIGRAM(S): 100 INJECTION SUBCUTANEOUS at 20:35

## 2022-12-06 RX ADMIN — Medication 81 MILLIGRAM(S): at 12:16

## 2022-12-06 RX ADMIN — SENNA PLUS 2 TABLET(S): 8.6 TABLET ORAL at 21:09

## 2022-12-06 RX ADMIN — Medication 1 DROP(S): at 18:12

## 2022-12-06 RX ADMIN — Medication 650 MILLIGRAM(S): at 04:26

## 2022-12-06 RX ADMIN — Medication 1 PATCH: at 12:15

## 2022-12-06 NOTE — BH CONSULTATION LIAISON ASSESSMENT NOTE - NSBHCONSULTFOLLOWAFTERCARE_PSY_A_CORE FT
Please copy paste following referral info into DC paperwork:    Closer to patient's home:  [facility] [phone]  [facility] [phone] / [phone]  [facility] [phone]    Closer to [facility]:  [facility]  [phone] [place] [address], First Floor, [place] [address] [phone]  [email or web address]: [phone]

## 2022-12-06 NOTE — BH CONSULTATION LIAISON ASSESSMENT NOTE - MSE UNSTRUCTURED FT
Mental Status Exam:  Khushi: well groomed, fair hygiene     Behavior: calm, cooperative, no psychomotor retardation/agitation  Motor: no tremors, EPS, or rigidity  Gait: did not assess, pt in bed  Speech: normal rate, rhythm, prosedy and volume   Mood: "okay"  Affect: cheerful, congruent  Thought process: clear, goal directed   Thought Content: denies paranoia, delusions   Perception: denies AH/VH  SI: denies  HI: denies  Insight: fair  Judgment: fair    Cognitive Exam:  Orientation: AOx3

## 2022-12-06 NOTE — DISCHARGE NOTE PROVIDER - NSDCFUSCHEDAPPT_GEN_ALL_CORE_FT
Odell Baum  [facility]  OPHTHALM [address] [facility]  Scheduled Appointment: 02/08/2023

## 2022-12-06 NOTE — DISCHARGE NOTE PROVIDER - NSDCCPCAREPLAN_GEN_ALL_CORE_FT
PRINCIPAL DISCHARGE DIAGNOSIS  Diagnosis: Optic neuritis  Assessment and Plan of Treatment:       SECONDARY DISCHARGE DIAGNOSES  Diagnosis: Depression, major  Assessment and Plan of Treatment: You reported feeling depressed and down due to given situation at this time. You were seen by the psychiatrist in the hospital and was recommended to continue seroquel 50mg at bedside to help with sleep. Continue with the current regimen and follow up with outpatient psychiatrist:   These are the reecommended outpatient psychiatrist:   Closer to Your home:  [facility] [phone]  [facility] [phone] / [phone]  [facility] [phone]  Closer to [facility]:  [facility]  [address] [place] [address], First Floor, [place] [id number] [phone]  [email or web address] Intake: [phone]     PRINCIPAL DISCHARGE DIAGNOSIS  Diagnosis: Optic neuritis  Assessment and Plan of Treatment: You were started on steroids and 5 sessions of plasma exchange (last 12/17) with improvement. Continue with steroid taper as recommended. Follow up outpatient with PCP in 1-2 weeks for further management.      SECONDARY DISCHARGE DIAGNOSES  Diagnosis: Depression, major  Assessment and Plan of Treatment: You reported feeling depressed and down due to given situation at this time. You were seen by the psychiatrist in the hospital and was recommended to continue seroquel 50mg at bedside to help with sleep. Continue with the current regimen and follow up with outpatient psychiatrist:   These are the reecommended outpatient psychiatrist:   Closer to Your home:  [facility] [phone]  [facility] [phone] / [phone]  [facility] [phone]  Closer to [facility]:  [facility]  [address] [place] [address], First Floor, [place] [address]  [phone]  [email or web address] Intake: [phone]     PRINCIPAL DISCHARGE DIAGNOSIS  Diagnosis: Optic neuritis  Assessment and Plan of Treatment: You were started on steroids and 7 sessions of plasma exchange (last 12/22) with improvement. Continue with steroid taper as recommended. Follow up outpatient with PCP in 1-2 weeks for further management. Please follow up with Opthalmology as scheduled. Please follow up with your outpatient neurologist to continue care for your underlying Multiple Sclerosis.      SECONDARY DISCHARGE DIAGNOSES  Diagnosis: 2019 novel coronavirus disease (COVID-19)  Assessment and Plan of Treatment: You were found to have COVID-19 during your hospital stay when you began having flu like symptoms. You received 3 days of remdesivir and improved. Your oxygen level was always normal. Should continue to use a mask and avoid crowded spaces until at least 12/25.    Diagnosis: Depression, major  Assessment and Plan of Treatment: You reported feeling depressed and down due to given situation at this time. You were seen by the psychiatrist in the hospital and was recommended to continue seroquel 50mg at bedside to help with sleep. Continue with the current regimen and follow up with outpatient psychiatrist:   These are the recommended outpatient psychiatrist:   Closer to Your home:  [facility] [phone]  [facility] [phone] / [phone]  [facility] [phone]  Closer to [facility]:  [facility]  [address] [place] [address], [facility], [place] [id number] [phone]  [email or web address]   [facility] Int[facility]: [phone]    Diagnosis: Vitamin D deficiency  Assessment and Plan of Treatment: Please continue to take Vitamin D every week on Wednesdays and have a repeat Vitamin D level in 6-8 weeks with your PCP.

## 2022-12-06 NOTE — BH CONSULTATION LIAISON ASSESSMENT NOTE - SUMMARY
52 year old female with no formal PPH, PMH HLD, p/w 4 day hx of progressive vision loss OD admitted for concern for optic neuritis currently on IV steroids, psych c/s for depression /anxiety.    Patient does not meet criterion for major depressive d/o but does have partial features over past few months, does appear to have some features of hypomania in past that may preclude usage of standing serotinergics (OK to c/w usage of TCA for migraine/headaches). Patient had done well on seroquel in past for sleep, discussed off label use and risk of weight gain if used in conjunction with steroids for long term, patient states she will be mindful of this and already has thought of exercise plan risks/benefits discussed.      Recs:  - STOP Trazodone  - BEGIN Seroquel 50mg qHS at 9pm   - OK to c/w amitriptyline as ordered  - F/u TSH, free T4  - PRN for severe anxiety Ativan 0.5mg BID PRN PO  - Psych to follow for support  - See psych referrals as below, no psych c/i to discharge

## 2022-12-06 NOTE — BH CONSULTATION LIAISON ASSESSMENT NOTE - CURRENT MEDICATION
MEDICATIONS  (STANDING):  artificial  tears Solution 1 Drop(s) Both EYES four times a day  aspirin enteric coated 81 milliGRAM(s) Oral daily  atorvastatin 80 milliGRAM(s) Oral at bedtime  dextrose 5%. 1000 milliLiter(s) (50 mL/Hr) IV Continuous <Continuous>  dextrose 5%. 1000 milliLiter(s) (100 mL/Hr) IV Continuous <Continuous>  dextrose 50% Injectable 25 Gram(s) IV Push once  dextrose 50% Injectable 12.5 Gram(s) IV Push once  dextrose 50% Injectable 25 Gram(s) IV Push once  enoxaparin Injectable 40 milliGRAM(s) SubCutaneous every 24 hours  ergocalciferol 57263 Unit(s) Oral <User Schedule>  glucagon  Injectable 1 milliGRAM(s) IntraMuscular once  insulin lispro (ADMELOG) corrective regimen sliding scale   SubCutaneous three times a day before meals  insulin lispro (ADMELOG) corrective regimen sliding scale   SubCutaneous at bedtime  methylPREDNISolone sodium succinate IVPB 1000 milliGRAM(s) IV Intermittent every 24 hours  multivitamin 1 Tablet(s) Oral daily  nicotine -  14 mG/24Hr(s) Patch 1 Patch Transdermal daily  pantoprazole    Tablet 40 milliGRAM(s) Oral before breakfast  senna 2 Tablet(s) Oral at bedtime  traZODone 50 milliGRAM(s) Oral at bedtime    MEDICATIONS  (PRN):  bisacodyl 5 milliGRAM(s) Oral every 12 hours PRN Constipation  cyclobenzaprine 5 milliGRAM(s) Oral three times a day PRN Muscle Spasm  dextrose Oral Gel 15 Gram(s) Oral once PRN Blood Glucose LESS THAN 70 milliGRAM(s)/deciliter  nortriptyline 10 milliGRAM(s) Oral daily PRN Migraines  polyethylene glycol 3350 17 Gram(s) Oral two times a day PRN Constipation

## 2022-12-06 NOTE — BH CONSULTATION LIAISON ASSESSMENT NOTE - NSBHCHARTREVIEWVS_PSY_A_CORE FT
Vital Signs Last 24 Hrs  T(C): 36.7 (06 Dec 2022 10:02), Max: 37.1 (05 Dec 2022 21:00)  T(F): 98.1 (06 Dec 2022 10:02), Max: 98.8 (05 Dec 2022 21:00)  HR: 67 (06 Dec 2022 10:02) (63 - 89)  BP: 138/63 (06 Dec 2022 10:02) (112/70 - 138/63)  BP(mean): --  RR: 18 (06 Dec 2022 10:02) (16 - 18)  SpO2: 100% (06 Dec 2022 10:02) (98% - 100%)    Parameters below as of 06 Dec 2022 10:02  Patient On (Oxygen Delivery Method): room air

## 2022-12-06 NOTE — PROGRESS NOTE ADULT - PROBLEM SELECTOR PLAN 1
Currently with slight pain with EOM and decreased vision resembling previous optic neuritis. Previously treated with 1G/day steroids with outpatient tapering of prednisone  -Neurology C/S: Solumedrol IV 1g x 5 days given significant worsening in vision. If she responds symptomatically by day 3/5, will consider administering remainder in outpatient setting. If she does not respond towards end of IV solumedrol course, will consider PLEX.  - MR brain w/ and w/o contrast, MR orbit w/ w/o contrast. Can defer MR spine for now.  - infectious workup including CBC, VBG w/ lactate, CXR, UA, all negative this far  - vitamin D, Ca supplementation, protonix 40mg daily,   - monitor FS daily, avoid use of concurrent NSAIDS  Ophth c/s: Recommendations per neuro

## 2022-12-06 NOTE — PROGRESS NOTE ADULT - SUBJECTIVE AND OBJECTIVE BOX
[facility] Department of Hospital Medicine  Dougie Sales MD  Available on MS Teams  Pager: [phone]    Patient is a 52y old  Female who presents with a chief complaint of Vision change (06 Dec 2022 15:28)    OVERNIGHT EVENTS: No acute events overnight.    SUBJECTIVE: Pt seen and examined at bedside this morning.     ADDITIONAL REVIEW OF SYSTEMS:    MEDICATIONS  (STANDING):  artificial  tears Solution 1 Drop(s) Both EYES four times a day  aspirin enteric coated 81 milliGRAM(s) Oral daily  atorvastatin 80 milliGRAM(s) Oral at bedtime  dextrose 5%. 1000 milliLiter(s) (50 mL/Hr) IV Continuous <Continuous>  dextrose 5%. 1000 milliLiter(s) (100 mL/Hr) IV Continuous <Continuous>  dextrose 50% Injectable 25 Gram(s) IV Push once  dextrose 50% Injectable 12.5 Gram(s) IV Push once  dextrose 50% Injectable 25 Gram(s) IV Push once  enoxaparin Injectable 40 milliGRAM(s) SubCutaneous every 24 hours  ergocalciferol 56966 Unit(s) Oral <User Schedule>  glucagon  Injectable 1 milliGRAM(s) IntraMuscular once  insulin lispro (ADMELOG) corrective regimen sliding scale   SubCutaneous three times a day before meals  insulin lispro (ADMELOG) corrective regimen sliding scale   SubCutaneous at bedtime  methylPREDNISolone sodium succinate IVPB 1000 milliGRAM(s) IV Intermittent every 24 hours  multivitamin 1 Tablet(s) Oral daily  nicotine -  14 mG/24Hr(s) Patch 1 Patch Transdermal daily  pantoprazole    Tablet 40 milliGRAM(s) Oral before breakfast  QUEtiapine 50 milliGRAM(s) Oral <User Schedule>  senna 2 Tablet(s) Oral at bedtime    MEDICATIONS  (PRN):  bisacodyl 5 milliGRAM(s) Oral every 12 hours PRN Constipation  cyclobenzaprine 5 milliGRAM(s) Oral three times a day PRN Muscle Spasm  dextrose Oral Gel 15 Gram(s) Oral once PRN Blood Glucose LESS THAN 70 milliGRAM(s)/deciliter  LORazepam     Tablet 0.5 milliGRAM(s) Oral two times a day PRN Anxiety  nortriptyline 10 milliGRAM(s) Oral daily PRN Migraines  polyethylene glycol 3350 17 Gram(s) Oral two times a day PRN Constipation    CAPILLARY BLOOD GLUCOSE    POCT Blood Glucose.: 143 mg/dL (06 Dec 2022 16:34)  POCT Blood Glucose.: 199 mg/dL (06 Dec 2022 11:17)  POCT Blood Glucose.: 146 mg/dL (06 Dec 2022 07:46)  POCT Blood Glucose.: 214 mg/dL (05 Dec 2022 22:15)    I&O's Summary    06 Dec 2022 07:01  -  06 Dec 2022 20:00  --------------------------------------------------------  IN: 0 mL / OUT: 500 mL / NET: -500 mL    PHYSICAL EXAM:  Vital Signs Last 24 Hrs  T(C): 36.8 (06 Dec 2022 17:50), Max: 37.1 (05 Dec 2022 21:00)  T(F): 98.2 (06 Dec 2022 17:50), Max: 98.8 (05 Dec 2022 21:00)  HR: 95 (06 Dec 2022 17:50) (67 - 95)  BP: 132/91 (06 Dec 2022 17:50) (126/73 - 138/63)  BP(mean): --  RR: 18 (06 Dec 2022 17:50) (18 - 18)  SpO2: 97% (06 Dec 2022 17:50) (97% - 100%)    Parameters below as of 06 Dec 2022 17:50  Patient On (Oxygen Delivery Method): room air    CONSTITUTIONAL: NAD, well-developed  HEAD: Normocephalic, atraumatic  EYES: EOMI, PERRL  ENT: no rhinorrhea, no pharyngeal erythema  RESPIRATORY: No increased work of breathing, CTAB, no wheezes or crackles appreciated  CARDIOVASCULAR: RRR, S1 and S2 present, no m/r/g  ABDOMEN: soft, NT, ND, bowel sounds present  EXTREMITIES: No LE edema  MUSCULOSKELETAL: no joint swelling, no tenderness to palpation  NEURO: A&Ox3, moving all extremities    LABS:                        12.0   13.03 )-----------( 322      ( 06 Dec 2022 05:37 )             37.1     12-06    141  |  108<H>  |  9   ----------------------------<  216<H>  4.0   |  20<L>  |  0.48<L>    Ca    9.4      06 Dec 2022 05:37  Phos  2.7     12-  Mg     1.90     -    TPro  7.8  /  Alb  4.2  /  TBili  <0.2  /  DBili  x   /  AST  31  /  ALT  30  /  AlkPhos  125<H>  12-    Urinalysis Basic - ( 05 Dec 2022 23:24 )    Color: Yellow / Appearance: Clear / S.029 / pH: x  Gluc: x / Ketone: Trace  / Bili: Negative / Urobili: <2 mg/dL   Blood: x / Protein: Trace / Nitrite: Negative   Leuk Esterase: Negative / RBC: x / WBC x   Sq Epi: x / Non Sq Epi: x / Bacteria: x    RADIOLOGY & ADDITIONAL TESTS:    Results Reviewed:   Imaging Personally Reviewed:  Electrocardiogram Personally Reviewed:    COORDINATION OF CARE:  Care Discussed with Consultants/Other Providers [Y/N]:  Prior or Outpatient Records Reviewed [Y/N]:   [facility] Department of Hospital Medicine  Dougie Sales MD  Available on MS Teams  Pager: [phone]    Patient is a 52y old  Female who presents with a chief complaint of Vision change (06 Dec 2022 15:28)    OVERNIGHT EVENTS: No acute events overnight.    SUBJECTIVE: Pt seen and examined at bedside this morning. Patient very pleasant and conversant. Patient reports that her vision remains blurry but she otherwise feels well. States that although she is optimistic about recovering, she still has many stressors and has been feeling overwhelmed lately. Otherwise denies any fevers, chest pain, shortness of breath, dizziness, nausea, vomiting or abd pain.    ADDITIONAL REVIEW OF SYSTEMS: as above.    MEDICATIONS  (STANDING):  artificial  tears Solution 1 Drop(s) Both EYES four times a day  aspirin enteric coated 81 milliGRAM(s) Oral daily  atorvastatin 80 milliGRAM(s) Oral at bedtime  dextrose 5%. 1000 milliLiter(s) (50 mL/Hr) IV Continuous <Continuous>  dextrose 5%. 1000 milliLiter(s) (100 mL/Hr) IV Continuous <Continuous>  dextrose 50% Injectable 25 Gram(s) IV Push once  dextrose 50% Injectable 12.5 Gram(s) IV Push once  dextrose 50% Injectable 25 Gram(s) IV Push once  enoxaparin Injectable 40 milliGRAM(s) SubCutaneous every 24 hours  ergocalciferol 84428 Unit(s) Oral <User Schedule>  glucagon  Injectable 1 milliGRAM(s) IntraMuscular once  insulin lispro (ADMELOG) corrective regimen sliding scale   SubCutaneous three times a day before meals  insulin lispro (ADMELOG) corrective regimen sliding scale   SubCutaneous at bedtime  methylPREDNISolone sodium succinate IVPB 1000 milliGRAM(s) IV Intermittent every 24 hours  multivitamin 1 Tablet(s) Oral daily  nicotine -  14 mG/24Hr(s) Patch 1 Patch Transdermal daily  pantoprazole    Tablet 40 milliGRAM(s) Oral before breakfast  QUEtiapine 50 milliGRAM(s) Oral <User Schedule>  senna 2 Tablet(s) Oral at bedtime    MEDICATIONS  (PRN):  bisacodyl 5 milliGRAM(s) Oral every 12 hours PRN Constipation  cyclobenzaprine 5 milliGRAM(s) Oral three times a day PRN Muscle Spasm  dextrose Oral Gel 15 Gram(s) Oral once PRN Blood Glucose LESS THAN 70 milliGRAM(s)/deciliter  LORazepam     Tablet 0.5 milliGRAM(s) Oral two times a day PRN Anxiety  nortriptyline 10 milliGRAM(s) Oral daily PRN Migraines  polyethylene glycol 3350 17 Gram(s) Oral two times a day PRN Constipation    CAPILLARY BLOOD GLUCOSE    POCT Blood Glucose.: 143 mg/dL (06 Dec 2022 16:34)  POCT Blood Glucose.: 199 mg/dL (06 Dec 2022 11:17)  POCT Blood Glucose.: 146 mg/dL (06 Dec 2022 07:46)  POCT Blood Glucose.: 214 mg/dL (05 Dec 2022 22:15)    I&O's Summary    06 Dec 2022 07:01  -  06 Dec 2022 20:00  --------------------------------------------------------  IN: 0 mL / OUT: 500 mL / NET: -500 mL    PHYSICAL EXAM:  Vital Signs Last 24 Hrs  T(C): 36.8 (06 Dec 2022 17:50), Max: 37.1 (05 Dec 2022 21:00)  T(F): 98.2 (06 Dec 2022 17:50), Max: 98.8 (05 Dec 2022 21:00)  HR: 95 (06 Dec 2022 17:50) (67 - 95)  BP: 132/91 (06 Dec 2022 17:50) (126/73 - 138/63)  BP(mean): --  RR: 18 (06 Dec 2022 17:50) (18 - 18)  SpO2: 97% (06 Dec 2022 17:50) (97% - 100%)    Parameters below as of 06 Dec 2022 17:50  Patient On (Oxygen Delivery Method): room air    CONSTITUTIONAL: NAD, well-developed  HEAD: Normocephalic, atraumatic  EYES: EOMI, PERRL  ENT: no rhinorrhea, no pharyngeal erythema  RESPIRATORY: No increased work of breathing, CTAB, no wheezes or crackles appreciated  CARDIOVASCULAR: RRR, S1 and S2 present, no m/r/g  ABDOMEN: soft, NT, ND, bowel sounds present  EXTREMITIES: No LE edema  MUSCULOSKELETAL: no joint swelling, no tenderness to palpation  NEURO: A&Ox3, moving all extremities    LABS:                        12.0   13.03 )-----------( 322      ( 06 Dec 2022 05:37 )             37.1     12    141  |  108<H>  |  9   ----------------------------<  216<H>  4.0   |  20<L>  |  0.48<L>    Ca    9.4      06 Dec 2022 05:37  Phos  2.7       Mg     1.90         TPro  7.8  /  Alb  4.2  /  TBili  <0.2  /  DBili  x   /  AST  31  /  ALT  30  /  AlkPhos  125<H>      Urinalysis Basic - ( 05 Dec 2022 23:24 )    Color: Yellow / Appearance: Clear / S.029 / pH: x  Gluc: x / Ketone: Trace  / Bili: Negative / Urobili: <2 mg/dL   Blood: x / Protein: Trace / Nitrite: Negative   Leuk Esterase: Negative / RBC: x / WBC x   Sq Epi: x / Non Sq Epi: x / Bacteria: x    RADIOLOGY & ADDITIONAL TESTS:    Results Reviewed:   Imaging Personally Reviewed:  Electrocardiogram Personally Reviewed:    COORDINATION OF CARE:  Care Discussed with Consultants/Other Providers [Y/N]:  Prior or Outpatient Records Reviewed [Y/N]:

## 2022-12-06 NOTE — DISCHARGE NOTE PROVIDER - NSDCMRMEDTOKEN_GEN_ALL_CORE_FT
aspirin 81 mg oral tablet: 1 tab(s) orally once a day  Crestor 20 mg oral tablet: 1 tab(s) orally once a day (at bedtime)  cyclobenzaprine 5 mg oral tablet: 1 tab(s) orally 3 times a day, As Needed - for muscle spasm  Dulcolax Laxative 5 mg oral delayed release tablet: 1 tab(s) orally once a day, As Needed  ibuprofen 800 mg oral tablet: 1 tab(s) orally every other day, As Needed - for headache  Multiple Vitamins oral tablet: 1 tab(s) orally once a day  nicotine 14 mg/24 hr transdermal film, extended release: 1 patch transdermal once a day   nortriptyline 10 mg oral capsule: 1 cap(s) orally once a day, As Needed Migraine  omeprazole 40 mg oral delayed release capsule: 1 cap(s) orally once a day  polyethylene glycol 3350 oral powder for reconstitution: 17 gram(s) orally 2 times a day, As Needed  rizatriptan 10 mg oral tablet, disintegrating: 10 milligram(s) orally every 2 hours, As Needed  senna leaf extract oral tablet: 2 tab(s) orally once a day (at bedtime)  traZODone 50 mg oral tablet: 1 tab(s) orally once a day (at bedtime)  Vitamin D2 1.25 mg (50,000 intl units) oral capsule: 1 cap(s) orally once a week wednesday   aspirin 81 mg oral tablet: 1 tab(s) orally once a day  Crestor 20 mg oral tablet: 1 tab(s) orally once a day (at bedtime)  cyclobenzaprine 5 mg oral tablet: 1 tab(s) orally 3 times a day, As Needed - for muscle spasm  Deltasone 10 mg oral tablet: 5 tab(s) orally once a day x 4 days and then 4 tabs daily x 7 days a nd then 3 yabs daily x 7 days and then 2 tabs daily x 7 days and then 1 tab daily x 7 days and then stop  Dulcolax Laxative 5 mg oral delayed release tablet: 1 tab(s) orally once a day, As Needed  ibuprofen 800 mg oral tablet: 1 tab(s) orally every other day, As Needed - for headache  Multiple Vitamins oral tablet: 1 tab(s) orally once a day  nicotine 14 mg/24 hr transdermal film, extended release: 1 patch transdermal once a day   nortriptyline 10 mg oral capsule: 1 cap(s) orally once a day, As Needed Migraine  omeprazole 40 mg oral delayed release capsule: 1 cap(s) orally once a day  polyethylene glycol 3350 oral powder for reconstitution: 17 gram(s) orally 2 times a day, As Needed  rizatriptan 10 mg oral tablet, disintegrating: 10 milligram(s) orally every 2 hours, As Needed  senna leaf extract oral tablet: 2 tab(s) orally once a day (at bedtime)  traZODone 50 mg oral tablet: 1 tab(s) orally once a day (at bedtime)  Vitamin D2 1.25 mg (50,000 intl units) oral capsule: 1 cap(s) orally once a week wednesday   aspirin 81 mg oral tablet: 1 tab(s) orally once a day  Crestor 20 mg oral tablet: 1 tab(s) orally once a day (at bedtime)  cyclobenzaprine 5 mg oral tablet: 1 tab(s) orally 3 times a day, As Needed - for muscle spasm  Dulcolax Laxative 5 mg oral delayed release tablet: 1 tab(s) orally once a day, As Needed  fluticasone 50 mcg/inh nasal spray: 1 spray(s) nasal 2 times a day  Multiple Vitamins oral tablet: 1 tab(s) orally once a day  nicotine 14 mg/24 hr transdermal film, extended release: 1 patch transdermal once a day   nortriptyline 10 mg oral capsule: 1 cap(s) orally once a day, As Needed Migraine  omeprazole 40 mg oral delayed release capsule: 1 cap(s) orally once a day  polyethylene glycol 3350 oral powder for reconstitution: 17 gram(s) orally 2 times a day, As Needed  predniSONE 1 mg oral delayed release tablet: 1 tab(s) orally once a day starting 1/24/23  predniSONE 10 mg oral tablet: 5 tab(s) orally once a day x 4 days and then 4 tabs daily x 7 dyas and then 3 tabs daily x 7 days and then 2 tabs daily x 7 days and then 1 tab daily x 7 days and then stop.  QUEtiapine 50 mg oral tablet: 1 tab(s) orally once a day at 21:00  senna leaf extract oral tablet: 2 tab(s) orally once a day (at bedtime)  Vitamin D2 1.25 mg (50,000 intl units) oral capsule: 1 cap(s) orally once a week wednesday

## 2022-12-06 NOTE — PHYSICAL THERAPY INITIAL EVALUATION ADULT - ADDITIONAL COMMENTS
Patient lives with her  in apartment, 5 steps to enter. Patient was using a cane prior to admission for ambulation. Patient was independent in all ADL prior to admission.

## 2022-12-06 NOTE — OCCUPATIONAL THERAPY INITIAL EVALUATION ADULT - PERTINENT HX OF CURRENT PROBLEM, REHAB EVAL
Pt is a 53 y/o female with PMH of Migraines, HLD, COVID in 12/21, dx'd with MS 6 months ago, hospitalized for recurrent Rt eye optic neuritis now on high dose IV steroids

## 2022-12-06 NOTE — PHYSICAL THERAPY INITIAL EVALUATION ADULT - PERTINENT HX OF CURRENT PROBLEM, REHAB EVAL
53 yo F w/ PMHx of Migraines, HLD, COVID in 12/21, dx'd with MS 6 months ago, hospitalized for recurrent Rt eye optic neuritis now on high dose IV steroids

## 2022-12-06 NOTE — BH CONSULTATION LIAISON ASSESSMENT NOTE - HPI (INCLUDE ILLNESS QUALITY, SEVERITY, DURATION, TIMING, CONTEXT, MODIFYING FACTORS, ASSOCIATED SIGNS AND SYMPTOMS)
52 year old female with no formal PPH, PMH HLD, p/w 4 day hx of progressive vision loss OD admitted for concern for optic neuritis currently on IV steroids, psych c/s for depression /anxiety.    Patient calm, pleasant cooperative - states that over the past 6-7mo since her dx she has been more depressed to the point of feeling down daily, has had partial anhedonia and amotivational state though does feel pleasure when she sees children and grandchildren. Denies suicidality/intent or plan and denies homicidality/intent or plan. Normal appetite but chronically poor sleep, had been on Seroquel for sleep for years but stopped after PMD retired, states this helped best after several agents were tried and failed. Denies AH/VH, delusions or paranoia. Does endorse some intermittent periods lasting 3-4d of decreased need for sleep, increased energy, racing thoughts, increase in goal direction, and irritability without grandiosity, risky behaviors etc. Denies etoh or drug abuse. Discussed caregiver distress given 's multiple myeloma.

## 2022-12-06 NOTE — PROGRESS NOTE ADULT - ASSESSMENT
51 yo F w/ PMHx of Migraines, HLD, COVID in 12/21, dx'd with MS 6 months ago, hospitalized for recurrent Rt eye optic neuritis now on high dose IV steroids.

## 2022-12-06 NOTE — DISCHARGE NOTE PROVIDER - HOSPITAL COURSE
52 F PMHx migraines, HLD, COVID in 12/21, dx'd with MS 6 months ago, hospitalized for Rt eye optic neuritis tx'd with IV steroids x 5 days and then taper x 10 days. Pt now presents with cloudy vision in Rt eye since 12/1. Admitted for optic neuritis. Neuro consulted. S/P IV Solumedrol x5 days with minimal improvement. MR brain w/ and w/o contrast. MR orbit w/ w/o contrast showing findings of demyelinating disease. Infectious workup including CBC, VBG w/ lactate, CXR, UA, all negative this far. S/P Mary, started on PLEX 12/9, completed 12/17. Started on steroid taper. Pt monitored with improvement.     Labs notable for hypocalcemia. Ionized calcium low likely in the setting of PLEX administration. C/w calcium gluconate q48 hours ( w/ PLEX). PTH high, vitamin D low - c/w vitamin D supplements         52 F PMHx migraines, HLD, COVID in 12/21, dx'd with MS 6 months ago, hospitalized for Rt eye optic neuritis tx'd with IV steroids x 5 days and then taper x 10 days. Pt now presents with cloudy vision in Rt eye since 12/1. Admitted for optic neuritis. Neuro consulted. S/P IV Solumedrol x5 days with minimal improvement. MR brain w/ and w/o contrast. MR orbit w/ w/o contrast showing findings of demyelinating disease. Infectious workup including CBC, VBG w/ lactate, CXR, UA, all negative this far. S/P Mary, started on PLEX 12/9, completed 12/17. After conversation with outpatient Neuroimmunologist it was decided that patient should receive 2 more treatmetns prior to discharge. Patient received total of 7 sessions of plasmapheresis with some improvement on her vision (subjectively). Started on steroid taper which patient will continue on discharge (plus PPI). Seen by Ophthalmology during hospital stay, no improvement noted until their last visit (12/20) with visual acuity of 20/800, recommended outpatient follow up. Hospital course was complicated with COVID-19 infection, patient only had URI symptoms that improved rapidly, patient received 3 days of remdesivir, O2 saturation was always at goal.  Labs notable for hypocalcemia. Ionized calcium low likely in the setting of PLEX administration. C/w calcium gluconate q48 hours ( w/ PLEX). PTH high, vitamin D low - c/w vitamin D supplements  Patient is now stable for discharge home with Neurology, PMD and Ophthalmology follow up as outpatient.

## 2022-12-06 NOTE — PHYSICAL THERAPY INITIAL EVALUATION ADULT - DIAGNOSIS, PT EVAL
Ila Bangura   2017 4:20 PM   Office Visit   MRN: [id number]    Department:  [address] [place] [address]   Dept Phone:  [phone]    Description:  Female : 1944   Provider:  Wendy Galan M.D.           Allergies as of 2017     Allergen Noted Reactions    Augmentin 2016   Hives    Hard to breath    Aspirin 2016   Nausea      You were diagnosed with     Essential hypertension   [9424023]       Dyslipidemia   [964721]       Mild persistent asthma without complication   [480298]       Gastroesophageal reflux disease without esophagitis   [712554]       IFG (impaired fasting glucose)   [510434]       Pain of upper abdomen   [461620]         Vital Signs     Blood Pressure Pulse Temperature Height Weight Body Mass Index    126/68 mmHg 72 36.6 °C (97.9 °F) 1.524 m (5') 53.343 kg (117 lb 9.6 oz) 22.97 kg/m2    Oxygen Saturation Smoking Status                96% Never Smoker           Basic Information     Date Of Birth Sex Race Ethnicity Preferred Language    1944 Female Unable to Obtain Unknown English      Your appointments     2017  4:00 PM   Established Patient with Wendy Galan M.D.   [address])    [address] [place] [address] [phone]           You will be receiving a confirmation call a few days before your appointment from our automated call confirmation system.              Problem List              ICD-10-CM Priority Class Noted - Resolved    Essential hypertension I10   10/9/2016 - Present    Dyslipidemia E78.5   10/9/2016 - Present    Mild persistent asthma without complication J45.30   10/9/2016 - Present    Gastroesophageal reflux disease without esophagitis K21.9   10/9/2016 - Present    IFG (impaired fasting glucose) R73.01   10/10/2016 - Present    Pain of upper abdomen R10.10   2017 - Present      Health Maintenance        Date Due Completion Dates    IMM DTaP/Tdap/Td Vaccine (1 - Tdap) 1964 ---       PAP SMEAR 2/18/1966 ---    COLONOSCOPY 2/18/1995 ---    IMM ZOSTER VACCINE 2/18/2005 ---    BONE DENSITY 2/18/2010 ---    IMM PNEUMOCOCCAL 65+ (ADULT) LOW/MEDIUM RISK SERIES (1 of 2 - PCV13) 2/18/2010 ---    IMM INFLUENZA (1) 9/1/2016 ---    MAMMOGRAM 12/5/2017 12/5/2016, 12/5/2016, 12/5/2016, 11/21/2016            Current Immunizations     No immunizations on file.      Below and/or attached are the medications your provider expects you to take. Review all of your home medications and newly ordered medications with your provider and/or pharmacist. Follow medication instructions as directed by your provider and/or pharmacist. Please keep your medication list with you and share with your provider. Update the information when medications are discontinued, doses are changed, or new medications (including over-the-counter products) are added; and carry medication information at all times in the event of emergency situations     Allergies:  AUGMENTIN - Hives     ASPIRIN - Nausea               Medications  Valid as of: January 11, 2017 -  5:34 PM    Generic Name Brand Name Tablet Size Instructions for use    Atorvastatin Calcium (Tab) LIPITOR 10 MG Take 1 Tab by mouth every day.        Carvedilol (Tab) COREG 6.25 MG Take 1 Tab by mouth 2 times a day, with meals.        Fluticasone Furoate (AEROSOL POWDER, BREATH ACTIVATED) Fluticasone Furoate 100 MCG/ACT Inhale  by mouth.        Levalbuterol Tartrate (Aerosol) XOPENEX HFA 45 MCG/ACT Inhale 1-2 Puffs by mouth every four hours as needed for Shortness of Breath.        Multiple Vitamins-Minerals   Take  by mouth.        Omeprazole (CAPSULE DELAYED RELEASE) PRILOSEC 20 MG Take 1 Cap by mouth every day.        .                 Medicines prescribed today were sent to:     [facility] - [facility] [address]    [address] [facility] [address]    Phone: [phone] Fax: [phone]    Open 24 Hours?: No    [id number] - [place], [place] - [address] AT [facility] [address]    [address] [place] [address]    Phone: [phone] Fax: [phone]    Open 24 Hours?: No      Medication refill instructions:       If your prescription bottle indicates you have medication refills left, it is not necessary to call your provider’s office. Please contact your pharmacy and they will refill your medication.    If your prescription bottle indicates you do not have any refills left, you may request refills at any time through one of the following ways: The online Blendin system (except Urgent Care), by calling your provider’s office, or by asking your pharmacy to contact your provider’s office with a refill request. Medication refills are processed only during regular business hours and may not be available until the next business day. Your provider may request additional information or to have a follow-up visit with you prior to refilling your medication.   *Please Note: Medication refills are assigned a new Rx number when refilled electronically. Your pharmacy may indicate that no refills were authorized even though a new prescription for the same medication is available at the pharmacy. Please request the medicine by name with the pharmacy before contacting your provider for a refill.        Your To Do List     Future Labs/Procedures Complete By Expires    CBC WITH DIFFERENTIAL  As directed 1/12/2018    COMP METABOLIC PANEL  As directed 1/12/2018    HEMOGLOBIN A1C  As directed 1/12/2018    LIPID PROFILE  As directed 1/12/2018    US-ABDOMEN LIMITED  As directed 7/14/2017         Blendin Access Code: [email or web address]  Expires: 2/10/2017  5:34 PM    [employer]endin  A secure, online tool to manage your health information     [employer]Clip® is a secure, online tool that connects you to your personalized health information from the privacy of your home -- day or night - making it very easy for you to manage your  healthcare. Once the activation process is completed, you can even access your medical information using the [facility] jm, which is available for free in the Apple Jm store or Google Play store.     [facility] provides the following levels of access (as shown below):   My Chart Features   [facility] Doctor [facility]  Specialists [facility]  Urgent  Care Non-[facility]  Primary Care  Doctor   Email your healthcare team securely and privately 24/7 X X X    Manage appointments: schedule your next appointment; view details of past/upcoming appointments X      Request prescription refills. X      View recent personal medical records, including lab and immunizations X X X X   View health record, including health history, allergies, medications X X X X   Read reports about your outpatient visits, procedures, consult and ER notes X X X X   See your discharge summary, which is a recap of your hospital and/or ER visit that includes your diagnosis, lab results, and care plan. X X       How to register for [facility]:  1. Go to  [email or web address].  2. Click on the Sign Up Now box, which takes you to the New Member Sign Up page. You will need to provide the following information:  a. Enter your [facility] Access Code exactly as it appears at the top of this page. (You will not need to use this code after you’ve completed the sign-up process. If you do not sign up before the expiration date, you must request a new code.)   b. Enter your date of birth.   c. Enter your home email address.   d. Click Submit, and follow the next screen’s instructions.  3. Create a [facility] ID. This will be your [facility] login ID and cannot be changed, so think of one that is secure and easy to remember.  4. Create a [facility] password. You can change your password at any time.  5. Enter your Password Reset Question and Answer. This can be used at a later time if you forget your password.   6. Enter your e-mail address. This allows you to receive e-mail  notifications when new information is available in [employer].  7. Click Sign Up. You can now view your health information.    For assistance activating your [employer] account, call [phone]         Decreased functional mobility

## 2022-12-07 LAB
ANION GAP SERPL CALC-SCNC: 13 MMOL/L — SIGNIFICANT CHANGE UP (ref 7–14)
BASOPHILS # BLD AUTO: 0.03 K/UL — SIGNIFICANT CHANGE UP (ref 0–0.2)
BASOPHILS NFR BLD AUTO: 0.1 % — SIGNIFICANT CHANGE UP (ref 0–2)
BUN SERPL-MCNC: 12 MG/DL — SIGNIFICANT CHANGE UP (ref 7–23)
CALCIUM SERPL-MCNC: 9.3 MG/DL — SIGNIFICANT CHANGE UP (ref 8.4–10.5)
CHLORIDE SERPL-SCNC: 110 MMOL/L — HIGH (ref 98–107)
CO2 SERPL-SCNC: 21 MMOL/L — LOW (ref 22–31)
CREAT SERPL-MCNC: 0.51 MG/DL — SIGNIFICANT CHANGE UP (ref 0.5–1.3)
EGFR: 113 ML/MIN/1.73M2 — SIGNIFICANT CHANGE UP
EOSINOPHIL # BLD AUTO: 0 K/UL — SIGNIFICANT CHANGE UP (ref 0–0.5)
EOSINOPHIL NFR BLD AUTO: 0 % — SIGNIFICANT CHANGE UP (ref 0–6)
GLUCOSE BLDC GLUCOMTR-MCNC: 121 MG/DL — HIGH (ref 70–99)
GLUCOSE BLDC GLUCOMTR-MCNC: 254 MG/DL — HIGH (ref 70–99)
GLUCOSE BLDC GLUCOMTR-MCNC: 284 MG/DL — HIGH (ref 70–99)
GLUCOSE BLDC GLUCOMTR-MCNC: 331 MG/DL — HIGH (ref 70–99)
GLUCOSE SERPL-MCNC: 147 MG/DL — HIGH (ref 70–99)
HCT VFR BLD CALC: 37.3 % — SIGNIFICANT CHANGE UP (ref 34.5–45)
HGB BLD-MCNC: 12 G/DL — SIGNIFICANT CHANGE UP (ref 11.5–15.5)
IANC: 22.62 K/UL — HIGH (ref 1.8–7.4)
IMM GRANULOCYTES NFR BLD AUTO: 1.1 % — HIGH (ref 0–0.9)
LYMPHOCYTES # BLD AUTO: 0.68 K/UL — LOW (ref 1–3.3)
LYMPHOCYTES # BLD AUTO: 2.9 % — LOW (ref 13–44)
MAGNESIUM SERPL-MCNC: 2.1 MG/DL — SIGNIFICANT CHANGE UP (ref 1.6–2.6)
MCHC RBC-ENTMCNC: 27.6 PG — SIGNIFICANT CHANGE UP (ref 27–34)
MCHC RBC-ENTMCNC: 32.2 GM/DL — SIGNIFICANT CHANGE UP (ref 32–36)
MCV RBC AUTO: 85.9 FL — SIGNIFICANT CHANGE UP (ref 80–100)
MONOCYTES # BLD AUTO: 0.14 K/UL — SIGNIFICANT CHANGE UP (ref 0–0.9)
MONOCYTES NFR BLD AUTO: 0.6 % — LOW (ref 2–14)
NEUTROPHILS # BLD AUTO: 22.62 K/UL — HIGH (ref 1.8–7.4)
NEUTROPHILS NFR BLD AUTO: 95.3 % — HIGH (ref 43–77)
NRBC # BLD: 0 /100 WBCS — SIGNIFICANT CHANGE UP (ref 0–0)
NRBC # FLD: 0 K/UL — SIGNIFICANT CHANGE UP (ref 0–0)
PHOSPHATE SERPL-MCNC: 3.6 MG/DL — SIGNIFICANT CHANGE UP (ref 2.5–4.5)
PLATELET # BLD AUTO: 307 K/UL — SIGNIFICANT CHANGE UP (ref 150–400)
POTASSIUM SERPL-MCNC: 3.9 MMOL/L — SIGNIFICANT CHANGE UP (ref 3.5–5.3)
POTASSIUM SERPL-SCNC: 3.9 MMOL/L — SIGNIFICANT CHANGE UP (ref 3.5–5.3)
RBC # BLD: 4.34 M/UL — SIGNIFICANT CHANGE UP (ref 3.8–5.2)
RBC # FLD: 15.6 % — HIGH (ref 10.3–14.5)
SODIUM SERPL-SCNC: 144 MMOL/L — SIGNIFICANT CHANGE UP (ref 135–145)
T4 FREE SERPL-MCNC: 1 NG/DL — SIGNIFICANT CHANGE UP (ref 0.9–1.8)
TSH SERPL-MCNC: <0.1 UIU/ML — LOW (ref 0.27–4.2)
WBC # BLD: 23.74 K/UL — HIGH (ref 3.8–10.5)
WBC # FLD AUTO: 23.74 K/UL — HIGH (ref 3.8–10.5)

## 2022-12-07 PROCEDURE — 70553 MRI BRAIN STEM W/O & W/DYE: CPT | Mod: 26

## 2022-12-07 PROCEDURE — 99232 SBSQ HOSP IP/OBS MODERATE 35: CPT

## 2022-12-07 PROCEDURE — 70543 MRI ORBT/FAC/NCK W/O &W/DYE: CPT | Mod: 26

## 2022-12-07 PROCEDURE — 72157 MRI CHEST SPINE W/O & W/DYE: CPT | Mod: 26

## 2022-12-07 PROCEDURE — 72156 MRI NECK SPINE W/O & W/DYE: CPT | Mod: 26

## 2022-12-07 RX ADMIN — Medication 0.5 MILLIGRAM(S): at 07:35

## 2022-12-07 RX ADMIN — Medication 1 TABLET(S): at 11:46

## 2022-12-07 RX ADMIN — NORTRIPTYLINE HYDROCHLORIDE 10 MILLIGRAM(S): 10 CAPSULE ORAL at 10:37

## 2022-12-07 RX ADMIN — Medication 1 PATCH: at 11:45

## 2022-12-07 RX ADMIN — Medication 81 MILLIGRAM(S): at 11:44

## 2022-12-07 RX ADMIN — Medication 6: at 11:42

## 2022-12-07 RX ADMIN — Medication 1 PATCH: at 07:09

## 2022-12-07 RX ADMIN — Medication 8: at 07:35

## 2022-12-07 RX ADMIN — POLYETHYLENE GLYCOL 3350 17 GRAM(S): 17 POWDER, FOR SOLUTION ORAL at 21:06

## 2022-12-07 RX ADMIN — Medication 1 DROP(S): at 11:43

## 2022-12-07 RX ADMIN — ATORVASTATIN CALCIUM 80 MILLIGRAM(S): 80 TABLET, FILM COATED ORAL at 21:05

## 2022-12-07 RX ADMIN — SENNA PLUS 2 TABLET(S): 8.6 TABLET ORAL at 21:06

## 2022-12-07 RX ADMIN — ERGOCALCIFEROL 50000 UNIT(S): 1.25 CAPSULE ORAL at 07:31

## 2022-12-07 RX ADMIN — QUETIAPINE FUMARATE 50 MILLIGRAM(S): 200 TABLET, FILM COATED ORAL at 21:05

## 2022-12-07 RX ADMIN — Medication 58 MILLIGRAM(S): at 18:23

## 2022-12-07 RX ADMIN — Medication 1 DROP(S): at 06:29

## 2022-12-07 RX ADMIN — POLYETHYLENE GLYCOL 3350 17 GRAM(S): 17 POWDER, FOR SOLUTION ORAL at 06:30

## 2022-12-07 RX ADMIN — ENOXAPARIN SODIUM 40 MILLIGRAM(S): 100 INJECTION SUBCUTANEOUS at 18:24

## 2022-12-07 RX ADMIN — Medication 2: at 22:23

## 2022-12-07 RX ADMIN — PANTOPRAZOLE SODIUM 40 MILLIGRAM(S): 20 TABLET, DELAYED RELEASE ORAL at 06:30

## 2022-12-07 RX ADMIN — Medication 1 DROP(S): at 18:22

## 2022-12-07 NOTE — PROGRESS NOTE ADULT - ASSESSMENT
53 yo F w/ PMHx of Migraines, HLD, COVID in 12/21, dx'd with MS 6 months ago, hospitalized for recurrent Rt eye optic neuritis now on high dose IV steroids.

## 2022-12-07 NOTE — PROGRESS NOTE ADULT - PROBLEM SELECTOR PLAN 1
Currently with slight pain with EOM and decreased vision resembling previous optic neuritis. Previously treated with 1G/day steroids with outpatient tapering of prednisone  -now noted to be overall improving  -Neurology C/S: Solumedrol IV 1g x 5 days given significant worsening in vision. If she responds symptomatically by day 3/5, will consider administering remainder in outpatient setting. If she does not respond towards end of IV solumedrol course, will consider PLEX.  - MR brain w/ and w/o contrast, MR orbit w/ w/o contrast. Can defer MR spine for now.  - infectious workup including CBC, VBG w/ lactate, CXR, UA, all negative this far  - vitamin D, Ca supplementation, protonix 40mg daily,   - monitor FS daily, avoid use of concurrent NSAIDS  Ophth c/s: Recommendations per neuro

## 2022-12-07 NOTE — PROGRESS NOTE ADULT - SUBJECTIVE AND OBJECTIVE BOX
[facility] Department of Hospital Medicine  Dougie Sales MD  Available on MS Teams  Pager: [phone]    Patient is a 52y old  Female who presents with a chief complaint of Vision change (06 Dec 2022 20:00)    OVERNIGHT EVENTS: No acute events overnight    SUBJECTIVE: Pt seen and examined at bedside this morning. Reports that her right eye vision has somewhat improved. Reports that previously her vision was very "cloudy and foggy" and now its clearer and just blurry. Patient denies any weakness or loss of sensation. Has been tolerating diet and ambulating without difficulty.    ADDITIONAL REVIEW OF SYSTEMS: as above    MEDICATIONS  (STANDING):  artificial  tears Solution 1 Drop(s) Both EYES four times a day  aspirin enteric coated 81 milliGRAM(s) Oral daily  atorvastatin 80 milliGRAM(s) Oral at bedtime  dextrose 5%. 1000 milliLiter(s) (50 mL/Hr) IV Continuous <Continuous>  dextrose 5%. 1000 milliLiter(s) (100 mL/Hr) IV Continuous <Continuous>  dextrose 50% Injectable 25 Gram(s) IV Push once  dextrose 50% Injectable 12.5 Gram(s) IV Push once  dextrose 50% Injectable 25 Gram(s) IV Push once  enoxaparin Injectable 40 milliGRAM(s) SubCutaneous every 24 hours  ergocalciferol 00105 Unit(s) Oral <User Schedule>  glucagon  Injectable 1 milliGRAM(s) IntraMuscular once  insulin lispro (ADMELOG) corrective regimen sliding scale   SubCutaneous three times a day before meals  insulin lispro (ADMELOG) corrective regimen sliding scale   SubCutaneous at bedtime  methylPREDNISolone sodium succinate IVPB 1000 milliGRAM(s) IV Intermittent every 24 hours  multivitamin 1 Tablet(s) Oral daily  nicotine -  14 mG/24Hr(s) Patch 1 Patch Transdermal daily  pantoprazole    Tablet 40 milliGRAM(s) Oral before breakfast  QUEtiapine 50 milliGRAM(s) Oral <User Schedule>  senna 2 Tablet(s) Oral at bedtime    MEDICATIONS  (PRN):  bisacodyl 5 milliGRAM(s) Oral every 12 hours PRN Constipation  cyclobenzaprine 5 milliGRAM(s) Oral three times a day PRN Muscle Spasm  dextrose Oral Gel 15 Gram(s) Oral once PRN Blood Glucose LESS THAN 70 milliGRAM(s)/deciliter  LORazepam     Tablet 0.5 milliGRAM(s) Oral two times a day PRN Anxiety  nortriptyline 10 milliGRAM(s) Oral daily PRN Migraines  polyethylene glycol 3350 17 Gram(s) Oral two times a day PRN Constipation    CAPILLARY BLOOD GLUCOSE    POCT Blood Glucose.: 121 mg/dL (07 Dec 2022 16:52)  POCT Blood Glucose.: 284 mg/dL (07 Dec 2022 11:12)  POCT Blood Glucose.: 331 mg/dL (07 Dec 2022 07:22)  POCT Blood Glucose.: 271 mg/dL (06 Dec 2022 21:01)    I&O's Summary    06 Dec 2022 07:01  -  07 Dec 2022 07:00  --------------------------------------------------------  IN: 0 mL / OUT: 1300 mL / NET: -1300 mL    07 Dec 2022 07:01  -  07 Dec 2022 19:08  --------------------------------------------------------  IN: 0 mL / OUT: 600 mL / NET: -600 mL    PHYSICAL EXAM:  Vital Signs Last 24 Hrs  T(C): 36.6 (07 Dec 2022 18:00), Max: 37 (07 Dec 2022 06:08)  T(F): 97.9 (07 Dec 2022 18:00), Max: 98.6 (07 Dec 2022 06:08)  HR: 80 (07 Dec 2022 18:00) (73 - 91)  BP: 119/68 (07 Dec 2022 18:00) (119/68 - 139/61)  BP(mean): --  RR: 17 (07 Dec 2022 18:00) (17 - 18)  SpO2: 100% (07 Dec 2022 18:00) (93% - 100%)    Parameters below as of 07 Dec 2022 18:00  Patient On (Oxygen Delivery Method): room air    CONSTITUTIONAL: NAD, well-developed  HEAD: Normocephalic, atraumatic  EYES: EOMI, PERRL  ENT: no rhinorrhea, no pharyngeal erythema  RESPIRATORY: No increased work of breathing, CTAB, no wheezes or crackles appreciated  CARDIOVASCULAR: RRR, S1 and S2 present, no m/r/g  ABDOMEN: soft, NT, ND, bowel sounds present  EXTREMITIES: No LE edema  MUSCULOSKELETAL: no joint swelling, no tenderness to palpation  NEURO: A&Ox3, moving all extremities    LABS:                        12.0   23.74 )-----------( 307      ( 07 Dec 2022 06:59 )             37.3     12-07    144  |  110<H>  |  12  ----------------------------<  147<H>  3.9   |  21<L>  |  0.51    Ca    9.3      07 Dec 2022 06:59  Phos  3.6     12  Mg     2.10     12-    Urinalysis Basic - ( 05 Dec 2022 23:24 )    Color: Yellow / Appearance: Clear / S.029 / pH: x  Gluc: x / Ketone: Trace  / Bili: Negative / Urobili: <2 mg/dL   Blood: x / Protein: Trace / Nitrite: Negative   Leuk Esterase: Negative / RBC: x / WBC x   Sq Epi: x / Non Sq Epi: x / Bacteria: x    RADIOLOGY & ADDITIONAL TESTS:    Results Reviewed:   Imaging Personally Reviewed:  Electrocardiogram Personally Reviewed:    COORDINATION OF CARE:  Care Discussed with Consultants/Other Providers [Y/N]:  Prior or Outpatient Records Reviewed [Y/N]:

## 2022-12-08 LAB
ANION GAP SERPL CALC-SCNC: 13 MMOL/L — SIGNIFICANT CHANGE UP (ref 7–14)
APTT BLD: 25.6 SEC — LOW (ref 27–36.3)
BUN SERPL-MCNC: 12 MG/DL — SIGNIFICANT CHANGE UP (ref 7–23)
CALCIUM SERPL-MCNC: 8.9 MG/DL — SIGNIFICANT CHANGE UP (ref 8.4–10.5)
CHLORIDE SERPL-SCNC: 109 MMOL/L — HIGH (ref 98–107)
CO2 SERPL-SCNC: 21 MMOL/L — LOW (ref 22–31)
CREAT SERPL-MCNC: 0.56 MG/DL — SIGNIFICANT CHANGE UP (ref 0.5–1.3)
EGFR: 110 ML/MIN/1.73M2 — SIGNIFICANT CHANGE UP
GLUCOSE BLDC GLUCOMTR-MCNC: 103 MG/DL — HIGH (ref 70–99)
GLUCOSE BLDC GLUCOMTR-MCNC: 167 MG/DL — HIGH (ref 70–99)
GLUCOSE BLDC GLUCOMTR-MCNC: 180 MG/DL — HIGH (ref 70–99)
GLUCOSE BLDC GLUCOMTR-MCNC: 183 MG/DL — HIGH (ref 70–99)
GLUCOSE SERPL-MCNC: 185 MG/DL — HIGH (ref 70–99)
HCT VFR BLD CALC: 36 % — SIGNIFICANT CHANGE UP (ref 34.5–45)
HGB BLD-MCNC: 11.8 G/DL — SIGNIFICANT CHANGE UP (ref 11.5–15.5)
INR BLD: 1.05 RATIO — SIGNIFICANT CHANGE UP (ref 0.88–1.16)
MAGNESIUM SERPL-MCNC: 2.1 MG/DL — SIGNIFICANT CHANGE UP (ref 1.6–2.6)
MCHC RBC-ENTMCNC: 28.2 PG — SIGNIFICANT CHANGE UP (ref 27–34)
MCHC RBC-ENTMCNC: 32.8 GM/DL — SIGNIFICANT CHANGE UP (ref 32–36)
MCV RBC AUTO: 85.9 FL — SIGNIFICANT CHANGE UP (ref 80–100)
NRBC # BLD: 0 /100 WBCS — SIGNIFICANT CHANGE UP (ref 0–0)
NRBC # FLD: 0 K/UL — SIGNIFICANT CHANGE UP (ref 0–0)
PHOSPHATE SERPL-MCNC: 3.6 MG/DL — SIGNIFICANT CHANGE UP (ref 2.5–4.5)
PLATELET # BLD AUTO: 278 K/UL — SIGNIFICANT CHANGE UP (ref 150–400)
POTASSIUM SERPL-MCNC: 4.2 MMOL/L — SIGNIFICANT CHANGE UP (ref 3.5–5.3)
POTASSIUM SERPL-SCNC: 4.2 MMOL/L — SIGNIFICANT CHANGE UP (ref 3.5–5.3)
PROTHROM AB SERPL-ACNC: 12.2 SEC — SIGNIFICANT CHANGE UP (ref 10.5–13.4)
RBC # BLD: 4.19 M/UL — SIGNIFICANT CHANGE UP (ref 3.8–5.2)
RBC # FLD: 15.9 % — HIGH (ref 10.3–14.5)
SODIUM SERPL-SCNC: 143 MMOL/L — SIGNIFICANT CHANGE UP (ref 135–145)
WBC # BLD: 16.51 K/UL — HIGH (ref 3.8–10.5)
WBC # FLD AUTO: 16.51 K/UL — HIGH (ref 3.8–10.5)

## 2022-12-08 PROCEDURE — 76937 US GUIDE VASCULAR ACCESS: CPT | Mod: 26

## 2022-12-08 PROCEDURE — 99233 SBSQ HOSP IP/OBS HIGH 50: CPT

## 2022-12-08 PROCEDURE — 36556 INSERT NON-TUNNEL CV CATH: CPT

## 2022-12-08 PROCEDURE — 77001 FLUOROGUIDE FOR VEIN DEVICE: CPT | Mod: 26,GC

## 2022-12-08 RX ORDER — SODIUM CHLORIDE 9 MG/ML
10 INJECTION INTRAMUSCULAR; INTRAVENOUS; SUBCUTANEOUS
Refills: 0 | Status: DISCONTINUED | OUTPATIENT
Start: 2022-12-08 | End: 2022-12-19

## 2022-12-08 RX ORDER — CHLORHEXIDINE GLUCONATE 213 G/1000ML
1 SOLUTION TOPICAL
Refills: 0 | Status: DISCONTINUED | OUTPATIENT
Start: 2022-12-08 | End: 2022-12-09

## 2022-12-08 RX ADMIN — ATORVASTATIN CALCIUM 80 MILLIGRAM(S): 80 TABLET, FILM COATED ORAL at 22:13

## 2022-12-08 RX ADMIN — Medication 2: at 12:04

## 2022-12-08 RX ADMIN — Medication 58 MILLIGRAM(S): at 18:36

## 2022-12-08 RX ADMIN — Medication 1 DROP(S): at 07:42

## 2022-12-08 RX ADMIN — Medication 1 PATCH: at 07:56

## 2022-12-08 RX ADMIN — Medication 1 DROP(S): at 18:37

## 2022-12-08 RX ADMIN — Medication 81 MILLIGRAM(S): at 12:04

## 2022-12-08 RX ADMIN — PANTOPRAZOLE SODIUM 40 MILLIGRAM(S): 20 TABLET, DELAYED RELEASE ORAL at 07:41

## 2022-12-08 RX ADMIN — NORTRIPTYLINE HYDROCHLORIDE 10 MILLIGRAM(S): 10 CAPSULE ORAL at 10:10

## 2022-12-08 RX ADMIN — Medication 2: at 07:40

## 2022-12-08 RX ADMIN — SENNA PLUS 2 TABLET(S): 8.6 TABLET ORAL at 22:13

## 2022-12-08 RX ADMIN — Medication 1 TABLET(S): at 12:04

## 2022-12-08 RX ADMIN — Medication 1 DROP(S): at 12:03

## 2022-12-08 RX ADMIN — Medication 0.5 MILLIGRAM(S): at 18:37

## 2022-12-08 RX ADMIN — Medication 1 PATCH: at 12:01

## 2022-12-08 RX ADMIN — QUETIAPINE FUMARATE 50 MILLIGRAM(S): 200 TABLET, FILM COATED ORAL at 22:13

## 2022-12-08 NOTE — PROGRESS NOTE ADULT - PROBLEM SELECTOR PLAN 1
Currently with slight pain with EOM and decreased vision resembling previous optic neuritis. Previously treated with 1G/day steroids with outpatient tapering of prednisone  -now noted to be overall improving  -Neurology C/S: Solumedrol IV 1g x 5 days given significant worsening in vision. If she responds symptomatically by day 3/5, will consider administering remainder in outpatient setting.   - case discussed with neurology, patient with minimal improvement with IV steroids, plan to begin PLEX as soon as possible  - IR consulted for shiley placement, patient last received DVT dose lovenox at 6PM on 12/8. As patient is at risk for neurologic deterioration (especially with eye involvement), benefits of receiving PLEX right away outweigh the increased risk of bleeding 2/2 lovenox (patient also now almost 24 hrs from most recent dose). Will also check coags immediately prior to procedure to check for marked elevation.  - MR brain w/ and w/o contrast, MR orbit w/ w/o contrast showing findings of demyelinating disease  - infectious workup including CBC, VBG w/ lactate, CXR, UA, all negative this far  - vitamin D, Ca supplementation, protonix 40mg daily,   - monitor FS daily, avoid use of concurrent NSAIDS  Ophth c/s: Recommendations per neuro Currently with slight pain with EOM and decreased vision resembling previous optic neuritis. Previously treated with 1G/day steroids with outpatient tapering of prednisone  -Neurology C/S: Solumedrol IV 1g x 5 days given significant worsening in vision. Patient with minimal improvement with IV steroids  - case discussed with neurology, patient with minimal improvement with IV steroids, plan to begin PLEX as soon as possible  - IR consulted for shiley placement, patient last received DVT dose lovenox at 6PM on 12/8. As patient is at risk for neurologic deterioration (especially with eye involvement), benefits of receiving PLEX right away outweigh the increased risk of bleeding 2/2 lovenox (patient also now almost 24 hrs from most recent dose). Coags normal, plan for Shiley placement 12/8  - MR brain w/ and w/o contrast, MR orbit w/ w/o contrast showing findings of demyelinating disease  - infectious workup including CBC, VBG w/ lactate, CXR, UA, all negative this far  - vitamin D, Ca supplementation, protonix 40mg daily,   - monitor FS daily, avoid use of concurrent NSAIDS  Ophth c/s: Recommendations per neuro

## 2022-12-08 NOTE — PROGRESS NOTE ADULT - SUBJECTIVE AND OBJECTIVE BOX
Neurology Progress Note    SUBJECTIVE/OBJECTIVE/INTERVAL EVENTS: Patient seen and examined at bedside w/ neuro attending and team. Patient report minimal improvement in vision of her R eye after receiving 3 doses of IV solumedrol. Otherwise reports having improvement in leg numbness. MRI also obtained showing no new active lesions.      MEDICATIONS  (STANDING):  artificial  tears Solution 1 Drop(s) Both EYES four times a day  aspirin enteric coated 81 milliGRAM(s) Oral daily  atorvastatin 80 milliGRAM(s) Oral at bedtime  dextrose 5%. 1000 milliLiter(s) (50 mL/Hr) IV Continuous <Continuous>  dextrose 5%. 1000 milliLiter(s) (100 mL/Hr) IV Continuous <Continuous>  dextrose 50% Injectable 25 Gram(s) IV Push once  dextrose 50% Injectable 12.5 Gram(s) IV Push once  dextrose 50% Injectable 25 Gram(s) IV Push once  enoxaparin Injectable 40 milliGRAM(s) SubCutaneous every 24 hours  ergocalciferol 43386 Unit(s) Oral <User Schedule>  glucagon  Injectable 1 milliGRAM(s) IntraMuscular once  insulin lispro (ADMELOG) corrective regimen sliding scale   SubCutaneous three times a day before meals  insulin lispro (ADMELOG) corrective regimen sliding scale   SubCutaneous at bedtime  methylPREDNISolone sodium succinate IVPB 1000 milliGRAM(s) IV Intermittent every 24 hours  multivitamin 1 Tablet(s) Oral daily  nicotine -  14 mG/24Hr(s) Patch 1 Patch Transdermal daily  pantoprazole    Tablet 40 milliGRAM(s) Oral before breakfast  QUEtiapine 50 milliGRAM(s) Oral <User Schedule>  senna 2 Tablet(s) Oral at bedtime    MEDICATIONS  (PRN):  bisacodyl 5 milliGRAM(s) Oral every 12 hours PRN Constipation  cyclobenzaprine 5 milliGRAM(s) Oral three times a day PRN Muscle Spasm  dextrose Oral Gel 15 Gram(s) Oral once PRN Blood Glucose LESS THAN 70 milliGRAM(s)/deciliter  LORazepam     Tablet 0.5 milliGRAM(s) Oral two times a day PRN Anxiety  nortriptyline 10 milliGRAM(s) Oral daily PRN Migraines  polyethylene glycol 3350 17 Gram(s) Oral two times a day PRN Constipation      VITALS & EXAMINATION:  Vital Signs Last 24 Hrs  T(C): 36.4 (08 Dec 2022 09:53), Max: 36.7 (08 Dec 2022 06:14)  T(F): 97.6 (08 Dec 2022 09:53), Max: 98 (08 Dec 2022 06:14)  HR: 94 (08 Dec 2022 09:53) (68 - 94)  BP: 139/84 (08 Dec 2022 09:53) (119/68 - 140/47)  BP(mean): --  RR: 18 (08 Dec 2022 09:53) (17 - 18)  SpO2: 100% (08 Dec 2022 09:53) (98% - 100%)    Parameters below as of 08 Dec 2022 09:53  Patient On (Oxygen Delivery Method): room air      PHYSICAL EXAM:  Constitutional: Female, appears stated age, in no apparent distress including pain  Head: Normocephalic & Atraumatic.  Respiratory: Breathing comfortably.  Extremities: No cyanosis, clubbing   Skin: no rash, no bruising     Neurological Exam:  MS: Awake, alert, oriented to person, place, month, year, president. Normal affect. Follows all commands.  Language: Speech is clear, fluent with good repetition & comprehension (able to name objects)    CNs: Pupils round, 3mm bilaterally,  + APD. Decreased vision in R eye (20/800), worse in superior quadrants. Intact in L eye. Red color desaturation in R eye. EOMI but with pain,  R eye pain with EOM, No nystagmus. V1-3 intact to LT. No facial asymmetry b/l, Hearing grossly normal (rubbing fingers) b/l. Symmetric palate elevation in midline. Gag reflex deferred. Head turning & shoulder shrug intact b/l. Tongue midline, normal movements, no atrophy.    Motor: Normal muscle bulk & tone. No noticeable tremor.              Deltoid	Biceps	Triceps 	   R	5	5	5	5	 	  L	5	5	5	5			    	H-Flex	H-Ext	K-Flex	K-Ext	D-Flex	P-Flex  R	5	5	5	5	5	5		   L	5	5	5	5	5	5		     Sensation: Intact to LT b/l throughout.     Reflexes:              Biceps(C5)       BR(C6)     Triceps(C7)               Patellar(L4)    Achilles(S1)    Plantar Resp  R	2	          2	             2		        2		    2		Down   L	2	          2	             2		        2		    2		Down     Coordination:. No dysmetria to FTN/HTS  Gait:  Deferred      LABORATORY:  CBC                       11.8   16.51 )-----------( 278      ( 08 Dec 2022 06:03 )             36.0     Chem 12-08    143  |  109<H>  |  12  ----------------------------<  185<H>  4.2   |  21<L>  |  0.56    Ca    8.9      08 Dec 2022 06:03  Phos  3.6     12-08  Mg     2.10     12-08      LFTs   Coagulopathy   Lipid Panel 12-06 Chol 186 LDL -- HDL 60 Trig 67  A1c   Cardiac enzymes     U/A   CSF  Immunological  Other    STUDIES & IMAGING: (EEG, CT, MR, U/S, TTE/ARIANA):    < from: MR Thoracic Spine w/wo IV Cont (12.07.22 @ 10:15) >    ACC: [id number] EXAM:  MR ORBITS ONLY WAWI                        ACC: [id number] EXAM:  MR SPINE CERVICAL WAW IC                        ACC: [id number] EXAM:  MR BRAIN WAW IC                        ACC: [id number] EXAM:  MR SPINE THORACIC WAW IC                PROCEDURE DATE:  12/07/2022          INTERPRETATION:  Contrast-enhanced MRI of the brain, orbits, cervical   spine, and thoracic spine    CLINICAL INDICATION: Right eye blurred vision, optic neuritis suspected.   Multiple sclerosis recently diagnosed. Migraines.    TECHNIQUE: Multiplanar, multisequence MR images of the brain, orbits,   cervical spine    COMPARISON: MRI brain, orbits, and cervical spine 7/18/2022    FINDINGS:    MRI BRAIN:    No hydrocephalus, midline shift, mass effect, vasogenic edema, or acute   intracranial hemorrhage.    Diffusion weighted images demonstrate no acute territorial infarct.    Similar-appearing nonenhancing foci of increased T2 and FLAIR   hyperintense signal in the periventricular white matter, some ovoid and   oriented in a perpendicular fashion to the long axis of the ventricles.    No abnormal parenchymal or leptomeningeal enhancement.    Flow voids are seen within the major intracranial vessels consistent with   their patency.    Visualized paranasal sinuses and mastoid air cells are clear.    MRI ORBITS:    The globes, extraocular muscles, retrobulbar fat, and optic nerves are   unremarkable.    No abnormal signal or enhancement within the optic nerves. No abnormal   enhancementwithin the orbits.    MRI CERVICAL SPINE:    Vertebral body height, marrow signal homogeneity, and facet alignment are   maintained throughout the visualized spinal segments.    No prevertebral or paravertebral edema.    No abnormal enhancement in the cervical region.    No abnormal intrinsic cord signal.    No significant spinal canal stenosis or neural foraminal narrowing.    MRI THORACIC SPINE:    Vertebral body height, marrow signal homogeneity, and facet alignment are   maintained throughoutthe visualized spinal segments.    No abnormal intrinsic cord signal.    No abnormal enhancement.    Mild multilevel degenerative changes without significant spinal canal   stenosis or neural foraminal narrowing.    IMPRESSION:    MRI BRAIN:  Similar-appearing nonenhancing foci of increased T2 and FLAIR   hyperintense signal in the periventricular white matter, some ovoid and   oriented in a perpendicular fashion to the long axis of the ventricles.   Findings are consistent with the presence of demyelinating disease.    No abnormal parenchymal or leptomeningeal enhancement.    MRI ORBITS:  No abnormal signal or enhancement within the optic nerves.  Intraorbital contents unremarkable.    MRI CERVICAL SPINE:  No abnormal intrinsic cord signal.  No abnormal enhancement cervical region.  No significant spinal canal stenosis or neural foraminal narrowing.    MRI THORACIC SPINE:  No abnormal intrinsic cord signal.  No abnormal enhancement thoracic region.  No significant spinal canal stenosis or neural foraminal narrowing.       Neurology Progress Note    SUBJECTIVE/OBJECTIVE/INTERVAL EVENTS: Patient seen and examined at bedside w/ neuro attending and team. Patient report minimal improvement in vision of her R eye after receiving 3 doses of IV solumedrol. Otherwise reports having improvement in leg numbness. MRI also obtained showing no new active lesions.      MEDICATIONS  (STANDING):  artificial  tears Solution 1 Drop(s) Both EYES four times a day  aspirin enteric coated 81 milliGRAM(s) Oral daily  atorvastatin 80 milliGRAM(s) Oral at bedtime  dextrose 5%. 1000 milliLiter(s) (50 mL/Hr) IV Continuous <Continuous>  dextrose 5%. 1000 milliLiter(s) (100 mL/Hr) IV Continuous <Continuous>  dextrose 50% Injectable 25 Gram(s) IV Push once  dextrose 50% Injectable 12.5 Gram(s) IV Push once  dextrose 50% Injectable 25 Gram(s) IV Push once  enoxaparin Injectable 40 milliGRAM(s) SubCutaneous every 24 hours  ergocalciferol 58972 Unit(s) Oral <User Schedule>  glucagon  Injectable 1 milliGRAM(s) IntraMuscular once  insulin lispro (ADMELOG) corrective regimen sliding scale   SubCutaneous three times a day before meals  insulin lispro (ADMELOG) corrective regimen sliding scale   SubCutaneous at bedtime  methylPREDNISolone sodium succinate IVPB 1000 milliGRAM(s) IV Intermittent every 24 hours  multivitamin 1 Tablet(s) Oral daily  nicotine -  14 mG/24Hr(s) Patch 1 Patch Transdermal daily  pantoprazole    Tablet 40 milliGRAM(s) Oral before breakfast  QUEtiapine 50 milliGRAM(s) Oral <User Schedule>  senna 2 Tablet(s) Oral at bedtime    MEDICATIONS  (PRN):  bisacodyl 5 milliGRAM(s) Oral every 12 hours PRN Constipation  cyclobenzaprine 5 milliGRAM(s) Oral three times a day PRN Muscle Spasm  dextrose Oral Gel 15 Gram(s) Oral once PRN Blood Glucose LESS THAN 70 milliGRAM(s)/deciliter  LORazepam     Tablet 0.5 milliGRAM(s) Oral two times a day PRN Anxiety  nortriptyline 10 milliGRAM(s) Oral daily PRN Migraines  polyethylene glycol 3350 17 Gram(s) Oral two times a day PRN Constipation      VITALS & EXAMINATION:  Vital Signs Last 24 Hrs  T(C): 36.4 (08 Dec 2022 09:53), Max: 36.7 (08 Dec 2022 06:14)  T(F): 97.6 (08 Dec 2022 09:53), Max: 98 (08 Dec 2022 06:14)  HR: 94 (08 Dec 2022 09:53) (68 - 94)  BP: 139/84 (08 Dec 2022 09:53) (119/68 - 140/47)  BP(mean): --  RR: 18 (08 Dec 2022 09:53) (17 - 18)  SpO2: 100% (08 Dec 2022 09:53) (98% - 100%)    Parameters below as of 08 Dec 2022 09:53  Patient On (Oxygen Delivery Method): room air      PHYSICAL EXAM:  Constitutional: Female, appears stated age, in no apparent distress including pain  Head: Normocephalic & Atraumatic.  Respiratory: Breathing comfortably.  Extremities: No cyanosis, clubbing   Skin: no rash, no bruising     Neurological Exam:  MS: Awake, alert, oriented to person, place, month, year, president. Normal affect. Follows all commands.  Language: Speech is clear, fluent with good repetition & comprehension (able to name objects)    CNs: Pupils round, 3mm bilaterally,  + right  RAPD. Decreased vision in R eye (20/800), worse in superior quadrants. Intact in L eye. Red color desaturation in R eye. EOMI but with pain,  R eye pain with EOM, No nystagmus. V1-3 intact to LT. No facial asymmetry b/l, Hearing grossly normal (rubbing fingers) b/l. Symmetric palate elevation in midline. Gag reflex deferred. Head turning & shoulder shrug intact b/l. Tongue midline, normal movements, no atrophy.    Motor: Normal muscle bulk & tone. No noticeable tremor.              Deltoid	Biceps	Triceps 	   R	5	5	5	5	 	  L	5	5	5	5			    	H-Flex	H-Ext	K-Flex	K-Ext	D-Flex	P-Flex  R	5	5	5	5	5	5		   L	5	5	5	5	5	5		     Sensation: Intact to LT b/l throughout.     Reflexes:              Biceps(C5)       BR(C6)     Triceps(C7)               Patellar(L4)    Achilles(S1)    Plantar Resp  R	2	          2	             2		        2		    2		Down   L	2	          2	             2		        2		    2		Down     Coordination:. No dysmetria to FTN/HTS  Gait:  Deferred      LABORATORY:  CBC                       11.8   16.51 )-----------( 278      ( 08 Dec 2022 06:03 )             36.0     Chem 12-08    143  |  109<H>  |  12  ----------------------------<  185<H>  4.2   |  21<L>  |  0.56    Ca    8.9      08 Dec 2022 06:03  Phos  3.6     12-08  Mg     2.10     12-08      LFTs   Coagulopathy   Lipid Panel 12-06 Chol 186 LDL -- HDL 60 Trig 67  A1c   Cardiac enzymes     U/A   CSF  Immunological  Other    STUDIES & IMAGING: (EEG, CT, MR, U/S, TTE/ARIANA):    < from: MR Thoracic Spine w/wo IV Cont (12.07.22 @ 10:15) >    ACC: [id number] EXAM:  MR ORBITS ONLY WAWI                        ACC: [id number] EXAM:  MR SPINE CERVICAL WAW IC                        ACC: [id number] EXAM:  MR BRAIN WAW IC                        ACC: [id number] EXAM:  MR SPINE THORACIC WAW IC                PROCEDURE DATE:  12/07/2022          INTERPRETATION:  Contrast-enhanced MRI of the brain, orbits, cervical   spine, and thoracic spine    CLINICAL INDICATION: Right eye blurred vision, optic neuritis suspected.   Multiple sclerosis recently diagnosed. Migraines.    TECHNIQUE: Multiplanar, multisequence MR images of the brain, orbits,   cervical spine    COMPARISON: MRI brain, orbits, and cervical spine 7/18/2022    FINDINGS:    MRI BRAIN:    No hydrocephalus, midline shift, mass effect, vasogenic edema, or acute   intracranial hemorrhage.    Diffusion weighted images demonstrate no acute territorial infarct.    Similar-appearing nonenhancing foci of increased T2 and FLAIR   hyperintense signal in the periventricular white matter, some ovoid and   oriented in a perpendicular fashion to the long axis of the ventricles.    No abnormal parenchymal or leptomeningeal enhancement.    Flow voids are seen within the major intracranial vessels consistent with   their patency.    Visualized paranasal sinuses and mastoid air cells are clear.    MRI ORBITS:    The globes, extraocular muscles, retrobulbar fat, and optic nerves are   unremarkable.    No abnormal signal or enhancement within the optic nerves. No abnormal   enhancementwithin the orbits.    MRI CERVICAL SPINE:    Vertebral body height, marrow signal homogeneity, and facet alignment are   maintained throughout the visualized spinal segments.    No prevertebral or paravertebral edema.    No abnormal enhancement in the cervical region.    No abnormal intrinsic cord signal.    No significant spinal canal stenosis or neural foraminal narrowing.    MRI THORACIC SPINE:    Vertebral body height, marrow signal homogeneity, and facet alignment are   maintained throughoutthe visualized spinal segments.    No abnormal intrinsic cord signal.    No abnormal enhancement.    Mild multilevel degenerative changes without significant spinal canal   stenosis or neural foraminal narrowing.    IMPRESSION:    MRI BRAIN:  Similar-appearing nonenhancing foci of increased T2 and FLAIR   hyperintense signal in the periventricular white matter, some ovoid and   oriented in a perpendicular fashion to the long axis of the ventricles.   Findings are consistent with the presence of demyelinating disease.    No abnormal parenchymal or leptomeningeal enhancement.    MRI ORBITS:  No abnormal signal or enhancement within the optic nerves.  Intraorbital contents unremarkable.    MRI CERVICAL SPINE:  No abnormal intrinsic cord signal.  No abnormal enhancement cervical region.  No significant spinal canal stenosis or neural foraminal narrowing.    MRI THORACIC SPINE:  No abnormal intrinsic cord signal.  No abnormal enhancement thoracic region.  No significant spinal canal stenosis or neural foraminal narrowing.

## 2022-12-08 NOTE — CHART NOTE - NSCHARTNOTEFT_GEN_A_CORE
PRE-INTERVENTIONAL RADIOLOGY PROCEDURE NOTE    Patient Age: 52  Patient Gender:  F    Procedure (including site / side if known): carrillo    Diagnosis / Indication: optic neuritis from MS relapse     Interventional Radiology Attending Physician: Dr. Ramírez    Ordering Attending Physician: Dr. Sales    Pertinent medical history: MS     Pertinent labs:                       11.8   16.51 )-----------( 278      ( 08 Dec 2022 06:03 )             36.0   12-08    143  |  109<H>  |  12  ----------------------------<  185<H>  4.2   |  21<L>  |  0.56    Ca    8.9      08 Dec 2022 06:03  Phos  3.6     12-08  Mg     2.10     12-08        Patient and Family aware? Yes          Contact #: _____________________ PRE-INTERVENTIONAL RADIOLOGY PROCEDURE NOTE    Patient Age: 52  Patient Gender:  F    Procedure (including site / side if known): carrillo    Diagnosis / Indication: optic neuritis from MS relapse     Interventional Radiology Attending Physician: Dr. Ramírez    Ordering Attending Physician: Dr. Sales    Pertinent medical history: MS     Pertinent labs:                       11.8   16.51 )-----------( 278      ( 08 Dec 2022 06:03 )             36.0   12-08    143  |  109<H>  |  12  ----------------------------<  185<H>  4.2   |  21<L>  |  0.56    Ca    8.9      08 Dec 2022 06:03  Phos  3.6     12-08  Mg     2.10     12-08        Patient and Family aware? Yes      Halle [facility] [id number]

## 2022-12-08 NOTE — PROGRESS NOTE ADULT - SUBJECTIVE AND OBJECTIVE BOX
[facility] Department of Hospital Medicine  Dougie Sales MD  Available on MS Teams  Pager: [phone]    Patient is a 52y old  Female who presents with a chief complaint of Vision change (08 Dec 2022 10:13)      OVERNIGHT EVENTS: No acute events overnight    SUBJECTIVE: Pt seen and examined at bedside this morning.     ADDITIONAL REVIEW OF SYSTEMS:    MEDICATIONS  (STANDING):  artificial  tears Solution 1 Drop(s) Both EYES four times a day  aspirin enteric coated 81 milliGRAM(s) Oral daily  atorvastatin 80 milliGRAM(s) Oral at bedtime  dextrose 5%. 1000 milliLiter(s) (50 mL/Hr) IV Continuous <Continuous>  dextrose 5%. 1000 milliLiter(s) (100 mL/Hr) IV Continuous <Continuous>  dextrose 50% Injectable 25 Gram(s) IV Push once  dextrose 50% Injectable 12.5 Gram(s) IV Push once  dextrose 50% Injectable 25 Gram(s) IV Push once  enoxaparin Injectable 40 milliGRAM(s) SubCutaneous every 24 hours  ergocalciferol 62335 Unit(s) Oral <User Schedule>  glucagon  Injectable 1 milliGRAM(s) IntraMuscular once  insulin lispro (ADMELOG) corrective regimen sliding scale   SubCutaneous three times a day before meals  insulin lispro (ADMELOG) corrective regimen sliding scale   SubCutaneous at bedtime  methylPREDNISolone sodium succinate IVPB 1000 milliGRAM(s) IV Intermittent every 24 hours  multivitamin 1 Tablet(s) Oral daily  nicotine -  14 mG/24Hr(s) Patch 1 Patch Transdermal daily  pantoprazole    Tablet 40 milliGRAM(s) Oral before breakfast  QUEtiapine 50 milliGRAM(s) Oral <User Schedule>  senna 2 Tablet(s) Oral at bedtime    MEDICATIONS  (PRN):  bisacodyl 5 milliGRAM(s) Oral every 12 hours PRN Constipation  cyclobenzaprine 5 milliGRAM(s) Oral three times a day PRN Muscle Spasm  dextrose Oral Gel 15 Gram(s) Oral once PRN Blood Glucose LESS THAN 70 milliGRAM(s)/deciliter  LORazepam     Tablet 0.5 milliGRAM(s) Oral two times a day PRN Anxiety  nortriptyline 10 milliGRAM(s) Oral daily PRN Migraines  polyethylene glycol 3350 17 Gram(s) Oral two times a day PRN Constipation      CAPILLARY BLOOD GLUCOSE      POCT Blood Glucose.: 167 mg/dL (08 Dec 2022 11:38)  POCT Blood Glucose.: 183 mg/dL (08 Dec 2022 07:32)  POCT Blood Glucose.: 254 mg/dL (07 Dec 2022 22:08)  POCT Blood Glucose.: 121 mg/dL (07 Dec 2022 16:52)    I&O's Summary    07 Dec 2022 07:01  -  08 Dec 2022 07:00  --------------------------------------------------------  IN: 0 mL / OUT: 1800 mL / NET: -1800 mL        PHYSICAL EXAM:  Vital Signs Last 24 Hrs  T(C): 36.4 (08 Dec 2022 09:53), Max: 36.7 (08 Dec 2022 06:14)  T(F): 97.6 (08 Dec 2022 09:53), Max: 98 (08 Dec 2022 06:14)  HR: 94 (08 Dec 2022 09:53) (68 - 94)  BP: 139/84 (08 Dec 2022 09:53) (119/68 - 140/47)  BP(mean): --  RR: 18 (08 Dec 2022 09:53) (17 - 18)  SpO2: 100% (08 Dec 2022 09:53) (98% - 100%)    Parameters below as of 08 Dec 2022 09:53  Patient On (Oxygen Delivery Method): room air        CONSTITUTIONAL: NAD, well-developed  HEAD: Normocephalic, atraumatic  EYES: EOMI, PERRL  ENT: no rhinorrhea, no pharyngeal erythema  RESPIRATORY: No increased work of breathing, CTAB, no wheezes or crackles appreciated  CARDIOVASCULAR: RRR, S1 and S2 present, no m/r/g  ABDOMEN: soft, NT, ND, bowel sounds present  EXTREMITIES: No LE edema  MUSCULOSKELETAL: no joint swelling, no tenderness to palpation  NEURO: A&Ox3, moving all extremities    LABS:                        11.8   16.51 )-----------( 278      ( 08 Dec 2022 06:03 )             36.0     12-08    143  |  109<H>  |  12  ----------------------------<  185<H>  4.2   |  21<L>  |  0.56    Ca    8.9      08 Dec 2022 06:03  Phos  3.6     12-08  Mg     2.10     12-08    RADIOLOGY & ADDITIONAL TESTS:    Results Reviewed:   Imaging Personally Reviewed:  Electrocardiogram Personally Reviewed:    COORDINATION OF CARE:  Care Discussed with Consultants/Other Providers [Y/N]:  Prior or Outpatient Records Reviewed [Y/N]: [facility] Department of Hospital Medicine  Dougie Sales MD  Available on MS Teams  Pager: [phone]    Patient is a 52y old  Female who presents with a chief complaint of Vision change (08 Dec 2022 10:13)    OVERNIGHT EVENTS: No acute events overnight.    SUBJECTIVE: Pt seen and examined at bedside this morning. Patient continues to endorse right eye "cloudiness" which has not improved since the day prior. Patient denies any other acute symptoms. Tolerating diet and ambulating without issue. Patient expressing significant anxiety about receiving plasma exchange.    ADDITIONAL REVIEW OF SYSTEMS: as above    MEDICATIONS  (STANDING):  artificial  tears Solution 1 Drop(s) Both EYES four times a day  aspirin enteric coated 81 milliGRAM(s) Oral daily  atorvastatin 80 milliGRAM(s) Oral at bedtime  dextrose 5%. 1000 milliLiter(s) (50 mL/Hr) IV Continuous <Continuous>  dextrose 5%. 1000 milliLiter(s) (100 mL/Hr) IV Continuous <Continuous>  dextrose 50% Injectable 25 Gram(s) IV Push once  dextrose 50% Injectable 12.5 Gram(s) IV Push once  dextrose 50% Injectable 25 Gram(s) IV Push once  enoxaparin Injectable 40 milliGRAM(s) SubCutaneous every 24 hours  ergocalciferol 77609 Unit(s) Oral <User Schedule>  glucagon  Injectable 1 milliGRAM(s) IntraMuscular once  insulin lispro (ADMELOG) corrective regimen sliding scale   SubCutaneous three times a day before meals  insulin lispro (ADMELOG) corrective regimen sliding scale   SubCutaneous at bedtime  methylPREDNISolone sodium succinate IVPB 1000 milliGRAM(s) IV Intermittent every 24 hours  multivitamin 1 Tablet(s) Oral daily  nicotine -  14 mG/24Hr(s) Patch 1 Patch Transdermal daily  pantoprazole    Tablet 40 milliGRAM(s) Oral before breakfast  QUEtiapine 50 milliGRAM(s) Oral <User Schedule>  senna 2 Tablet(s) Oral at bedtime    MEDICATIONS  (PRN):  bisacodyl 5 milliGRAM(s) Oral every 12 hours PRN Constipation  cyclobenzaprine 5 milliGRAM(s) Oral three times a day PRN Muscle Spasm  dextrose Oral Gel 15 Gram(s) Oral once PRN Blood Glucose LESS THAN 70 milliGRAM(s)/deciliter  LORazepam     Tablet 0.5 milliGRAM(s) Oral two times a day PRN Anxiety  nortriptyline 10 milliGRAM(s) Oral daily PRN Migraines  polyethylene glycol 3350 17 Gram(s) Oral two times a day PRN Constipation      CAPILLARY BLOOD GLUCOSE      POCT Blood Glucose.: 167 mg/dL (08 Dec 2022 11:38)  POCT Blood Glucose.: 183 mg/dL (08 Dec 2022 07:32)  POCT Blood Glucose.: 254 mg/dL (07 Dec 2022 22:08)  POCT Blood Glucose.: 121 mg/dL (07 Dec 2022 16:52)    I&O's Summary    07 Dec 2022 07:01  -  08 Dec 2022 07:00  --------------------------------------------------------  IN: 0 mL / OUT: 1800 mL / NET: -1800 mL        PHYSICAL EXAM:  Vital Signs Last 24 Hrs  T(C): 36.4 (08 Dec 2022 09:53), Max: 36.7 (08 Dec 2022 06:14)  T(F): 97.6 (08 Dec 2022 09:53), Max: 98 (08 Dec 2022 06:14)  HR: 94 (08 Dec 2022 09:53) (68 - 94)  BP: 139/84 (08 Dec 2022 09:53) (119/68 - 140/47)  BP(mean): --  RR: 18 (08 Dec 2022 09:53) (17 - 18)  SpO2: 100% (08 Dec 2022 09:53) (98% - 100%)    Parameters below as of 08 Dec 2022 09:53  Patient On (Oxygen Delivery Method): room air        CONSTITUTIONAL: NAD, well-developed  HEAD: Normocephalic, atraumatic  EYES: EOMI, PERRL  ENT: no rhinorrhea, no pharyngeal erythema  RESPIRATORY: No increased work of breathing, CTAB, no wheezes or crackles appreciated  CARDIOVASCULAR: RRR, S1 and S2 present, no m/r/g  ABDOMEN: soft, NT, ND, bowel sounds present  EXTREMITIES: No LE edema  MUSCULOSKELETAL: no joint swelling, no tenderness to palpation  NEURO: A&Ox3, moving all extremities    LABS:                        11.8   16.51 )-----------( 278      ( 08 Dec 2022 06:03 )             36.0     12-08    143  |  109<H>  |  12  ----------------------------<  185<H>  4.2   |  21<L>  |  0.56    Ca    8.9      08 Dec 2022 06:03  Phos  3.6     12-08  Mg     2.10     12-08    RADIOLOGY & ADDITIONAL TESTS:    Results Reviewed:   Imaging Personally Reviewed:  Electrocardiogram Personally Reviewed:    COORDINATION OF CARE:  Care Discussed with Consultants/Other Providers [Y/N]:  Prior or Outpatient Records Reviewed [Y/N]:

## 2022-12-08 NOTE — PROGRESS NOTE ADULT - ASSESSMENT
Assessment and Recommendations:  52y female w/ pmhx/ochx of  multiple sclerosis and right optic neuritis consulted for possible recurrent optic neuritis of the right side.  # Optic neuritis, right side, secondary to relapsing multiple sclerosis  - Vision on 11/16 with Dr Baum 20/50 right eye, 20/25 left eye with superior visual field loss and 4/12 on color plates; presented with 20/400 right eye, 20/25 left eye with worsening APD, total confrontation visual field loss, and 0/12 on color plates  - Posterior segment exam without disc pallor of edema  - s/p 3 days of IV steroids with no improvement on exam  - Appreciate neuro eval  - Repeat MRI with increased T2 and FLAIR hyperintensity in periventricular white matter; negative MRI orbits/cervical/thoracic   - Consider plasmapheresis as next step  - Findings and plan discussed with patient and primary team.    Patient discussed with Dr. Baum    Outpatient follow-up: Patient should follow-up with his/her ophthalmologist or with [facility] Department of Ophthalmology at the address below     [address] [place] [address]. [address]  [place] [address]  [phone]

## 2022-12-08 NOTE — PROGRESS NOTE ADULT - ASSESSMENT
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 Was treated with IV solumedrol x5 days in July 2022. Followed up with Dr Baum and Dr Trujillo for which she was started on Ocrevus on 11/9 and second part of dose on 11/23. From 11/23, she began to have increasing prevalence of R eye floaters, film like sensation over vision, and flashing lights. Symptoms worsened since last thursday 12/1 with daily episodes of R eye vision becoming "white." Not currently on steroids. In July 2022 hospitalization, patient had 7/16/22 OD eye exam w/ +RAPD, red color desaturation, and ~20/50 visual acuity. Relapsing Multiple sclerosis with first relapse 5/2022- B/l foot numbness, likely thoracic myelitis. 2nd relapse 7/2022- R Optic neuritis.    July 2022 labs:    ACE neg, SSA/SSB neg, Lyme neg, Syphilis neg  MOG ab negative, NMO neg  DsDNA neg    Aug 2022 labs:   Quant IgA 194, IgM 183, IgG 873   K/L FLC ratio Ur 1.44  Quant neg  HB surf Ab and antigen neg,  core Ab and IgM neg,   SHANIKA virus pos  varicella IgG ab positive     MRI 12/7/22: Brain, Orbits, Cervical, Thoracic spine: similar non-enhancing foci of T2 and FLAIR hyperintensities c/w demyelinating disease. No abnormal signal in optic nerves, spine.     Impression: Acute worsening of R vision concerning for optic neuritis from MS relapse. Of note, recently started ocrevus but likely not in full effect at this time.    Recommendations:  [ ] Solumedrol IV 1g x 5 days. Followed by PO prednisone taper: 60mg daily for 7 days, then 50mg daily for 7 days, then 40mg daily for 7 days, then 30mg daily for 7 days, then 20mg daily for 7 days, then 10mg daily for 7 days.   [ ] Given minimal improvement s/p 3 doses of IV solumedrol, we are recommending PLEX given minimal improvement.    [ ] Please consult IR for Shiley placement  [ ] Consult Blood bank for Plasmapheresis - to start with 5 sessions, every other day over the course of 10 days. If they would like to speak with neurology ([phone])  [ ] Draw coags, ionized calcium and fibrinogen prior to PLEX on treatment days  [ ] DVT ppx while getting PLEX  [x] optho consult appreciated  [x] infectious workup: CBC, VBG w/ lactate, CXR, UA,   [x] , a1c 5.9  [x] TSH <0.10, free thyroxine 1.0,      [ ] vitamin D, Ca supplementation, protonix 40mg daily  [ ] routine outpatient optho eval for glaucoma, cataracts, elevated intraocular pressure  [ ] monitor FS daily, avoid use of concurrent NSAIDS  [ ] PT/OT     Discussed with neurology attending Dr Trujillo and Dr Tatum, and patient seen by attending during morning rounds. Recommendations will be finalized once signed by attending.   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 Was treated with IV solumedrol x5 days in July 2022. Followed up with Dr Baum and Dr Trujillo for which she was started on Ocrevus on 11/9 and second part of dose on 11/23. From 11/23, she began to have increasing prevalence of R eye floaters, film like sensation over vision, and flashing lights. Symptoms worsened since last thursday 12/1 with daily episodes of R eye vision becoming "white." Not currently on steroids. In July 2022 hospitalization, patient had 7/16/22 OD eye exam w/ +RAPD, red color desaturation, and ~20/50 visual acuity. Relapsing Multiple sclerosis with first relapse 5/2022- B/l foot numbness, likely thoracic myelitis. 2nd relapse 7/2022- R Optic neuritis.    July 2022 labs:    ACE neg, SSA/SSB neg, Lyme neg, Syphilis neg  MOG ab negative, NMO neg  DsDNA neg    Aug 2022 labs:   Quant IgA 194, IgM 183, IgG 873   K/L FLC ratio Ur 1.44  Quant neg  HB surf Ab and antigen neg,  core Ab and IgM neg,   SHANIKA virus pos  varicella IgG ab positive     MRI 12/7/22: Brain, Orbits, Cervical, Thoracic spine: similar non-enhancing foci of T2 and FLAIR hyperintensities c/w demyelinating disease. No abnormal signal in optic nerves, spine.     Impression: Acute worsening of R vision concerning for optic neuritis from MS relapse. Of note, recently started ocrevus but likely not in full effect at this time.    Recommendations:  [ ] Solumedrol IV 1g x 5 days. Followed by PO prednisone taper: 60mg daily for 7 days, then 50mg daily for 7 days, then 40mg daily for 7 days, then 30mg daily for 7 days, then 20mg daily for 7 days, then 10mg daily for 7 days.   [ ] Given minimal improvement s/p 3 doses of IV solumedrol, we are recommending PLEX given minimal improvement.    [ ] Please consult IR for Shiley placement  [ ] Consult Blood bank for Plasmapheresis - to start with 5 sessions, every other day over the course of 10 days. If they would like to speak with neurology ([phone])  [ ] Draw coags, ionized calcium and fibrinogen prior to PLEX on treatment days  [ ] DVT ppx while getting PLEX  [x] optho consult appreciated  [x] infectious workup: CBC, VBG w/ lactate, CXR, UA,   [x] , a1c 5.9  [x] TSH <0.10, free thyroxine 1.0, thyroid w/u per primary team  [ ] monitor leukocytosis, signs of infection vs steroid induced      [ ] vitamin D, Ca supplementation, protonix 40mg daily  [ ] routine outpatient optho eval for glaucoma, cataracts, elevated intraocular pressure  [ ] monitor FS daily, avoid use of concurrent NSAIDS  [ ] PT/OT     Discussed with neurology attending Dr Trujillo and Dr Tatum, and patient seen by attending during morning rounds. Recommendations will be finalized once signed by attending.

## 2022-12-08 NOTE — PROGRESS NOTE ADULT - SUBJECTIVE AND OBJECTIVE BOX
[facility] DEPARTMENT OF OPHTHALMOLOGY  ------------------------------------------------------------------------------  Jorge Lai MD PGY 3  [phone]  ------------------------------------------------------------------------------    Interval History: No acute events overnight. Patient reports no changes in vision. Nervous about possible plasmapheresis     MEDICATIONS  (STANDING):  artificial  tears Solution 1 Drop(s) Both EYES four times a day  aspirin enteric coated 81 milliGRAM(s) Oral daily  atorvastatin 80 milliGRAM(s) Oral at bedtime  dextrose 5%. 1000 milliLiter(s) (50 mL/Hr) IV Continuous <Continuous>  dextrose 5%. 1000 milliLiter(s) (100 mL/Hr) IV Continuous <Continuous>  dextrose 50% Injectable 25 Gram(s) IV Push once  dextrose 50% Injectable 12.5 Gram(s) IV Push once  dextrose 50% Injectable 25 Gram(s) IV Push once  enoxaparin Injectable 40 milliGRAM(s) SubCutaneous every 24 hours  ergocalciferol 51795 Unit(s) Oral <User Schedule>  glucagon  Injectable 1 milliGRAM(s) IntraMuscular once  insulin lispro (ADMELOG) corrective regimen sliding scale   SubCutaneous three times a day before meals  insulin lispro (ADMELOG) corrective regimen sliding scale   SubCutaneous at bedtime  methylPREDNISolone sodium succinate IVPB 1000 milliGRAM(s) IV Intermittent every 24 hours  multivitamin 1 Tablet(s) Oral daily  nicotine -  14 mG/24Hr(s) Patch 1 Patch Transdermal daily  pantoprazole    Tablet 40 milliGRAM(s) Oral before breakfast  QUEtiapine 50 milliGRAM(s) Oral <User Schedule>  senna 2 Tablet(s) Oral at bedtime    MEDICATIONS  (PRN):  bisacodyl 5 milliGRAM(s) Oral every 12 hours PRN Constipation  cyclobenzaprine 5 milliGRAM(s) Oral three times a day PRN Muscle Spasm  dextrose Oral Gel 15 Gram(s) Oral once PRN Blood Glucose LESS THAN 70 milliGRAM(s)/deciliter  LORazepam     Tablet 0.5 milliGRAM(s) Oral two times a day PRN Anxiety  nortriptyline 10 milliGRAM(s) Oral daily PRN Migraines  polyethylene glycol 3350 17 Gram(s) Oral two times a day PRN Constipation      VITALS: T(C): 36.7 (12-08-22 @ 06:14)  T(F): 98 (12-08-22 @ 06:14), Max: 98 (12-08-22 @ 06:14)  HR: 90 (12-08-22 @ 06:14) (68 - 90)  BP: 139/68 (12-08-22 @ 06:14) (119/68 - 140/47)  RR:  (17 - 18)  SpO2:  (98% - 100%)  Wt(kg): --  General: AAO x 3, appropriate mood and affect    Ophthalmology Exam:  Visual acuity (sc): 20/400 OD, 20/25 OS  Pupils: PERRL OU, +RAPD OD  Ttono: 16 OD, 16 OS  Extraocular movements (EOMs): Full OU, +pain with supraduction and abduction  Confrontational Visual Field (CVF): Unable OD, full OS  Color Plates: 0/12 OD, 12/12 OS    Pen Light Exam (PLE)  External: Flat OU  Lids/Lashes/Lacrimal Ducts: Flat OU    Sclera/Conjunctiva: W+Q OU  Cornea: Cl OU  Anterior Chamber: D+F OU    Iris: Flat OU  Lens: Cl OU      IMPRESSION:    MRI BRAIN:  Similar-appearing nonenhancing foci of increased T2 and FLAIR   hyperintense signal in the periventricular white matter, some ovoid and   oriented in a perpendicular fashion to the long axis of the ventricles.   Findings are consistent with the presence of demyelinating disease.    No abnormal parenchymal or leptomeningeal enhancement.    MRI ORBITS:  No abnormal signal or enhancement within the optic nerves.  Intraorbital contents unremarkable.    MRI CERVICAL SPINE:  No abnormal intrinsic cord signal.  No abnormal enhancement cervical region.  No significant spinal canal stenosis or neural foraminal narrowing.    MRI THORACIC SPINE:  No abnormal intrinsic cord signal.  No abnormal enhancement thoracic region.  No significant spinal canal stenosis or neural foraminal narrowing.                  --- End of Report ---            HUANG ACEVEDO MD; Attending Radiologist  This document has been electronically signed. Dec  7 2022  2:53PM

## 2022-12-09 LAB
ANION GAP SERPL CALC-SCNC: 12 MMOL/L — SIGNIFICANT CHANGE UP (ref 7–14)
APTT BLD: 24.6 SEC — LOW (ref 27–36.3)
BUN SERPL-MCNC: 12 MG/DL — SIGNIFICANT CHANGE UP (ref 7–23)
CA-I BLD-SCNC: 1.02 MMOL/L — LOW (ref 1.15–1.29)
CALCIUM SERPL-MCNC: 8.8 MG/DL — SIGNIFICANT CHANGE UP (ref 8.4–10.5)
CHLORIDE SERPL-SCNC: 107 MMOL/L — SIGNIFICANT CHANGE UP (ref 98–107)
CO2 SERPL-SCNC: 24 MMOL/L — SIGNIFICANT CHANGE UP (ref 22–31)
CREAT SERPL-MCNC: 0.51 MG/DL — SIGNIFICANT CHANGE UP (ref 0.5–1.3)
EGFR: 113 ML/MIN/1.73M2 — SIGNIFICANT CHANGE UP
FIBRINOGEN PPP-MCNC: 315 MG/DL — SIGNIFICANT CHANGE UP (ref 200–465)
GLUCOSE BLDC GLUCOMTR-MCNC: 158 MG/DL — HIGH (ref 70–99)
GLUCOSE BLDC GLUCOMTR-MCNC: 215 MG/DL — HIGH (ref 70–99)
GLUCOSE BLDC GLUCOMTR-MCNC: 254 MG/DL — HIGH (ref 70–99)
GLUCOSE BLDC GLUCOMTR-MCNC: 86 MG/DL — SIGNIFICANT CHANGE UP (ref 70–99)
GLUCOSE SERPL-MCNC: 129 MG/DL — HIGH (ref 70–99)
HCT VFR BLD CALC: 35.9 % — SIGNIFICANT CHANGE UP (ref 34.5–45)
HGB BLD-MCNC: 11.7 G/DL — SIGNIFICANT CHANGE UP (ref 11.5–15.5)
INR BLD: 1.08 RATIO — SIGNIFICANT CHANGE UP (ref 0.88–1.16)
MAGNESIUM SERPL-MCNC: 2.2 MG/DL — SIGNIFICANT CHANGE UP (ref 1.6–2.6)
MCHC RBC-ENTMCNC: 27.6 PG — SIGNIFICANT CHANGE UP (ref 27–34)
MCHC RBC-ENTMCNC: 32.6 GM/DL — SIGNIFICANT CHANGE UP (ref 32–36)
MCV RBC AUTO: 84.7 FL — SIGNIFICANT CHANGE UP (ref 80–100)
NRBC # BLD: 0 /100 WBCS — SIGNIFICANT CHANGE UP (ref 0–0)
NRBC # FLD: 0.02 K/UL — HIGH (ref 0–0)
PHOSPHATE SERPL-MCNC: 4.2 MG/DL — SIGNIFICANT CHANGE UP (ref 2.5–4.5)
PLATELET # BLD AUTO: 288 K/UL — SIGNIFICANT CHANGE UP (ref 150–400)
POTASSIUM SERPL-MCNC: 4.3 MMOL/L — SIGNIFICANT CHANGE UP (ref 3.5–5.3)
POTASSIUM SERPL-SCNC: 4.3 MMOL/L — SIGNIFICANT CHANGE UP (ref 3.5–5.3)
PROTHROM AB SERPL-ACNC: 12.5 SEC — SIGNIFICANT CHANGE UP (ref 10.5–13.4)
RBC # BLD: 4.24 M/UL — SIGNIFICANT CHANGE UP (ref 3.8–5.2)
RBC # FLD: 15.5 % — HIGH (ref 10.3–14.5)
SODIUM SERPL-SCNC: 143 MMOL/L — SIGNIFICANT CHANGE UP (ref 135–145)
WBC # BLD: 10.69 K/UL — HIGH (ref 3.8–10.5)
WBC # FLD AUTO: 10.69 K/UL — HIGH (ref 3.8–10.5)

## 2022-12-09 PROCEDURE — 99221 1ST HOSP IP/OBS SF/LOW 40: CPT | Mod: 25

## 2022-12-09 PROCEDURE — 36514 APHERESIS PLASMA: CPT

## 2022-12-09 PROCEDURE — 99233 SBSQ HOSP IP/OBS HIGH 50: CPT

## 2022-12-09 RX ORDER — ALBUMIN HUMAN 25 %
3250 VIAL (ML) INTRAVENOUS
Refills: 0 | Status: DISCONTINUED | OUTPATIENT
Start: 2022-12-09 | End: 2022-12-18

## 2022-12-09 RX ORDER — CALCIUM GLUCONATE 100 MG/ML
2 VIAL (ML) INTRAVENOUS
Refills: 0 | Status: DISCONTINUED | OUTPATIENT
Start: 2022-12-09 | End: 2022-12-19

## 2022-12-09 RX ORDER — CHLORHEXIDINE GLUCONATE 213 G/1000ML
1 SOLUTION TOPICAL DAILY
Refills: 0 | Status: DISCONTINUED | OUTPATIENT
Start: 2022-12-09 | End: 2022-12-22

## 2022-12-09 RX ADMIN — SENNA PLUS 2 TABLET(S): 8.6 TABLET ORAL at 21:23

## 2022-12-09 RX ADMIN — Medication 1 PATCH: at 18:09

## 2022-12-09 RX ADMIN — Medication 1 PATCH: at 11:45

## 2022-12-09 RX ADMIN — Medication 1 DROP(S): at 18:05

## 2022-12-09 RX ADMIN — Medication 1 PATCH: at 07:17

## 2022-12-09 RX ADMIN — Medication 1 DROP(S): at 11:58

## 2022-12-09 RX ADMIN — CHLORHEXIDINE GLUCONATE 1 APPLICATION(S): 213 SOLUTION TOPICAL at 18:05

## 2022-12-09 RX ADMIN — Medication 2: at 07:31

## 2022-12-09 RX ADMIN — Medication 1 DROP(S): at 00:06

## 2022-12-09 RX ADMIN — Medication 1 TABLET(S): at 12:01

## 2022-12-09 RX ADMIN — Medication 0.5 MILLIGRAM(S): at 18:13

## 2022-12-09 RX ADMIN — Medication 1 DROP(S): at 06:56

## 2022-12-09 RX ADMIN — Medication 6: at 11:57

## 2022-12-09 RX ADMIN — SODIUM CHLORIDE 10 MILLILITER(S): 9 INJECTION INTRAMUSCULAR; INTRAVENOUS; SUBCUTANEOUS at 18:10

## 2022-12-09 RX ADMIN — Medication 81 MILLIGRAM(S): at 12:00

## 2022-12-09 RX ADMIN — ATORVASTATIN CALCIUM 80 MILLIGRAM(S): 80 TABLET, FILM COATED ORAL at 21:24

## 2022-12-09 RX ADMIN — QUETIAPINE FUMARATE 50 MILLIGRAM(S): 200 TABLET, FILM COATED ORAL at 21:23

## 2022-12-09 RX ADMIN — Medication 1 PATCH: at 12:00

## 2022-12-09 RX ADMIN — PANTOPRAZOLE SODIUM 40 MILLIGRAM(S): 20 TABLET, DELAYED RELEASE ORAL at 06:57

## 2022-12-09 RX ADMIN — Medication 58 MILLIGRAM(S): at 18:05

## 2022-12-09 NOTE — PROGRESS NOTE ADULT - PROBLEM SELECTOR PLAN 1
Currently with slight pain with EOM and decreased vision resembling previous optic neuritis. Previously treated with 1G/day steroids with outpatient tapering of prednisone  -Neurology C/S: Solumedrol IV 1g x 5 days given significant worsening in vision. Patient with minimal improvement with IV steroids  - MR brain w/ and w/o contrast, MR orbit w/ w/o contrast showing findings of demyelinating disease  - infectious workup including CBC, VBG w/ lactate, CXR, UA, all negative this far  - monitor FS daily, avoid use of concurrent NSAIDS  - case discussed with neurology, patient with minimal improvement with IV steroids, plan to begin PLEX 12/9 via MARIBEL vizcaino placed on 12/8, tentative plan for 5 sessions.

## 2022-12-09 NOTE — PROGRESS NOTE ADULT - SUBJECTIVE AND OBJECTIVE BOX
[facility] Department of Hospital Medicine  Dougie Sales MD  Available on MS Teams  Pager: [phone]    Patient is a 52y old  Female who presents with a chief complaint of Vision change (08 Dec 2022 11:58)    OVERNIGHT EVENTS: No acute events overnight.    SUBJECTIVE: Pt seen and examined at bedside this morning. Patient s/p shiley placement last night. Today reports unchanged cloudiness in her right eye. Denies any new weakness or sensory loss. Patient endorsing mild discomfort around her catheter site but reports that it is improving. Tolerating diet and ambulating without issue.     ADDITIONAL REVIEW OF SYSTEMS: As above    MEDICATIONS  (STANDING):  albumin human  5% IVPB 3250 milliLiter(s) IV Intermittent every 48 hours  artificial  tears Solution 1 Drop(s) Both EYES four times a day  aspirin enteric coated 81 milliGRAM(s) Oral daily  atorvastatin 80 milliGRAM(s) Oral at bedtime  calcium gluconate IVPB 2 Gram(s) IV Intermittent <User Schedule>  chlorhexidine 2% Cloths 1 Application(s) Topical daily  dextrose 5%. 1000 milliLiter(s) (50 mL/Hr) IV Continuous <Continuous>  dextrose 5%. 1000 milliLiter(s) (100 mL/Hr) IV Continuous <Continuous>  dextrose 50% Injectable 25 Gram(s) IV Push once  dextrose 50% Injectable 12.5 Gram(s) IV Push once  dextrose 50% Injectable 25 Gram(s) IV Push once  ergocalciferol 47320 Unit(s) Oral <User Schedule>  glucagon  Injectable 1 milliGRAM(s) IntraMuscular once  insulin lispro (ADMELOG) corrective regimen sliding scale   SubCutaneous three times a day before meals  insulin lispro (ADMELOG) corrective regimen sliding scale   SubCutaneous at bedtime  methylPREDNISolone sodium succinate IVPB 1000 milliGRAM(s) IV Intermittent every 24 hours  multivitamin 1 Tablet(s) Oral daily  nicotine -  14 mG/24Hr(s) Patch 1 Patch Transdermal daily  pantoprazole    Tablet 40 milliGRAM(s) Oral before breakfast  QUEtiapine 50 milliGRAM(s) Oral <User Schedule>  senna 2 Tablet(s) Oral at bedtime    MEDICATIONS  (PRN):  bisacodyl 5 milliGRAM(s) Oral every 12 hours PRN Constipation  cyclobenzaprine 5 milliGRAM(s) Oral three times a day PRN Muscle Spasm  dextrose Oral Gel 15 Gram(s) Oral once PRN Blood Glucose LESS THAN 70 milliGRAM(s)/deciliter  LORazepam     Tablet 0.5 milliGRAM(s) Oral two times a day PRN Anxiety  nortriptyline 10 milliGRAM(s) Oral daily PRN Migraines  polyethylene glycol 3350 17 Gram(s) Oral two times a day PRN Constipation  sodium chloride 0.9% lock flush 10 milliLiter(s) IV Push every 1 hour PRN Pre/post blood products, medications, blood draw, and to maintain line patency    CAPILLARY BLOOD GLUCOSE    POCT Blood Glucose.: 254 mg/dL (09 Dec 2022 11:40)  POCT Blood Glucose.: 158 mg/dL (09 Dec 2022 07:18)  POCT Blood Glucose.: 180 mg/dL (08 Dec 2022 21:27)  POCT Blood Glucose.: 103 mg/dL (08 Dec 2022 16:56)    I&O's Summary    PHYSICAL EXAM:  Vital Signs Last 24 Hrs  T(C): 36.5 (09 Dec 2022 12:10), Max: 36.8 (08 Dec 2022 22:17)  T(F): 97.7 (09 Dec 2022 12:10), Max: 98.3 (08 Dec 2022 22:17)  HR: 98 (09 Dec 2022 12:10) (65 - 98)  BP: 127/70 (09 Dec 2022 12:10) (127/70 - 150/90)  BP(mean): --  RR: 18 (09 Dec 2022 12:10) (17 - 18)  SpO2: 100% (09 Dec 2022 12:10) (95% - 100%)    Parameters below as of 09 Dec 2022 12:10  Patient On (Oxygen Delivery Method): room air    CONSTITUTIONAL: NAD, well-developed  HEAD: Normocephalic, atraumatic  EYES: EOMI, PERRL  ENT: no rhinorrhea, no pharyngeal erythema  CHEST: right IJ shiley catheter noted, dressing c/d/i  RESPIRATORY: No increased work of breathing, CTAB, no wheezes or crackles appreciated  CARDIOVASCULAR: RRR, S1 and S2 present, no m/r/g  ABDOMEN: soft, NT, ND, bowel sounds present  EXTREMITIES: No LE edema  MUSCULOSKELETAL: no joint swelling, no tenderness to palpation  NEURO: A&Ox3, moving all extremities    LABS:                        11.7   10.69 )-----------( 288      ( 09 Dec 2022 07:55 )             35.9     12-09    143  |  107  |  12  ----------------------------<  129<H>  4.3   |  24  |  0.51    Ca    8.8      09 Dec 2022 07:55  Phos  4.2     12-09  Mg     2.20     12-09    PT/INR - ( 09 Dec 2022 12:14 )   PT: 12.5 sec;   INR: 1.08 ratio       PTT - ( 09 Dec 2022 12:14 )  PTT:24.6 sec    RADIOLOGY & ADDITIONAL TESTS:    Results Reviewed:   Imaging Personally Reviewed:  Electrocardiogram Personally Reviewed:    COORDINATION OF CARE:  Care Discussed with Consultants/Other Providers [Y/N]: Y, discussed with neurology team and with apheresis  Prior or Outpatient Records Reviewed [Y/N]:

## 2022-12-09 NOTE — PROGRESS NOTE ADULT - ASSESSMENT
53 yo F w/ PMHx of Migraines, HLD, COVID in 12/21, dx'd with MS 6 months ago, hospitalized for recurrent Rt eye optic neuritis s/p IV steroids with minimal improvement now planned for PLEX.

## 2022-12-09 NOTE — CONSULT NOTE ADULT - SUBJECTIVE AND OBJECTIVE BOX
Patient is a 52y old  Female who presents with a chief complaint of Vision change (09 Dec 2022 14:46)      HPI: 51 yo F with hx of migraine and MS diagnosed 6 months ago presented with R eye optic neuritis s/p steroids. Presented with cloudy vision and pain in right eye (MS relapse)  Pt admits she has a h/o ~2 migraines per week since she was 44 yo, usually they start as a tension in the back of her neck and radiate to the back of her head/ temples and jaw, lasting ~24 hrs. Improves with medications, rest and "blacking everything out". Pt c/o muscle pains, intermittent pain in her Lt knee & Lt shoulder at varying times for years now. Also has a stiff feeling in her wrists & thumbs. Since 10/16 she feels a pulling pain in her Rt sided occipital area, lasting ~30mins. Also since this past summer she has been feeling unsteady when getting up, has fallen twice then which she attributed to not seeing well then. Also since late 11/22 she has been experiencing b/l lower lateral back pain.  (05 Dec 2022 17:51)    BB consulted for PLEX for optic neuritis       PAST MEDICAL & SURGICAL HISTORY:  Migraine  HLD (hyperlipidemia)  S/P gastric bypass 2007  S/P cholecystectomy    MEDICATIONS  (STANDING): reviewed, no redosing needed with plex      Vital Signs Last 24 Hrs  T(C): 36.5 (09 Dec 2022 12:10), Max: 36.8 (08 Dec 2022 22:17)  T(F): 97.7 (09 Dec 2022 12:10), Max: 98.3 (08 Dec 2022 22:17)  HR: 98 (09 Dec 2022 12:10) (65 - 98)  BP: 127/70 (09 Dec 2022 12:10) (127/70 - 150/90)  RR: 18 (09 Dec 2022 12:10) (17 - 18)  SpO2: 100% (09 Dec 2022 12:10) (95% - 100%)    Parameters below as of 09 Dec 2022 12:10  Patient On (Oxygen Delivery Method): room air                          11.7   10.69 )-----------( 288      ( 09 Dec 2022 07:55 )             35.9       Hematocrit: 35.9 % (12-09 @ 07:55)  Hematocrit: 36.0 % (12-08 @ 06:03)  Hematocrit: 37.3 % (12-07 @ 06:59)    12-09    143  |  107  |  12  ----------------------------<  129<H>  4.3   |  24  |  0.51    Ca    8.8      09 Dec 2022 07:55  Phos  4.2     12-09  Mg     2.20     12-09    PT/INR - ( 09 Dec 2022 12:14 )   PT: 12.5 sec;   INR: 1.08 ratio     PTT - ( 09 Dec 2022 12:14 )  PTT:24.6 sec

## 2022-12-10 LAB
ANION GAP SERPL CALC-SCNC: 13 MMOL/L — SIGNIFICANT CHANGE UP (ref 7–14)
BUN SERPL-MCNC: 15 MG/DL — SIGNIFICANT CHANGE UP (ref 7–23)
CALCIUM SERPL-MCNC: 8.4 MG/DL — SIGNIFICANT CHANGE UP (ref 8.4–10.5)
CHLORIDE SERPL-SCNC: 108 MMOL/L — HIGH (ref 98–107)
CO2 SERPL-SCNC: 21 MMOL/L — LOW (ref 22–31)
CREAT SERPL-MCNC: 0.53 MG/DL — SIGNIFICANT CHANGE UP (ref 0.5–1.3)
EGFR: 112 ML/MIN/1.73M2 — SIGNIFICANT CHANGE UP
GLUCOSE BLDC GLUCOMTR-MCNC: 111 MG/DL — HIGH (ref 70–99)
GLUCOSE BLDC GLUCOMTR-MCNC: 127 MG/DL — HIGH (ref 70–99)
GLUCOSE BLDC GLUCOMTR-MCNC: 137 MG/DL — HIGH (ref 70–99)
GLUCOSE BLDC GLUCOMTR-MCNC: 209 MG/DL — HIGH (ref 70–99)
GLUCOSE SERPL-MCNC: 137 MG/DL — HIGH (ref 70–99)
HCT VFR BLD CALC: 37.8 % — SIGNIFICANT CHANGE UP (ref 34.5–45)
HGB BLD-MCNC: 12.5 G/DL — SIGNIFICANT CHANGE UP (ref 11.5–15.5)
MAGNESIUM SERPL-MCNC: 2.2 MG/DL — SIGNIFICANT CHANGE UP (ref 1.6–2.6)
MCHC RBC-ENTMCNC: 27.7 PG — SIGNIFICANT CHANGE UP (ref 27–34)
MCHC RBC-ENTMCNC: 33.1 GM/DL — SIGNIFICANT CHANGE UP (ref 32–36)
MCV RBC AUTO: 83.6 FL — SIGNIFICANT CHANGE UP (ref 80–100)
NRBC # BLD: 0 /100 WBCS — SIGNIFICANT CHANGE UP (ref 0–0)
NRBC # FLD: 0 K/UL — SIGNIFICANT CHANGE UP (ref 0–0)
PHOSPHATE SERPL-MCNC: 4.3 MG/DL — SIGNIFICANT CHANGE UP (ref 2.5–4.5)
PLATELET # BLD AUTO: 258 K/UL — SIGNIFICANT CHANGE UP (ref 150–400)
POTASSIUM SERPL-MCNC: 4.2 MMOL/L — SIGNIFICANT CHANGE UP (ref 3.5–5.3)
POTASSIUM SERPL-SCNC: 4.2 MMOL/L — SIGNIFICANT CHANGE UP (ref 3.5–5.3)
RBC # BLD: 4.52 M/UL — SIGNIFICANT CHANGE UP (ref 3.8–5.2)
RBC # FLD: 15.4 % — HIGH (ref 10.3–14.5)
SODIUM SERPL-SCNC: 142 MMOL/L — SIGNIFICANT CHANGE UP (ref 135–145)
WBC # BLD: 12.48 K/UL — HIGH (ref 3.8–10.5)
WBC # FLD AUTO: 12.48 K/UL — HIGH (ref 3.8–10.5)

## 2022-12-10 PROCEDURE — 99233 SBSQ HOSP IP/OBS HIGH 50: CPT

## 2022-12-10 RX ADMIN — Medication 4: at 11:58

## 2022-12-10 RX ADMIN — Medication 1 PATCH: at 11:53

## 2022-12-10 RX ADMIN — Medication 1 DROP(S): at 17:43

## 2022-12-10 RX ADMIN — PANTOPRAZOLE SODIUM 40 MILLIGRAM(S): 20 TABLET, DELAYED RELEASE ORAL at 05:10

## 2022-12-10 RX ADMIN — Medication 1 DROP(S): at 12:00

## 2022-12-10 RX ADMIN — CHLORHEXIDINE GLUCONATE 1 APPLICATION(S): 213 SOLUTION TOPICAL at 11:58

## 2022-12-10 RX ADMIN — Medication 0: at 21:59

## 2022-12-10 RX ADMIN — Medication 1 PATCH: at 20:08

## 2022-12-10 RX ADMIN — Medication 81 MILLIGRAM(S): at 11:55

## 2022-12-10 RX ADMIN — SENNA PLUS 2 TABLET(S): 8.6 TABLET ORAL at 21:59

## 2022-12-10 RX ADMIN — Medication 1 TABLET(S): at 11:54

## 2022-12-10 RX ADMIN — Medication 1 DROP(S): at 05:10

## 2022-12-10 RX ADMIN — Medication 1 DROP(S): at 23:34

## 2022-12-10 RX ADMIN — ATORVASTATIN CALCIUM 80 MILLIGRAM(S): 80 TABLET, FILM COATED ORAL at 21:58

## 2022-12-10 RX ADMIN — QUETIAPINE FUMARATE 50 MILLIGRAM(S): 200 TABLET, FILM COATED ORAL at 21:59

## 2022-12-10 NOTE — PROGRESS NOTE ADULT - SUBJECTIVE AND OBJECTIVE BOX
Medicine Progress Note    Patient is a 52y old  Female who presents with a chief complaint of Vision change (09 Dec 2022 15:10)      SUBJECTIVE / OVERNIGHT EVENTS: pt continues to report visual changes; like a waterfall in R eye    ADDITIONAL REVIEW OF SYSTEMS:    MEDICATIONS  (STANDING):  albumin human  5% IVPB 3250 milliLiter(s) IV Intermittent every 48 hours  artificial  tears Solution 1 Drop(s) Both EYES four times a day  aspirin enteric coated 81 milliGRAM(s) Oral daily  atorvastatin 80 milliGRAM(s) Oral at bedtime  calcium gluconate IVPB 2 Gram(s) IV Intermittent <User Schedule>  chlorhexidine 2% Cloths 1 Application(s) Topical daily  dextrose 5%. 1000 milliLiter(s) (100 mL/Hr) IV Continuous <Continuous>  dextrose 5%. 1000 milliLiter(s) (50 mL/Hr) IV Continuous <Continuous>  dextrose 50% Injectable 25 Gram(s) IV Push once  dextrose 50% Injectable 12.5 Gram(s) IV Push once  dextrose 50% Injectable 25 Gram(s) IV Push once  ergocalciferol 33862 Unit(s) Oral <User Schedule>  glucagon  Injectable 1 milliGRAM(s) IntraMuscular once  insulin lispro (ADMELOG) corrective regimen sliding scale   SubCutaneous three times a day before meals  insulin lispro (ADMELOG) corrective regimen sliding scale   SubCutaneous at bedtime  multivitamin 1 Tablet(s) Oral daily  nicotine -  14 mG/24Hr(s) Patch 1 Patch Transdermal daily  pantoprazole    Tablet 40 milliGRAM(s) Oral before breakfast  QUEtiapine 50 milliGRAM(s) Oral <User Schedule>  senna 2 Tablet(s) Oral at bedtime    MEDICATIONS  (PRN):  bisacodyl 5 milliGRAM(s) Oral every 12 hours PRN Constipation  cyclobenzaprine 5 milliGRAM(s) Oral three times a day PRN Muscle Spasm  dextrose Oral Gel 15 Gram(s) Oral once PRN Blood Glucose LESS THAN 70 milliGRAM(s)/deciliter  LORazepam     Tablet 0.5 milliGRAM(s) Oral two times a day PRN Anxiety  nortriptyline 10 milliGRAM(s) Oral daily PRN Migraines  polyethylene glycol 3350 17 Gram(s) Oral two times a day PRN Constipation  sodium chloride 0.9% lock flush 10 milliLiter(s) IV Push every 1 hour PRN Pre/post blood products, medications, blood draw, and to maintain line patency    CAPILLARY BLOOD GLUCOSE      POCT Blood Glucose.: 209 mg/dL (10 Dec 2022 11:42)  POCT Blood Glucose.: 137 mg/dL (10 Dec 2022 07:47)  POCT Blood Glucose.: 215 mg/dL (09 Dec 2022 21:35)  POCT Blood Glucose.: 86 mg/dL (09 Dec 2022 16:45)    I&O's Summary      PHYSICAL EXAM:  Vital Signs Last 24 Hrs  T(C): 36.7 (10 Dec 2022 09:30), Max: 36.7 (10 Dec 2022 02:00)  T(F): 98.1 (10 Dec 2022 09:30), Max: 98.1 (10 Dec 2022 09:30)  HR: 66 (10 Dec 2022 09:30) (66 - 73)  BP: 111/54 (10 Dec 2022 09:30) (111/54 - 141/79)  BP(mean): --  RR: 17 (10 Dec 2022 09:30) (17 - 17)  SpO2: 98% (10 Dec 2022 09:30) (97% - 99%)    Parameters below as of 10 Dec 2022 09:30  Patient On (Oxygen Delivery Method): room air      CONSTITUTIONAL: NAD, well-developed, well-groomed  ENMT: Moist oral mucosa, no pharyngeal injection or exudates; normal dentition  RESPIRATORY: Normal respiratory effort; lungs are clear to auscultation bilaterally  CARDIOVASCULAR: Regular rate and rhythm, normal S1 and S2, no murmur/rub/gallop; No lower extremity edema; Peripheral pulses are 2+ bilaterally  ABDOMEN: Nontender to palpation, normoactive bowel sounds, no rebound/guarding; No hepatosplenomegaly  PSYCH: A+O to person, place, and time; affect appropriate  NEUROLOGY: decreased vision in R eye in all quadrants  SKIN: No rashes; no palpable lesions    LABS:                        12.5   12.48 )-----------( 258      ( 10 Dec 2022 07:46 )             37.8     12-10    142  |  108<H>  |  15  ----------------------------<  137<H>  4.2   |  21<L>  |  0.53    Ca    8.4      10 Dec 2022 07:46  Phos  4.3     12-10  Mg     2.20     12-10      PT/INR - ( 09 Dec 2022 12:14 )   PT: 12.5 sec;   INR: 1.08 ratio         PTT - ( 09 Dec 2022 12:14 )  PTT:24.6 sec          COVID-19 PCR: NotDetec (13 Jul 2022 22:34)      RADIOLOGY & ADDITIONAL TESTS:  Imaging from Last 24 Hours:    Electrocardiogram/QTc Interval:    COORDINATION OF CARE:  Care Discussed with Consultants/Other Providers:   Medicine Progress Note    Patient is a 52y old  Female who presents with a chief complaint of Vision change (09 Dec 2022 15:10)      SUBJECTIVE / OVERNIGHT EVENTS: pt continues to report visual changes; like a waterfall in R eye; pt reports some nausea and headache following PLEX    ADDITIONAL REVIEW OF SYSTEMS:    MEDICATIONS  (STANDING):  albumin human  5% IVPB 3250 milliLiter(s) IV Intermittent every 48 hours  artificial  tears Solution 1 Drop(s) Both EYES four times a day  aspirin enteric coated 81 milliGRAM(s) Oral daily  atorvastatin 80 milliGRAM(s) Oral at bedtime  calcium gluconate IVPB 2 Gram(s) IV Intermittent <User Schedule>  chlorhexidine 2% Cloths 1 Application(s) Topical daily  dextrose 5%. 1000 milliLiter(s) (100 mL/Hr) IV Continuous <Continuous>  dextrose 5%. 1000 milliLiter(s) (50 mL/Hr) IV Continuous <Continuous>  dextrose 50% Injectable 25 Gram(s) IV Push once  dextrose 50% Injectable 12.5 Gram(s) IV Push once  dextrose 50% Injectable 25 Gram(s) IV Push once  ergocalciferol 87896 Unit(s) Oral <User Schedule>  glucagon  Injectable 1 milliGRAM(s) IntraMuscular once  insulin lispro (ADMELOG) corrective regimen sliding scale   SubCutaneous three times a day before meals  insulin lispro (ADMELOG) corrective regimen sliding scale   SubCutaneous at bedtime  multivitamin 1 Tablet(s) Oral daily  nicotine -  14 mG/24Hr(s) Patch 1 Patch Transdermal daily  pantoprazole    Tablet 40 milliGRAM(s) Oral before breakfast  QUEtiapine 50 milliGRAM(s) Oral <User Schedule>  senna 2 Tablet(s) Oral at bedtime    MEDICATIONS  (PRN):  bisacodyl 5 milliGRAM(s) Oral every 12 hours PRN Constipation  cyclobenzaprine 5 milliGRAM(s) Oral three times a day PRN Muscle Spasm  dextrose Oral Gel 15 Gram(s) Oral once PRN Blood Glucose LESS THAN 70 milliGRAM(s)/deciliter  LORazepam     Tablet 0.5 milliGRAM(s) Oral two times a day PRN Anxiety  nortriptyline 10 milliGRAM(s) Oral daily PRN Migraines  polyethylene glycol 3350 17 Gram(s) Oral two times a day PRN Constipation  sodium chloride 0.9% lock flush 10 milliLiter(s) IV Push every 1 hour PRN Pre/post blood products, medications, blood draw, and to maintain line patency    CAPILLARY BLOOD GLUCOSE      POCT Blood Glucose.: 209 mg/dL (10 Dec 2022 11:42)  POCT Blood Glucose.: 137 mg/dL (10 Dec 2022 07:47)  POCT Blood Glucose.: 215 mg/dL (09 Dec 2022 21:35)  POCT Blood Glucose.: 86 mg/dL (09 Dec 2022 16:45)    I&O's Summary      PHYSICAL EXAM:  Vital Signs Last 24 Hrs  T(C): 36.7 (10 Dec 2022 09:30), Max: 36.7 (10 Dec 2022 02:00)  T(F): 98.1 (10 Dec 2022 09:30), Max: 98.1 (10 Dec 2022 09:30)  HR: 66 (10 Dec 2022 09:30) (66 - 73)  BP: 111/54 (10 Dec 2022 09:30) (111/54 - 141/79)  BP(mean): --  RR: 17 (10 Dec 2022 09:30) (17 - 17)  SpO2: 98% (10 Dec 2022 09:30) (97% - 99%)    Parameters below as of 10 Dec 2022 09:30  Patient On (Oxygen Delivery Method): room air      CONSTITUTIONAL: NAD, well-developed, well-groomed  ENMT: Moist oral mucosa, no pharyngeal injection or exudates; normal dentition  RESPIRATORY: Normal respiratory effort; lungs are clear to auscultation bilaterally  CARDIOVASCULAR: Regular rate and rhythm, normal S1 and S2, no murmur/rub/gallop; No lower extremity edema; Peripheral pulses are 2+ bilaterally  ABDOMEN: Nontender to palpation, normoactive bowel sounds, no rebound/guarding; No hepatosplenomegaly  PSYCH: A+O to person, place, and time; affect appropriate  NEUROLOGY: decreased vision in R eye in all quadrants  SKIN: No rashes; no palpable lesions    LABS:                        12.5   12.48 )-----------( 258      ( 10 Dec 2022 07:46 )             37.8     12-10    142  |  108<H>  |  15  ----------------------------<  137<H>  4.2   |  21<L>  |  0.53    Ca    8.4      10 Dec 2022 07:46  Phos  4.3     12-10  Mg     2.20     12-10      PT/INR - ( 09 Dec 2022 12:14 )   PT: 12.5 sec;   INR: 1.08 ratio         PTT - ( 09 Dec 2022 12:14 )  PTT:24.6 sec          COVID-19 PCR: NotDetec (13 Jul 2022 22:34)      RADIOLOGY & ADDITIONAL TESTS:  Imaging from Last 24 Hours:    Electrocardiogram/QTc Interval:    COORDINATION OF CARE:  Care Discussed with Consultants/Other Providers:

## 2022-12-10 NOTE — PROGRESS NOTE ADULT - ASSESSMENT
51 yo F w/ PMHx of Migraines, HLD, COVID in 12/21, dx'd with MS 6 months ago, hospitalized for recurrent Rt eye optic neuritis s/p IV steroids with minimal improvement now planned for PLEX.

## 2022-12-10 NOTE — PROGRESS NOTE ADULT - PROBLEM SELECTOR PLAN 1
Currently with slight pain with EOM and decreased vision resembling previous optic neuritis. Previously treated with 1G/day steroids with outpatient tapering of prednisone  -Neurology C/S: Solumedrol IV 1g x 5 days given significant worsening in vision. Patient with minimal improvement with IV steroids  - MR brain w/ and w/o contrast, MR orbit w/ w/o contrast showing findings of demyelinating disease  - infectious workup including CBC, VBG w/ lactate, CXR, UA, all negative this far  - monitor FS daily, avoid use of concurrent NSAIDS  - case discussed with neurology, patient with minimal improvement with IV steroids  -carrillo placed on 12/8, tentative plan for 5 PLEX sessions every other day; first was 12/9.

## 2022-12-11 LAB
ANION GAP SERPL CALC-SCNC: 12 MMOL/L — SIGNIFICANT CHANGE UP (ref 7–14)
BLD GP AB SCN SERPL QL: NEGATIVE — SIGNIFICANT CHANGE UP
BUN SERPL-MCNC: 15 MG/DL — SIGNIFICANT CHANGE UP (ref 7–23)
CA-I BLD-SCNC: 1.08 MMOL/L — LOW (ref 1.15–1.29)
CALCIUM SERPL-MCNC: 8.1 MG/DL — LOW (ref 8.4–10.5)
CHLORIDE SERPL-SCNC: 108 MMOL/L — HIGH (ref 98–107)
CO2 SERPL-SCNC: 22 MMOL/L — SIGNIFICANT CHANGE UP (ref 22–31)
CREAT SERPL-MCNC: 0.54 MG/DL — SIGNIFICANT CHANGE UP (ref 0.5–1.3)
EGFR: 111 ML/MIN/1.73M2 — SIGNIFICANT CHANGE UP
FIBRINOGEN PPP-MCNC: 145 MG/DL — LOW (ref 200–465)
GLUCOSE BLDC GLUCOMTR-MCNC: 158 MG/DL — HIGH (ref 70–99)
GLUCOSE BLDC GLUCOMTR-MCNC: 242 MG/DL — HIGH (ref 70–99)
GLUCOSE BLDC GLUCOMTR-MCNC: 68 MG/DL — LOW (ref 70–99)
GLUCOSE BLDC GLUCOMTR-MCNC: 71 MG/DL — SIGNIFICANT CHANGE UP (ref 70–99)
GLUCOSE BLDC GLUCOMTR-MCNC: 88 MG/DL — SIGNIFICANT CHANGE UP (ref 70–99)
GLUCOSE BLDC GLUCOMTR-MCNC: 97 MG/DL — SIGNIFICANT CHANGE UP (ref 70–99)
GLUCOSE SERPL-MCNC: 82 MG/DL — SIGNIFICANT CHANGE UP (ref 70–99)
HCT VFR BLD CALC: 37 % — SIGNIFICANT CHANGE UP (ref 34.5–45)
HGB BLD-MCNC: 12.1 G/DL — SIGNIFICANT CHANGE UP (ref 11.5–15.5)
MAGNESIUM SERPL-MCNC: 2.3 MG/DL — SIGNIFICANT CHANGE UP (ref 1.6–2.6)
MCHC RBC-ENTMCNC: 27.5 PG — SIGNIFICANT CHANGE UP (ref 27–34)
MCHC RBC-ENTMCNC: 32.7 GM/DL — SIGNIFICANT CHANGE UP (ref 32–36)
MCV RBC AUTO: 84.1 FL — SIGNIFICANT CHANGE UP (ref 80–100)
NRBC # BLD: 0 /100 WBCS — SIGNIFICANT CHANGE UP (ref 0–0)
NRBC # FLD: 0.02 K/UL — HIGH (ref 0–0)
PHOSPHATE SERPL-MCNC: 3.7 MG/DL — SIGNIFICANT CHANGE UP (ref 2.5–4.5)
PLATELET # BLD AUTO: 227 K/UL — SIGNIFICANT CHANGE UP (ref 150–400)
POTASSIUM SERPL-MCNC: 3.9 MMOL/L — SIGNIFICANT CHANGE UP (ref 3.5–5.3)
POTASSIUM SERPL-SCNC: 3.9 MMOL/L — SIGNIFICANT CHANGE UP (ref 3.5–5.3)
RBC # BLD: 4.4 M/UL — SIGNIFICANT CHANGE UP (ref 3.8–5.2)
RBC # FLD: 15.3 % — HIGH (ref 10.3–14.5)
RH IG SCN BLD-IMP: POSITIVE — SIGNIFICANT CHANGE UP
SODIUM SERPL-SCNC: 142 MMOL/L — SIGNIFICANT CHANGE UP (ref 135–145)
WBC # BLD: 10.31 K/UL — SIGNIFICANT CHANGE UP (ref 3.8–10.5)
WBC # FLD AUTO: 10.31 K/UL — SIGNIFICANT CHANGE UP (ref 3.8–10.5)

## 2022-12-11 PROCEDURE — 36514 APHERESIS PLASMA: CPT

## 2022-12-11 PROCEDURE — 99233 SBSQ HOSP IP/OBS HIGH 50: CPT

## 2022-12-11 PROCEDURE — 99232 SBSQ HOSP IP/OBS MODERATE 35: CPT

## 2022-12-11 RX ORDER — ACETAMINOPHEN 500 MG
650 TABLET ORAL EVERY 6 HOURS
Refills: 0 | Status: DISCONTINUED | OUTPATIENT
Start: 2022-12-11 | End: 2022-12-22

## 2022-12-11 RX ORDER — INSULIN GLARGINE 100 [IU]/ML
4 INJECTION, SOLUTION SUBCUTANEOUS AT BEDTIME
Refills: 0 | Status: DISCONTINUED | OUTPATIENT
Start: 2022-12-11 | End: 2022-12-11

## 2022-12-11 RX ORDER — INSULIN LISPRO 100/ML
3 VIAL (ML) SUBCUTANEOUS
Refills: 0 | Status: DISCONTINUED | OUTPATIENT
Start: 2022-12-11 | End: 2022-12-11

## 2022-12-11 RX ORDER — INSULIN GLARGINE 100 [IU]/ML
6 INJECTION, SOLUTION SUBCUTANEOUS AT BEDTIME
Refills: 0 | Status: DISCONTINUED | OUTPATIENT
Start: 2022-12-11 | End: 2022-12-11

## 2022-12-11 RX ADMIN — Medication 0.5 MILLIGRAM(S): at 12:23

## 2022-12-11 RX ADMIN — QUETIAPINE FUMARATE 50 MILLIGRAM(S): 200 TABLET, FILM COATED ORAL at 21:14

## 2022-12-11 RX ADMIN — Medication 4: at 09:16

## 2022-12-11 RX ADMIN — PANTOPRAZOLE SODIUM 40 MILLIGRAM(S): 20 TABLET, DELAYED RELEASE ORAL at 05:27

## 2022-12-11 RX ADMIN — Medication 0.5 MILLIGRAM(S): at 22:21

## 2022-12-11 RX ADMIN — Medication 81 MILLIGRAM(S): at 12:16

## 2022-12-11 RX ADMIN — Medication 1 PATCH: at 12:15

## 2022-12-11 RX ADMIN — Medication 1 TABLET(S): at 12:16

## 2022-12-11 RX ADMIN — Medication 60 MILLIGRAM(S): at 15:46

## 2022-12-11 RX ADMIN — Medication 650 MILLIGRAM(S): at 09:08

## 2022-12-11 RX ADMIN — SENNA PLUS 2 TABLET(S): 8.6 TABLET ORAL at 21:11

## 2022-12-11 RX ADMIN — Medication 1 DROP(S): at 18:09

## 2022-12-11 RX ADMIN — CHLORHEXIDINE GLUCONATE 1 APPLICATION(S): 213 SOLUTION TOPICAL at 12:24

## 2022-12-11 RX ADMIN — ATORVASTATIN CALCIUM 80 MILLIGRAM(S): 80 TABLET, FILM COATED ORAL at 21:12

## 2022-12-11 RX ADMIN — Medication 650 MILLIGRAM(S): at 07:38

## 2022-12-11 RX ADMIN — Medication 1 DROP(S): at 12:25

## 2022-12-11 RX ADMIN — Medication 1 PATCH: at 18:11

## 2022-12-11 RX ADMIN — Medication 1 DROP(S): at 05:27

## 2022-12-11 RX ADMIN — Medication 1 PATCH: at 06:00

## 2022-12-11 RX ADMIN — Medication 1 PATCH: at 12:32

## 2022-12-11 NOTE — PROGRESS NOTE ADULT - SUBJECTIVE AND OBJECTIVE BOX
Today is day #2 for Plex.  Slight improvement in the vision in the right eye    VITALS & EXAMINATION:  Vital Signs Last 24 Hrs  T(C): 36.4 (11 Dec 2022 18:33), Max: 36.8 (11 Dec 2022 09:52)  T(F): 97.5 (11 Dec 2022 18:33), Max: 98.2 (11 Dec 2022 09:52)  HR: 84 (11 Dec 2022 18:33) (79 - 84)  BP: 102/66 (11 Dec 2022 18:33) (101/54 - 102/66)  BP(mean): --  RR: 18 (11 Dec 2022 18:33) (17 - 18)  SpO2: 98% (11 Dec 2022 18:33) (98% - 98%)    Parameters below as of 11 Dec 2022 18:33  Patient On (Oxygen Delivery Method): room air        General:  Constitutional: NAD    Neurological:  MS: Awake, alert.  Attentive.  Gives detailed history.     CNs: Possible subtle right RAPD.  Visual acuity: OD 20/400 (-2); OS 20/25.  OD - unable to CF with VF testing.

## 2022-12-11 NOTE — CHART NOTE - NSCHARTNOTEFT_GEN_A_CORE
51yo F PMHx chronic migraines, HLD, relapsing MS (first relapse 5/2022- B/l foot numbness, likely thoracic myelitis. 2nd relapse 7/2022- R Optic neuritis), treated with IV steroids with outpatient tapering of prednisone, also started on Ocrevus. She is now presenting with acute worsening of R vision again concerning for recurrent optic neuritis from MS relapse. PLEX initiated on 12/9/22 given minimal improvement with IV steroids. Current plan is a course of 5 PLEX over 10 days.    Clinical notes, labs, meds reviewed. No acute interval events.    PLEX #2 performed today, with 3250ml of 5% albumin as replacement fluid. Pre-procedure fibrinogen resulted 145. Hospitalist team will transfuse a pool of 5 cryo post- procedure.    PLEX#3 currently scheduled on 12/13/22. Please draw a CBC, fibrinogen and ionized calcium in the morning of the procedure.

## 2022-12-11 NOTE — CHART NOTE - NSCHARTNOTEFT_GEN_A_CORE
Blood consent obtained from patient prior to receiving cryoprecipitate. Risks vs. benefits explained. Patient expressed understanding and agrees with plan. Will continue to monitor patient closely.

## 2022-12-11 NOTE — PROGRESS NOTE ADULT - ASSESSMENT
Ms. Dangelo is a 53 yo woman with relapsing remitting multiple sclerosis with an attack of right optic neuritis.   PO prednisone taper: 60mg daily for 7 days, then 50mg daily for 7 days, then 40mg daily for 7 days, then 30mg daily for 7 days, then 20mg daily for 7 days, then 10mg daily for 7 days.   Plasmapheresis   Thank you

## 2022-12-11 NOTE — PROGRESS NOTE ADULT - PROBLEM SELECTOR PLAN 1
Currently with slight pain with EOM and decreased vision resembling previous optic neuritis. Previously treated with 1G/day steroids with outpatient tapering of prednisone  -Neurology C/S: Solumedrol IV 1g x 5 days given significant worsening in vision. Patient with minimal improvement with IV steroids  - MR brain w/ and w/o contrast, MR orbit w/ w/o contrast showing findings of demyelinating disease  - infectious workup including CBC, VBG w/ lactate, CXR, UA, all negative this far  - monitor FS daily, avoid use of concurrent NSAIDS  - case discussed with neurology, patient with minimal improvement with IV steroids  -carrillo placed on 12/8, tentative plan for 5 PLEX sessions every other day; first was 12/9  -to receive second dose today

## 2022-12-11 NOTE — PROGRESS NOTE ADULT - SUBJECTIVE AND OBJECTIVE BOX
Medicine Progress Note    Patient is a 52y old  Female who presents with a chief complaint of Vision change (10 Dec 2022 12:41)      SUBJECTIVE / OVERNIGHT EVENTS: no overnight events    ADDITIONAL REVIEW OF SYSTEMS:    MEDICATIONS  (STANDING):  albumin human  5% IVPB 3250 milliLiter(s) IV Intermittent every 48 hours  artificial  tears Solution 1 Drop(s) Both EYES four times a day  aspirin enteric coated 81 milliGRAM(s) Oral daily  atorvastatin 80 milliGRAM(s) Oral at bedtime  calcium gluconate IVPB 2 Gram(s) IV Intermittent <User Schedule>  chlorhexidine 2% Cloths 1 Application(s) Topical daily  dextrose 5%. 1000 milliLiter(s) (100 mL/Hr) IV Continuous <Continuous>  dextrose 5%. 1000 milliLiter(s) (50 mL/Hr) IV Continuous <Continuous>  dextrose 50% Injectable 25 Gram(s) IV Push once  dextrose 50% Injectable 12.5 Gram(s) IV Push once  dextrose 50% Injectable 25 Gram(s) IV Push once  ergocalciferol 70786 Unit(s) Oral <User Schedule>  glucagon  Injectable 1 milliGRAM(s) IntraMuscular once  insulin glargine Injectable (LANTUS) 4 Unit(s) SubCutaneous at bedtime  insulin lispro (ADMELOG) corrective regimen sliding scale   SubCutaneous three times a day before meals  insulin lispro (ADMELOG) corrective regimen sliding scale   SubCutaneous at bedtime  multivitamin 1 Tablet(s) Oral daily  nicotine -  14 mG/24Hr(s) Patch 1 Patch Transdermal daily  pantoprazole    Tablet 40 milliGRAM(s) Oral before breakfast  QUEtiapine 50 milliGRAM(s) Oral <User Schedule>  senna 2 Tablet(s) Oral at bedtime    MEDICATIONS  (PRN):  acetaminophen     Tablet .. 650 milliGRAM(s) Oral every 6 hours PRN Moderate Pain (4 - 6)  bisacodyl 5 milliGRAM(s) Oral every 12 hours PRN Constipation  cyclobenzaprine 5 milliGRAM(s) Oral three times a day PRN Muscle Spasm  dextrose Oral Gel 15 Gram(s) Oral once PRN Blood Glucose LESS THAN 70 milliGRAM(s)/deciliter  LORazepam     Tablet 0.5 milliGRAM(s) Oral two times a day PRN Anxiety  nortriptyline 10 milliGRAM(s) Oral daily PRN Migraines  polyethylene glycol 3350 17 Gram(s) Oral two times a day PRN Constipation  sodium chloride 0.9% lock flush 10 milliLiter(s) IV Push every 1 hour PRN Pre/post blood products, medications, blood draw, and to maintain line patency    CAPILLARY BLOOD GLUCOSE      POCT Blood Glucose.: 242 mg/dL (11 Dec 2022 09:04)  POCT Blood Glucose.: 111 mg/dL (10 Dec 2022 21:34)  POCT Blood Glucose.: 127 mg/dL (10 Dec 2022 16:43)  POCT Blood Glucose.: 209 mg/dL (10 Dec 2022 11:42)    I&O's Summary      PHYSICAL EXAM:  Vital Signs Last 24 Hrs  T(C): 36.8 (10 Dec 2022 17:48), Max: 36.8 (10 Dec 2022 17:48)  T(F): 98.2 (10 Dec 2022 17:48), Max: 98.2 (10 Dec 2022 17:48)  HR: 65 (10 Dec 2022 17:48) (65 - 65)  BP: 121/79 (10 Dec 2022 17:48) (121/79 - 121/79)  BP(mean): --  RR: 17 (10 Dec 2022 17:48) (17 - 17)  SpO2: 98% (10 Dec 2022 17:48) (98% - 98%)    Parameters below as of 10 Dec 2022 17:48  Patient On (Oxygen Delivery Method): room air      CONSTITUTIONAL: NAD, well-developed, well-groomed  ENMT: Moist oral mucosa, no pharyngeal injection or exudates; normal dentition  RESPIRATORY: Normal respiratory effort; lungs are clear to auscultation bilaterally  CARDIOVASCULAR: Regular rate and rhythm, normal S1 and S2, no murmur/rub/gallop; No lower extremity edema; Peripheral pulses are 2+ bilaterally  ABDOMEN: Nontender to palpation, normoactive bowel sounds, no rebound/guarding; No hepatosplenomegaly  PSYCH: A+O   SKIN: No rashes; no palpable lesions    LABS:                        12.1   10.31 )-----------( 227      ( 11 Dec 2022 06:18 )             37.0     12-11    142  |  108<H>  |  15  ----------------------------<  82  3.9   |  22  |  0.54    Ca    8.1<L>      11 Dec 2022 06:18  Phos  3.7     12-11  Mg     2.30     12-11      PT/INR - ( 09 Dec 2022 12:14 )   PT: 12.5 sec;   INR: 1.08 ratio         PTT - ( 09 Dec 2022 12:14 )  PTT:24.6 sec          COVID-19 PCR: NotDetec (13 Jul 2022 22:34)      RADIOLOGY & ADDITIONAL TESTS:  Imaging from Last 24 Hours:    Electrocardiogram/QTc Interval:    COORDINATION OF CARE:  Care Discussed with Consultants/Other Providers:

## 2022-12-12 DIAGNOSIS — E16.2 HYPOGLYCEMIA, UNSPECIFIED: ICD-10-CM

## 2022-12-12 LAB
ANION GAP SERPL CALC-SCNC: 10 MMOL/L — SIGNIFICANT CHANGE UP (ref 7–14)
BUN SERPL-MCNC: 9 MG/DL — SIGNIFICANT CHANGE UP (ref 7–23)
CA-I BLD-SCNC: 1.05 MMOL/L — LOW (ref 1.15–1.29)
CALCIUM SERPL-MCNC: 8.6 MG/DL — SIGNIFICANT CHANGE UP (ref 8.4–10.5)
CHLORIDE SERPL-SCNC: 108 MMOL/L — HIGH (ref 98–107)
CO2 SERPL-SCNC: 23 MMOL/L — SIGNIFICANT CHANGE UP (ref 22–31)
CREAT SERPL-MCNC: 0.44 MG/DL — LOW (ref 0.5–1.3)
EGFR: 117 ML/MIN/1.73M2 — SIGNIFICANT CHANGE UP
FIBRINOGEN PPP-MCNC: 176 MG/DL — LOW (ref 200–465)
GLUCOSE BLDC GLUCOMTR-MCNC: 215 MG/DL — HIGH (ref 70–99)
GLUCOSE BLDC GLUCOMTR-MCNC: 69 MG/DL — LOW (ref 70–99)
GLUCOSE BLDC GLUCOMTR-MCNC: 84 MG/DL — SIGNIFICANT CHANGE UP (ref 70–99)
GLUCOSE BLDC GLUCOMTR-MCNC: 87 MG/DL — SIGNIFICANT CHANGE UP (ref 70–99)
GLUCOSE BLDC GLUCOMTR-MCNC: 91 MG/DL — SIGNIFICANT CHANGE UP (ref 70–99)
GLUCOSE SERPL-MCNC: 100 MG/DL — HIGH (ref 70–99)
HCT VFR BLD CALC: 36.8 % — SIGNIFICANT CHANGE UP (ref 34.5–45)
HGB BLD-MCNC: 12.2 G/DL — SIGNIFICANT CHANGE UP (ref 11.5–15.5)
MAGNESIUM SERPL-MCNC: 2.2 MG/DL — SIGNIFICANT CHANGE UP (ref 1.6–2.6)
MCHC RBC-ENTMCNC: 27.9 PG — SIGNIFICANT CHANGE UP (ref 27–34)
MCHC RBC-ENTMCNC: 33.2 GM/DL — SIGNIFICANT CHANGE UP (ref 32–36)
MCV RBC AUTO: 84.2 FL — SIGNIFICANT CHANGE UP (ref 80–100)
NRBC # BLD: 0 /100 WBCS — SIGNIFICANT CHANGE UP (ref 0–0)
NRBC # FLD: 0 K/UL — SIGNIFICANT CHANGE UP (ref 0–0)
PHOSPHATE SERPL-MCNC: 4 MG/DL — SIGNIFICANT CHANGE UP (ref 2.5–4.5)
PLATELET # BLD AUTO: 238 K/UL — SIGNIFICANT CHANGE UP (ref 150–400)
POTASSIUM SERPL-MCNC: 4.3 MMOL/L — SIGNIFICANT CHANGE UP (ref 3.5–5.3)
POTASSIUM SERPL-SCNC: 4.3 MMOL/L — SIGNIFICANT CHANGE UP (ref 3.5–5.3)
RBC # BLD: 4.37 M/UL — SIGNIFICANT CHANGE UP (ref 3.8–5.2)
RBC # FLD: 15.4 % — HIGH (ref 10.3–14.5)
SODIUM SERPL-SCNC: 141 MMOL/L — SIGNIFICANT CHANGE UP (ref 135–145)
WBC # BLD: 14.75 K/UL — HIGH (ref 3.8–10.5)
WBC # FLD AUTO: 14.75 K/UL — HIGH (ref 3.8–10.5)

## 2022-12-12 PROCEDURE — 99233 SBSQ HOSP IP/OBS HIGH 50: CPT

## 2022-12-12 RX ORDER — ALBUMIN HUMAN 25 %
3250 VIAL (ML) INTRAVENOUS ONCE
Refills: 0 | Status: DISCONTINUED | OUTPATIENT
Start: 2022-12-13 | End: 2022-12-18

## 2022-12-12 RX ORDER — CALCIUM GLUCONATE 100 MG/ML
2 VIAL (ML) INTRAVENOUS ONCE
Refills: 0 | Status: DISCONTINUED | OUTPATIENT
Start: 2022-12-12 | End: 2022-12-13

## 2022-12-12 RX ADMIN — PANTOPRAZOLE SODIUM 40 MILLIGRAM(S): 20 TABLET, DELAYED RELEASE ORAL at 05:27

## 2022-12-12 RX ADMIN — Medication 60 MILLIGRAM(S): at 17:18

## 2022-12-12 RX ADMIN — ATORVASTATIN CALCIUM 80 MILLIGRAM(S): 80 TABLET, FILM COATED ORAL at 21:03

## 2022-12-12 RX ADMIN — Medication 1 PATCH: at 20:15

## 2022-12-12 RX ADMIN — Medication 1 TABLET(S): at 12:09

## 2022-12-12 RX ADMIN — Medication 1 PATCH: at 07:45

## 2022-12-12 RX ADMIN — Medication 1 PATCH: at 12:09

## 2022-12-12 RX ADMIN — Medication 1 DROP(S): at 05:28

## 2022-12-12 RX ADMIN — Medication 650 MILLIGRAM(S): at 18:44

## 2022-12-12 RX ADMIN — QUETIAPINE FUMARATE 50 MILLIGRAM(S): 200 TABLET, FILM COATED ORAL at 21:03

## 2022-12-12 RX ADMIN — Medication 1 DROP(S): at 00:51

## 2022-12-12 RX ADMIN — Medication 1 DROP(S): at 12:10

## 2022-12-12 RX ADMIN — Medication 1 DROP(S): at 17:18

## 2022-12-12 RX ADMIN — Medication 650 MILLIGRAM(S): at 17:44

## 2022-12-12 RX ADMIN — Medication 81 MILLIGRAM(S): at 12:08

## 2022-12-12 RX ADMIN — CHLORHEXIDINE GLUCONATE 1 APPLICATION(S): 213 SOLUTION TOPICAL at 12:32

## 2022-12-12 NOTE — BH CONSULTATION LIAISON PROGRESS NOTE - NSBHCONSULTFOLLOWAFTERCARE_PSY_A_CORE FT
Please copy paste following referral info into DC paperwork:    Closer to patient's home:  [facility] [phone]  [facility] [phone] / [phone]  [facility] [phone]    Closer to [facility]:  [facility]  [address] [place] [address], First Floor, [place] [id number] [phone]  [email or web address] Intake: [phone]

## 2022-12-12 NOTE — BH CONSULTATION LIAISON PROGRESS NOTE - NSBHCHARTREVIEWLAB_PSY_A_CORE FT
CBC Full  -  ( 12 Dec 2022 06:16 )  WBC Count : 14.75 K/uL  RBC Count : 4.37 M/uL  Hemoglobin : 12.2 g/dL  Hematocrit : 36.8 %  Platelet Count - Automated : 238 K/uL  Mean Cell Volume : 84.2 fL  Mean Cell Hemoglobin : 27.9 pg  Mean Cell Hemoglobin Concentration : 33.2 gm/dL  Auto Neutrophil # : x  Auto Lymphocyte # : x  Auto Monocyte # : x  Auto Eosinophil # : x  Auto Basophil # : x  Auto Neutrophil % : x  Auto Lymphocyte % : x  Auto Monocyte % : x  Auto Eosinophil % : x  Auto Basophil % : x    12-12    141  |  108<H>  |  9   ----------------------------<  100<H>  4.3   |  23  |  0.44<L>    Ca    8.6      12 Dec 2022 06:16  Phos  4.0     12-12  Mg     2.20     12-12

## 2022-12-12 NOTE — BH CONSULTATION LIAISON PROGRESS NOTE - NSBHCHARTREVIEWVS_PSY_A_CORE FT
Vital Signs Last 24 Hrs  T(C): 36.9 (12 Dec 2022 09:58), Max: 36.9 (11 Dec 2022 22:50)  T(F): 98.4 (12 Dec 2022 09:58), Max: 98.5 (11 Dec 2022 22:50)  HR: 92 (12 Dec 2022 09:58) (60 - 95)  BP: 126/57 (12 Dec 2022 09:58) (96/54 - 126/57)  BP(mean): --  RR: 18 (12 Dec 2022 09:58) (18 - 18)  SpO2: 100% (12 Dec 2022 09:58) (98% - 100%)    Parameters below as of 12 Dec 2022 09:58  Patient On (Oxygen Delivery Method): room air

## 2022-12-12 NOTE — BH CONSULTATION LIAISON PROGRESS NOTE - CURRENT MEDICATION
MEDICATIONS  (STANDING):  albumin human  5% IVPB 3250 milliLiter(s) IV Intermittent every 48 hours  artificial  tears Solution 1 Drop(s) Both EYES four times a day  aspirin enteric coated 81 milliGRAM(s) Oral daily  atorvastatin 80 milliGRAM(s) Oral at bedtime  calcium gluconate IVPB 2 Gram(s) IV Intermittent <User Schedule>  chlorhexidine 2% Cloths 1 Application(s) Topical daily  dextrose 5%. 1000 milliLiter(s) (100 mL/Hr) IV Continuous <Continuous>  dextrose 5%. 1000 milliLiter(s) (50 mL/Hr) IV Continuous <Continuous>  dextrose 50% Injectable 25 Gram(s) IV Push once  dextrose 50% Injectable 12.5 Gram(s) IV Push once  dextrose 50% Injectable 25 Gram(s) IV Push once  ergocalciferol 19241 Unit(s) Oral <User Schedule>  glucagon  Injectable 1 milliGRAM(s) IntraMuscular once  insulin lispro (ADMELOG) corrective regimen sliding scale   SubCutaneous three times a day before meals  insulin lispro (ADMELOG) corrective regimen sliding scale   SubCutaneous at bedtime  multivitamin 1 Tablet(s) Oral daily  nicotine -  14 mG/24Hr(s) Patch 1 Patch Transdermal daily  pantoprazole    Tablet 40 milliGRAM(s) Oral before breakfast  predniSONE   Tablet 60 milliGRAM(s) Oral daily  predniSONE   Tablet   Oral   QUEtiapine 50 milliGRAM(s) Oral <User Schedule>  senna 2 Tablet(s) Oral at bedtime    MEDICATIONS  (PRN):  acetaminophen     Tablet .. 650 milliGRAM(s) Oral every 6 hours PRN Moderate Pain (4 - 6)  bisacodyl 5 milliGRAM(s) Oral every 12 hours PRN Constipation  cyclobenzaprine 5 milliGRAM(s) Oral three times a day PRN Muscle Spasm  dextrose Oral Gel 15 Gram(s) Oral once PRN Blood Glucose LESS THAN 70 milliGRAM(s)/deciliter  LORazepam     Tablet 0.5 milliGRAM(s) Oral two times a day PRN Anxiety  nortriptyline 10 milliGRAM(s) Oral daily PRN Migraines  polyethylene glycol 3350 17 Gram(s) Oral two times a day PRN Constipation  sodium chloride 0.9% lock flush 10 milliLiter(s) IV Push every 1 hour PRN Pre/post blood products, medications, blood draw, and to maintain line patency

## 2022-12-12 NOTE — BH CONSULTATION LIAISON PROGRESS NOTE - NSBHATTESTCOMMENTATTENDFT_PSY_A_CORE
Chart reviewed. Pt seen with resident. Pleasant, though a bit hypomanic/pressured (likely 2/2 steroids). No safety concerns or psychosis/SI. Agree with above assessment/recs.

## 2022-12-12 NOTE — PROGRESS NOTE ADULT - PROBLEM SELECTOR PLAN 1
Pt had an episode of hypoglycemia this AM. FS in 60s. Patient reports she was not symptomatic at the time. Unclear if this is due to poor PO intake (pt. reports she has not had an appetite) vs. AI (however less likely given patient remains on a slow prednisone taper). Pt did receive premeal insulin yesterday which could potentially be contributing.   - Monitor off of insulin   - Encourage PO intake.

## 2022-12-12 NOTE — PROGRESS NOTE ADULT - PROBLEM SELECTOR PLAN 2
Currently with slight pain with EOM and decreased vision resembling previous optic neuritis. Previously treated with 1G/day steroids with outpatient tapering of prednisone  -Neurology C/S: Solumedrol IV 1g x 5 days given significant worsening in vision. Patient with minimal improvement with IV steroids  - MR brain w/ and w/o contrast, MR orbit w/ w/o contrast showing findings of demyelinating disease  - infectious workup including CBC, VBG w/ lactate, CXR, UA, all negative this far  - monitor FS daily, avoid use of concurrent NSAIDS  - case discussed with neurology, patient with minimal improvement with IV steroids  -carrillo placed on 12/8, tentative plan for 5 PLEX sessions every other day; first was 12/9  -to receive third dose tomorrow 12/13

## 2022-12-12 NOTE — BH CONSULTATION LIAISON PROGRESS NOTE - NSBHFUPINTERVALHXFT_PSY_A_CORE
Chart reviewed. No acute events noted during the weekend. Patient did receive Ativan 0.5mg x2 dose on both days over the weekend for anxiety. Patient received her standing Psychiatric regimen as well.    Patient was evaluated at the bedside. She was alert, cooperative, and euthymic. Patient states that she was initially anxious about the unknowns of being admitted, but endorsed that she was provided with reassurance and information by her Primary Medical Team. She discussed that she did receive steroid treatment and is still receiving standing PO Prednisone. She states that she is at her emotional and Psychiatric baseline. Additionally, she endorsed that her Seroquel at its current dosage is working well for her insomnia sx. She denies SI/HI, AVH, and endorses wanting some resources for outpatient Psychiatry. The patient was also provided with brief psychoeducation regarding the SE of Seroquel.

## 2022-12-12 NOTE — PROGRESS NOTE ADULT - SUBJECTIVE AND OBJECTIVE BOX
Hospitalist Progress Note  Kaity Robertson MD Pager # [phone]    OVERNIGHT EVENTS: VANDA    SUBJECTIVE / INTERVAL HPI: Patient seen and examined at bedside. Pt reporting that she is still having blurry vision. She says that she cannot see any bright colors. Red/orange looks grey. She also had an episode of hypoglycemia this AM. Asymptomatic at the time.     VITAL SIGNS:  Vital Signs Last 24 Hrs  T(C): 36.9 (12 Dec 2022 09:58), Max: 36.9 (11 Dec 2022 22:50)  T(F): 98.4 (12 Dec 2022 09:58), Max: 98.5 (11 Dec 2022 22:50)  HR: 92 (12 Dec 2022 09:58) (60 - 95)  BP: 126/57 (12 Dec 2022 09:58) (96/54 - 126/57)  BP(mean): --  RR: 18 (12 Dec 2022 09:58) (18 - 18)  SpO2: 100% (12 Dec 2022 09:58) (98% - 100%)    Parameters below as of 12 Dec 2022 09:58  Patient On (Oxygen Delivery Method): room air        PHYSICAL EXAM:    General: WDWN female resting in bed   HEENT: NC/AT; PERRL, anicteric sclera; MMM  Neck: supple  Cardiovascular: +S1/S2; RRR  Respiratory: CTA B/L; no W/R/R  Gastrointestinal: soft, NT/ND; +BSx4  Extremities: WWP; no edema, clubbing or cyanosis  Vascular: 2+ radial, DP/PT pulses B/L  Neurological: AAOx3; no focal deficits    MEDICATIONS:  MEDICATIONS  (STANDING):  albumin human  5% IVPB 3250 milliLiter(s) IV Intermittent every 48 hours  artificial  tears Solution 1 Drop(s) Both EYES four times a day  aspirin enteric coated 81 milliGRAM(s) Oral daily  atorvastatin 80 milliGRAM(s) Oral at bedtime  calcium gluconate IVPB 2 Gram(s) IV Intermittent once  calcium gluconate IVPB 2 Gram(s) IV Intermittent <User Schedule>  chlorhexidine 2% Cloths 1 Application(s) Topical daily  dextrose 5%. 1000 milliLiter(s) (50 mL/Hr) IV Continuous <Continuous>  dextrose 5%. 1000 milliLiter(s) (100 mL/Hr) IV Continuous <Continuous>  dextrose 50% Injectable 25 Gram(s) IV Push once  dextrose 50% Injectable 12.5 Gram(s) IV Push once  dextrose 50% Injectable 25 Gram(s) IV Push once  ergocalciferol 59834 Unit(s) Oral <User Schedule>  glucagon  Injectable 1 milliGRAM(s) IntraMuscular once  insulin lispro (ADMELOG) corrective regimen sliding scale   SubCutaneous three times a day before meals  insulin lispro (ADMELOG) corrective regimen sliding scale   SubCutaneous at bedtime  multivitamin 1 Tablet(s) Oral daily  nicotine -  14 mG/24Hr(s) Patch 1 Patch Transdermal daily  pantoprazole    Tablet 40 milliGRAM(s) Oral before breakfast  predniSONE   Tablet 60 milliGRAM(s) Oral daily  predniSONE   Tablet   Oral   QUEtiapine 50 milliGRAM(s) Oral <User Schedule>  senna 2 Tablet(s) Oral at bedtime    MEDICATIONS  (PRN):  acetaminophen     Tablet .. 650 milliGRAM(s) Oral every 6 hours PRN Moderate Pain (4 - 6)  bisacodyl 5 milliGRAM(s) Oral every 12 hours PRN Constipation  cyclobenzaprine 5 milliGRAM(s) Oral three times a day PRN Muscle Spasm  dextrose Oral Gel 15 Gram(s) Oral once PRN Blood Glucose LESS THAN 70 milliGRAM(s)/deciliter  LORazepam     Tablet 0.5 milliGRAM(s) Oral two times a day PRN Anxiety  nortriptyline 10 milliGRAM(s) Oral daily PRN Migraines  polyethylene glycol 3350 17 Gram(s) Oral two times a day PRN Constipation  sodium chloride 0.9% lock flush 10 milliLiter(s) IV Push every 1 hour PRN Pre/post blood products, medications, blood draw, and to maintain line patency      ALLERGIES:  Allergies    No Known Allergies    Intolerances        LABS:                        12.2   14.75 )-----------( 238      ( 12 Dec 2022 06:16 )             36.8     12-12    141  |  108<H>  |  9   ----------------------------<  100<H>  4.3   |  23  |  0.44<L>    Ca    8.6      12 Dec 2022 06:16  Phos  4.0     12-12  Mg     2.20     12-12          CAPILLARY BLOOD GLUCOSE      POCT Blood Glucose.: 91 mg/dL (12 Dec 2022 11:20)      RADIOLOGY & ADDITIONAL TESTS: Reviewed.    ASSESSMENT:    PLAN:

## 2022-12-13 LAB
ANION GAP SERPL CALC-SCNC: 12 MMOL/L — SIGNIFICANT CHANGE UP (ref 7–14)
BUN SERPL-MCNC: 12 MG/DL — SIGNIFICANT CHANGE UP (ref 7–23)
CA-I BLD-SCNC: 1.07 MMOL/L — LOW (ref 1.15–1.29)
CALCIUM SERPL-MCNC: 8.5 MG/DL — SIGNIFICANT CHANGE UP (ref 8.4–10.5)
CHLORIDE SERPL-SCNC: 108 MMOL/L — HIGH (ref 98–107)
CO2 SERPL-SCNC: 23 MMOL/L — SIGNIFICANT CHANGE UP (ref 22–31)
CREAT SERPL-MCNC: 0.44 MG/DL — LOW (ref 0.5–1.3)
EGFR: 117 ML/MIN/1.73M2 — SIGNIFICANT CHANGE UP
FIBRINOGEN PPP-MCNC: 217 MG/DL — SIGNIFICANT CHANGE UP (ref 200–465)
GLUCOSE BLDC GLUCOMTR-MCNC: 104 MG/DL — HIGH (ref 70–99)
GLUCOSE BLDC GLUCOMTR-MCNC: 169 MG/DL — HIGH (ref 70–99)
GLUCOSE BLDC GLUCOMTR-MCNC: 93 MG/DL — SIGNIFICANT CHANGE UP (ref 70–99)
GLUCOSE BLDC GLUCOMTR-MCNC: 93 MG/DL — SIGNIFICANT CHANGE UP (ref 70–99)
GLUCOSE SERPL-MCNC: 155 MG/DL — HIGH (ref 70–99)
HCT VFR BLD CALC: 37.8 % — SIGNIFICANT CHANGE UP (ref 34.5–45)
HGB BLD-MCNC: 12.4 G/DL — SIGNIFICANT CHANGE UP (ref 11.5–15.5)
MAGNESIUM SERPL-MCNC: 2.2 MG/DL — SIGNIFICANT CHANGE UP (ref 1.6–2.6)
MCHC RBC-ENTMCNC: 27.8 PG — SIGNIFICANT CHANGE UP (ref 27–34)
MCHC RBC-ENTMCNC: 32.8 GM/DL — SIGNIFICANT CHANGE UP (ref 32–36)
MCV RBC AUTO: 84.8 FL — SIGNIFICANT CHANGE UP (ref 80–100)
NRBC # BLD: 0 /100 WBCS — SIGNIFICANT CHANGE UP (ref 0–0)
NRBC # FLD: 0 K/UL — SIGNIFICANT CHANGE UP (ref 0–0)
PHOSPHATE SERPL-MCNC: 3.8 MG/DL — SIGNIFICANT CHANGE UP (ref 2.5–4.5)
PLATELET # BLD AUTO: 252 K/UL — SIGNIFICANT CHANGE UP (ref 150–400)
POTASSIUM SERPL-MCNC: 4.6 MMOL/L — SIGNIFICANT CHANGE UP (ref 3.5–5.3)
POTASSIUM SERPL-SCNC: 4.6 MMOL/L — SIGNIFICANT CHANGE UP (ref 3.5–5.3)
RBC # BLD: 4.46 M/UL — SIGNIFICANT CHANGE UP (ref 3.8–5.2)
RBC # FLD: 15.7 % — HIGH (ref 10.3–14.5)
SODIUM SERPL-SCNC: 143 MMOL/L — SIGNIFICANT CHANGE UP (ref 135–145)
WBC # BLD: 14.79 K/UL — HIGH (ref 3.8–10.5)
WBC # FLD AUTO: 14.79 K/UL — HIGH (ref 3.8–10.5)

## 2022-12-13 PROCEDURE — 99232 SBSQ HOSP IP/OBS MODERATE 35: CPT

## 2022-12-13 PROCEDURE — 36514 APHERESIS PLASMA: CPT

## 2022-12-13 RX ADMIN — SENNA PLUS 2 TABLET(S): 8.6 TABLET ORAL at 21:21

## 2022-12-13 RX ADMIN — ATORVASTATIN CALCIUM 80 MILLIGRAM(S): 80 TABLET, FILM COATED ORAL at 21:21

## 2022-12-13 RX ADMIN — Medication 1 DROP(S): at 05:04

## 2022-12-13 RX ADMIN — Medication 1 PATCH: at 12:04

## 2022-12-13 RX ADMIN — Medication 0.5 MILLIGRAM(S): at 13:37

## 2022-12-13 RX ADMIN — Medication 1 DROP(S): at 00:20

## 2022-12-13 RX ADMIN — Medication 1 PATCH: at 19:06

## 2022-12-13 RX ADMIN — CHLORHEXIDINE GLUCONATE 1 APPLICATION(S): 213 SOLUTION TOPICAL at 08:00

## 2022-12-13 RX ADMIN — PANTOPRAZOLE SODIUM 40 MILLIGRAM(S): 20 TABLET, DELAYED RELEASE ORAL at 05:04

## 2022-12-13 RX ADMIN — Medication 1 PATCH: at 09:35

## 2022-12-13 RX ADMIN — Medication 1 TABLET(S): at 12:19

## 2022-12-13 RX ADMIN — Medication 81 MILLIGRAM(S): at 12:19

## 2022-12-13 RX ADMIN — Medication 1 PATCH: at 12:19

## 2022-12-13 RX ADMIN — QUETIAPINE FUMARATE 50 MILLIGRAM(S): 200 TABLET, FILM COATED ORAL at 21:21

## 2022-12-13 RX ADMIN — Medication 60 MILLIGRAM(S): at 19:07

## 2022-12-13 NOTE — CHART NOTE - NSCHARTNOTEFT_GEN_A_CORE
51yo F PMHx chronic migraines presented with relapsing MS, s/p steroids for recurrent optic neuritis with minimal improvement. Current plan is a course of 5 PLEX over 10 days.    Clinical notes, labs, meds reviewed. No acute interval events. Care d/w apheresis RN.    PLEX #3 today, fibrinogen 217. Using 3250ml 5% albumin as replacement PLEX. Tolerating well.     Please draw fibrinogen with AM labs  on 12/15/22.

## 2022-12-13 NOTE — PROGRESS NOTE ADULT - SUBJECTIVE AND OBJECTIVE BOX
Hospitalist Progress Note  Kaity Robertson MD Pager # [phone]    OVERNIGHT EVENTS: VANDA    SUBJECTIVE / INTERVAL HPI: Patient seen and examined at bedside. Patient reports that her vision has not improved. Her sugars have improved and she has not has another episode of hypoglycemia. All other ROS negative.     VITAL SIGNS:  Vital Signs Last 24 Hrs  T(C): 36.7 (13 Dec 2022 10:00), Max: 36.9 (12 Dec 2022 21:40)  T(F): 98 (13 Dec 2022 10:00), Max: 98.4 (12 Dec 2022 21:40)  HR: 93 (13 Dec 2022 10:00) (76 - 93)  BP: 111/60 (13 Dec 2022 10:00) (109/54 - 125/67)  BP(mean): --  RR: 18 (13 Dec 2022 10:00) (17 - 18)  SpO2: 95% (13 Dec 2022 10:00) (95% - 100%)    Parameters below as of 13 Dec 2022 10:00  Patient On (Oxygen Delivery Method): room air        PHYSICAL EXAM:    General: WDWN female resting in bed   HEENT: NC/AT; PERRL, anicteric sclera; MMM  Neck: supple right sided shiley in place   Cardiovascular: +S1/S2; RRR  Respiratory: CTA B/L; no W/R/R  Gastrointestinal: soft, NT/ND; +BSx4  Extremities: WWP; no edema, clubbing or cyanosis  Vascular: 2+ radial, DP/PT pulses B/L  Neurological: AAOx3; no focal deficits    MEDICATIONS:  MEDICATIONS  (STANDING):  albumin human  5% IVPB 3250 milliLiter(s) IV Intermittent every 48 hours  albumin human  5% IVPB 3250 milliLiter(s) IV Intermittent once  artificial  tears Solution 1 Drop(s) Both EYES four times a day  aspirin enteric coated 81 milliGRAM(s) Oral daily  atorvastatin 80 milliGRAM(s) Oral at bedtime  calcium gluconate IVPB 2 Gram(s) IV Intermittent <User Schedule>  chlorhexidine 2% Cloths 1 Application(s) Topical daily  dextrose 5%. 1000 milliLiter(s) (100 mL/Hr) IV Continuous <Continuous>  dextrose 5%. 1000 milliLiter(s) (50 mL/Hr) IV Continuous <Continuous>  dextrose 50% Injectable 25 Gram(s) IV Push once  dextrose 50% Injectable 12.5 Gram(s) IV Push once  dextrose 50% Injectable 25 Gram(s) IV Push once  ergocalciferol 79562 Unit(s) Oral <User Schedule>  glucagon  Injectable 1 milliGRAM(s) IntraMuscular once  multivitamin 1 Tablet(s) Oral daily  nicotine -  14 mG/24Hr(s) Patch 1 Patch Transdermal daily  pantoprazole    Tablet 40 milliGRAM(s) Oral before breakfast  predniSONE   Tablet   Oral   predniSONE   Tablet 60 milliGRAM(s) Oral daily  QUEtiapine 50 milliGRAM(s) Oral <User Schedule>  senna 2 Tablet(s) Oral at bedtime    MEDICATIONS  (PRN):  acetaminophen     Tablet .. 650 milliGRAM(s) Oral every 6 hours PRN Moderate Pain (4 - 6)  bisacodyl 5 milliGRAM(s) Oral every 12 hours PRN Constipation  cyclobenzaprine 5 milliGRAM(s) Oral three times a day PRN Muscle Spasm  dextrose Oral Gel 15 Gram(s) Oral once PRN Blood Glucose LESS THAN 70 milliGRAM(s)/deciliter  LORazepam     Tablet 0.5 milliGRAM(s) Oral two times a day PRN Anxiety  nortriptyline 10 milliGRAM(s) Oral daily PRN Migraines  polyethylene glycol 3350 17 Gram(s) Oral two times a day PRN Constipation  sodium chloride 0.9% lock flush 10 milliLiter(s) IV Push every 1 hour PRN Pre/post blood products, medications, blood draw, and to maintain line patency      ALLERGIES:  Allergies    No Known Allergies    Intolerances        LABS:                        12.4   14.79 )-----------( 252      ( 13 Dec 2022 04:30 )             37.8     12-13    143  |  108<H>  |  12  ----------------------------<  155<H>  4.6   |  23  |  0.44<L>    Ca    8.5      13 Dec 2022 04:30  Phos  3.8     12-13  Mg     2.20     12-13          CAPILLARY BLOOD GLUCOSE      POCT Blood Glucose.: 104 mg/dL (13 Dec 2022 11:20)      RADIOLOGY & ADDITIONAL TESTS: Reviewed.    ASSESSMENT:    PLAN:

## 2022-12-13 NOTE — PROGRESS NOTE ADULT - PROBLEM SELECTOR PLAN 1
Improved. No episodes in the past 24 hours. Hypoglycemia episode likely 2/2 insulin use and decreased steroid dose.   - Monitor off of insulin   - Encourage PO intake.

## 2022-12-13 NOTE — PROGRESS NOTE ADULT - PROBLEM SELECTOR PLAN 2
Currently with slight pain with EOM and decreased vision resembling previous optic neuritis. Previously treated with 1G/day steroids with outpatient tapering of prednisone  -Neurology C/S: Solumedrol IV 1g x 5 days given significant worsening in vision. Patient with minimal improvement with IV steroids  - MR brain w/ and w/o contrast, MR orbit w/ w/o contrast showing findings of demyelinating disease  - infectious workup including CBC, VBG w/ lactate, CXR, UA, all negative this far  - monitor FS daily, avoid use of concurrent NSAIDS  - case discussed with neurology, patient with minimal improvement with IV steroids  -carrillo placed on 12/8, tentative plan for 5 PLEX sessions every other day; first was 12/9  -to receive third dose today 12/13

## 2022-12-13 NOTE — PROGRESS NOTE ADULT - SUBJECTIVE AND OBJECTIVE BOX
[facility] DEPARTMENT OF OPHTHALMOLOGY  ------------------------------------------------------------------------------  Jessica Ernst MD PGY-3  ------------------------------------------------------------------------------    Interval History: Started on PLEX 12/9. Pt reports no improvement in vision.     MEDICATIONS  (STANDING):  albumin human  5% IVPB 3250 milliLiter(s) IV Intermittent every 48 hours  albumin human  5% IVPB 3250 milliLiter(s) IV Intermittent once  artificial  tears Solution 1 Drop(s) Both EYES four times a day  aspirin enteric coated 81 milliGRAM(s) Oral daily  atorvastatin 80 milliGRAM(s) Oral at bedtime  calcium gluconate IVPB 2 Gram(s) IV Intermittent once  calcium gluconate IVPB 2 Gram(s) IV Intermittent <User Schedule>  chlorhexidine 2% Cloths 1 Application(s) Topical daily  dextrose 5%. 1000 milliLiter(s) (50 mL/Hr) IV Continuous <Continuous>  dextrose 5%. 1000 milliLiter(s) (100 mL/Hr) IV Continuous <Continuous>  dextrose 50% Injectable 25 Gram(s) IV Push once  dextrose 50% Injectable 12.5 Gram(s) IV Push once  dextrose 50% Injectable 25 Gram(s) IV Push once  ergocalciferol 00031 Unit(s) Oral <User Schedule>  glucagon  Injectable 1 milliGRAM(s) IntraMuscular once  insulin lispro (ADMELOG) corrective regimen sliding scale   SubCutaneous three times a day before meals  insulin lispro (ADMELOG) corrective regimen sliding scale   SubCutaneous at bedtime  multivitamin 1 Tablet(s) Oral daily  nicotine -  14 mG/24Hr(s) Patch 1 Patch Transdermal daily  pantoprazole    Tablet 40 milliGRAM(s) Oral before breakfast  predniSONE   Tablet 60 milliGRAM(s) Oral daily  predniSONE   Tablet   Oral   QUEtiapine 50 milliGRAM(s) Oral <User Schedule>  senna 2 Tablet(s) Oral at bedtime    MEDICATIONS  (PRN):  acetaminophen     Tablet .. 650 milliGRAM(s) Oral every 6 hours PRN Moderate Pain (4 - 6)  bisacodyl 5 milliGRAM(s) Oral every 12 hours PRN Constipation  cyclobenzaprine 5 milliGRAM(s) Oral three times a day PRN Muscle Spasm  dextrose Oral Gel 15 Gram(s) Oral once PRN Blood Glucose LESS THAN 70 milliGRAM(s)/deciliter  LORazepam     Tablet 0.5 milliGRAM(s) Oral two times a day PRN Anxiety  nortriptyline 10 milliGRAM(s) Oral daily PRN Migraines  polyethylene glycol 3350 17 Gram(s) Oral two times a day PRN Constipation  sodium chloride 0.9% lock flush 10 milliLiter(s) IV Push every 1 hour PRN Pre/post blood products, medications, blood draw, and to maintain line patency      VITALS: T(C): 36.7 (12-13-22 @ 10:00)  T(F): 98 (12-13-22 @ 10:00), Max: 98.4 (12-12-22 @ 21:40)  HR: 93 (12-13-22 @ 10:00) (76 - 93)  BP: 111/60 (12-13-22 @ 10:00) (109/54 - 125/67)  RR:  (17 - 18)  SpO2:  (95% - 100%)  Wt(kg): --  General: AAOx3    Visual acuity (sc): 20/800 PHNI OD, 20/25 OS  Pupils: PERRL OU, + trace RAPD OD  Confrontational Visual Field (CVF): Unable OD  Color Plates: 0/12 OD    Pen Light Exam (PLE)  External: Flat OU  Lids/Lashes/Lacrimal Ducts: Flat OU    Sclera/Conjunctiva: W+Q OU  Cornea: Cl OU  Anterior Chamber: D+F OU    Iris: Flat OU  Lens: Cl OU

## 2022-12-13 NOTE — DIETITIAN INITIAL EVALUATION ADULT - NS FNS DIET ORDER
Diet, Regular:   DASH/TLC {Sodium & Cholesterol Restricted} (DASH)  Supplement Feeding Modality:  Oral  Glucerna Shake Cans or Servings Per Day:  1       Frequency:  Two Times a day (12-11-22 @ 12:25)

## 2022-12-13 NOTE — DIETITIAN INITIAL EVALUATION ADULT - OTHER INFO
Per chart----51 yo F w/ PMHx of Migraines, HLD, COVID in 12/21, dx'd with MS 6 months ago, hospitalized for recurrent Rt eye optic neuritis s/p IV steroids with minimal improvement now planned for PLEX.    Reports good appetite and po intake and denies nausea/vomiting/constipation or any issues with chewing/swallowing. On Glucerna shake twice daily with 100% completion. Was having difficulty with bowel movement, reports had a BM 12/12. Nutrition consult received as Pt had an episode of Hypoglycemia on 12/12 (69). Pt is on IV steroid therapy with ISS. Pt reports she has been eating well. We discussed importance of nutrition considering Insulin therapy and reviewed menu selections to consider to ensure adequate nutrient/CHO intakes. Pt states she has not received insulin for the past 3 days.   Pt was informed current HgA1C (5.7%), which maybe 2/2 hx of being on steroids. Pt educated to monitor same and provided with strategies to help with managing higher glucose levels.  Per chart----53 yo F w/ PMHx of Migraines, HLD, COVID in 12/21, dx'd with MS 6 months ago, hospitalized for recurrent Rt eye optic neuritis s/p IV steroids with minimal improvement now planned for PLEX.    Reports good appetite and po intake and denies nausea/vomiting or any issues with chewing/swallowing. On Glucerna shake twice daily with 100% completion. Was having difficulty with bowel movement, reports had a BM 12/12. Nutrition consult received as Pt had an episode of Hypoglycemia on 12/12 (69). S/P IV solumedrol and now on PO therapy with ISS. Pt reports she has been eating well. We discussed importance of nutrition considering Insulin therapy and reviewed menu selections to consider to ensure adequate nutrient/CHO intakes. Pt states she has not received insulin for the past 3 days.   Pt was informed current HgA1C (5.7%), which maybe 2/2 hx of being on steroids. Pt educated to monitor same and provided with strategies to help with managing higher glucose levels.

## 2022-12-13 NOTE — PROGRESS NOTE ADULT - ASSESSMENT
52y female w/ pmhx/ochx of  multiple sclerosis and right optic neuritis found to have likely recurrent optic neuritis of the right side.    # Optic neuritis, right side, secondary to relapsing multiple sclerosis  - Vision on 11/16 with Dr Baum 20/50 right eye, 20/25 left eye with superior visual field loss and 4/12 on color plates; presented with 20/400 right eye, 20/25 left eye with worsening APD, total confrontation visual field loss, and 0/12 on color plates  - Posterior segment exam without disc pallor of edema  - s/p course of IV steroids with no improvement on exam  - Appreciate neuro input  - Repeat MRI with increased T2 and FLAIR hyperintensity in periventricular white matter; negative MRI orbits/cervical/thoracic   - visual acuity 20/800 today - no improvement thus far  - PLEX started 12/9 - continue per neurology    Pt seen and discussed with Dr. Hong.     Outpatient follow-up: Patient should follow-up with his/her ophthalmologist or with [facility] Department of Ophthalmology at the address below     [address] [place] [address]. [address]  [place] [address]  [phone]

## 2022-12-13 NOTE — DIETITIAN INITIAL EVALUATION ADULT - PERTINENT MEDS FT
MEDICATIONS  (STANDING):  albumin human  5% IVPB 3250 milliLiter(s) IV Intermittent once  albumin human  5% IVPB 3250 milliLiter(s) IV Intermittent every 48 hours  artificial  tears Solution 1 Drop(s) Both EYES four times a day  aspirin enteric coated 81 milliGRAM(s) Oral daily  atorvastatin 80 milliGRAM(s) Oral at bedtime  calcium gluconate IVPB 2 Gram(s) IV Intermittent <User Schedule>  calcium gluconate IVPB 2 Gram(s) IV Intermittent once  chlorhexidine 2% Cloths 1 Application(s) Topical daily  dextrose 5%. 1000 milliLiter(s) (100 mL/Hr) IV Continuous <Continuous>  dextrose 5%. 1000 milliLiter(s) (50 mL/Hr) IV Continuous <Continuous>  dextrose 50% Injectable 25 Gram(s) IV Push once  dextrose 50% Injectable 12.5 Gram(s) IV Push once  dextrose 50% Injectable 25 Gram(s) IV Push once  ergocalciferol 65961 Unit(s) Oral <User Schedule>  glucagon  Injectable 1 milliGRAM(s) IntraMuscular once  insulin lispro (ADMELOG) corrective regimen sliding scale   SubCutaneous three times a day before meals  insulin lispro (ADMELOG) corrective regimen sliding scale   SubCutaneous at bedtime  multivitamin 1 Tablet(s) Oral daily  nicotine -  14 mG/24Hr(s) Patch 1 Patch Transdermal daily  pantoprazole    Tablet 40 milliGRAM(s) Oral before breakfast  predniSONE   Tablet   Oral   predniSONE   Tablet 60 milliGRAM(s) Oral daily  QUEtiapine 50 milliGRAM(s) Oral <User Schedule>  senna 2 Tablet(s) Oral at bedtime    MEDICATIONS  (PRN):  acetaminophen     Tablet .. 650 milliGRAM(s) Oral every 6 hours PRN Moderate Pain (4 - 6)  bisacodyl 5 milliGRAM(s) Oral every 12 hours PRN Constipation  cyclobenzaprine 5 milliGRAM(s) Oral three times a day PRN Muscle Spasm  dextrose Oral Gel 15 Gram(s) Oral once PRN Blood Glucose LESS THAN 70 milliGRAM(s)/deciliter  LORazepam     Tablet 0.5 milliGRAM(s) Oral two times a day PRN Anxiety  nortriptyline 10 milliGRAM(s) Oral daily PRN Migraines  polyethylene glycol 3350 17 Gram(s) Oral two times a day PRN Constipation  sodium chloride 0.9% lock flush 10 milliLiter(s) IV Push every 1 hour PRN Pre/post blood products, medications, blood draw, and to maintain line patency

## 2022-12-13 NOTE — DIETITIAN INITIAL EVALUATION ADULT - ORAL INTAKE PTA/DIET HISTORY
Pt with Hx of Gatric Bypass (2007), therefore eats small, frequent meals. Pt also reports significant 27# weight increase since being on steroid therapy for the past 7 months.  Pt with Hx of Gastric Bypass (2007), therefore eats small, frequent meals. Pt also reports significant 27# weight increase since being on steroid therapy for the past 7 months.

## 2022-12-13 NOTE — DIETITIAN INITIAL EVALUATION ADULT - PERTINENT LABORATORY DATA
12-13    143  |  108<H>  |  12  ----------------------------<  155<H>  4.6   |  23  |  0.44<L>    Ca    8.5      13 Dec 2022 04:30  Phos  3.8     12-13  Mg     2.20     12-13    POCT Blood Glucose.: 93 mg/dL (12-13-22 @ 07:26)  A1C with Estimated Average Glucose Result: 5.9 % (12-06-22 @ 05:37)  A1C with Estimated Average Glucose Result: 6.0 % (12-05-22 @ 13:15)  CAPILLARY BLOOD GLUCOSE    POCT Blood Glucose.: 93 mg/dL (13 Dec 2022 07:26)  POCT Blood Glucose.: 215 mg/dL (12 Dec 2022 21:25)  POCT Blood Glucose.: 84 mg/dL (12 Dec 2022 16:57)  POCT Blood Glucose.: 91 mg/dL (12 Dec 2022 11:20)  POCT Blood Glucose.: 69 mg/dL (12 Dec 2022 11:12)

## 2022-12-14 LAB
ANION GAP SERPL CALC-SCNC: 12 MMOL/L — SIGNIFICANT CHANGE UP (ref 7–14)
BUN SERPL-MCNC: 12 MG/DL — SIGNIFICANT CHANGE UP (ref 7–23)
CALCIUM SERPL-MCNC: 8.8 MG/DL — SIGNIFICANT CHANGE UP (ref 8.4–10.5)
CHLORIDE SERPL-SCNC: 106 MMOL/L — SIGNIFICANT CHANGE UP (ref 98–107)
CO2 SERPL-SCNC: 22 MMOL/L — SIGNIFICANT CHANGE UP (ref 22–31)
CREAT SERPL-MCNC: 0.53 MG/DL — SIGNIFICANT CHANGE UP (ref 0.5–1.3)
EGFR: 112 ML/MIN/1.73M2 — SIGNIFICANT CHANGE UP
GLUCOSE BLDC GLUCOMTR-MCNC: 121 MG/DL — HIGH (ref 70–99)
GLUCOSE BLDC GLUCOMTR-MCNC: 78 MG/DL — SIGNIFICANT CHANGE UP (ref 70–99)
GLUCOSE SERPL-MCNC: 137 MG/DL — HIGH (ref 70–99)
HCT VFR BLD CALC: 38.4 % — SIGNIFICANT CHANGE UP (ref 34.5–45)
HGB BLD-MCNC: 12.6 G/DL — SIGNIFICANT CHANGE UP (ref 11.5–15.5)
MAGNESIUM SERPL-MCNC: 2.2 MG/DL — SIGNIFICANT CHANGE UP (ref 1.6–2.6)
MCHC RBC-ENTMCNC: 27.7 PG — SIGNIFICANT CHANGE UP (ref 27–34)
MCHC RBC-ENTMCNC: 32.8 GM/DL — SIGNIFICANT CHANGE UP (ref 32–36)
MCV RBC AUTO: 84.4 FL — SIGNIFICANT CHANGE UP (ref 80–100)
NRBC # BLD: 0 /100 WBCS — SIGNIFICANT CHANGE UP (ref 0–0)
NRBC # FLD: 0 K/UL — SIGNIFICANT CHANGE UP (ref 0–0)
PHOSPHATE SERPL-MCNC: 4.5 MG/DL — SIGNIFICANT CHANGE UP (ref 2.5–4.5)
PLATELET # BLD AUTO: 280 K/UL — SIGNIFICANT CHANGE UP (ref 150–400)
POTASSIUM SERPL-MCNC: 4.7 MMOL/L — SIGNIFICANT CHANGE UP (ref 3.5–5.3)
POTASSIUM SERPL-SCNC: 4.7 MMOL/L — SIGNIFICANT CHANGE UP (ref 3.5–5.3)
RBC # BLD: 4.55 M/UL — SIGNIFICANT CHANGE UP (ref 3.8–5.2)
RBC # FLD: 15.6 % — HIGH (ref 10.3–14.5)
SODIUM SERPL-SCNC: 140 MMOL/L — SIGNIFICANT CHANGE UP (ref 135–145)
WBC # BLD: 13.9 K/UL — HIGH (ref 3.8–10.5)
WBC # FLD AUTO: 13.9 K/UL — HIGH (ref 3.8–10.5)

## 2022-12-14 PROCEDURE — 99232 SBSQ HOSP IP/OBS MODERATE 35: CPT

## 2022-12-14 RX ORDER — CALCIUM GLUCONATE 100 MG/ML
2 VIAL (ML) INTRAVENOUS ONCE
Refills: 0 | Status: COMPLETED | OUTPATIENT
Start: 2022-12-15 | End: 2022-12-15

## 2022-12-14 RX ORDER — ALBUMIN HUMAN 25 %
3250 VIAL (ML) INTRAVENOUS ONCE
Refills: 0 | Status: DISCONTINUED | OUTPATIENT
Start: 2022-12-15 | End: 2022-12-18

## 2022-12-14 RX ADMIN — ERGOCALCIFEROL 50000 UNIT(S): 1.25 CAPSULE ORAL at 07:07

## 2022-12-14 RX ADMIN — Medication 5 MILLIGRAM(S): at 21:02

## 2022-12-14 RX ADMIN — Medication 650 MILLIGRAM(S): at 11:04

## 2022-12-14 RX ADMIN — Medication 1 DROP(S): at 21:03

## 2022-12-14 RX ADMIN — Medication 60 MILLIGRAM(S): at 05:09

## 2022-12-14 RX ADMIN — Medication 81 MILLIGRAM(S): at 11:06

## 2022-12-14 RX ADMIN — ATORVASTATIN CALCIUM 80 MILLIGRAM(S): 80 TABLET, FILM COATED ORAL at 21:00

## 2022-12-14 RX ADMIN — CHLORHEXIDINE GLUCONATE 1 APPLICATION(S): 213 SOLUTION TOPICAL at 11:11

## 2022-12-14 RX ADMIN — Medication 1 PATCH: at 06:38

## 2022-12-14 RX ADMIN — PANTOPRAZOLE SODIUM 40 MILLIGRAM(S): 20 TABLET, DELAYED RELEASE ORAL at 05:10

## 2022-12-14 RX ADMIN — Medication 1 TABLET(S): at 11:06

## 2022-12-14 RX ADMIN — Medication 1 PATCH: at 18:18

## 2022-12-14 RX ADMIN — SENNA PLUS 2 TABLET(S): 8.6 TABLET ORAL at 21:00

## 2022-12-14 RX ADMIN — Medication 650 MILLIGRAM(S): at 12:00

## 2022-12-14 RX ADMIN — Medication 1 PATCH: at 13:02

## 2022-12-14 RX ADMIN — QUETIAPINE FUMARATE 50 MILLIGRAM(S): 200 TABLET, FILM COATED ORAL at 21:01

## 2022-12-14 RX ADMIN — Medication 1 PATCH: at 12:00

## 2022-12-14 NOTE — PROGRESS NOTE ADULT - SUBJECTIVE AND OBJECTIVE BOX
Hospitalist Progress Note  Kaity Robertson MD Pager # [phone]    OVERNIGHT EVENTS: VANDA    SUBJECTIVE / INTERVAL HPI: Patient seen and examined at bedside. Patient reports no pain or discomfort. She has some improvement with her vision - she is able to see some colors but it does not appear to be the correct color. She has no episodes of dizziness/lightheadedness when her FS is 70. All other ROS negative.     VITAL SIGNS:  Vital Signs Last 24 Hrs  T(C): 36.4 (14 Dec 2022 10:00), Max: 36.5 (13 Dec 2022 22:00)  T(F): 97.6 (14 Dec 2022 10:00), Max: 97.7 (13 Dec 2022 22:00)  HR: 92 (14 Dec 2022 05:40) (75 - 92)  BP: 112/67 (14 Dec 2022 10:00) (104/66 - 122/61)  BP(mean): --  RR: 18 (14 Dec 2022 10:00) (17 - 18)  SpO2: 98% (14 Dec 2022 10:00) (98% - 99%)    Parameters below as of 14 Dec 2022 10:00  Patient On (Oxygen Delivery Method): room air        PHYSICAL EXAM:    General: WDWN female resting in bed   HEENT: NC/AT; PERRL, anicteric sclera; MMM  Neck: supple - right shiley in place   Cardiovascular: +S1/S2; RRR  Respiratory: CTA B/L; no W/R/R  Gastrointestinal: soft, NT/ND; +BSx4  Extremities: WWP; no edema, clubbing or cyanosis  Vascular: 2+ radial, DP/PT pulses B/L  Neurological: AAOx3; no focal deficits    MEDICATIONS:  MEDICATIONS  (STANDING):  albumin human  5% IVPB 3250 milliLiter(s) IV Intermittent every 48 hours  albumin human  5% IVPB 3250 milliLiter(s) IV Intermittent once  artificial  tears Solution 1 Drop(s) Both EYES four times a day  aspirin enteric coated 81 milliGRAM(s) Oral daily  atorvastatin 80 milliGRAM(s) Oral at bedtime  calcium gluconate IVPB 2 Gram(s) IV Intermittent <User Schedule>  chlorhexidine 2% Cloths 1 Application(s) Topical daily  dextrose 5%. 1000 milliLiter(s) (50 mL/Hr) IV Continuous <Continuous>  dextrose 5%. 1000 milliLiter(s) (100 mL/Hr) IV Continuous <Continuous>  dextrose 50% Injectable 25 Gram(s) IV Push once  dextrose 50% Injectable 12.5 Gram(s) IV Push once  dextrose 50% Injectable 25 Gram(s) IV Push once  ergocalciferol 56549 Unit(s) Oral <User Schedule>  glucagon  Injectable 1 milliGRAM(s) IntraMuscular once  multivitamin 1 Tablet(s) Oral daily  nicotine -  14 mG/24Hr(s) Patch 1 Patch Transdermal daily  pantoprazole    Tablet 40 milliGRAM(s) Oral before breakfast  predniSONE   Tablet   Oral   predniSONE   Tablet 60 milliGRAM(s) Oral daily  QUEtiapine 50 milliGRAM(s) Oral <User Schedule>  senna 2 Tablet(s) Oral at bedtime    MEDICATIONS  (PRN):  acetaminophen     Tablet .. 650 milliGRAM(s) Oral every 6 hours PRN Moderate Pain (4 - 6)  bisacodyl 5 milliGRAM(s) Oral every 12 hours PRN Constipation  cyclobenzaprine 5 milliGRAM(s) Oral three times a day PRN Muscle Spasm  dextrose Oral Gel 15 Gram(s) Oral once PRN Blood Glucose LESS THAN 70 milliGRAM(s)/deciliter  LORazepam     Tablet 0.5 milliGRAM(s) Oral two times a day PRN Anxiety  nortriptyline 10 milliGRAM(s) Oral daily PRN Migraines  polyethylene glycol 3350 17 Gram(s) Oral two times a day PRN Constipation  sodium chloride 0.9% lock flush 10 milliLiter(s) IV Push every 1 hour PRN Pre/post blood products, medications, blood draw, and to maintain line patency      ALLERGIES:  Allergies    No Known Allergies    Intolerances        LABS:                        12.6   13.90 )-----------( 280      ( 14 Dec 2022 06:47 )             38.4     12-14    140  |  106  |  12  ----------------------------<  137<H>  4.7   |  22  |  0.53    Ca    8.8      14 Dec 2022 06:47  Phos  4.5     12-14  Mg     2.20     12-14          CAPILLARY BLOOD GLUCOSE      POCT Blood Glucose.: 78 mg/dL (14 Dec 2022 11:49)      RADIOLOGY & ADDITIONAL TESTS: Reviewed.    ASSESSMENT:    PLAN:

## 2022-12-14 NOTE — PROGRESS NOTE ADULT - PROBLEM SELECTOR PLAN 1
Currently with slight pain with EOM and decreased vision resembling previous optic neuritis. Previously treated with 1G/day steroids with outpatient tapering of prednisone  -Neurology C/S: Solumedrol IV 1g x 5 days given significant worsening in vision. Patient with minimal improvement with IV steroids  - MR brain w/ and w/o contrast, MR orbit w/ w/o contrast showing findings of demyelinating disease  - infectious workup including CBC, VBG w/ lactate, CXR, UA, all negative this far  - monitor FS daily, avoid use of concurrent NSAIDS  - case discussed with neurology, patient with minimal improvement with IV steroids  -carrillo placed on 12/8, tentative plan for 5 PLEX sessions every other day; first was 12/9  -to receive fourth dose 12/15

## 2022-12-15 DIAGNOSIS — E83.51 HYPOCALCEMIA: ICD-10-CM

## 2022-12-15 LAB
ANION GAP SERPL CALC-SCNC: 13 MMOL/L — SIGNIFICANT CHANGE UP (ref 7–14)
BASOPHILS # BLD AUTO: 0.02 K/UL — SIGNIFICANT CHANGE UP (ref 0–0.2)
BASOPHILS NFR BLD AUTO: 0.1 % — SIGNIFICANT CHANGE UP (ref 0–2)
BUN SERPL-MCNC: 10 MG/DL — SIGNIFICANT CHANGE UP (ref 7–23)
CA-I BLD-SCNC: 1.07 MMOL/L — LOW (ref 1.15–1.29)
CALCIUM SERPL-MCNC: 8.5 MG/DL — SIGNIFICANT CHANGE UP (ref 8.4–10.5)
CHLORIDE SERPL-SCNC: 108 MMOL/L — HIGH (ref 98–107)
CO2 SERPL-SCNC: 23 MMOL/L — SIGNIFICANT CHANGE UP (ref 22–31)
CREAT SERPL-MCNC: 0.56 MG/DL — SIGNIFICANT CHANGE UP (ref 0.5–1.3)
EGFR: 110 ML/MIN/1.73M2 — SIGNIFICANT CHANGE UP
EOSINOPHIL # BLD AUTO: 0.18 K/UL — SIGNIFICANT CHANGE UP (ref 0–0.5)
EOSINOPHIL NFR BLD AUTO: 1.1 % — SIGNIFICANT CHANGE UP (ref 0–6)
FIBRINOGEN PPP-MCNC: 160 MG/DL — LOW (ref 200–465)
GLUCOSE SERPL-MCNC: 88 MG/DL — SIGNIFICANT CHANGE UP (ref 70–99)
HCT VFR BLD CALC: 35.2 % — SIGNIFICANT CHANGE UP (ref 34.5–45)
HGB BLD-MCNC: 11.5 G/DL — SIGNIFICANT CHANGE UP (ref 11.5–15.5)
IANC: 10.31 K/UL — HIGH (ref 1.8–7.4)
IMM GRANULOCYTES NFR BLD AUTO: 2.4 % — HIGH (ref 0–0.9)
LYMPHOCYTES # BLD AUTO: 24.7 % — SIGNIFICANT CHANGE UP (ref 13–44)
LYMPHOCYTES # BLD AUTO: 3.88 K/UL — HIGH (ref 1–3.3)
MAGNESIUM SERPL-MCNC: 2.1 MG/DL — SIGNIFICANT CHANGE UP (ref 1.6–2.6)
MCHC RBC-ENTMCNC: 27.4 PG — SIGNIFICANT CHANGE UP (ref 27–34)
MCHC RBC-ENTMCNC: 32.7 GM/DL — SIGNIFICANT CHANGE UP (ref 32–36)
MCV RBC AUTO: 83.8 FL — SIGNIFICANT CHANGE UP (ref 80–100)
MONOCYTES # BLD AUTO: 0.97 K/UL — HIGH (ref 0–0.9)
MONOCYTES NFR BLD AUTO: 6.2 % — SIGNIFICANT CHANGE UP (ref 2–14)
NEUTROPHILS # BLD AUTO: 10.31 K/UL — HIGH (ref 1.8–7.4)
NEUTROPHILS NFR BLD AUTO: 65.5 % — SIGNIFICANT CHANGE UP (ref 43–77)
NRBC # BLD: 0 /100 WBCS — SIGNIFICANT CHANGE UP (ref 0–0)
NRBC # FLD: 0 K/UL — SIGNIFICANT CHANGE UP (ref 0–0)
PHOSPHATE SERPL-MCNC: 4.2 MG/DL — SIGNIFICANT CHANGE UP (ref 2.5–4.5)
PLATELET # BLD AUTO: 239 K/UL — SIGNIFICANT CHANGE UP (ref 150–400)
POTASSIUM SERPL-MCNC: 4.4 MMOL/L — SIGNIFICANT CHANGE UP (ref 3.5–5.3)
POTASSIUM SERPL-SCNC: 4.4 MMOL/L — SIGNIFICANT CHANGE UP (ref 3.5–5.3)
RBC # BLD: 4.2 M/UL — SIGNIFICANT CHANGE UP (ref 3.8–5.2)
RBC # FLD: 15.9 % — HIGH (ref 10.3–14.5)
SODIUM SERPL-SCNC: 144 MMOL/L — SIGNIFICANT CHANGE UP (ref 135–145)
WBC # BLD: 15.73 K/UL — HIGH (ref 3.8–10.5)
WBC # FLD AUTO: 15.73 K/UL — HIGH (ref 3.8–10.5)

## 2022-12-15 PROCEDURE — 99232 SBSQ HOSP IP/OBS MODERATE 35: CPT

## 2022-12-15 PROCEDURE — 99231 SBSQ HOSP IP/OBS SF/LOW 25: CPT | Mod: GC

## 2022-12-15 PROCEDURE — 36514 APHERESIS PLASMA: CPT

## 2022-12-15 RX ORDER — CALCIUM CARBONATE 500(1250)
1 TABLET ORAL ONCE
Refills: 0 | Status: COMPLETED | OUTPATIENT
Start: 2022-12-15 | End: 2022-12-15

## 2022-12-15 RX ADMIN — Medication 1 PATCH: at 19:47

## 2022-12-15 RX ADMIN — CHLORHEXIDINE GLUCONATE 1 APPLICATION(S): 213 SOLUTION TOPICAL at 12:27

## 2022-12-15 RX ADMIN — ATORVASTATIN CALCIUM 80 MILLIGRAM(S): 80 TABLET, FILM COATED ORAL at 21:02

## 2022-12-15 RX ADMIN — Medication 50 GRAM(S): at 09:50

## 2022-12-15 RX ADMIN — Medication 1 DROP(S): at 12:27

## 2022-12-15 RX ADMIN — PANTOPRAZOLE SODIUM 40 MILLIGRAM(S): 20 TABLET, DELAYED RELEASE ORAL at 05:16

## 2022-12-15 RX ADMIN — CYCLOBENZAPRINE HYDROCHLORIDE 5 MILLIGRAM(S): 10 TABLET, FILM COATED ORAL at 15:10

## 2022-12-15 RX ADMIN — Medication 1 PATCH: at 12:00

## 2022-12-15 RX ADMIN — QUETIAPINE FUMARATE 50 MILLIGRAM(S): 200 TABLET, FILM COATED ORAL at 21:03

## 2022-12-15 RX ADMIN — Medication 1 PATCH: at 12:23

## 2022-12-15 RX ADMIN — Medication 1 TABLET(S): at 12:22

## 2022-12-15 RX ADMIN — Medication 1 PATCH: at 06:51

## 2022-12-15 RX ADMIN — Medication 1 DROP(S): at 18:47

## 2022-12-15 RX ADMIN — Medication 60 MILLIGRAM(S): at 05:16

## 2022-12-15 RX ADMIN — Medication 0.5 MILLIGRAM(S): at 08:48

## 2022-12-15 RX ADMIN — Medication 81 MILLIGRAM(S): at 12:22

## 2022-12-15 RX ADMIN — Medication 1 TABLET(S): at 15:14

## 2022-12-15 NOTE — PROGRESS NOTE ADULT - ATTENDING COMMENTS
Pt was seen 12/15/22. Reported some improvement in R eye vision. Sometimes R eye sees gray, sometimes it sees color. She reports LLE and ankle cramping during PLEX session, which also happened during a previous session. Muscle relaxant is helping. No other complaints.    Exam 12/15/22:  Cognition: Awake, alert, oriented to situation, answers questions and follows commands briskly, attentive to conversation, provides appropriate history  Language: fluent, comprehension intact  CN: R eye superior hemianopia. L eye VFF. R eye 20/800 (reading from inferior hemifield) and L eye 20/25 - near vision chart, with glasses, eyes tested individually. PERRL, 3 mm, no APD. EOMI, no nystagmus.  Motor: no BUE drift  Coord: FNF and HTS intact  Gait: normal. normal heel- and toe-walk. can tandem but unsteady.    12/7/22 MRI brain, orbits, C-spine, T-spine w/wo:  "MRI BRAIN:  Similar-appearing nonenhancing foci of increased T2 and FLAIR hyperintense signal in the periventricular white matter, some ovoid and oriented in a perpendicular fashion to the long axis of the ventricles. Findings are consistent with the presence of demyelinating disease.    No abnormal parenchymal or leptomeningeal enhancement.    MRI ORBITS:  No abnormal signal or enhancement within the optic nerves.  Intraorbital contents unremarkable.    MRI CERVICAL SPINE:  No abnormal intrinsic cord signal.  No abnormal enhancement cervical region.  No significant spinal canal stenosis or neural foraminal narrowing.    MRI THORACIC SPINE:  No abnormal intrinsic cord signal.  No abnormal enhancement thoracic region.  No significant spinal canal stenosis or neural foraminal narrowing."    Assessment:  52 F with recently diagnosed multiple sclerosis admitted for R optic neuritis. Slight improvement on PLEX.    Recommendations:  As in resident note above    Yanet Mcleod MD
Ms. Dangelo is a 51 yo woman with relapsing remitting multiple sclerosis with an attack of right optic neuritis.   I agree with work up and management as above.   Thank you

## 2022-12-15 NOTE — PROGRESS NOTE ADULT - PROBLEM SELECTOR PLAN 1
Currently with slight pain with EOM and decreased vision resembling previous optic neuritis. Previously treated with 1G/day steroids with outpatient tapering of prednisone  -Neurology C/S: Solumedrol IV 1g x 5 days given significant worsening in vision. Patient with minimal improvement with IV steroids  - MR brain w/ and w/o contrast, MR orbit w/ w/o contrast showing findings of demyelinating disease  - infectious workup including CBC, VBG w/ lactate, CXR, UA, all negative this far  - monitor FS daily, avoid use of concurrent NSAIDS  - case discussed with neurology, patient with minimal improvement with IV steroids  -carrillo placed on 12/8, tentative plan for 5 PLEX sessions every other day; first was 12/9  - Received 4th dose of plex today. last day of plex is on 12/17.

## 2022-12-15 NOTE — CHART NOTE - NSCHARTNOTEFT_GEN_A_CORE
53yo F PMHx chronic migraines presented with relapsing MS, s/p steroids for recurrent optic neuritis with minimal improvement. Current plan is a course of 5 PLEX over 10 days.    Clinical notes, labs, meds reviewed. No acute interval events. Some improvement in vision.    PLEX 4 today, fibrinogen 166. Using 3250ml 5% albumin as replacement PLEX. Care d/w apheresis RN. Tolerating well.     PLEx#5 12/17/22

## 2022-12-15 NOTE — PROGRESS NOTE ADULT - ASSESSMENT
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 Was treated with IV solumedrol x5 days in July 2022. Followed up with Dr Baum and Dr Trujillo for which she was started on Ocrevus on 11/9 and second part of dose on 11/23. From 11/23, she began to have increasing prevalence of R eye floaters, film like sensation over vision, and flashing lights. Symptoms worsened since last thursday 12/1 with daily episodes of R eye vision becoming "white." Not currently on steroids. In July 2022 hospitalization, patient had 7/16/22 OD eye exam w/ +RAPD, red color desaturation, and ~20/50 visual acuity. Relapsing Multiple sclerosis with first relapse 5/2022- B/l foot numbness, likely thoracic myelitis. 2nd relapse 7/2022- R Optic neuritis.    July 2022 labs:    ACE neg, SSA/SSB neg, Lyme neg, Syphilis neg  MOG ab negative, NMO neg  DsDNA neg    Aug 2022 labs:   Quant IgA 194, IgM 183, IgG 873   K/L FLC ratio Ur 1.44  Quant neg  HB surf Ab and antigen neg,  core Ab and IgM neg,   SHANIKA virus pos  varicella IgG ab positive     MRI 12/7/22: Brain, Orbits, Cervical, Thoracic spine: similar non-enhancing foci of T2 and FLAIR hyperintensities c/w demyelinating disease. No abnormal signal in optic nerves, spine.     Impression: Acute worsening of R vision concerning for optic neuritis from MS relapse. Of note, recently started ocrevus but likely not in full effect at this time.    Recommendations:  [x] completed Solumedrol IV 1g x 5 days. Currently on PO prednisone taper: 50mg daily for 7 days, then 40mg daily for 7 days, then 30mg daily for 7 days, then 20mg daily for 7 days, then 10mg daily for 7 days.   [] continue PLEX, completed 4/5  [] Draw coags, ionized calcium and fibrinogen prior to PLEX on treatment days  [] DVT ppx while getting PLEX  [x] optho consult appreciated  [x] infectious workup: CBC, VBG w/ lactate, CXR, UA,   [x] , a1c 5.9  [x] TSH <0.10, free thyroxine 1.0, thyroid w/u per primary team  [ ] monitor leukocytosis, signs of infection vs steroid induced      [ ] vitamin D, Ca supplementation, protonix 40mg daily  [ ] routine outpatient optho eval for glaucoma, cataracts, elevated intraocular pressure  [ ] monitor FS daily, avoid use of concurrent NSAIDS  [ ] PT/OT     Discussed with neurology attending Dr Mcleod.    [id number] [facility]

## 2022-12-15 NOTE — PROGRESS NOTE ADULT - PROBLEM SELECTOR PLAN 2
Ionized calcium low. She has received calcium gluconate. Also reporting muscle cramps today.   - PTH, vit d and vit d 1,25 in AM to assess calcium levels.

## 2022-12-15 NOTE — PROGRESS NOTE ADULT - SUBJECTIVE AND OBJECTIVE BOX
Hospitalist Progress Note  Kaity Robertson MD Pager # [phone]    OVERNIGHT EVENTS: VANDA    SUBJECTIVE / INTERVAL HPI: Patient seen and examined at bedside. Patient reports no pain or discomfort. Her vision is improving. She is starting to see outlines of figures and color is improving. She reports that she had significant cramping in her legs today during the infusion.     VITAL SIGNS:  Vital Signs Last 24 Hrs  T(C): 36.7 (15 Dec 2022 09:17), Max: 36.7 (15 Dec 2022 09:17)  T(F): 98.1 (15 Dec 2022 09:17), Max: 98.1 (15 Dec 2022 09:17)  HR: 89 (15 Dec 2022 09:17) (73 - 97)  BP: 125/60 (15 Dec 2022 09:17) (110/63 - 125/60)  BP(mean): --  RR: 16 (15 Dec 2022 09:17) (16 - 18)  SpO2: 98% (15 Dec 2022 09:17) (98% - 100%)    Parameters below as of 15 Dec 2022 09:17  Patient On (Oxygen Delivery Method): room air        PHYSICAL EXAM:    General: WDWN female resting in bed   HEENT: NC/AT; PERRL, anicteric sclera; MMM - right chest wall shiley in place.   Neck: supple  Cardiovascular: +S1/S2; RRR  Respiratory: CTA B/L; no W/R/R  Gastrointestinal: soft, NT/ND; +BSx4  Extremities: WWP; no edema, clubbing or cyanosis  Vascular: 2+ radial, DP/PT pulses B/L  Neurological: AAOx3; no focal deficits    MEDICATIONS:  MEDICATIONS  (STANDING):  albumin human  5% IVPB 3250 milliLiter(s) IV Intermittent every 48 hours  albumin human  5% IVPB 3250 milliLiter(s) IV Intermittent once  albumin human  5% IVPB 3250 milliLiter(s) IV Intermittent once  artificial  tears Solution 1 Drop(s) Both EYES four times a day  aspirin enteric coated 81 milliGRAM(s) Oral daily  atorvastatin 80 milliGRAM(s) Oral at bedtime  calcium gluconate IVPB 2 Gram(s) IV Intermittent once  calcium gluconate IVPB 2 Gram(s) IV Intermittent <User Schedule>  chlorhexidine 2% Cloths 1 Application(s) Topical daily  dextrose 5%. 1000 milliLiter(s) (50 mL/Hr) IV Continuous <Continuous>  dextrose 5%. 1000 milliLiter(s) (100 mL/Hr) IV Continuous <Continuous>  dextrose 50% Injectable 25 Gram(s) IV Push once  dextrose 50% Injectable 12.5 Gram(s) IV Push once  dextrose 50% Injectable 25 Gram(s) IV Push once  ergocalciferol 31770 Unit(s) Oral <User Schedule>  glucagon  Injectable 1 milliGRAM(s) IntraMuscular once  multivitamin 1 Tablet(s) Oral daily  nicotine -  14 mG/24Hr(s) Patch 1 Patch Transdermal daily  pantoprazole    Tablet 40 milliGRAM(s) Oral before breakfast  predniSONE   Tablet   Oral   predniSONE   Tablet 60 milliGRAM(s) Oral daily  QUEtiapine 50 milliGRAM(s) Oral <User Schedule>  senna 2 Tablet(s) Oral at bedtime    MEDICATIONS  (PRN):  acetaminophen     Tablet .. 650 milliGRAM(s) Oral every 6 hours PRN Moderate Pain (4 - 6)  bisacodyl 5 milliGRAM(s) Oral every 12 hours PRN Constipation  cyclobenzaprine 5 milliGRAM(s) Oral three times a day PRN Muscle Spasm  dextrose Oral Gel 15 Gram(s) Oral once PRN Blood Glucose LESS THAN 70 milliGRAM(s)/deciliter  LORazepam     Tablet 0.5 milliGRAM(s) Oral two times a day PRN Anxiety  nortriptyline 10 milliGRAM(s) Oral daily PRN Migraines  polyethylene glycol 3350 17 Gram(s) Oral two times a day PRN Constipation  sodium chloride 0.9% lock flush 10 milliLiter(s) IV Push every 1 hour PRN Pre/post blood products, medications, blood draw, and to maintain line patency      ALLERGIES:  Allergies    No Known Allergies    Intolerances        LABS:                        11.5   15.73 )-----------( 239      ( 15 Dec 2022 05:22 )             35.2     12-15    144  |  108<H>  |  10  ----------------------------<  88  4.4   |  23  |  0.56    Ca    8.5      15 Dec 2022 05:22  Phos  4.2     12-15  Mg     2.10     12-15          CAPILLARY BLOOD GLUCOSE      POCT Blood Glucose.: 78 mg/dL (14 Dec 2022 11:49)      RADIOLOGY & ADDITIONAL TESTS: Reviewed.    ASSESSMENT:    PLAN:

## 2022-12-15 NOTE — PROGRESS NOTE ADULT - SUBJECTIVE AND OBJECTIVE BOX
Neurology Progress Note    SUBJECTIVE/OBJECTIVE/INTERVAL EVENTS: Patient seen and examined at bedside w/ neuro attending and team. Patient reported some improvement in her vision and can now see some motion in her lower visual fields in the R eye. Was also complaining of some cramping in her LLE after PLEX today and has improved after muscle relaxants. Otherwise, stated that she is stable.      MEDICATIONS  (STANDING):  albumin human  5% IVPB 3250 milliLiter(s) IV Intermittent every 48 hours  albumin human  5% IVPB 3250 milliLiter(s) IV Intermittent once  albumin human  5% IVPB 3250 milliLiter(s) IV Intermittent once  artificial  tears Solution 1 Drop(s) Both EYES four times a day  aspirin enteric coated 81 milliGRAM(s) Oral daily  atorvastatin 80 milliGRAM(s) Oral at bedtime  calcium gluconate IVPB 2 Gram(s) IV Intermittent once  calcium gluconate IVPB 2 Gram(s) IV Intermittent <User Schedule>  chlorhexidine 2% Cloths 1 Application(s) Topical daily  dextrose 5%. 1000 milliLiter(s) (50 mL/Hr) IV Continuous <Continuous>  dextrose 5%. 1000 milliLiter(s) (100 mL/Hr) IV Continuous <Continuous>  dextrose 50% Injectable 25 Gram(s) IV Push once  dextrose 50% Injectable 12.5 Gram(s) IV Push once  dextrose 50% Injectable 25 Gram(s) IV Push once  ergocalciferol 46842 Unit(s) Oral <User Schedule>  glucagon  Injectable 1 milliGRAM(s) IntraMuscular once  multivitamin 1 Tablet(s) Oral daily  nicotine -  14 mG/24Hr(s) Patch 1 Patch Transdermal daily  pantoprazole    Tablet 40 milliGRAM(s) Oral before breakfast  predniSONE   Tablet   Oral   predniSONE   Tablet 60 milliGRAM(s) Oral daily  QUEtiapine 50 milliGRAM(s) Oral <User Schedule>  senna 2 Tablet(s) Oral at bedtime    MEDICATIONS  (PRN):  acetaminophen     Tablet .. 650 milliGRAM(s) Oral every 6 hours PRN Moderate Pain (4 - 6)  bisacodyl 5 milliGRAM(s) Oral every 12 hours PRN Constipation  cyclobenzaprine 5 milliGRAM(s) Oral three times a day PRN Muscle Spasm  dextrose Oral Gel 15 Gram(s) Oral once PRN Blood Glucose LESS THAN 70 milliGRAM(s)/deciliter  LORazepam     Tablet 0.5 milliGRAM(s) Oral two times a day PRN Anxiety  nortriptyline 10 milliGRAM(s) Oral daily PRN Migraines  polyethylene glycol 3350 17 Gram(s) Oral two times a day PRN Constipation  sodium chloride 0.9% lock flush 10 milliLiter(s) IV Push every 1 hour PRN Pre/post blood products, medications, blood draw, and to maintain line patency    VITALS & EXAMINATION:  Vital Signs Last 24 Hrs  T(C): 36.7 (15 Dec 2022 09:17), Max: 36.7 (15 Dec 2022 09:17)  T(F): 98.1 (15 Dec 2022 09:17), Max: 98.1 (15 Dec 2022 09:17)  HR: 89 (15 Dec 2022 09:17) (73 - 97)  BP: 125/60 (15 Dec 2022 09:17) (110/63 - 125/60)  BP(mean): --  RR: 16 (15 Dec 2022 09:17) (16 - 18)  SpO2: 98% (15 Dec 2022 09:17) (98% - 100%)    Parameters below as of 15 Dec 2022 09:17  Patient On (Oxygen Delivery Method): room air    PHYSICAL EXAM:  Constitutional: Female, appears stated age, in no apparent distress including pain  Head: Normocephalic & Atraumatic.  Respiratory: Breathing comfortably.  Extremities: No cyanosis, clubbing   Skin: no rash, no bruising     Neurological Exam:  MS: Awake, alert, oriented to person, place, month, year, president. Normal affect. Follows all commands.  Language: Speech is clear, fluent with good repetition & comprehension (able to name objects)    CNs: Pupils round, 3mm bilaterally,  no APD noted. Decreased vision in R eye (20/800), worse in superior quadrants. Intact in L eye. Red color desaturation in R eye. EOMI but with pain,  R eye pain with EOM, No nystagmus. V1-3 intact to LT. No facial asymmetry b/l, Hearing grossly normal (rubbing fingers) b/l. Symmetric palate elevation in midline. Gag reflex deferred. Head turning & shoulder shrug intact b/l. Tongue midline, normal movements, no atrophy.    Motor: Normal muscle bulk & tone. No noticeable tremor.              Deltoid	Biceps	Triceps 	   R	5	5	5	5	 	  L	5	5	5	5			    	H-Flex	H-Ext	K-Flex	K-Ext	D-Flex	P-Flex  R	5	5	5	5	5	5		   L	5	5	5	5	5	5		     Sensation: Intact to LT b/l throughout.     Reflexes:              Biceps(C5)       BR(C6)     Triceps(C7)               Patellar(L4)    Achilles(S1)    Plantar Resp  R	2	          2	             2		        2		    2		Down   L	2	          2	             2		        2		    2		Down     Coordination:. No dysmetria to FTN/HTS  Gait:  Normal gait. Unable to perform tandem walking.     LABORATORY:                          11.5   15.73 )-----------( 239      ( 15 Dec 2022 05:22 )             35.2     12-15    144  |  108<H>  |  10  ----------------------------<  88  4.4   |  23  |  0.56    Ca    8.5      15 Dec 2022 05:22  Phos  4.2     12-15  Mg     2.10     12-15      STUDIES & IMAGING: (EEG, CT, MR, U/S, TTE/ARIANA):    < from: MR Thoracic Spine w/wo IV Cont (12.07.22 @ 10:15) >    ACC: [id number] EXAM:  MR ORBITS ONLY [facility]                        ACC: [id number] EXAM:  MR SPINE CERVICAL [facility]                        ACC: [id number] EXAM:  MR BRAIN [facility]                        ACC: [id number] EXAM:  MR SPINE THORACIC [facility]                 PROCEDURE DATE:  12/07/2022          INTERPRETATION:  Contrast-enhanced MRI of the brain, orbits, cervical   spine, and thoracic spine    CLINICAL INDICATION: Right eye blurred vision, optic neuritis suspected.   Multiple sclerosis recently diagnosed. Migraines.    TECHNIQUE: Multiplanar, multisequence MR images of the brain, orbits,   cervical spine    COMPARISON: MRI brain, orbits, and cervical spine 7/18/2022    FINDINGS:    MRI BRAIN:    No hydrocephalus, midline shift, mass effect, vasogenic edema, or acute   intracranial hemorrhage.    Diffusion weighted images demonstrate no acute territorial infarct.    Similar-appearing nonenhancing foci of increased T2 and FLAIR   hyperintense signal in the periventricular white matter, some ovoid and   oriented in a perpendicular fashion to the long axis of the ventricles.    No abnormal parenchymal or leptomeningeal enhancement.    Flow voids are seen within the major intracranial vessels consistent with   their patency.    Visualized paranasal sinuses and mastoid air cells are clear.    MRI ORBITS:    The globes, extraocular muscles, retrobulbar fat, and optic nerves are   unremarkable.    No abnormal signal or enhancement within the optic nerves. No abnormal   enhancementwithin the orbits.    MRI CERVICAL SPINE:    Vertebral body height, marrow signal homogeneity, and facet alignment are   maintained throughout the visualized spinal segments.    No prevertebral or paravertebral edema.    No abnormal enhancement in the cervical region.    No abnormal intrinsic cord signal.    No significant spinal canal stenosis or neural foraminal narrowing.    MRI THORACIC SPINE:    Vertebral body height, marrow signal homogeneity, and facet alignment are   maintained throughoutthe visualized spinal segments.    No abnormal intrinsic cord signal.    No abnormal enhancement.    Mild multilevel degenerative changes without significant spinal canal   stenosis or neural foraminal narrowing.    IMPRESSION:    MRI BRAIN:  Similar-appearing nonenhancing foci of increased T2 and FLAIR   hyperintense signal in the periventricular white matter, some ovoid and   oriented in a perpendicular fashion to the long axis of the ventricles.   Findings are consistent with the presence of demyelinating disease.    No abnormal parenchymal or leptomeningeal enhancement.    MRI ORBITS:  No abnormal signal or enhancement within the optic nerves.  Intraorbital contents unremarkable.    MRI CERVICAL SPINE:  No abnormal intrinsic cord signal.  No abnormal enhancement cervical region.  No significant spinal canal stenosis or neural foraminal narrowing.    MRI THORACIC SPINE:  No abnormal intrinsic cord signal.  No abnormal enhancement thoracic region.  No significant spinal canal stenosis or neural foraminal narrowing.

## 2022-12-16 LAB
24R-OH-CALCIDIOL SERPL-MCNC: 22.1 NG/ML — LOW (ref 30–80)
ALBUMIN SERPL ELPH-MCNC: 4.1 G/DL — SIGNIFICANT CHANGE UP (ref 3.3–5)
ALP SERPL-CCNC: 32 U/L — LOW (ref 40–120)
ALT FLD-CCNC: 40 U/L — HIGH (ref 4–33)
ANION GAP SERPL CALC-SCNC: 12 MMOL/L — SIGNIFICANT CHANGE UP (ref 7–14)
AST SERPL-CCNC: 34 U/L — HIGH (ref 4–32)
BASOPHILS # BLD AUTO: 0.08 K/UL — SIGNIFICANT CHANGE UP (ref 0–0.2)
BASOPHILS NFR BLD AUTO: 0.4 % — SIGNIFICANT CHANGE UP (ref 0–2)
BILIRUB DIRECT SERPL-MCNC: <0.2 MG/DL — SIGNIFICANT CHANGE UP (ref 0–0.3)
BILIRUB INDIRECT FLD-MCNC: >0 MG/DL — SIGNIFICANT CHANGE UP (ref 0–1)
BILIRUB SERPL-MCNC: 0.2 MG/DL — SIGNIFICANT CHANGE UP (ref 0.2–1.2)
BUN SERPL-MCNC: 13 MG/DL — SIGNIFICANT CHANGE UP (ref 7–23)
CA-I BLD-SCNC: 1.03 MMOL/L — LOW (ref 1.15–1.29)
CALCIUM SERPL-MCNC: 8.2 MG/DL — LOW (ref 8.4–10.5)
CHLORIDE SERPL-SCNC: 107 MMOL/L — SIGNIFICANT CHANGE UP (ref 98–107)
CO2 SERPL-SCNC: 21 MMOL/L — LOW (ref 22–31)
CREAT SERPL-MCNC: 0.58 MG/DL — SIGNIFICANT CHANGE UP (ref 0.5–1.3)
EGFR: 110 ML/MIN/1.73M2 — SIGNIFICANT CHANGE UP
EOSINOPHIL # BLD AUTO: 0.19 K/UL — SIGNIFICANT CHANGE UP (ref 0–0.5)
EOSINOPHIL NFR BLD AUTO: 1 % — SIGNIFICANT CHANGE UP (ref 0–6)
GLUCOSE SERPL-MCNC: 100 MG/DL — HIGH (ref 70–99)
HCT VFR BLD CALC: 34.8 % — SIGNIFICANT CHANGE UP (ref 34.5–45)
HGB BLD-MCNC: 11.2 G/DL — LOW (ref 11.5–15.5)
IANC: 13.17 K/UL — HIGH (ref 1.8–7.4)
IMM GRANULOCYTES NFR BLD AUTO: 2.9 % — HIGH (ref 0–0.9)
LYMPHOCYTES # BLD AUTO: 19.1 % — SIGNIFICANT CHANGE UP (ref 13–44)
LYMPHOCYTES # BLD AUTO: 3.55 K/UL — HIGH (ref 1–3.3)
MAGNESIUM SERPL-MCNC: 2 MG/DL — SIGNIFICANT CHANGE UP (ref 1.6–2.6)
MCHC RBC-ENTMCNC: 27.7 PG — SIGNIFICANT CHANGE UP (ref 27–34)
MCHC RBC-ENTMCNC: 32.2 GM/DL — SIGNIFICANT CHANGE UP (ref 32–36)
MCV RBC AUTO: 86.1 FL — SIGNIFICANT CHANGE UP (ref 80–100)
MONOCYTES # BLD AUTO: 1.03 K/UL — HIGH (ref 0–0.9)
MONOCYTES NFR BLD AUTO: 5.6 % — SIGNIFICANT CHANGE UP (ref 2–14)
NEUTROPHILS # BLD AUTO: 13.17 K/UL — HIGH (ref 1.8–7.4)
NEUTROPHILS NFR BLD AUTO: 71 % — SIGNIFICANT CHANGE UP (ref 43–77)
NRBC # BLD: 0 /100 WBCS — SIGNIFICANT CHANGE UP (ref 0–0)
NRBC # FLD: 0.02 K/UL — HIGH (ref 0–0)
PHOSPHATE SERPL-MCNC: 4.4 MG/DL — SIGNIFICANT CHANGE UP (ref 2.5–4.5)
PLATELET # BLD AUTO: 231 K/UL — SIGNIFICANT CHANGE UP (ref 150–400)
POTASSIUM SERPL-MCNC: 3.8 MMOL/L — SIGNIFICANT CHANGE UP (ref 3.5–5.3)
POTASSIUM SERPL-SCNC: 3.8 MMOL/L — SIGNIFICANT CHANGE UP (ref 3.5–5.3)
PROT SERPL-MCNC: 4.2 G/DL — LOW (ref 6–8.3)
PTH-INTACT FLD-MCNC: 96 PG/ML — HIGH (ref 15–65)
RBC # BLD: 4.04 M/UL — SIGNIFICANT CHANGE UP (ref 3.8–5.2)
RBC # FLD: 16.5 % — HIGH (ref 10.3–14.5)
SODIUM SERPL-SCNC: 140 MMOL/L — SIGNIFICANT CHANGE UP (ref 135–145)
VIT D25+D1,25 OH+D1,25 PNL SERPL-MCNC: 52.9 PG/ML — SIGNIFICANT CHANGE UP (ref 19.9–79.3)
WBC # BLD: 18.55 K/UL — HIGH (ref 3.8–10.5)
WBC # FLD AUTO: 18.55 K/UL — HIGH (ref 3.8–10.5)

## 2022-12-16 PROCEDURE — 99232 SBSQ HOSP IP/OBS MODERATE 35: CPT

## 2022-12-16 RX ADMIN — CHLORHEXIDINE GLUCONATE 1 APPLICATION(S): 213 SOLUTION TOPICAL at 12:05

## 2022-12-16 RX ADMIN — Medication 1 PATCH: at 06:07

## 2022-12-16 RX ADMIN — Medication 1 PATCH: at 12:04

## 2022-12-16 RX ADMIN — Medication 1 DROP(S): at 12:05

## 2022-12-16 RX ADMIN — Medication 1 PATCH: at 19:42

## 2022-12-16 RX ADMIN — Medication 60 MILLIGRAM(S): at 05:08

## 2022-12-16 RX ADMIN — QUETIAPINE FUMARATE 50 MILLIGRAM(S): 200 TABLET, FILM COATED ORAL at 21:51

## 2022-12-16 RX ADMIN — Medication 1 PATCH: at 12:08

## 2022-12-16 RX ADMIN — Medication 1 DROP(S): at 18:52

## 2022-12-16 RX ADMIN — ATORVASTATIN CALCIUM 80 MILLIGRAM(S): 80 TABLET, FILM COATED ORAL at 21:51

## 2022-12-16 RX ADMIN — Medication 650 MILLIGRAM(S): at 06:14

## 2022-12-16 RX ADMIN — Medication 1 TABLET(S): at 12:05

## 2022-12-16 RX ADMIN — Medication 650 MILLIGRAM(S): at 05:11

## 2022-12-16 RX ADMIN — PANTOPRAZOLE SODIUM 40 MILLIGRAM(S): 20 TABLET, DELAYED RELEASE ORAL at 05:08

## 2022-12-16 RX ADMIN — Medication 81 MILLIGRAM(S): at 12:04

## 2022-12-16 RX ADMIN — Medication 1 DROP(S): at 23:41

## 2022-12-16 NOTE — PROGRESS NOTE ADULT - SUBJECTIVE AND OBJECTIVE BOX
Hospitalist Progress Note  Kaity Robertson MD Pager # [phone]    OVERNIGHT EVENTS: VANDA    SUBJECTIVE / INTERVAL HPI: Patient seen and examined at bedside. Patient reports her vision is stable and has not improved from the point it was at yesterday. No further leg cramping.     VITAL SIGNS:  Vital Signs Last 24 Hrs  T(C): 36.4 (16 Dec 2022 09:55), Max: 36.5 (15 Dec 2022 21:30)  T(F): 97.6 (16 Dec 2022 09:55), Max: 97.7 (15 Dec 2022 21:30)  HR: 110 (16 Dec 2022 09:55) (82 - 110)  BP: 118/69 (16 Dec 2022 09:55) (111/60 - 129/79)  BP(mean): --  RR: 18 (16 Dec 2022 09:55) (17 - 18)  SpO2: 98% (16 Dec 2022 09:55) (96% - 98%)    Parameters below as of 16 Dec 2022 09:55  Patient On (Oxygen Delivery Method): room air        PHYSICAL EXAM:    General: WDWN female resting in bed   HEENT: NC/AT; PERRL, anicteric sclera; MMM  Neck: supple  Cardiovascular: +S1/S2; RRR  Respiratory: CTA B/L; no W/R/R  Gastrointestinal: soft, NT/ND; +BSx4  Extremities: WWP; no edema, clubbing or cyanosis  Vascular: 2+ radial, DP/PT pulses B/L  Neurological: AAOx3; no focal deficits    MEDICATIONS:  MEDICATIONS  (STANDING):  albumin human  5% IVPB 3250 milliLiter(s) IV Intermittent every 48 hours  albumin human  5% IVPB 3250 milliLiter(s) IV Intermittent once  albumin human  5% IVPB 3250 milliLiter(s) IV Intermittent once  artificial  tears Solution 1 Drop(s) Both EYES four times a day  aspirin enteric coated 81 milliGRAM(s) Oral daily  atorvastatin 80 milliGRAM(s) Oral at bedtime  calcium gluconate IVPB 2 Gram(s) IV Intermittent <User Schedule>  chlorhexidine 2% Cloths 1 Application(s) Topical daily  dextrose 5%. 1000 milliLiter(s) (50 mL/Hr) IV Continuous <Continuous>  dextrose 5%. 1000 milliLiter(s) (100 mL/Hr) IV Continuous <Continuous>  dextrose 50% Injectable 25 Gram(s) IV Push once  dextrose 50% Injectable 12.5 Gram(s) IV Push once  dextrose 50% Injectable 25 Gram(s) IV Push once  ergocalciferol 48289 Unit(s) Oral <User Schedule>  glucagon  Injectable 1 milliGRAM(s) IntraMuscular once  multivitamin 1 Tablet(s) Oral daily  nicotine -  14 mG/24Hr(s) Patch 1 Patch Transdermal daily  pantoprazole    Tablet 40 milliGRAM(s) Oral before breakfast  predniSONE   Tablet 60 milliGRAM(s) Oral daily  predniSONE   Tablet   Oral   QUEtiapine 50 milliGRAM(s) Oral <User Schedule>  senna 2 Tablet(s) Oral at bedtime    MEDICATIONS  (PRN):  acetaminophen     Tablet .. 650 milliGRAM(s) Oral every 6 hours PRN Moderate Pain (4 - 6)  bisacodyl 5 milliGRAM(s) Oral every 12 hours PRN Constipation  cyclobenzaprine 5 milliGRAM(s) Oral three times a day PRN Muscle Spasm  dextrose Oral Gel 15 Gram(s) Oral once PRN Blood Glucose LESS THAN 70 milliGRAM(s)/deciliter  LORazepam     Tablet 0.5 milliGRAM(s) Oral two times a day PRN Anxiety  nortriptyline 10 milliGRAM(s) Oral daily PRN Migraines  polyethylene glycol 3350 17 Gram(s) Oral two times a day PRN Constipation  sodium chloride 0.9% lock flush 10 milliLiter(s) IV Push every 1 hour PRN Pre/post blood products, medications, blood draw, and to maintain line patency      ALLERGIES:  Allergies    No Known Allergies    Intolerances        LABS:                        11.2   18.55 )-----------( 231      ( 16 Dec 2022 06:30 )             34.8     12-16    140  |  107  |  13  ----------------------------<  100<H>  3.8   |  21<L>  |  0.58    Ca    8.2<L>      16 Dec 2022 06:30  Phos  4.4     12-16  Mg     2.00     12-16          CAPILLARY BLOOD GLUCOSE          RADIOLOGY & ADDITIONAL TESTS: Reviewed.    ASSESSMENT:    PLAN:

## 2022-12-16 NOTE — PROGRESS NOTE ADULT - PROBLEM SELECTOR PLAN 1
Currently with slight pain with EOM and decreased vision resembling previous optic neuritis. Previously treated with 1G/day steroids with outpatient tapering of prednisone  -Neurology C/S: Solumedrol IV 1g x 5 days given significant worsening in vision. Patient with minimal improvement with IV steroids  - MR brain w/ and w/o contrast, MR orbit w/ w/o contrast showing findings of demyelinating disease  - infectious workup including CBC, VBG w/ lactate, CXR, UA, all negative this far  - monitor FS daily, avoid use of concurrent NSAIDS  - case discussed with neurology, patient with minimal improvement with IV steroids  -carrillo placed on 12/8, tentative plan for 5 PLEX sessions every other day; first was 12/9  - Received 4th dose of plex yesterday. last day of plex is on 12/17.

## 2022-12-16 NOTE — PROGRESS NOTE ADULT - PROBLEM SELECTOR PLAN 2
Ionized calcium low likely in the setting of PLEX administration.   - C/w calcium gluconate q48 hours ( w/ PLEX)   - PTH high and vit D low   - C/ vit d supplementation.

## 2022-12-17 LAB
ANION GAP SERPL CALC-SCNC: 9 MMOL/L — SIGNIFICANT CHANGE UP (ref 7–14)
BUN SERPL-MCNC: 12 MG/DL — SIGNIFICANT CHANGE UP (ref 7–23)
CA-I BLD-SCNC: 1.14 MMOL/L — LOW (ref 1.15–1.29)
CALCIUM SERPL-MCNC: 8.6 MG/DL — SIGNIFICANT CHANGE UP (ref 8.4–10.5)
CHLORIDE SERPL-SCNC: 104 MMOL/L — SIGNIFICANT CHANGE UP (ref 98–107)
CO2 SERPL-SCNC: 26 MMOL/L — SIGNIFICANT CHANGE UP (ref 22–31)
CREAT SERPL-MCNC: 0.6 MG/DL — SIGNIFICANT CHANGE UP (ref 0.5–1.3)
EGFR: 109 ML/MIN/1.73M2 — SIGNIFICANT CHANGE UP
GLUCOSE SERPL-MCNC: 71 MG/DL — SIGNIFICANT CHANGE UP (ref 70–99)
HCT VFR BLD CALC: 34.3 % — LOW (ref 34.5–45)
HGB BLD-MCNC: 11.2 G/DL — LOW (ref 11.5–15.5)
MAGNESIUM SERPL-MCNC: 1.9 MG/DL — SIGNIFICANT CHANGE UP (ref 1.6–2.6)
MCHC RBC-ENTMCNC: 28.1 PG — SIGNIFICANT CHANGE UP (ref 27–34)
MCHC RBC-ENTMCNC: 32.7 GM/DL — SIGNIFICANT CHANGE UP (ref 32–36)
MCV RBC AUTO: 86.2 FL — SIGNIFICANT CHANGE UP (ref 80–100)
NRBC # BLD: 0 /100 WBCS — SIGNIFICANT CHANGE UP (ref 0–0)
NRBC # FLD: 0 K/UL — SIGNIFICANT CHANGE UP (ref 0–0)
PHOSPHATE SERPL-MCNC: 4.5 MG/DL — SIGNIFICANT CHANGE UP (ref 2.5–4.5)
PLATELET # BLD AUTO: 214 K/UL — SIGNIFICANT CHANGE UP (ref 150–400)
POTASSIUM SERPL-MCNC: 3.8 MMOL/L — SIGNIFICANT CHANGE UP (ref 3.5–5.3)
POTASSIUM SERPL-SCNC: 3.8 MMOL/L — SIGNIFICANT CHANGE UP (ref 3.5–5.3)
RBC # BLD: 3.98 M/UL — SIGNIFICANT CHANGE UP (ref 3.8–5.2)
RBC # FLD: 16.5 % — HIGH (ref 10.3–14.5)
SODIUM SERPL-SCNC: 139 MMOL/L — SIGNIFICANT CHANGE UP (ref 135–145)
WBC # BLD: 16.07 K/UL — HIGH (ref 3.8–10.5)
WBC # FLD AUTO: 16.07 K/UL — HIGH (ref 3.8–10.5)

## 2022-12-17 PROCEDURE — 36514 APHERESIS PLASMA: CPT

## 2022-12-17 PROCEDURE — 99232 SBSQ HOSP IP/OBS MODERATE 35: CPT

## 2022-12-17 RX ORDER — LORATADINE 10 MG/1
10 TABLET ORAL DAILY
Refills: 0 | Status: DISCONTINUED | OUTPATIENT
Start: 2022-12-17 | End: 2022-12-22

## 2022-12-17 RX ADMIN — Medication 1 PATCH: at 13:39

## 2022-12-17 RX ADMIN — SENNA PLUS 2 TABLET(S): 8.6 TABLET ORAL at 21:32

## 2022-12-17 RX ADMIN — Medication 1 PATCH: at 18:19

## 2022-12-17 RX ADMIN — NORTRIPTYLINE HYDROCHLORIDE 10 MILLIGRAM(S): 10 CAPSULE ORAL at 04:49

## 2022-12-17 RX ADMIN — Medication 60 MILLIGRAM(S): at 05:01

## 2022-12-17 RX ADMIN — Medication 0.5 MILLIGRAM(S): at 12:16

## 2022-12-17 RX ADMIN — Medication 81 MILLIGRAM(S): at 12:12

## 2022-12-17 RX ADMIN — QUETIAPINE FUMARATE 50 MILLIGRAM(S): 200 TABLET, FILM COATED ORAL at 21:32

## 2022-12-17 RX ADMIN — Medication 1 PATCH: at 12:12

## 2022-12-17 RX ADMIN — PANTOPRAZOLE SODIUM 40 MILLIGRAM(S): 20 TABLET, DELAYED RELEASE ORAL at 05:00

## 2022-12-17 RX ADMIN — Medication 1 DROP(S): at 12:13

## 2022-12-17 RX ADMIN — LORATADINE 10 MILLIGRAM(S): 10 TABLET ORAL at 18:32

## 2022-12-17 RX ADMIN — Medication 1 PATCH: at 05:15

## 2022-12-17 RX ADMIN — Medication 1 TABLET(S): at 12:12

## 2022-12-17 RX ADMIN — ATORVASTATIN CALCIUM 80 MILLIGRAM(S): 80 TABLET, FILM COATED ORAL at 21:33

## 2022-12-17 RX ADMIN — CHLORHEXIDINE GLUCONATE 1 APPLICATION(S): 213 SOLUTION TOPICAL at 12:13

## 2022-12-17 RX ADMIN — Medication 1 DROP(S): at 05:01

## 2022-12-17 RX ADMIN — Medication 1 DROP(S): at 17:10

## 2022-12-17 NOTE — CHART NOTE - NSCHARTNOTEFT_GEN_A_CORE
51yo F PMHx chronic migraines presented with relapsing MS, s/p steroids for recurrent optic neuritis with minimal improvement. Current plan is a course of 5 PLEX over 10 days.    Clinical notes, labs, meds reviewed. No acute interval events. Some improvement in vision.    PLEX 5 today, fibrinogen 177.   Using 3250ml 5% albumin as replacement PLEX. Care d/w apheresis RN. Tolerated well.   Marilee can be dc'ed

## 2022-12-17 NOTE — PROGRESS NOTE ADULT - PROBLEM SELECTOR PLAN 1
Currently with slight pain with EOM and decreased vision resembling previous optic neuritis. Previously treated with 1G/day steroids with outpatient tapering of prednisone  -Neurology C/S: Solumedrol IV 1g x 5 days given significant worsening in vision. Patient with minimal improvement with IV steroids  - MR brain w/ and w/o contrast, MR orbit w/ w/o contrast showing findings of demyelinating disease  - infectious workup including CBC, VBG w/ lactate, CXR, UA, all negative this far  - monitor FS daily, avoid use of concurrent NSAIDS  - case discussed with neurology, patient with minimal improvement with IV steroids  -carrillo placed on 12/8, tentative plan for 5 PLEX sessions every other day; first was 12/9  - Last day of PLEX today - will follow up with neurology regarding plan

## 2022-12-17 NOTE — PROGRESS NOTE ADULT - SUBJECTIVE AND OBJECTIVE BOX
Hospitalist Progress Note  Kaity Robertson MD Pager # [phone]    OVERNIGHT EVENTS: VANDA    SUBJECTIVE / INTERVAL HPI: Patient seen and examined at bedside. Patient reports that she had a migraine this AM but has resolved. Otherwise, she feels well and has no complaints.     VITAL SIGNS:  Vital Signs Last 24 Hrs  T(C): 36.7 (17 Dec 2022 09:59), Max: 36.9 (16 Dec 2022 17:36)  T(F): 98.1 (17 Dec 2022 09:59), Max: 98.5 (16 Dec 2022 17:36)  HR: 106 (17 Dec 2022 09:59) (82 - 106)  BP: 123/60 (17 Dec 2022 09:59) (112/78 - 123/60)  BP(mean): --  RR: 18 (17 Dec 2022 09:59) (17 - 18)  SpO2: 98% (17 Dec 2022 09:59) (97% - 98%)    Parameters below as of 17 Dec 2022 09:59  Patient On (Oxygen Delivery Method): room air        PHYSICAL EXAM:    General: WDWN female resting in bed   HEENT: NC/AT; PERRL, anicteric sclera; MMM  Neck: supple - right upper chest shiley in place   Cardiovascular: +S1/S2; RRR  Respiratory: CTA B/L; no W/R/R  Gastrointestinal: soft, NT/ND; +BSx4  Extremities: WWP; no edema, clubbing or cyanosis  Vascular: 2+ radial, DP/PT pulses B/L  Neurological: AAOx3; no focal deficits    MEDICATIONS:  MEDICATIONS  (STANDING):  albumin human  5% IVPB 3250 milliLiter(s) IV Intermittent once  albumin human  5% IVPB 3250 milliLiter(s) IV Intermittent once  albumin human  5% IVPB 3250 milliLiter(s) IV Intermittent every 48 hours  artificial  tears Solution 1 Drop(s) Both EYES four times a day  aspirin enteric coated 81 milliGRAM(s) Oral daily  atorvastatin 80 milliGRAM(s) Oral at bedtime  calcium gluconate IVPB 2 Gram(s) IV Intermittent <User Schedule>  chlorhexidine 2% Cloths 1 Application(s) Topical daily  dextrose 5%. 1000 milliLiter(s) (100 mL/Hr) IV Continuous <Continuous>  dextrose 5%. 1000 milliLiter(s) (50 mL/Hr) IV Continuous <Continuous>  dextrose 50% Injectable 25 Gram(s) IV Push once  dextrose 50% Injectable 12.5 Gram(s) IV Push once  dextrose 50% Injectable 25 Gram(s) IV Push once  ergocalciferol 35023 Unit(s) Oral <User Schedule>  glucagon  Injectable 1 milliGRAM(s) IntraMuscular once  multivitamin 1 Tablet(s) Oral daily  nicotine -  14 mG/24Hr(s) Patch 1 Patch Transdermal daily  pantoprazole    Tablet 40 milliGRAM(s) Oral before breakfast  predniSONE   Tablet 60 milliGRAM(s) Oral daily  predniSONE   Tablet   Oral   QUEtiapine 50 milliGRAM(s) Oral <User Schedule>  senna 2 Tablet(s) Oral at bedtime    MEDICATIONS  (PRN):  acetaminophen     Tablet .. 650 milliGRAM(s) Oral every 6 hours PRN Moderate Pain (4 - 6)  bisacodyl 5 milliGRAM(s) Oral every 12 hours PRN Constipation  cyclobenzaprine 5 milliGRAM(s) Oral three times a day PRN Muscle Spasm  dextrose Oral Gel 15 Gram(s) Oral once PRN Blood Glucose LESS THAN 70 milliGRAM(s)/deciliter  LORazepam     Tablet 0.5 milliGRAM(s) Oral two times a day PRN Anxiety  nortriptyline 10 milliGRAM(s) Oral daily PRN Migraines  polyethylene glycol 3350 17 Gram(s) Oral two times a day PRN Constipation  sodium chloride 0.9% lock flush 10 milliLiter(s) IV Push every 1 hour PRN Pre/post blood products, medications, blood draw, and to maintain line patency      ALLERGIES:  Allergies    No Known Allergies    Intolerances        LABS:                        11.2   16.07 )-----------( 214      ( 17 Dec 2022 06:00 )             34.3     12-17    139  |  104  |  12  ----------------------------<  71  3.8   |  26  |  0.60    Ca    8.6      17 Dec 2022 06:00  Phos  4.5     12-17  Mg     1.90     12-17    TPro  4.2<L>  /  Alb  4.1  /  TBili  0.2  /  DBili  <0.2  /  AST  34<H>  /  ALT  40<H>  /  AlkPhos  32<L>  12-16        CAPILLARY BLOOD GLUCOSE          RADIOLOGY & ADDITIONAL TESTS: Reviewed.    ASSESSMENT:    PLAN:

## 2022-12-18 LAB
ANION GAP SERPL CALC-SCNC: 9 MMOL/L — SIGNIFICANT CHANGE UP (ref 7–14)
B PERT DNA SPEC QL NAA+PROBE: SIGNIFICANT CHANGE UP
B PERT+PARAPERT DNA PNL SPEC NAA+PROBE: SIGNIFICANT CHANGE UP
BORDETELLA PARAPERTUSSIS (RAPRVP): SIGNIFICANT CHANGE UP
BUN SERPL-MCNC: 10 MG/DL — SIGNIFICANT CHANGE UP (ref 7–23)
C PNEUM DNA SPEC QL NAA+PROBE: SIGNIFICANT CHANGE UP
CALCIUM SERPL-MCNC: 8.3 MG/DL — LOW (ref 8.4–10.5)
CHLORIDE SERPL-SCNC: 106 MMOL/L — SIGNIFICANT CHANGE UP (ref 98–107)
CO2 SERPL-SCNC: 27 MMOL/L — SIGNIFICANT CHANGE UP (ref 22–31)
CREAT SERPL-MCNC: 0.65 MG/DL — SIGNIFICANT CHANGE UP (ref 0.5–1.3)
EGFR: 107 ML/MIN/1.73M2 — SIGNIFICANT CHANGE UP
FLUAV SUBTYP SPEC NAA+PROBE: SIGNIFICANT CHANGE UP
FLUBV RNA SPEC QL NAA+PROBE: SIGNIFICANT CHANGE UP
GLUCOSE SERPL-MCNC: 90 MG/DL — SIGNIFICANT CHANGE UP (ref 70–99)
HADV DNA SPEC QL NAA+PROBE: SIGNIFICANT CHANGE UP
HCOV 229E RNA SPEC QL NAA+PROBE: SIGNIFICANT CHANGE UP
HCOV HKU1 RNA SPEC QL NAA+PROBE: SIGNIFICANT CHANGE UP
HCOV NL63 RNA SPEC QL NAA+PROBE: SIGNIFICANT CHANGE UP
HCOV OC43 RNA SPEC QL NAA+PROBE: SIGNIFICANT CHANGE UP
HCT VFR BLD CALC: 37.2 % — SIGNIFICANT CHANGE UP (ref 34.5–45)
HGB BLD-MCNC: 11.8 G/DL — SIGNIFICANT CHANGE UP (ref 11.5–15.5)
HMPV RNA SPEC QL NAA+PROBE: SIGNIFICANT CHANGE UP
HPIV1 RNA SPEC QL NAA+PROBE: SIGNIFICANT CHANGE UP
HPIV2 RNA SPEC QL NAA+PROBE: SIGNIFICANT CHANGE UP
HPIV3 RNA SPEC QL NAA+PROBE: SIGNIFICANT CHANGE UP
HPIV4 RNA SPEC QL NAA+PROBE: SIGNIFICANT CHANGE UP
M PNEUMO DNA SPEC QL NAA+PROBE: SIGNIFICANT CHANGE UP
MAGNESIUM SERPL-MCNC: 2.2 MG/DL — SIGNIFICANT CHANGE UP (ref 1.6–2.6)
MCHC RBC-ENTMCNC: 27.4 PG — SIGNIFICANT CHANGE UP (ref 27–34)
MCHC RBC-ENTMCNC: 31.7 GM/DL — LOW (ref 32–36)
MCV RBC AUTO: 86.5 FL — SIGNIFICANT CHANGE UP (ref 80–100)
NRBC # BLD: 0 /100 WBCS — SIGNIFICANT CHANGE UP (ref 0–0)
NRBC # FLD: 0 K/UL — SIGNIFICANT CHANGE UP (ref 0–0)
PHOSPHATE SERPL-MCNC: 4.3 MG/DL — SIGNIFICANT CHANGE UP (ref 2.5–4.5)
PLATELET # BLD AUTO: 215 K/UL — SIGNIFICANT CHANGE UP (ref 150–400)
POTASSIUM SERPL-MCNC: 3.8 MMOL/L — SIGNIFICANT CHANGE UP (ref 3.5–5.3)
POTASSIUM SERPL-SCNC: 3.8 MMOL/L — SIGNIFICANT CHANGE UP (ref 3.5–5.3)
RAPID RVP RESULT: DETECTED
RBC # BLD: 4.3 M/UL — SIGNIFICANT CHANGE UP (ref 3.8–5.2)
RBC # FLD: 17 % — HIGH (ref 10.3–14.5)
RSV RNA SPEC QL NAA+PROBE: SIGNIFICANT CHANGE UP
RV+EV RNA SPEC QL NAA+PROBE: SIGNIFICANT CHANGE UP
SARS-COV-2 RNA SPEC QL NAA+PROBE: DETECTED
SODIUM SERPL-SCNC: 142 MMOL/L — SIGNIFICANT CHANGE UP (ref 135–145)
WBC # BLD: 15.16 K/UL — HIGH (ref 3.8–10.5)
WBC # FLD AUTO: 15.16 K/UL — HIGH (ref 3.8–10.5)

## 2022-12-18 PROCEDURE — 99232 SBSQ HOSP IP/OBS MODERATE 35: CPT

## 2022-12-18 RX ORDER — FLUTICASONE PROPIONATE 50 MCG
1 SPRAY, SUSPENSION NASAL
Refills: 0 | Status: DISCONTINUED | OUTPATIENT
Start: 2022-12-18 | End: 2022-12-22

## 2022-12-18 RX ADMIN — LORATADINE 10 MILLIGRAM(S): 10 TABLET ORAL at 12:06

## 2022-12-18 RX ADMIN — Medication 1 PATCH: at 18:45

## 2022-12-18 RX ADMIN — Medication 1 PATCH: at 12:00

## 2022-12-18 RX ADMIN — Medication 1 SPRAY(S): at 22:23

## 2022-12-18 RX ADMIN — Medication 81 MILLIGRAM(S): at 12:04

## 2022-12-18 RX ADMIN — Medication 1 DROP(S): at 12:05

## 2022-12-18 RX ADMIN — Medication 1 DROP(S): at 22:24

## 2022-12-18 RX ADMIN — Medication 60 MILLIGRAM(S): at 06:21

## 2022-12-18 RX ADMIN — CHLORHEXIDINE GLUCONATE 1 APPLICATION(S): 213 SOLUTION TOPICAL at 12:05

## 2022-12-18 RX ADMIN — Medication 1 PATCH: at 12:04

## 2022-12-18 RX ADMIN — ATORVASTATIN CALCIUM 80 MILLIGRAM(S): 80 TABLET, FILM COATED ORAL at 22:26

## 2022-12-18 RX ADMIN — QUETIAPINE FUMARATE 50 MILLIGRAM(S): 200 TABLET, FILM COATED ORAL at 22:25

## 2022-12-18 RX ADMIN — Medication 1 TABLET(S): at 12:05

## 2022-12-18 RX ADMIN — PANTOPRAZOLE SODIUM 40 MILLIGRAM(S): 20 TABLET, DELAYED RELEASE ORAL at 06:21

## 2022-12-18 RX ADMIN — Medication 5 MILLIGRAM(S): at 12:06

## 2022-12-18 RX ADMIN — Medication 1 DROP(S): at 06:22

## 2022-12-18 RX ADMIN — Medication 1 DROP(S): at 00:07

## 2022-12-18 RX ADMIN — Medication 1 PATCH: at 07:00

## 2022-12-18 RX ADMIN — SENNA PLUS 2 TABLET(S): 8.6 TABLET ORAL at 22:26

## 2022-12-18 RX ADMIN — Medication 1 DROP(S): at 17:09

## 2022-12-18 NOTE — PROGRESS NOTE ADULT - ASSESSMENT
This is a 62 year old female with optic neuritis.  Eye better but not normal.    plex competed and steroids    doing well   Family

## 2022-12-18 NOTE — PROGRESS NOTE ADULT - SUBJECTIVE AND OBJECTIVE BOX
Neurology Progress    YOCASTA [id number]    HPI:  53 yo F w/ PMHx of Migraines, HLD, COVID in 12/21, dx'd with MS 6 months ago, hospitalized for Rt eye optic neuritis tx'd with IV steroids x 5 days and then taper x 10 days. Pt now presents with cloudy vision in Rt eye since 12/1. Episodes would last ~30minutes, occurring ~3x per day until this morning. She woke up around 5:30am and has noticed the cloudy vision in Rt eye has been constant since. Pt also admits since this AM she has been experiencing pain (up to 10/10 severity) in her Rt eye with certain EOM's, like when looking laterally & superiorly, feels fine looking downward. Pt admits she had a similar presentation when she was hospitalized for optic neuritis. Pt admits she has a h/o ~2 migraines per week since she was 42 yo, usually they start as a tension in the back of her neck and radiate to the back of her head/ temples and jaw, lasting ~24 hrs. Improves with medications, rest and "blacking everything out". Pt c/o muscle pains, intermittent pain in her Lt knee & Lt shoulder at varying times for years now. Also has a stiff feeling in her wrists & thumbs. Since 10/16 she feels a pulling pain in her Rt sided occipital area, lasting ~30mins. Also since this past summer she has been feeling unsteady when getting up, has fallen twice then which she attributed to not seeing well then. Also since late 11/22 she has been experiencing b/l lower lateral back pain.  (05 Dec 2022 17:51)      Past Medical History  Migraine    Multiple sclerosis    Optic neuritis    HLD (hyperlipidemia)        Past Surgical History  No significant past surgical history    S/P gastric bypass    S/P cholecystectomy        MEDICATIONS    acetaminophen     Tablet .. 650 milliGRAM(s) Oral every 6 hours PRN  albumin human  5% IVPB 3250 milliLiter(s) IV Intermittent once  albumin human  5% IVPB 3250 milliLiter(s) IV Intermittent every 48 hours  albumin human  5% IVPB 3250 milliLiter(s) IV Intermittent once  artificial  tears Solution 1 Drop(s) Both EYES four times a day  aspirin enteric coated 81 milliGRAM(s) Oral daily  atorvastatin 80 milliGRAM(s) Oral at bedtime  bisacodyl 5 milliGRAM(s) Oral every 12 hours PRN  calcium gluconate IVPB 2 Gram(s) IV Intermittent <User Schedule>  chlorhexidine 2% Cloths 1 Application(s) Topical daily  cyclobenzaprine 5 milliGRAM(s) Oral three times a day PRN  dextrose 5%. 1000 milliLiter(s) IV Continuous <Continuous>  dextrose 5%. 1000 milliLiter(s) IV Continuous <Continuous>  dextrose 50% Injectable 25 Gram(s) IV Push once  dextrose 50% Injectable 12.5 Gram(s) IV Push once  dextrose 50% Injectable 25 Gram(s) IV Push once  dextrose Oral Gel 15 Gram(s) Oral once PRN  ergocalciferol 68699 Unit(s) Oral <User Schedule>  glucagon  Injectable 1 milliGRAM(s) IntraMuscular once  loratadine 10 milliGRAM(s) Oral daily  LORazepam     Tablet 0.5 milliGRAM(s) Oral two times a day PRN  multivitamin 1 Tablet(s) Oral daily  nicotine -  14 mG/24Hr(s) Patch 1 Patch Transdermal daily  nortriptyline 10 milliGRAM(s) Oral daily PRN  pantoprazole    Tablet 40 milliGRAM(s) Oral before breakfast  polyethylene glycol 3350 17 Gram(s) Oral two times a day PRN  predniSONE   Tablet   Oral   QUEtiapine 50 milliGRAM(s) Oral <User Schedule>  senna 2 Tablet(s) Oral at bedtime  sodium chloride 0.9% lock flush 10 milliLiter(s) IV Push every 1 hour PRN         Family history: No history of dementia, strokes, or seizures   FAMILY HISTORY:  FH: hypertension (Mother)    Family history of type 2 diabetes mellitus (Mother)      SOCIAL HISTORY -- No history of tobacco or alcohol use     Allergies    No Known Allergies    Intolerances            Vital Signs Last 24 Hrs  T(C): 36.3 (18 Dec 2022 09:28), Max: 36.9 (18 Dec 2022 05:36)  T(F): 97.4 (18 Dec 2022 09:28), Max: 98.5 (18 Dec 2022 05:36)  HR: 96 (18 Dec 2022 05:36) (93 - 106)  BP: 121/59 (18 Dec 2022 09:28) (106/59 - 126/78)  BP(mean): --  RR: 18 (18 Dec 2022 09:28) (18 - 18)  SpO2: 98% (18 Dec 2022 09:28) (96% - 100%)    Parameters below as of 18 Dec 2022 09:28  Patient On (Oxygen Delivery Method): room air            On Neurological Examination:    Mental Status - Patient is alert, awake, oriented X3. .   Follows commands well and able to answer questions appropriately. Mood and affect  normal  Follow simple commands able to repeat  able to name.  Speech - Fluent no Dysarthria  no  Aphasia                              Cranial Nerves - Extraocular muscle intact  ALENA Facial symmetry Tongue midline, CnV1to V3 intact gross hearing intact      Motor Exam -   Right upper  5/5 throughout  Left upper 5/5 throughtout  Right lower- 5/5 throughout  Left lower 5/5 throughout  Coordination -finger to nose intact  Muscle tone - is normal all over. No asymmetry is seen.      Sensory    Bilateral intact to light touch    Gait -  normal  no ataxia     GENERAL Exam:     Nontoxic , No Acute Distress   	  HEENT:  normocephalic, atraumatic  		  LUNGS:	Clear bilaterally  No Wheeze      VASCULAR: no carotid brui  	  HEART:	 Normal S1S2   No murmur RRR        	  MUSCULOSKELETAL: Normal Range of Motion  	   SKIN:      Normal   No Ecchymosis               LABS:  CBC Full  -  ( 18 Dec 2022 06:07 )  WBC Count : 15.16 K/uL  RBC Count : 4.30 M/uL  Hemoglobin : 11.8 g/dL  Hematocrit : 37.2 %  Platelet Count - Automated : 215 K/uL  Mean Cell Volume : 86.5 fL  Mean Cell Hemoglobin : 27.4 pg  Mean Cell Hemoglobin Concentration : 31.7 gm/dL  Auto Neutrophil # : x  Auto Lymphocyte # : x  Auto Monocyte # : x  Auto Eosinophil # : x  Auto Basophil # : x  Auto Neutrophil % : x  Auto Lymphocyte % : x  Auto Monocyte % : x  Auto Eosinophil % : x  Auto Basophil % : x      12-18    142  |  106  |  10  ----------------------------<  90  3.8   |  27  |  0.65    Ca    8.3<L>      18 Dec 2022 06:07  Phos  4.3     12-18  Mg     2.20     12-18      Hemoglobin A1C:       Vitamin B12         RADIOLOGY    EKG      IMPRESSION  This is a  year old           schoenberg

## 2022-12-18 NOTE — PROGRESS NOTE ADULT - SUBJECTIVE AND OBJECTIVE BOX
Hospitalist Progress Note  Kaity Robertson MD Pager # [phone]    OVERNIGHT EVENTS: VANDA    SUBJECTIVE / INTERVAL HPI: Patient seen and examined at bedside. Patient reports that her vision continues to improve. She has cold like symptoms with a stuffy nose/runny nose and phlegm in her throat.     VITAL SIGNS:  Vital Signs Last 24 Hrs  T(C): 36.3 (18 Dec 2022 09:28), Max: 36.9 (18 Dec 2022 05:36)  T(F): 97.4 (18 Dec 2022 09:28), Max: 98.5 (18 Dec 2022 05:36)  HR: 96 (18 Dec 2022 05:36) (93 - 96)  BP: 121/59 (18 Dec 2022 09:28) (106/59 - 126/78)  BP(mean): --  RR: 18 (18 Dec 2022 09:28) (18 - 18)  SpO2: 98% (18 Dec 2022 09:28) (96% - 100%)    Parameters below as of 18 Dec 2022 09:28  Patient On (Oxygen Delivery Method): room air        PHYSICAL EXAM:    General: WDWN female resting in bed  HEENT: NC/AT; PERRL, anicteric sclera; MMM - right shiley in place   Neck: supple  Cardiovascular: +S1/S2; RRR  Respiratory: CTA B/L; no W/R/R  Gastrointestinal: soft, NT/ND; +BSx4  Extremities: WWP; no edema, clubbing or cyanosis. Right arm IV palpated - feels thrombosed.   Vascular: 2+ radial, DP/PT pulses B/L  Neurological: AAOx3; no focal deficits    MEDICATIONS:  MEDICATIONS  (STANDING):  albumin human  5% IVPB 3250 milliLiter(s) IV Intermittent every 48 hours  albumin human  5% IVPB 3250 milliLiter(s) IV Intermittent once  albumin human  5% IVPB 3250 milliLiter(s) IV Intermittent once  artificial  tears Solution 1 Drop(s) Both EYES four times a day  aspirin enteric coated 81 milliGRAM(s) Oral daily  atorvastatin 80 milliGRAM(s) Oral at bedtime  calcium gluconate IVPB 2 Gram(s) IV Intermittent <User Schedule>  chlorhexidine 2% Cloths 1 Application(s) Topical daily  dextrose 5%. 1000 milliLiter(s) (100 mL/Hr) IV Continuous <Continuous>  dextrose 5%. 1000 milliLiter(s) (50 mL/Hr) IV Continuous <Continuous>  dextrose 50% Injectable 25 Gram(s) IV Push once  dextrose 50% Injectable 12.5 Gram(s) IV Push once  dextrose 50% Injectable 25 Gram(s) IV Push once  ergocalciferol 55546 Unit(s) Oral <User Schedule>  glucagon  Injectable 1 milliGRAM(s) IntraMuscular once  loratadine 10 milliGRAM(s) Oral daily  multivitamin 1 Tablet(s) Oral daily  nicotine -  14 mG/24Hr(s) Patch 1 Patch Transdermal daily  pantoprazole    Tablet 40 milliGRAM(s) Oral before breakfast  predniSONE   Tablet   Oral   QUEtiapine 50 milliGRAM(s) Oral <User Schedule>  senna 2 Tablet(s) Oral at bedtime    MEDICATIONS  (PRN):  acetaminophen     Tablet .. 650 milliGRAM(s) Oral every 6 hours PRN Moderate Pain (4 - 6)  bisacodyl 5 milliGRAM(s) Oral every 12 hours PRN Constipation  cyclobenzaprine 5 milliGRAM(s) Oral three times a day PRN Muscle Spasm  dextrose Oral Gel 15 Gram(s) Oral once PRN Blood Glucose LESS THAN 70 milliGRAM(s)/deciliter  LORazepam     Tablet 0.5 milliGRAM(s) Oral two times a day PRN Anxiety  nortriptyline 10 milliGRAM(s) Oral daily PRN Migraines  polyethylene glycol 3350 17 Gram(s) Oral two times a day PRN Constipation  sodium chloride 0.9% lock flush 10 milliLiter(s) IV Push every 1 hour PRN Pre/post blood products, medications, blood draw, and to maintain line patency      ALLERGIES:  Allergies    No Known Allergies    Intolerances        LABS:                        11.8   15.16 )-----------( 215      ( 18 Dec 2022 06:07 )             37.2     12-18    142  |  106  |  10  ----------------------------<  90  3.8   |  27  |  0.65    Ca    8.3<L>      18 Dec 2022 06:07  Phos  4.3     12-18  Mg     2.20     12-18          CAPILLARY BLOOD GLUCOSE          RADIOLOGY & ADDITIONAL TESTS: Reviewed.    ASSESSMENT:    PLAN:

## 2022-12-18 NOTE — PROGRESS NOTE ADULT - PROBLEM SELECTOR PLAN 1
Currently with slight pain with EOM and decreased vision resembling previous optic neuritis. Previously treated with 1G/day steroids with outpatient tapering of prednisone  -Neurology C/S: Solumedrol IV 1g x 5 days given significant worsening in vision. Patient with minimal improvement with IV steroids  - MR brain w/ and w/o contrast, MR orbit w/ w/o contrast showing findings of demyelinating disease  - infectious workup including CBC, VBG w/ lactate, CXR, UA, all negative this far  - monitor FS daily, avoid use of concurrent NSAIDS  - case discussed with neurology, patient with minimal improvement with IV steroids  -carrillo placed on 12/8, tentative plan for 5 PLEX sessions every other day; first was 12/9  - PLEX completed on Saturday - discussed with neurology - will assess need for further treatment on Monday. Keep the Shiley in until care plan is determined.

## 2022-12-19 DIAGNOSIS — U07.1 COVID-19: ICD-10-CM

## 2022-12-19 LAB
ANION GAP SERPL CALC-SCNC: 13 MMOL/L — SIGNIFICANT CHANGE UP (ref 7–14)
BUN SERPL-MCNC: 7 MG/DL — SIGNIFICANT CHANGE UP (ref 7–23)
CALCIUM SERPL-MCNC: 8.5 MG/DL — SIGNIFICANT CHANGE UP (ref 8.4–10.5)
CHLORIDE SERPL-SCNC: 107 MMOL/L — SIGNIFICANT CHANGE UP (ref 98–107)
CO2 SERPL-SCNC: 22 MMOL/L — SIGNIFICANT CHANGE UP (ref 22–31)
CREAT SERPL-MCNC: 0.51 MG/DL — SIGNIFICANT CHANGE UP (ref 0.5–1.3)
EGFR: 113 ML/MIN/1.73M2 — SIGNIFICANT CHANGE UP
GLUCOSE SERPL-MCNC: 139 MG/DL — HIGH (ref 70–99)
HCT VFR BLD CALC: 33.9 % — LOW (ref 34.5–45)
HGB BLD-MCNC: 10.8 G/DL — LOW (ref 11.5–15.5)
MAGNESIUM SERPL-MCNC: 1.9 MG/DL — SIGNIFICANT CHANGE UP (ref 1.6–2.6)
MCHC RBC-ENTMCNC: 27.4 PG — SIGNIFICANT CHANGE UP (ref 27–34)
MCHC RBC-ENTMCNC: 31.9 GM/DL — LOW (ref 32–36)
MCV RBC AUTO: 86 FL — SIGNIFICANT CHANGE UP (ref 80–100)
NRBC # BLD: 0 /100 WBCS — SIGNIFICANT CHANGE UP (ref 0–0)
NRBC # FLD: 0 K/UL — SIGNIFICANT CHANGE UP (ref 0–0)
PHOSPHATE SERPL-MCNC: 3.6 MG/DL — SIGNIFICANT CHANGE UP (ref 2.5–4.5)
PLATELET # BLD AUTO: 190 K/UL — SIGNIFICANT CHANGE UP (ref 150–400)
POTASSIUM SERPL-MCNC: 3.8 MMOL/L — SIGNIFICANT CHANGE UP (ref 3.5–5.3)
POTASSIUM SERPL-SCNC: 3.8 MMOL/L — SIGNIFICANT CHANGE UP (ref 3.5–5.3)
RBC # BLD: 3.94 M/UL — SIGNIFICANT CHANGE UP (ref 3.8–5.2)
RBC # FLD: 17.2 % — HIGH (ref 10.3–14.5)
SODIUM SERPL-SCNC: 142 MMOL/L — SIGNIFICANT CHANGE UP (ref 135–145)
WBC # BLD: 14.31 K/UL — HIGH (ref 3.8–10.5)
WBC # FLD AUTO: 14.31 K/UL — HIGH (ref 3.8–10.5)

## 2022-12-19 PROCEDURE — 99232 SBSQ HOSP IP/OBS MODERATE 35: CPT

## 2022-12-19 PROCEDURE — 99233 SBSQ HOSP IP/OBS HIGH 50: CPT

## 2022-12-19 RX ORDER — REMDESIVIR 5 MG/ML
100 INJECTION INTRAVENOUS EVERY 24 HOURS
Refills: 0 | Status: COMPLETED | OUTPATIENT
Start: 2022-12-20 | End: 2022-12-21

## 2022-12-19 RX ORDER — REMDESIVIR 5 MG/ML
200 INJECTION INTRAVENOUS EVERY 24 HOURS
Refills: 0 | Status: COMPLETED | OUTPATIENT
Start: 2022-12-19 | End: 2022-12-19

## 2022-12-19 RX ORDER — ENOXAPARIN SODIUM 100 MG/ML
40 INJECTION SUBCUTANEOUS EVERY 12 HOURS
Refills: 0 | Status: DISCONTINUED | OUTPATIENT
Start: 2022-12-19 | End: 2022-12-22

## 2022-12-19 RX ORDER — REMDESIVIR 5 MG/ML
INJECTION INTRAVENOUS
Refills: 0 | Status: COMPLETED | OUTPATIENT
Start: 2022-12-19 | End: 2022-12-21

## 2022-12-19 RX ADMIN — CHLORHEXIDINE GLUCONATE 1 APPLICATION(S): 213 SOLUTION TOPICAL at 12:13

## 2022-12-19 RX ADMIN — Medication 1 PATCH: at 18:33

## 2022-12-19 RX ADMIN — Medication 50 MILLIGRAM(S): at 05:18

## 2022-12-19 RX ADMIN — ENOXAPARIN SODIUM 40 MILLIGRAM(S): 100 INJECTION SUBCUTANEOUS at 18:18

## 2022-12-19 RX ADMIN — Medication 1 DROP(S): at 12:13

## 2022-12-19 RX ADMIN — QUETIAPINE FUMARATE 50 MILLIGRAM(S): 200 TABLET, FILM COATED ORAL at 21:58

## 2022-12-19 RX ADMIN — Medication 5 MILLIGRAM(S): at 18:18

## 2022-12-19 RX ADMIN — Medication 1 TABLET(S): at 12:11

## 2022-12-19 RX ADMIN — PANTOPRAZOLE SODIUM 40 MILLIGRAM(S): 20 TABLET, DELAYED RELEASE ORAL at 05:18

## 2022-12-19 RX ADMIN — LORATADINE 10 MILLIGRAM(S): 10 TABLET ORAL at 12:11

## 2022-12-19 RX ADMIN — Medication 1 SPRAY(S): at 18:19

## 2022-12-19 RX ADMIN — REMDESIVIR 200 MILLIGRAM(S): 5 INJECTION INTRAVENOUS at 18:17

## 2022-12-19 RX ADMIN — Medication 1 DROP(S): at 05:16

## 2022-12-19 RX ADMIN — Medication 1 SPRAY(S): at 05:16

## 2022-12-19 RX ADMIN — Medication 1 DROP(S): at 21:59

## 2022-12-19 RX ADMIN — Medication 1 DROP(S): at 18:19

## 2022-12-19 RX ADMIN — Medication 81 MILLIGRAM(S): at 12:11

## 2022-12-19 RX ADMIN — SENNA PLUS 2 TABLET(S): 8.6 TABLET ORAL at 21:58

## 2022-12-19 RX ADMIN — ATORVASTATIN CALCIUM 80 MILLIGRAM(S): 80 TABLET, FILM COATED ORAL at 21:58

## 2022-12-19 RX ADMIN — Medication 1 PATCH: at 05:24

## 2022-12-19 RX ADMIN — Medication 1 PATCH: at 12:00

## 2022-12-19 RX ADMIN — Medication 1 PATCH: at 12:16

## 2022-12-19 NOTE — PROGRESS NOTE ADULT - SUBJECTIVE AND OBJECTIVE BOX
Patient is a 52y old  Female who presents with a chief complaint of Vision change (18 Dec 2022 14:42)    Rahul Mark MD   [facility] Division of Hospital Medicine   Pager [phone]  Reachable on Microsoft Teams     SUBJECTIVE / OVERNIGHT EVENTS:  Patient seen and examined this morning. She states that her vision is improving she states that vision still remains blurry but is able to make out more each day.  States that eye pain has improved.    MEDICATIONS  (STANDING):  artificial  tears Solution 1 Drop(s) Both EYES four times a day  aspirin enteric coated 81 milliGRAM(s) Oral daily  atorvastatin 80 milliGRAM(s) Oral at bedtime  calcium gluconate IVPB 2 Gram(s) IV Intermittent <User Schedule>  chlorhexidine 2% Cloths 1 Application(s) Topical daily  dextrose 5%. 1000 milliLiter(s) (50 mL/Hr) IV Continuous <Continuous>  dextrose 5%. 1000 milliLiter(s) (100 mL/Hr) IV Continuous <Continuous>  dextrose 50% Injectable 25 Gram(s) IV Push once  dextrose 50% Injectable 12.5 Gram(s) IV Push once  dextrose 50% Injectable 25 Gram(s) IV Push once  enoxaparin Injectable 40 milliGRAM(s) SubCutaneous every 12 hours  ergocalciferol 72331 Unit(s) Oral <User Schedule>  fluticasone propionate 50 MICROgram(s)/spray Nasal Spray 1 Spray(s) Both Nostrils two times a day  glucagon  Injectable 1 milliGRAM(s) IntraMuscular once  loratadine 10 milliGRAM(s) Oral daily  multivitamin 1 Tablet(s) Oral daily  nicotine -  14 mG/24Hr(s) Patch 1 Patch Transdermal daily  pantoprazole    Tablet 40 milliGRAM(s) Oral before breakfast  predniSONE   Tablet   Oral   predniSONE   Tablet 50 milliGRAM(s) Oral daily  QUEtiapine 50 milliGRAM(s) Oral <User Schedule>  remdesivir  IVPB   IV Intermittent   senna 2 Tablet(s) Oral at bedtime    MEDICATIONS  (PRN):  acetaminophen     Tablet .. 650 milliGRAM(s) Oral every 6 hours PRN Moderate Pain (4 - 6)  bisacodyl 5 milliGRAM(s) Oral every 12 hours PRN Constipation  cyclobenzaprine 5 milliGRAM(s) Oral three times a day PRN Muscle Spasm  dextrose Oral Gel 15 Gram(s) Oral once PRN Blood Glucose LESS THAN 70 milliGRAM(s)/deciliter  LORazepam     Tablet 0.5 milliGRAM(s) Oral two times a day PRN Anxiety  nortriptyline 10 milliGRAM(s) Oral daily PRN Migraines  polyethylene glycol 3350 17 Gram(s) Oral two times a day PRN Constipation  sodium chloride 0.9% lock flush 10 milliLiter(s) IV Push every 1 hour PRN Pre/post blood products, medications, blood draw, and to maintain line patency      Vital Signs Last 24 Hrs  T(C): 36.5 (19 Dec 2022 10:04), Max: 36.8 (19 Dec 2022 05:11)  T(F): 97.7 (19 Dec 2022 10:04), Max: 98.3 (19 Dec 2022 05:11)  HR: 96 (19 Dec 2022 10:04) (87 - 96)  BP: 120/67 (19 Dec 2022 10:04) (110/66 - 120/67)  BP(mean): --  RR: 18 (19 Dec 2022 10:04) (17 - 18)  SpO2: 100% (19 Dec 2022 10:04) (97% - 100%)  CAPILLARY BLOOD GLUCOSE        I&O's Summary      General: NAD, awake and alert  Eyes: conjunctiva clear, nonicteric sclera  HENMT: NCAT, MMM  Neck: Supple, trachea midline   Respiratory: No respiratory distress, CTABL, No rales, rhonchi, wheezing.  Cardiovascular: S1,S2; Regular rate and rhythm; No m/g/r. 2+ peripheral pulses  Gastrointestinal: Soft, Nontender, Nondistended; +BS.   Extremities: No c/c/e; warm to touch  Neurological: Moving all 4 extremities; Sensation to LT grossly in tact.  Skin: No rashes, No erythema   Psych: AAOx3; appropriate mood and affect    LABS:                        10.8   14.31 )-----------( 190      ( 19 Dec 2022 06:43 )             33.9     12-19    142  |  107  |  7   ----------------------------<  139<H>  3.8   |  22  |  0.51    Ca    8.5      19 Dec 2022 06:43  Phos  3.6     12-19  Mg     1.90     12-19                RADIOLOGY & ADDITIONAL TESTS:    Imaging Personally Reviewed:    Consultant(s) Notes Reviewed:      Care Discussed with Consultants/Other Providers:

## 2022-12-19 NOTE — PROGRESS NOTE ADULT - PROBLEM SELECTOR PLAN 2
PCR positive 12/18, symptoms started 12/17   currently with normal saturations on room air  will start on 3d remdesivir given recent pulse dose steroids and plasmapheresis   started on weight based Lovenox for DVT chemo ppx PCR positive 12/18, symptoms started 12/17   currently with normal saturations on room air  will start on 3d remdesivir given recent pulse dose steroids and plasmapheresis. Can d/c early if ready for discharge  started on weight based Lovenox for DVT chemo ppx

## 2022-12-19 NOTE — PROGRESS NOTE ADULT - ASSESSMENT
Ms. Dangelo is a 53 yo woman with relapsing remitting multiple sclerosis with an attack of right optic neuritis - very severe.  Continue slow taper of PO prednisone taper: 60mg daily for 7 days, then 50mg daily for 7 days, then 40mg daily for 7 days, then 30mg daily for 7 days, then 20mg daily for 7 days, then 10mg daily for 7 days.   Resident discussed with the patient's outpatient neuroimmunologist and she recommends 2 more sessions of plasma exchange - she has had minimal improvement in vision since admission.   Resident d/w primary team.   Thank you

## 2022-12-19 NOTE — PROGRESS NOTE ADULT - SUBJECTIVE AND OBJECTIVE BOX
Had Plex #5  Vision only slightly improved.     Neurology Progress Note    SUBJECTIVE/OBJECTIVE/INTERVAL EVENTS: Patient seen and examined.    INTERVAL HISTORY:      MEDICATIONS  (STANDING):  artificial  tears Solution 1 Drop(s) Both EYES four times a day  aspirin enteric coated 81 milliGRAM(s) Oral daily  atorvastatin 80 milliGRAM(s) Oral at bedtime  chlorhexidine 2% Cloths 1 Application(s) Topical daily  dextrose 5%. 1000 milliLiter(s) (50 mL/Hr) IV Continuous <Continuous>  dextrose 5%. 1000 milliLiter(s) (100 mL/Hr) IV Continuous <Continuous>  dextrose 50% Injectable 25 Gram(s) IV Push once  dextrose 50% Injectable 12.5 Gram(s) IV Push once  dextrose 50% Injectable 25 Gram(s) IV Push once  enoxaparin Injectable 40 milliGRAM(s) SubCutaneous every 12 hours  ergocalciferol 63944 Unit(s) Oral <User Schedule>  fluticasone propionate 50 MICROgram(s)/spray Nasal Spray 1 Spray(s) Both Nostrils two times a day  glucagon  Injectable 1 milliGRAM(s) IntraMuscular once  loratadine 10 milliGRAM(s) Oral daily  multivitamin 1 Tablet(s) Oral daily  nicotine -  14 mG/24Hr(s) Patch 1 Patch Transdermal daily  pantoprazole    Tablet 40 milliGRAM(s) Oral before breakfast  predniSONE   Tablet   Oral   predniSONE   Tablet 50 milliGRAM(s) Oral daily  QUEtiapine 50 milliGRAM(s) Oral <User Schedule>  remdesivir  IVPB   IV Intermittent   remdesivir  IVPB 200 milliGRAM(s) IV Intermittent every 24 hours  senna 2 Tablet(s) Oral at bedtime    MEDICATIONS  (PRN):  acetaminophen     Tablet .. 650 milliGRAM(s) Oral every 6 hours PRN Moderate Pain (4 - 6)  bisacodyl 5 milliGRAM(s) Oral every 12 hours PRN Constipation  cyclobenzaprine 5 milliGRAM(s) Oral three times a day PRN Muscle Spasm  dextrose Oral Gel 15 Gram(s) Oral once PRN Blood Glucose LESS THAN 70 milliGRAM(s)/deciliter  LORazepam     Tablet 0.5 milliGRAM(s) Oral two times a day PRN Anxiety  nortriptyline 10 milliGRAM(s) Oral daily PRN Migraines  polyethylene glycol 3350 17 Gram(s) Oral two times a day PRN Constipation      VITALS & EXAMINATION:  Vital Signs Last 24 Hrs  T(C): 36.5 (19 Dec 2022 10:04), Max: 36.8 (19 Dec 2022 05:11)  T(F): 97.7 (19 Dec 2022 10:04), Max: 98.3 (19 Dec 2022 05:11)  HR: 96 (19 Dec 2022 10:04) (87 - 96)  BP: 120/67 (19 Dec 2022 10:04) (118/73 - 120/67)  BP(mean): --  RR: 18 (19 Dec 2022 10:04) (18 - 18)  SpO2: 100% (19 Dec 2022 10:04) (97% - 100%)    Parameters below as of 19 Dec 2022 10:04  Patient On (Oxygen Delivery Method): room air        General:  Constitutional: NAD    Neurological:  MS: Awake, alert, attentive, appropriate.  Follows commands.    CNs: Possible subtle right RAPD.  Visual acuity: OD 20/400; OS 20/25.  OD - unable to CF with VF testing.

## 2022-12-19 NOTE — PROGRESS NOTE ADULT - PROBLEM SELECTOR PLAN 1
Currently with slight pain with EOM and decreased vision resembling previous optic neuritis. Previously treated with 1G/day steroids with outpatient tapering of prednisone  -Neurology C/S: Solumedrol IV 1g x 5 days given significant worsening in vision. Patient with minimal improvement with IV steroids  - MR brain w/ and w/o contrast, MR orbit w/ w/o contrast showing findings of demyelinating disease  - infectious workup including CBC, VBG w/ lactate, CXR, UA, all negative this far  - monitor FS daily, avoid use of concurrent NSAIDS  - case discussed with neurology, patient with minimal improvement with IV steroids  - carrillo placed on 12/8, s/p 5 PLEX sessions every other day; first was 12/9-12/17  - c/w prednisone taper per Neuro  [ ] Shiley removal pending neuro recs

## 2022-12-20 DIAGNOSIS — Z29.9 ENCOUNTER FOR PROPHYLACTIC MEASURES, UNSPECIFIED: ICD-10-CM

## 2022-12-20 LAB
ANION GAP SERPL CALC-SCNC: 11 MMOL/L — SIGNIFICANT CHANGE UP (ref 7–14)
BUN SERPL-MCNC: 7 MG/DL — SIGNIFICANT CHANGE UP (ref 7–23)
CALCIUM SERPL-MCNC: 8.4 MG/DL — SIGNIFICANT CHANGE UP (ref 8.4–10.5)
CHLORIDE SERPL-SCNC: 107 MMOL/L — SIGNIFICANT CHANGE UP (ref 98–107)
CO2 SERPL-SCNC: 25 MMOL/L — SIGNIFICANT CHANGE UP (ref 22–31)
CREAT SERPL-MCNC: 0.53 MG/DL — SIGNIFICANT CHANGE UP (ref 0.5–1.3)
CRP SERPL-MCNC: 4.8 MG/L — SIGNIFICANT CHANGE UP
D DIMER BLD IA.RAPID-MCNC: 169 NG/ML DDU — SIGNIFICANT CHANGE UP
EGFR: 112 ML/MIN/1.73M2 — SIGNIFICANT CHANGE UP
ERYTHROCYTE [SEDIMENTATION RATE] IN BLOOD: 6 MM/HR — SIGNIFICANT CHANGE UP (ref 4–25)
FERRITIN SERPL-MCNC: 40 NG/ML — SIGNIFICANT CHANGE UP (ref 15–150)
FIBRINOGEN PPP-MCNC: 282 MG/DL — SIGNIFICANT CHANGE UP (ref 200–465)
GLUCOSE SERPL-MCNC: 80 MG/DL — SIGNIFICANT CHANGE UP (ref 70–99)
HCT VFR BLD CALC: 33.6 % — LOW (ref 34.5–45)
HGB BLD-MCNC: 11.1 G/DL — LOW (ref 11.5–15.5)
MAGNESIUM SERPL-MCNC: 2 MG/DL — SIGNIFICANT CHANGE UP (ref 1.6–2.6)
MCHC RBC-ENTMCNC: 28.3 PG — SIGNIFICANT CHANGE UP (ref 27–34)
MCHC RBC-ENTMCNC: 33 GM/DL — SIGNIFICANT CHANGE UP (ref 32–36)
MCV RBC AUTO: 85.7 FL — SIGNIFICANT CHANGE UP (ref 80–100)
NRBC # BLD: 0 /100 WBCS — SIGNIFICANT CHANGE UP (ref 0–0)
NRBC # FLD: 0.02 K/UL — HIGH (ref 0–0)
PHOSPHATE SERPL-MCNC: 4.7 MG/DL — HIGH (ref 2.5–4.5)
PLATELET # BLD AUTO: 189 K/UL — SIGNIFICANT CHANGE UP (ref 150–400)
POTASSIUM SERPL-MCNC: 4.1 MMOL/L — SIGNIFICANT CHANGE UP (ref 3.5–5.3)
POTASSIUM SERPL-SCNC: 4.1 MMOL/L — SIGNIFICANT CHANGE UP (ref 3.5–5.3)
PROCALCITONIN SERPL-MCNC: 0.05 NG/ML — SIGNIFICANT CHANGE UP (ref 0.02–0.1)
RBC # BLD: 3.92 M/UL — SIGNIFICANT CHANGE UP (ref 3.8–5.2)
RBC # FLD: 17.2 % — HIGH (ref 10.3–14.5)
SODIUM SERPL-SCNC: 143 MMOL/L — SIGNIFICANT CHANGE UP (ref 135–145)
WBC # BLD: 10.08 K/UL — SIGNIFICANT CHANGE UP (ref 3.8–10.5)
WBC # FLD AUTO: 10.08 K/UL — SIGNIFICANT CHANGE UP (ref 3.8–10.5)

## 2022-12-20 PROCEDURE — 99233 SBSQ HOSP IP/OBS HIGH 50: CPT

## 2022-12-20 PROCEDURE — 99232 SBSQ HOSP IP/OBS MODERATE 35: CPT

## 2022-12-20 RX ORDER — CALCIUM GLUCONATE 100 MG/ML
2 VIAL (ML) INTRAVENOUS ONCE
Refills: 0 | Status: DISCONTINUED | OUTPATIENT
Start: 2022-12-20 | End: 2022-12-22

## 2022-12-20 RX ORDER — ALBUMIN HUMAN 25 %
3250 VIAL (ML) INTRAVENOUS ONCE
Refills: 0 | Status: DISCONTINUED | OUTPATIENT
Start: 2022-12-20 | End: 2022-12-22

## 2022-12-20 RX ADMIN — SENNA PLUS 2 TABLET(S): 8.6 TABLET ORAL at 21:33

## 2022-12-20 RX ADMIN — Medication 1 SPRAY(S): at 06:58

## 2022-12-20 RX ADMIN — LORATADINE 10 MILLIGRAM(S): 10 TABLET ORAL at 13:12

## 2022-12-20 RX ADMIN — QUETIAPINE FUMARATE 50 MILLIGRAM(S): 200 TABLET, FILM COATED ORAL at 21:32

## 2022-12-20 RX ADMIN — Medication 50 MILLIGRAM(S): at 06:58

## 2022-12-20 RX ADMIN — Medication 1 DROP(S): at 06:58

## 2022-12-20 RX ADMIN — Medication 650 MILLIGRAM(S): at 07:02

## 2022-12-20 RX ADMIN — Medication 1 TABLET(S): at 13:11

## 2022-12-20 RX ADMIN — ATORVASTATIN CALCIUM 80 MILLIGRAM(S): 80 TABLET, FILM COATED ORAL at 21:33

## 2022-12-20 RX ADMIN — REMDESIVIR 200 MILLIGRAM(S): 5 INJECTION INTRAVENOUS at 17:52

## 2022-12-20 RX ADMIN — ENOXAPARIN SODIUM 40 MILLIGRAM(S): 100 INJECTION SUBCUTANEOUS at 17:52

## 2022-12-20 RX ADMIN — Medication 650 MILLIGRAM(S): at 07:32

## 2022-12-20 RX ADMIN — Medication 1 DROP(S): at 13:08

## 2022-12-20 RX ADMIN — Medication 1 DROP(S): at 17:53

## 2022-12-20 RX ADMIN — Medication 81 MILLIGRAM(S): at 13:11

## 2022-12-20 RX ADMIN — CHLORHEXIDINE GLUCONATE 1 APPLICATION(S): 213 SOLUTION TOPICAL at 13:08

## 2022-12-20 RX ADMIN — Medication 1 PATCH: at 19:30

## 2022-12-20 RX ADMIN — Medication 0.5 MILLIGRAM(S): at 16:05

## 2022-12-20 RX ADMIN — Medication 1 PATCH: at 07:00

## 2022-12-20 RX ADMIN — NORTRIPTYLINE HYDROCHLORIDE 10 MILLIGRAM(S): 10 CAPSULE ORAL at 00:46

## 2022-12-20 RX ADMIN — Medication 0.5 MILLIGRAM(S): at 01:16

## 2022-12-20 RX ADMIN — Medication 1 PATCH: at 13:12

## 2022-12-20 RX ADMIN — Medication 1 PATCH: at 12:00

## 2022-12-20 RX ADMIN — ENOXAPARIN SODIUM 40 MILLIGRAM(S): 100 INJECTION SUBCUTANEOUS at 06:57

## 2022-12-20 RX ADMIN — Medication 1 SPRAY(S): at 17:53

## 2022-12-20 RX ADMIN — PANTOPRAZOLE SODIUM 40 MILLIGRAM(S): 20 TABLET, DELAYED RELEASE ORAL at 07:02

## 2022-12-20 NOTE — PROGRESS NOTE ADULT - PROBLEM SELECTOR PLAN 2
PCR positive 12/18, symptoms started 12/17;  likely nosocomial,  currently with normal saturations on room air  will start on 3d remdesivir given recent pulse dose steroids and plasmapheresis.   started on weight based Lovenox for DVT chemo ppx

## 2022-12-20 NOTE — PROGRESS NOTE ADULT - PROBLEM SELECTOR PLAN 1
Currently with slight pain with EOM and decreased vision resembling previous optic neuritis. Previously treated with 1G/day steroids with outpatient tapering of prednisone  -Neurology C/S: Solumedrol IV 1g x 5 days given significant worsening in vision. Patient with minimal improvement with IV steroids  - MR brain w/ and w/o contrast, MR orbit w/ w/o contrast showing findings of demyelinating disease  - infectious workup including CBC, VBG w/ lactate, CXR, UA, all negative this far  - monitor FS daily, avoid use of concurrent NSAIDS  - carrillo placed on 12/8, completed 5 sessions of PLEX 12/9-12/17-> neuro recommending 2 more sessions. d/w blood bank Dr. Shearer, planning on another session today   - c/w prednisone taper per Neuro

## 2022-12-20 NOTE — PROGRESS NOTE ADULT - SUBJECTIVE AND OBJECTIVE BOX
Patient is a 52y old  Female who presents with a chief complaint of Vision change (19 Dec 2022 17:38)    Rahul Mark MD   [facility] Division of Hospital Medicine   Pager [phone]  Reachable on Microsoft Teams     SUBJECTIVE / OVERNIGHT EVENTS:  Patient seen and examined this morning. Upset about move to 5S, but understands that it had to happen given that she is now COVID positive.  She states that her vision is improving, noticed that she could see more when the optho showed her chart, however remains blurry.  No chest pains, cough, congestion, fevers, chills.     MEDICATIONS  (STANDING):  albumin human  5% IVPB 3250 milliLiter(s) IV Intermittent once  artificial  tears Solution 1 Drop(s) Both EYES four times a day  aspirin enteric coated 81 milliGRAM(s) Oral daily  atorvastatin 80 milliGRAM(s) Oral at bedtime  calcium gluconate IVPB 2 Gram(s) IV Intermittent once  chlorhexidine 2% Cloths 1 Application(s) Topical daily  dextrose 5%. 1000 milliLiter(s) (50 mL/Hr) IV Continuous <Continuous>  dextrose 5%. 1000 milliLiter(s) (100 mL/Hr) IV Continuous <Continuous>  dextrose 50% Injectable 25 Gram(s) IV Push once  dextrose 50% Injectable 12.5 Gram(s) IV Push once  dextrose 50% Injectable 25 Gram(s) IV Push once  enoxaparin Injectable 40 milliGRAM(s) SubCutaneous every 12 hours  ergocalciferol 94748 Unit(s) Oral <User Schedule>  fluticasone propionate 50 MICROgram(s)/spray Nasal Spray 1 Spray(s) Both Nostrils two times a day  glucagon  Injectable 1 milliGRAM(s) IntraMuscular once  loratadine 10 milliGRAM(s) Oral daily  multivitamin 1 Tablet(s) Oral daily  nicotine -  14 mG/24Hr(s) Patch 1 Patch Transdermal daily  pantoprazole    Tablet 40 milliGRAM(s) Oral before breakfast  predniSONE   Tablet 50 milliGRAM(s) Oral daily  predniSONE   Tablet   Oral   QUEtiapine 50 milliGRAM(s) Oral <User Schedule>  remdesivir  IVPB   IV Intermittent   remdesivir  IVPB 100 milliGRAM(s) IV Intermittent every 24 hours  senna 2 Tablet(s) Oral at bedtime    MEDICATIONS  (PRN):  acetaminophen     Tablet .. 650 milliGRAM(s) Oral every 6 hours PRN Moderate Pain (4 - 6)  bisacodyl 5 milliGRAM(s) Oral every 12 hours PRN Constipation  cyclobenzaprine 5 milliGRAM(s) Oral three times a day PRN Muscle Spasm  dextrose Oral Gel 15 Gram(s) Oral once PRN Blood Glucose LESS THAN 70 milliGRAM(s)/deciliter  LORazepam     Tablet 0.5 milliGRAM(s) Oral two times a day PRN Anxiety  nortriptyline 10 milliGRAM(s) Oral daily PRN Migraines  polyethylene glycol 3350 17 Gram(s) Oral two times a day PRN Constipation      Vital Signs Last 24 Hrs  T(C): 36.4 (20 Dec 2022 13:20), Max: 36.7 (19 Dec 2022 23:50)  T(F): 97.6 (20 Dec 2022 13:20), Max: 98 (19 Dec 2022 23:50)  HR: 100 (20 Dec 2022 13:20) (80 - 100)  BP: 128/83 (20 Dec 2022 13:20) (104/66 - 135/69)  BP(mean): --  RR: 18 (20 Dec 2022 13:20) (18 - 18)  SpO2: 98% (20 Dec 2022 13:20) (97% - 100%)  CAPILLARY BLOOD GLUCOSE        I&O's Summary      General: NAD, awake and alert  Eyes: EOMI, conjunctiva clear, nonicteric sclera  HENMT: MMM  Respiratory: No respiratory distress, CTABL, No rales, rhonchi, wheezing.  Cardiovascular: S1,S2; Regular rate and rhythm; No m/g/r. 2+ peripheral pulses  Gastrointestinal: Soft, Nontender, obese abdomen; +BS.   Extremities: No c/c/e; warm to touch  Neurological: Moving all 4 extremities; Sensation to LT grossly in tact in BLEs.  Psych: AAOx3; appropriate mood and affect    LABS:                        11.1   10.08 )-----------( 189      ( 20 Dec 2022 06:39 )             33.6     12-20    143  |  107  |  7   ----------------------------<  80  4.1   |  25  |  0.53    Ca    8.4      20 Dec 2022 06:39  Phos  4.7     12-20  Mg     2.00     12-20                RADIOLOGY & ADDITIONAL TESTS:    Imaging Personally Reviewed:    Consultant(s) Notes Reviewed:      Care Discussed with Consultants/Other Providers:

## 2022-12-20 NOTE — CHART NOTE - NSCHARTNOTEFT_GEN_A_CORE
51yo F PMHx chronic migraines presented with relapsing MS, s/p steroids for recurrent optic neuritis with minimal improvement. The plan was for a course of 5 PLEX over 10 days.  All procedures well tolerated. Showing improvement in vision.  Neurology requested additional two procedures.  Patient now tested +COVID-19.    Clinical notes and labs reviewed.       PLEX 6 today, fibrinogen 280.   Using 3250ml 5% albumin as replacement PLEX. Care d/w apheresis RN.     PLEX#7 of 7 procedures on 12/22/22

## 2022-12-20 NOTE — PROGRESS NOTE ADULT - ASSESSMENT
52y female w/ pmhx/ochx of  multiple sclerosis and right optic neuritis found to have likely recurrent optic neuritis of the right side.    # Optic neuritis, right side, secondary to relapsing multiple sclerosis  - Vision on 11/16 with Dr Baum 20/50 right eye, 20/25 left eye with superior visual field loss and 4/12 on color plates; presented with 20/400 right eye, 20/25 left eye with worsening APD, total confrontation visual field loss, and 0/12 on color plates  - Posterior segment exam without disc pallor of edema  - s/p course of IV steroids with no improvement on exam  - Appreciate neuro input  - Repeat MRI with increased T2 and FLAIR hyperintensity in periventricular white matter; negative MRI orbits/cervical/thoracic   - visual acuity 20/800 today - no improvement thus far  - PLEX started 12/9, 2 more sessions- continue per neurology    Pt seen and discussed with Dr. Devlin.     Outpatient follow-up: Patient should follow-up with his/her ophthalmologist or with [facility] Department of Ophthalmology at the address below     [address] [place] [address]. [address]  [place], [place] [id number] [phone]

## 2022-12-20 NOTE — PROGRESS NOTE ADULT - SUBJECTIVE AND OBJECTIVE BOX
[facility] DEPARTMENT OF OPHTHALMOLOGY  ------------------------------------------------------------------------------  Blake Little MD PGY-3  Pager: [phone], also available on teams  ------------------------------------------------------------------------------    Interval History: No acute events overnight. Endorsing vision sl improved. No pain.    MEDICATIONS  (STANDING):  albumin human  5% IVPB 3250 milliLiter(s) IV Intermittent once  artificial  tears Solution 1 Drop(s) Both EYES four times a day  aspirin enteric coated 81 milliGRAM(s) Oral daily  atorvastatin 80 milliGRAM(s) Oral at bedtime  calcium gluconate IVPB 2 Gram(s) IV Intermittent once  chlorhexidine 2% Cloths 1 Application(s) Topical daily  dextrose 5%. 1000 milliLiter(s) (50 mL/Hr) IV Continuous <Continuous>  dextrose 5%. 1000 milliLiter(s) (100 mL/Hr) IV Continuous <Continuous>  dextrose 50% Injectable 25 Gram(s) IV Push once  dextrose 50% Injectable 12.5 Gram(s) IV Push once  dextrose 50% Injectable 25 Gram(s) IV Push once  enoxaparin Injectable 40 milliGRAM(s) SubCutaneous every 12 hours  ergocalciferol 77621 Unit(s) Oral <User Schedule>  fluticasone propionate 50 MICROgram(s)/spray Nasal Spray 1 Spray(s) Both Nostrils two times a day  glucagon  Injectable 1 milliGRAM(s) IntraMuscular once  loratadine 10 milliGRAM(s) Oral daily  multivitamin 1 Tablet(s) Oral daily  nicotine -  14 mG/24Hr(s) Patch 1 Patch Transdermal daily  pantoprazole    Tablet 40 milliGRAM(s) Oral before breakfast  predniSONE   Tablet 50 milliGRAM(s) Oral daily  predniSONE   Tablet   Oral   QUEtiapine 50 milliGRAM(s) Oral <User Schedule>  remdesivir  IVPB   IV Intermittent   remdesivir  IVPB 100 milliGRAM(s) IV Intermittent every 24 hours  senna 2 Tablet(s) Oral at bedtime    MEDICATIONS  (PRN):  acetaminophen     Tablet .. 650 milliGRAM(s) Oral every 6 hours PRN Moderate Pain (4 - 6)  bisacodyl 5 milliGRAM(s) Oral every 12 hours PRN Constipation  cyclobenzaprine 5 milliGRAM(s) Oral three times a day PRN Muscle Spasm  dextrose Oral Gel 15 Gram(s) Oral once PRN Blood Glucose LESS THAN 70 milliGRAM(s)/deciliter  LORazepam     Tablet 0.5 milliGRAM(s) Oral two times a day PRN Anxiety  nortriptyline 10 milliGRAM(s) Oral daily PRN Migraines  polyethylene glycol 3350 17 Gram(s) Oral two times a day PRN Constipation      VITALS: T(C): 36.4 (12-20-22 @ 13:20)  T(F): 97.6 (12-20-22 @ 13:20), Max: 98 (12-19-22 @ 23:50)  HR: 100 (12-20-22 @ 13:20) (80 - 100)  BP: 128/83 (12-20-22 @ 13:20) (104/66 - 135/69)  RR:  (18 - 18)  SpO2:  (97% - 100%)  General: AAO x 3, appropriate mood and affect    Visual acuity (sc): 20/400+ PHNI OD, 20/25 OS  Pupils: PERRL OU, +trace RAPD OD  Confrontational Visual Field (CVF): Unable OD  Color Plates: 0/12 OD, 12/12 OS    Pen Light Exam (PLE)  External: Flat OU  Lids/Lashes/Lacrimal Ducts: Flat OU    Sclera/Conjunctiva: W+Q OU  Cornea: Cl OU  Anterior Chamber: D+F OU    Iris: Flat OU  Lens: Cl OU

## 2022-12-20 NOTE — CHART NOTE - NSCHARTNOTEFT_GEN_A_CORE
Source: PCA, Chart review    Medical Course: 53 yo F w/ PMHx of Migraines, HLD, COVID in 12/21, dx'd with MS 6 months ago, hospitalized for recurrent Rt eye optic neuritis s/p IV steroids with minimal improvement now planned for PLEX.    Nutrition Course: Patient was sleeping at the time of visit.  Nutrition information obtained via PCA at bedside, who reports patient has good appetite in hospital. Had ~75% of breakfast intake this morning. Patient's diet continues supplementing with Glucerna 2x daily, Per PCA patient reports ~51-75% intake. Patient currently on MVI for micronutrient coverage. Labs notable with Phos 4.7H recommend consider add No Conc Phos diet or phos binder. No GI distress reported at this time (nausea/vomiting/diarrhea/constipation) currently on bowel regimen. RD to remain available for further nutritional interventions as indicated.            Diet, Regular:   DASH/TLC {Sodium & Cholesterol Restricted} (DASH)  Supplement Feeding Modality:  Oral  Glucerna Shake Cans or Servings Per Day:  1       Frequency:  Two Times a day (12-11-22 @ 12:25)      GI: WDL.     PO intake: 50-75% [x ]   % [x ]      Anthropometrics: Height (cm): 175.3 (12-05)  Weight (kg): 90.7 (12-05)  BMI (kg/m2): 29.5 (12-05)    Edema: None reported at present.   Pressure Injuries: None reported at present.     __________________ Pertinent Medications__________________   MEDICATIONS  (STANDING):  albumin human  5% IVPB 3250 milliLiter(s) IV Intermittent once  artificial  tears Solution 1 Drop(s) Both EYES four times a day  aspirin enteric coated 81 milliGRAM(s) Oral daily  atorvastatin 80 milliGRAM(s) Oral at bedtime  calcium gluconate IVPB 2 Gram(s) IV Intermittent once  chlorhexidine 2% Cloths 1 Application(s) Topical daily  dextrose 5%. 1000 milliLiter(s) (50 mL/Hr) IV Continuous <Continuous>  dextrose 5%. 1000 milliLiter(s) (100 mL/Hr) IV Continuous <Continuous>  dextrose 50% Injectable 25 Gram(s) IV Push once  dextrose 50% Injectable 12.5 Gram(s) IV Push once  dextrose 50% Injectable 25 Gram(s) IV Push once  enoxaparin Injectable 40 milliGRAM(s) SubCutaneous every 12 hours  ergocalciferol 13847 Unit(s) Oral <User Schedule>  fluticasone propionate 50 MICROgram(s)/spray Nasal Spray 1 Spray(s) Both Nostrils two times a day  glucagon  Injectable 1 milliGRAM(s) IntraMuscular once  loratadine 10 milliGRAM(s) Oral daily  multivitamin 1 Tablet(s) Oral daily  nicotine -  14 mG/24Hr(s) Patch 1 Patch Transdermal daily  pantoprazole    Tablet 40 milliGRAM(s) Oral before breakfast  predniSONE   Tablet 50 milliGRAM(s) Oral daily  predniSONE   Tablet   Oral   QUEtiapine 50 milliGRAM(s) Oral <User Schedule>  remdesivir  IVPB   IV Intermittent   remdesivir  IVPB 100 milliGRAM(s) IV Intermittent every 24 hours  senna 2 Tablet(s) Oral at bedtime    MEDICATIONS  (PRN):  acetaminophen     Tablet .. 650 milliGRAM(s) Oral every 6 hours PRN Moderate Pain (4 - 6)  bisacodyl 5 milliGRAM(s) Oral every 12 hours PRN Constipation  cyclobenzaprine 5 milliGRAM(s) Oral three times a day PRN Muscle Spasm  dextrose Oral Gel 15 Gram(s) Oral once PRN Blood Glucose LESS THAN 70 milliGRAM(s)/deciliter  LORazepam     Tablet 0.5 milliGRAM(s) Oral two times a day PRN Anxiety  nortriptyline 10 milliGRAM(s) Oral daily PRN Migraines  polyethylene glycol 3350 17 Gram(s) Oral two times a day PRN Constipation      __________________ Pertinent Labs__________________   12-20 Na143 mmol/L Glu 80 mg/dL K+ 4.1 mmol/L Cr  0.53 mg/dL BUN 7 mg/dL 12-20 Phos 4.7 mg/dL<H> 12-16 Alb 4.1 g/dL 12-06 Chol 186 mg/dL LDL --    HDL 60 mg/dL Trig 67 mg/d        Estimated Needs:   [x ] no change since previous assessment      Previous Nutrition Diagnosis: Altered Nutrition Related Lab Values      Nutrition Diagnosis is [x] ongoing     Recommendations:  1) Recommend continue with current diet, which remains appropriate at this time.   2) Recommend continue to provide Glucerna 2x daily (440kcals, 20g protein) to promote optimal nutrition.   3) Monitor PO intake, Labs, weights, BMs, and skin integrity.   4) RD to remain available for further nutritional interventions as indicated.         Monitoring and Evaluation:      [ x] Tolerance to diet prescription [x ] weights [x ] follow up per protocol  [ ] other:

## 2022-12-21 LAB
ALBUMIN SERPL ELPH-MCNC: 4.4 G/DL — SIGNIFICANT CHANGE UP (ref 3.3–5)
ALP SERPL-CCNC: 25 U/L — LOW (ref 40–120)
ALT FLD-CCNC: 92 U/L — HIGH (ref 4–33)
ANION GAP SERPL CALC-SCNC: 10 MMOL/L — SIGNIFICANT CHANGE UP (ref 7–14)
AST SERPL-CCNC: 47 U/L — HIGH (ref 4–32)
BILIRUB SERPL-MCNC: 0.3 MG/DL — SIGNIFICANT CHANGE UP (ref 0.2–1.2)
BUN SERPL-MCNC: 10 MG/DL — SIGNIFICANT CHANGE UP (ref 7–23)
CALCIUM SERPL-MCNC: 8.7 MG/DL — SIGNIFICANT CHANGE UP (ref 8.4–10.5)
CHLORIDE SERPL-SCNC: 104 MMOL/L — SIGNIFICANT CHANGE UP (ref 98–107)
CO2 SERPL-SCNC: 28 MMOL/L — SIGNIFICANT CHANGE UP (ref 22–31)
CREAT SERPL-MCNC: 0.63 MG/DL — SIGNIFICANT CHANGE UP (ref 0.5–1.3)
EGFR: 107 ML/MIN/1.73M2 — SIGNIFICANT CHANGE UP
GLUCOSE SERPL-MCNC: 86 MG/DL — SIGNIFICANT CHANGE UP (ref 70–99)
HCT VFR BLD CALC: 36.5 % — SIGNIFICANT CHANGE UP (ref 34.5–45)
HGB BLD-MCNC: 11.7 G/DL — SIGNIFICANT CHANGE UP (ref 11.5–15.5)
MAGNESIUM SERPL-MCNC: 2.1 MG/DL — SIGNIFICANT CHANGE UP (ref 1.6–2.6)
MCHC RBC-ENTMCNC: 27.8 PG — SIGNIFICANT CHANGE UP (ref 27–34)
MCHC RBC-ENTMCNC: 32.1 GM/DL — SIGNIFICANT CHANGE UP (ref 32–36)
MCV RBC AUTO: 86.7 FL — SIGNIFICANT CHANGE UP (ref 80–100)
NRBC # BLD: 0 /100 WBCS — SIGNIFICANT CHANGE UP (ref 0–0)
NRBC # FLD: 0 K/UL — SIGNIFICANT CHANGE UP (ref 0–0)
PHOSPHATE SERPL-MCNC: 4.6 MG/DL — HIGH (ref 2.5–4.5)
PLATELET # BLD AUTO: 189 K/UL — SIGNIFICANT CHANGE UP (ref 150–400)
POTASSIUM SERPL-MCNC: 4.3 MMOL/L — SIGNIFICANT CHANGE UP (ref 3.5–5.3)
POTASSIUM SERPL-SCNC: 4.3 MMOL/L — SIGNIFICANT CHANGE UP (ref 3.5–5.3)
PROT SERPL-MCNC: 5.3 G/DL — LOW (ref 6–8.3)
RBC # BLD: 4.21 M/UL — SIGNIFICANT CHANGE UP (ref 3.8–5.2)
RBC # FLD: 17.7 % — HIGH (ref 10.3–14.5)
SODIUM SERPL-SCNC: 142 MMOL/L — SIGNIFICANT CHANGE UP (ref 135–145)
WBC # BLD: 11.18 K/UL — HIGH (ref 3.8–10.5)
WBC # FLD AUTO: 11.18 K/UL — HIGH (ref 3.8–10.5)

## 2022-12-21 PROCEDURE — 99233 SBSQ HOSP IP/OBS HIGH 50: CPT

## 2022-12-21 RX ORDER — CALCIUM GLUCONATE 100 MG/ML
2 VIAL (ML) INTRAVENOUS ONCE
Refills: 0 | Status: COMPLETED | OUTPATIENT
Start: 2022-12-22 | End: 2022-12-22

## 2022-12-21 RX ORDER — ALBUMIN HUMAN 25 %
3250 VIAL (ML) INTRAVENOUS ONCE
Refills: 0 | Status: COMPLETED | OUTPATIENT
Start: 2022-12-22 | End: 2022-12-22

## 2022-12-21 RX ADMIN — Medication 1 DROP(S): at 06:37

## 2022-12-21 RX ADMIN — Medication 1 PATCH: at 06:55

## 2022-12-21 RX ADMIN — Medication 1 DROP(S): at 00:32

## 2022-12-21 RX ADMIN — Medication 1 SPRAY(S): at 18:04

## 2022-12-21 RX ADMIN — ENOXAPARIN SODIUM 40 MILLIGRAM(S): 100 INJECTION SUBCUTANEOUS at 06:37

## 2022-12-21 RX ADMIN — Medication 1 SPRAY(S): at 06:39

## 2022-12-21 RX ADMIN — Medication 1 TABLET(S): at 13:50

## 2022-12-21 RX ADMIN — ERGOCALCIFEROL 50000 UNIT(S): 1.25 CAPSULE ORAL at 08:36

## 2022-12-21 RX ADMIN — PANTOPRAZOLE SODIUM 40 MILLIGRAM(S): 20 TABLET, DELAYED RELEASE ORAL at 06:37

## 2022-12-21 RX ADMIN — Medication 50 MILLIGRAM(S): at 06:37

## 2022-12-21 RX ADMIN — ATORVASTATIN CALCIUM 80 MILLIGRAM(S): 80 TABLET, FILM COATED ORAL at 22:08

## 2022-12-21 RX ADMIN — Medication 1 DROP(S): at 13:49

## 2022-12-21 RX ADMIN — Medication 1 PATCH: at 13:50

## 2022-12-21 RX ADMIN — SENNA PLUS 2 TABLET(S): 8.6 TABLET ORAL at 22:08

## 2022-12-21 RX ADMIN — ENOXAPARIN SODIUM 40 MILLIGRAM(S): 100 INJECTION SUBCUTANEOUS at 18:05

## 2022-12-21 RX ADMIN — CYCLOBENZAPRINE HYDROCHLORIDE 5 MILLIGRAM(S): 10 TABLET, FILM COATED ORAL at 11:12

## 2022-12-21 RX ADMIN — Medication 1 PATCH: at 19:50

## 2022-12-21 RX ADMIN — Medication 1 DROP(S): at 18:04

## 2022-12-21 RX ADMIN — CHLORHEXIDINE GLUCONATE 1 APPLICATION(S): 213 SOLUTION TOPICAL at 13:50

## 2022-12-21 RX ADMIN — REMDESIVIR 200 MILLIGRAM(S): 5 INJECTION INTRAVENOUS at 18:05

## 2022-12-21 RX ADMIN — Medication 1 PATCH: at 13:00

## 2022-12-21 RX ADMIN — Medication 81 MILLIGRAM(S): at 13:50

## 2022-12-21 RX ADMIN — LORATADINE 10 MILLIGRAM(S): 10 TABLET ORAL at 13:50

## 2022-12-21 RX ADMIN — QUETIAPINE FUMARATE 50 MILLIGRAM(S): 200 TABLET, FILM COATED ORAL at 22:09

## 2022-12-21 NOTE — PROGRESS NOTE ADULT - PROBLEM SELECTOR PROBLEM 1
Optic neuritis
Hypoglycemia
Optic neuritis
Optic neuritis
Hypoglycemia
Optic neuritis

## 2022-12-21 NOTE — PROGRESS NOTE ADULT - PROBLEM SELECTOR PLAN 1
Currently with slight pain with EOM and decreased vision resembling previous optic neuritis. Previously treated with 1G/day steroids with outpatient tapering of prednisone  -Neurology C/S: Solumedrol IV 1g x 5 days given significant worsening in vision. Patient with minimal improvement with IV steroids  - MR brain w/ and w/o contrast, MR orbit w/ w/o contrast showing findings of demyelinating disease  - infectious workup including CBC, VBG w/ lactate, CXR, UA, all negative this far  - monitor FS daily, avoid use of concurrent NSAIDS  - carrillo placed on 12/8, completed 5 sessions of PLEX 12/9-12/17-> neuro recommending 2 more sessions. Prior attending d/w blood bank Dr. Shearer, had another session 12/20, last one planned for tomorrow 12/22  - c/w prednisone taper per Neuro, c/w PPI Currently with slight pain with EOM and decreased vision resembling previous optic neuritis. Previously treated with 1G/day steroids with outpatient tapering of prednisone  -Neurology C/S: Solumedrol IV 1g x 5 days given significant worsening in vision. Patient with minimal improvement with IV steroids  - MR brain w/ and w/o contrast, MR orbit w/ w/o contrast showing findings of demyelinating disease  - infectious workup including CBC, VBG w/ lactate, CXR, UA, all negative this far  - Ophthalmology follow up appreciated  - monitor FS daily, avoid use of concurrent NSAIDS  - Mary placed on 12/8, completed 5 sessions of PLEX 12/9-12/17-> neuro recommending 2 more sessions. Prior attending d/w blood bank Dr. Shearer, had another session 12/20, last one planned for tomorrow 12/22  - c/w prednisone taper per Neuro, c/w PPI

## 2022-12-21 NOTE — PROGRESS NOTE ADULT - PROBLEM SELECTOR PLAN 2
PCR positive 12/18, symptoms started 12/17;  likely nosocomial,  currently with normal saturations on room air  Completed 3d remdesivir today (ordered given recent pulse dose steroids and plasmapheresis)  Started on weight based Lovenox for DVT chemo ppx

## 2022-12-21 NOTE — PROGRESS NOTE ADULT - PROBLEM SELECTOR PROBLEM 4
HLD (hyperlipidemia)
HLD (hyperlipidemia)
Multiple sclerosis
HLD (hyperlipidemia)
HLD (hyperlipidemia)
Migraine
HLD (hyperlipidemia)
Multiple sclerosis
HLD (hyperlipidemia)
HLD (hyperlipidemia)
Multiple sclerosis
Multiple sclerosis
Migraine
Migraine

## 2022-12-21 NOTE — PROGRESS NOTE ADULT - PROBLEM SELECTOR PROBLEM 3
Migraine
Multiple sclerosis
Multiple sclerosis
Hypocalcemia
Migraine
Multiple sclerosis
Multiple sclerosis
Migraine
Multiple sclerosis
Migraine
Migraine
Multiple sclerosis
Migraine
Multiple sclerosis
Hypocalcemia
Hypocalcemia

## 2022-12-21 NOTE — PROGRESS NOTE ADULT - SUBJECTIVE AND OBJECTIVE BOX
Patient is a 52y old  Female who presents with a chief complaint of Vision change (20 Dec 2022 17:19)      INTERVAL HPI/OVERNIGHT EVENTS:  Seen by me this afternoon, doing well, had some pain on Left foot that improved with flexeril, tolerating PO. Wants to go home tomorrow after PLEX. Vision is better per patient. Minimal cough.    Review of Systems: 12 point review of systems otherwise negative    MEDICATIONS  (STANDING):  albumin human  5% IVPB 3250 milliLiter(s) IV Intermittent once  artificial  tears Solution 1 Drop(s) Both EYES four times a day  aspirin enteric coated 81 milliGRAM(s) Oral daily  atorvastatin 80 milliGRAM(s) Oral at bedtime  calcium gluconate IVPB 2 Gram(s) IV Intermittent once  chlorhexidine 2% Cloths 1 Application(s) Topical daily  dextrose 5%. 1000 milliLiter(s) (100 mL/Hr) IV Continuous <Continuous>  dextrose 5%. 1000 milliLiter(s) (50 mL/Hr) IV Continuous <Continuous>  dextrose 50% Injectable 25 Gram(s) IV Push once  dextrose 50% Injectable 12.5 Gram(s) IV Push once  dextrose 50% Injectable 25 Gram(s) IV Push once  enoxaparin Injectable 40 milliGRAM(s) SubCutaneous every 12 hours  ergocalciferol 59770 Unit(s) Oral <User Schedule>  fluticasone propionate 50 MICROgram(s)/spray Nasal Spray 1 Spray(s) Both Nostrils two times a day  glucagon  Injectable 1 milliGRAM(s) IntraMuscular once  loratadine 10 milliGRAM(s) Oral daily  multivitamin 1 Tablet(s) Oral daily  nicotine -  14 mG/24Hr(s) Patch 1 Patch Transdermal daily  pantoprazole    Tablet 40 milliGRAM(s) Oral before breakfast  predniSONE   Tablet 50 milliGRAM(s) Oral daily  predniSONE   Tablet   Oral   QUEtiapine 50 milliGRAM(s) Oral <User Schedule>  senna 2 Tablet(s) Oral at bedtime    MEDICATIONS  (PRN):  acetaminophen     Tablet .. 650 milliGRAM(s) Oral every 6 hours PRN Moderate Pain (4 - 6)  bisacodyl 5 milliGRAM(s) Oral every 12 hours PRN Constipation  cyclobenzaprine 5 milliGRAM(s) Oral three times a day PRN Muscle Spasm  dextrose Oral Gel 15 Gram(s) Oral once PRN Blood Glucose LESS THAN 70 milliGRAM(s)/deciliter  LORazepam     Tablet 0.5 milliGRAM(s) Oral two times a day PRN Anxiety  nortriptyline 10 milliGRAM(s) Oral daily PRN Migraines  polyethylene glycol 3350 17 Gram(s) Oral two times a day PRN Constipation      Allergies    No Known Allergies    Intolerances          Vital Signs Last 24 Hrs  T(C): 36.8 (21 Dec 2022 13:46), Max: 36.9 (21 Dec 2022 06:30)  T(F): 98.3 (21 Dec 2022 13:46), Max: 98.5 (21 Dec 2022 06:30)  HR: 97 (21 Dec 2022 13:46) (81 - 97)  BP: 109/72 (21 Dec 2022 13:46) (109/72 - 126/71)  BP(mean): --  RR: 18 (21 Dec 2022 13:46) (18 - 18)  SpO2: 97% (21 Dec 2022 13:46) (97% - 99%)    Parameters below as of 21 Dec 2022 13:46  Patient On (Oxygen Delivery Method): room air      CAPILLARY BLOOD GLUCOSE            Physical Exam:    Daily     Daily   General:  Well appearing, NAD, obese  HEENT:  Nonicteric, PERRLA  CV:  RRR, no murmur, no JVD  Lungs:  CTA B/L, no wheezes, rales, rhonchi  Abdomen:  Soft, non-tender, no distended, positive BS  Extremities:  2+ pulses, no c/c, no edema  Skin:  Warm and dry, no rashes  :  No sanz  Neuro:  AAOx3, non-focal, CN II-XII grossly intact  No Restraints    LABS:                        11.7   11.18 )-----------( 189      ( 21 Dec 2022 06:20 )             36.5     12-21    142  |  104  |  10  ----------------------------<  86  4.3   |  28  |  0.63    Ca    8.7      21 Dec 2022 06:20  Phos  4.6     12-21  Mg     2.10     12-21    TPro  5.3<L>  /  Alb  4.4  /  TBili  0.3  /  DBili  x   /  AST  47<H>  /  ALT  92<H>  /  AlkPhos  25<L>  12-21            RADIOLOGY & ADDITIONAL TESTS:  Reviewed by me

## 2022-12-21 NOTE — PROGRESS NOTE ADULT - PROBLEM SELECTOR PLAN 5
continue senna, miralax, dulcolax
Optic neuritis, otherwise rest of neurologic symptoms stable
continue senna, miralax, dulcolax
continue statin
continue senna, miralax, dulcolax
continue statin
Optic neuritis, otherwise rest of neurologic symptoms stable
continue statin
continue senna, miralax, dulcolax
Optic neuritis, otherwise rest of neurologic symptoms stable
continue statin

## 2022-12-21 NOTE — PROGRESS NOTE ADULT - PROBLEM SELECTOR PLAN 3
Ionized calcium low likely in the setting of PLEX administration  - C/w calcium gluconate q48 hours ( w/ PLEX)   - PTH high and vit D low   - C/ vit d supplementation.
Currently controlled  -Continue trazodone qhs, nortryptline, rizatriptan prn
Optic neuritis, otherwise rest of neurologic symptoms stable
Currently controlled  -Continue trazodone qhs, nortryptline, rizatriptan prn
Currently controlled  -Continue trazodone qhs, nortryptline, rizatriptan prn
Optic neuritis, otherwise rest of neurologic symptoms stable
Currently controlled  -Continue trazodone qhs, nortryptline, rizatriptan prn
Currently controlled  -Continue trazodone qhs, nortryptline, rizatriptan prn
Ionized calcium low likely in the setting of PLEX administration.   - C/w calcium gluconate q48 hours ( w/ PLEX)   - PTH high and vit D low   - C/ vit d supplementation.
Currently controlled  -Continue trazodone qhs, nortryptline, rizatriptan prn    #Cold like symptoms   - Sunday reporting cold symptoms.   - RVP pending
Ionized calcium low likely in the setting of PLEX administration.   - C/w calcium gluconate q48 hours ( w/ PLEX)   - PTH high and vit D low   - C/ vit d supplementation.
Optic neuritis, otherwise rest of neurologic symptoms stable

## 2022-12-21 NOTE — PROGRESS NOTE ADULT - PROBLEM SELECTOR PROBLEM 5
HLD (hyperlipidemia)
HLD (hyperlipidemia)
Constipation
HLD (hyperlipidemia)
Constipation
Multiple sclerosis
Constipation
Constipation
HLD (hyperlipidemia)
HLD (hyperlipidemia)
Constipation
Multiple sclerosis
HLD (hyperlipidemia)
Multiple sclerosis

## 2022-12-21 NOTE — PROGRESS NOTE ADULT - PROBLEM SELECTOR PLAN 6
continue statin
continue senna, miralax, dulcolax
Patient with hypoglycemia episode in the setting of insulin use and decreasing dose of steroids. Resolved.   - D/c fingersticks
continue senna, miralax, dulcolax
continue statin
continue senna, miralax, dulcolax
continue statin
continue senna, miralax, dulcolax

## 2022-12-21 NOTE — PROGRESS NOTE ADULT - PROBLEM SELECTOR PLAN 8
DVT ppx: lovenox, obese dose   Dispo: home, pending MS treatment      Discussed with ACP  Anticipate dc home tomorrow after plasmapheresis/catheter removal
DVT ppx: lovenox, obese dose   Diet: Reg  Dispo: home, pending MS treatment
Patient with hypoglycemia episode in the setting of insulin use and decreasing dose of steroids. Resolved.   - D/c fingersticks

## 2022-12-21 NOTE — PROGRESS NOTE ADULT - PROBLEM SELECTOR PLAN 7
Patient with hypoglycemia episode in the setting of insulin use and decreasing dose of steroids. Resolved.   - D/c fingersticks
continue senna, miralax, dulcolax
Patient with hypoglycemia episode in the setting of insulin use and decreasing dose of steroids. Resolved.   - D/c fingersticks
continue senna, miralax, dulcolax
Patient with hypoglycemia episode in the setting of insulin use and decreasing dose of steroids. Resolved.   - D/c fingersticks
continue senna, miralax, dulcolax
Patient with hypoglycemia episode in the setting of insulin use and decreasing dose of steroids. Resolved.   - D/c fingersticks

## 2022-12-21 NOTE — PROGRESS NOTE ADULT - PROBLEM SELECTOR PROBLEM 6
Constipation
HLD (hyperlipidemia)
Hypoglycemia
Constipation
HLD (hyperlipidemia)
HLD (hyperlipidemia)

## 2022-12-21 NOTE — PROGRESS NOTE ADULT - PROBLEM SELECTOR PROBLEM 2
Migraine
Hypocalcemia
2019 novel coronavirus disease (COVID-19)
Migraine
Migraine
Hypocalcemia
Migraine
Optic neuritis
Hypocalcemia
Optic neuritis
Migraine
Hypocalcemia
2019 novel coronavirus disease (COVID-19)
2019 novel coronavirus disease (COVID-19)

## 2022-12-22 ENCOUNTER — TRANSCRIPTION ENCOUNTER (OUTPATIENT)
Age: 52
End: 2022-12-22

## 2022-12-22 VITALS
DIASTOLIC BLOOD PRESSURE: 71 MMHG | HEART RATE: 79 BPM | RESPIRATION RATE: 18 BRPM | TEMPERATURE: 98 F | SYSTOLIC BLOOD PRESSURE: 114 MMHG | OXYGEN SATURATION: 99 %

## 2022-12-22 LAB
ALBUMIN SERPL ELPH-MCNC: 4.1 G/DL — SIGNIFICANT CHANGE UP (ref 3.3–5)
ALP SERPL-CCNC: 34 U/L — LOW (ref 40–120)
ALT FLD-CCNC: 120 U/L — HIGH (ref 4–33)
ANION GAP SERPL CALC-SCNC: 10 MMOL/L — SIGNIFICANT CHANGE UP (ref 7–14)
APTT BLD: 44.5 SEC — HIGH (ref 27–36.3)
AST SERPL-CCNC: 71 U/L — HIGH (ref 4–32)
BILIRUB SERPL-MCNC: 0.3 MG/DL — SIGNIFICANT CHANGE UP (ref 0.2–1.2)
BLD GP AB SCN SERPL QL: NEGATIVE — SIGNIFICANT CHANGE UP
BUN SERPL-MCNC: 10 MG/DL — SIGNIFICANT CHANGE UP (ref 7–23)
CALCIUM SERPL-MCNC: 8.7 MG/DL — SIGNIFICANT CHANGE UP (ref 8.4–10.5)
CHLORIDE SERPL-SCNC: 107 MMOL/L — SIGNIFICANT CHANGE UP (ref 98–107)
CO2 SERPL-SCNC: 26 MMOL/L — SIGNIFICANT CHANGE UP (ref 22–31)
CREAT SERPL-MCNC: 0.52 MG/DL — SIGNIFICANT CHANGE UP (ref 0.5–1.3)
EGFR: 112 ML/MIN/1.73M2 — SIGNIFICANT CHANGE UP
FIBRINOGEN PPP-MCNC: 195 MG/DL — LOW (ref 200–465)
GLUCOSE SERPL-MCNC: 82 MG/DL — SIGNIFICANT CHANGE UP (ref 70–99)
HCT VFR BLD CALC: 33.6 % — LOW (ref 34.5–45)
HGB BLD-MCNC: 10.7 G/DL — LOW (ref 11.5–15.5)
INR BLD: 1.57 RATIO — HIGH (ref 0.88–1.16)
MAGNESIUM SERPL-MCNC: 2 MG/DL — SIGNIFICANT CHANGE UP (ref 1.6–2.6)
MCHC RBC-ENTMCNC: 27.2 PG — SIGNIFICANT CHANGE UP (ref 27–34)
MCHC RBC-ENTMCNC: 31.8 GM/DL — LOW (ref 32–36)
MCV RBC AUTO: 85.5 FL — SIGNIFICANT CHANGE UP (ref 80–100)
NRBC # BLD: 0 /100 WBCS — SIGNIFICANT CHANGE UP (ref 0–0)
NRBC # FLD: 0 K/UL — SIGNIFICANT CHANGE UP (ref 0–0)
PHOSPHATE SERPL-MCNC: 4.7 MG/DL — HIGH (ref 2.5–4.5)
PLATELET # BLD AUTO: 187 K/UL — SIGNIFICANT CHANGE UP (ref 150–400)
POTASSIUM SERPL-MCNC: 4.2 MMOL/L — SIGNIFICANT CHANGE UP (ref 3.5–5.3)
POTASSIUM SERPL-SCNC: 4.2 MMOL/L — SIGNIFICANT CHANGE UP (ref 3.5–5.3)
PROT SERPL-MCNC: 5.5 G/DL — LOW (ref 6–8.3)
PROTHROM AB SERPL-ACNC: 18.3 SEC — HIGH (ref 10.5–13.4)
RBC # BLD: 3.93 M/UL — SIGNIFICANT CHANGE UP (ref 3.8–5.2)
RBC # FLD: 17.6 % — HIGH (ref 10.3–14.5)
RH IG SCN BLD-IMP: POSITIVE — SIGNIFICANT CHANGE UP
SODIUM SERPL-SCNC: 143 MMOL/L — SIGNIFICANT CHANGE UP (ref 135–145)
WBC # BLD: 10.35 K/UL — SIGNIFICANT CHANGE UP (ref 3.8–10.5)
WBC # FLD AUTO: 10.35 K/UL — SIGNIFICANT CHANGE UP (ref 3.8–10.5)

## 2022-12-22 PROCEDURE — 36514 APHERESIS PLASMA: CPT

## 2022-12-22 PROCEDURE — 99239 HOSP IP/OBS DSCHRG MGMT >30: CPT

## 2022-12-22 RX ORDER — FLUTICASONE PROPIONATE 50 MCG
1 SPRAY, SUSPENSION NASAL
Qty: 1 | Refills: 0
Start: 2022-12-22

## 2022-12-22 RX ORDER — QUETIAPINE FUMARATE 200 MG/1
1 TABLET, FILM COATED ORAL
Qty: 30 | Refills: 0
Start: 2022-12-22 | End: 2023-01-20

## 2022-12-22 RX ORDER — NICOTINE POLACRILEX 2 MG
1 GUM BUCCAL
Qty: 3 | Refills: 0
Start: 2022-12-22 | End: 2022-12-24

## 2022-12-22 RX ORDER — IBUPROFEN 200 MG
1 TABLET ORAL
Qty: 0 | Refills: 0 | DISCHARGE

## 2022-12-22 RX ORDER — RIZATRIPTAN BENZOATE 5 MG/1
10 TABLET ORAL
Qty: 0 | Refills: 0 | DISCHARGE

## 2022-12-22 RX ORDER — TRAZODONE HCL 50 MG
1 TABLET ORAL
Qty: 0 | Refills: 0 | DISCHARGE

## 2022-12-22 RX ADMIN — Medication 1 TABLET(S): at 11:29

## 2022-12-22 RX ADMIN — Medication 1 DROP(S): at 11:29

## 2022-12-22 RX ADMIN — Medication 1625 MILLILITER(S): at 09:00

## 2022-12-22 RX ADMIN — Medication 81 MILLIGRAM(S): at 11:29

## 2022-12-22 RX ADMIN — PANTOPRAZOLE SODIUM 40 MILLIGRAM(S): 20 TABLET, DELAYED RELEASE ORAL at 06:50

## 2022-12-22 RX ADMIN — CHLORHEXIDINE GLUCONATE 1 APPLICATION(S): 213 SOLUTION TOPICAL at 11:28

## 2022-12-22 RX ADMIN — Medication 1 PATCH: at 11:30

## 2022-12-22 RX ADMIN — Medication 1 SPRAY(S): at 17:03

## 2022-12-22 RX ADMIN — Medication 50 GRAM(S): at 08:59

## 2022-12-22 RX ADMIN — Medication 50 MILLIGRAM(S): at 06:49

## 2022-12-22 RX ADMIN — Medication 1 PATCH: at 11:29

## 2022-12-22 RX ADMIN — Medication 1 PATCH: at 07:54

## 2022-12-22 RX ADMIN — LORATADINE 10 MILLIGRAM(S): 10 TABLET ORAL at 11:30

## 2022-12-22 RX ADMIN — Medication 1 PATCH: at 18:09

## 2022-12-22 RX ADMIN — Medication 1 DROP(S): at 06:50

## 2022-12-22 RX ADMIN — Medication 1 SPRAY(S): at 06:48

## 2022-12-22 RX ADMIN — ENOXAPARIN SODIUM 40 MILLIGRAM(S): 100 INJECTION SUBCUTANEOUS at 17:02

## 2022-12-22 RX ADMIN — ENOXAPARIN SODIUM 40 MILLIGRAM(S): 100 INJECTION SUBCUTANEOUS at 06:49

## 2022-12-22 RX ADMIN — Medication 0.5 MILLIGRAM(S): at 08:52

## 2022-12-22 RX ADMIN — Medication 1 DROP(S): at 17:03

## 2022-12-22 NOTE — CHART NOTE - NSCHARTNOTESELECT_GEN_ALL_CORE
Event Note
Follow Up/Nutrition Services
TPE/Blood Bank
TPE/Blood Bank
Therapeutic Apheresis Attending Physician Note/Blood Bank
Event Note
Event Note
TPE/Blood Bank
TPE/Blood Bank

## 2022-12-22 NOTE — PROGRESS NOTE ADULT - PROVIDER SPECIALTY LIST ADULT
Neurology
Ophthalmology
Hospitalist
Neurology
Neurology
Ophthalmology
Hospitalist
Neurology
Neurology
Ophthalmology
Hospitalist
Hospitalist
Transfusion Medicine
Hospitalist

## 2022-12-22 NOTE — PROGRESS NOTE ADULT - SUBJECTIVE AND OBJECTIVE BOX
Patient is a 52y old  Female who presents with a chief complaint of Vision change (21 Dec 2022 19:10) #7th apheresis procedure, and the last, was done today, without complications.     Vital Signs Last 24 Hrs  T(C): 36.6 (22 Dec 2022 06:45), Max: 36.8 (21 Dec 2022 13:46)  T(F): 97.9 (22 Dec 2022 06:45), Max: 98.3 (21 Dec 2022 13:46)  HR: 77 (22 Dec 2022 06:45) (77 - 97)  BP: 117/76 (22 Dec 2022 06:45) (109/72 - 117/80)  BP(mean): --  RR: 17 (22 Dec 2022 06:45) (17 - 18)  SpO2: 99% (22 Dec 2022 06:45) (97% - 99%)    Parameters below as of 22 Dec 2022 06:45  Patient On (Oxygen Delivery Method): room air      Allergies    No Known Allergies    Intolerances      PAST MEDICAL & SURGICAL HISTORY:  Migraine      Multiple sclerosis      Optic neuritis      HLD (hyperlipidemia)      S/P gastric bypass  2007      S/P cholecystectomy                        10.7   10.35 )-----------( 187      ( 22 Dec 2022 06:40 )             33.6       12-22    143  |  107  |  10  ----------------------------<  82  4.2   |  26  |  0.52    Ca    8.7      22 Dec 2022 06:40  Phos  4.7     12-22  Mg     2.00     12-22    TPro  5.5<L>  /  Alb  4.1  /  TBili  0.3  /  DBili  x   /  AST  71<H>  /  ALT  120<H>  /  AlkPhos  34<L>  12-22

## 2022-12-22 NOTE — DISCHARGE NOTE NURSING/CASE MANAGEMENT/SOCIAL WORK - PATIENT PORTAL LINK FT
You can access the FollowMyHealth Patient Portal offered by [facility] by registering at the following website: [email or web address]. By joining [employer]’s FollowMyHealth portal, you will also be able to view your health information using other applications (apps) compatible with our system.

## 2022-12-22 NOTE — DISCHARGE NOTE NURSING/CASE MANAGEMENT/SOCIAL WORK - NSDCPEFALRISK_GEN_ALL_CORE
For information on Fall & Injury Prevention, visit: [email or web address] OR  [email or web address] OR  https://www.cdc.gov/steadi/patient.html

## 2022-12-22 NOTE — CHART NOTE - NSCHARTNOTEFT_GEN_A_CORE
s/p shiley removal form  right internal jugular vein. pressure applied for 10 minutes. no signs of bleeding or swelling noted. pressure dressing placed for 1 hr. site rechecked. no signs of bleeding. dressing in place. pt offers no complains.

## 2022-12-22 NOTE — PROGRESS NOTE ADULT - REASON FOR ADMISSION
Vision change

## 2022-12-22 NOTE — PROGRESS NOTE ADULT - ASSESSMENT
Patient is a 52y old  Female who presents with a chief complaint of Vision change (21 Dec 2022 19:10) #7th apheresis procedure, and the last, was done today, without complications.

## 2022-12-27 PROBLEM — H46.9 UNSPECIFIED OPTIC NEURITIS: Chronic | Status: ACTIVE | Noted: 2022-12-05

## 2022-12-27 PROBLEM — G35 MULTIPLE SCLEROSIS: Chronic | Status: ACTIVE | Noted: 2022-12-05

## 2022-12-27 PROBLEM — E78.5 HYPERLIPIDEMIA, UNSPECIFIED: Chronic | Status: ACTIVE | Noted: 2022-12-05

## 2023-01-11 ENCOUNTER — NON-APPOINTMENT (OUTPATIENT)
Age: 53
End: 2023-01-11

## 2023-02-08 ENCOUNTER — APPOINTMENT (OUTPATIENT)
Dept: OPHTHALMOLOGY | Facility: CLINIC | Age: 53
End: 2023-02-08
Payer: COMMERCIAL

## 2023-02-08 ENCOUNTER — NON-APPOINTMENT (OUTPATIENT)
Age: 53
End: 2023-02-08

## 2023-02-08 PROCEDURE — 92133 CPTRZD OPH DX IMG PST SGM ON: CPT

## 2023-02-08 PROCEDURE — 99214 OFFICE O/P EST MOD 30 MIN: CPT

## 2023-02-08 PROCEDURE — 92083 EXTENDED VISUAL FIELD XM: CPT

## 2023-02-10 ENCOUNTER — LABORATORY RESULT (OUTPATIENT)
Age: 53
End: 2023-02-10

## 2023-02-10 ENCOUNTER — APPOINTMENT (OUTPATIENT)
Dept: NEUROLOGY | Facility: CLINIC | Age: 53
End: 2023-02-10
Payer: COMMERCIAL

## 2023-02-10 DIAGNOSIS — H46.9 UNSPECIFIED OPTIC NEURITIS: ICD-10-CM

## 2023-02-10 PROCEDURE — 99215 OFFICE O/P EST HI 40 MIN: CPT

## 2023-02-10 RX ORDER — ELETRIPTAN HYDROBROMIDE 40 MG/1
40 TABLET, FILM COATED ORAL
Qty: 12 | Refills: 6 | Status: ACTIVE | COMMUNITY
Start: 2023-02-10 | End: 1900-01-01

## 2023-02-10 RX ORDER — RIZATRIPTAN BENZOATE 10 MG/1
10 TABLET ORAL
Qty: 15 | Refills: 6 | Status: DISCONTINUED | COMMUNITY
Start: 2022-09-01 | End: 2023-02-10

## 2023-02-10 RX ORDER — METHYLPREDNISOLONE 4 MG/1
4 TABLET ORAL
Qty: 1 | Refills: 0 | Status: DISCONTINUED | COMMUNITY
Start: 2022-10-14 | End: 2023-02-10

## 2023-02-10 NOTE — DATA REVIEWED
[de-identified] : 12/7/2022- MRI imaging or orbits, brain, C/T spine w/w/o contrast obtained in hospital 12/7/2022 and were all stable, no definite optic nerve signal abnormality/enh noted.\par \par MRI orbits 7/14/2022 confirmed evidence of right intraorbital segment optic neuritis/perineuritis. \par No dedicated brain MRI initially obtained. \par MRI C/T 7/14/2022 spine with evidence of dorsal cord lesion at T9 with subtle contrast enhancement. Normal cervical spine. \par \par \par A dedicated T2 FLAIR MRI w/wo contrast was performed 7/18/2022 showing Abnormal T2 prolongation in the periventricular white matter region with esay finger appearance and + T1 dark holes. No infratentorial lesions.

## 2023-02-10 NOTE — HISTORY OF PRESENT ILLNESS
[FreeTextEntry1] : INTERIM HX 02/10/2023: s/p Ocrevus split dosing 11/9 and 11/23. Admitted to [facility] 12/5-12/22 for worsening vision loss in right eye, "complete cloud over right eye" + eye pain. VA 20/400 on admission and also reportedly worsened to 20/800 OD, color vision 0/12 OD. Treated with IVMP and 7 sessions of PLEX with some improvement in symptoms. MRI imaging or orbits, brain, C/T spine w/w/o contrast obtained in hospital 12/7/2022 and were all stable, no definite optic nerve signal abnormality/enh noted.  Followed up with Dr. Baum 2/8/2023, VA 20/100 OD and 20/30 OS, trace APD OD, no disc edema, nasal hemifield defect OD, OCT normal.\par Pt reports her vision has improved 70% better. No new symptom. Still gets intermittent blurry vision in left eye - ? dry eyes vs migraine related. Still gets migraines 2x /week. 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 She has 3 kids. \par Stopped smoking. \par \par INTERIM HX 10/14/2022: PT returns for a follow up, reports new symptoms. Since our visit last week, she has been reporting daily migraine headaches, neck muscle soreness, body soreness, and especially left knee pain that is affecting her gait. She denies any significant weakness or new sensory symptoms. She has been doing yoga exercises.  \par Of note, PT got a call from insurance company and her Ocrevus has been approved. \par \par INTERIM HX 10/05/2022: Trouble sleeping at night. Migraines are better, once a week. Maxalt as needed and nortriptyline QD. Saw Dr. Baum 9/13/2022 and VA 20/60 OD and 20/30 OS, improvement in visual field on the right. No brand new symptoms. Occasional sees flashes of light in right eye and blue/white spots- this lasted 10 seconds at a time. Still pending Ocrevus approval. She is in good spirits. \par \par INTERIM HX 09/01/2022: Followed up with Dr Baum 8/12/2022 and VA improved from 20/200 OD to 20/50 OD, but with persistent superior visual field loss. She completed the 2nd course of steroids as well. Migraines are still daily. Sumatriptan not working. No brand new symptoms. \par \par Pre-DMT labs (date)- \par CBC w/ diff WNL, \par LFT normal\par Bun and Cr normal, \par Quant TB neg,\par VZV IgG + and IgM + (index 1.14), -> ? past infection.  Denies any recent shingles infection or chicken pox. No recent known exposures. \par Hep B surface Ab -, \par HEp B surface antigen and cord antibody negative.\par JCV ab positive 2.11\par Vitamin D 25.6\par -------------------------------------------------------\par \par HPI (initial visit Aug 04, 2022)- Isabel Dangelo is a 51 year old female with pmhx of migraines and HLD, admitted to [facility] 7/14/2022-7/18/2022 for 4 day hx of progressive vision loss in right eye (predominately involving superior quadrant) and associated pain with eye movements. Her exam was notable for RAPD OD,  superior visual field cut OD with red color desaturation OD. Of note, she also reportedly experienced 1 week of bilateral foot numbness in 5/2022, that then resolved. She denies any other significant episodic neurological symptoms. NO recent vaccination. NO recent infections. Ophthalmology and neurology were consulted. \par \par MRI orbits 7/14/2022 confirmed evidence of right intraorbital segment optic neuritis/perineuritis. \par No dedicated brain MRI initially obtained. \par MRI C/T 7/14/2022 spine with evidence of dorsal cord lesion at T9 with subtle contrast enhancement. Normal cervical spine. \par \par Pt admitted for management of Optic Neuritis and further workup. Patient received 5 days of IV Solumedrol 1000mg (7/14-7/18) with symptom improvement and and was d/c with steroid taper for 10 days. \par \par A dedicated T2 FLAIR MRI w/wo contrast was performed 7/18/2022 showing Abnormal T2 prolongation in the periventricular white matter region with seay finger appearance and + T1 black holes. No infratentorial lesions. \par \par Labs:- ACE neg, SSA/SSB neg, Lyme neg, Syphilis neg, serum NMO and MOG ab neg. DsDNA neg. \par \par Interim Hx  - She reports since the hospitalization the toes on the left have been constantly numb.Also experiencing cramps in legs. Seen by Dr. Baum 7/25/2022- VA 20/200 OD and 20/70 OS, 2+ RAPD, superior altitudinal defect OD. She completed prednisone taper 7/29/2022. Vision is better- 30% better. \par She has a hx of migraine headaches since 2015 - occipital and radiates to top of head. + Phonophobia, + Photophobia. + N/V. Headaches are frequent- almost daily. lasted 3-4 hours. Always at night. Headaches were better on steroids. Ibuprofen helps. She works at a [employer].\par \par

## 2023-02-10 NOTE — PHYSICAL EXAM
[FreeTextEntry1] : VA 20/70 OD and 20/40 OS\par Mild RAPD OD\par Nasal VF constriction OD\par No facial symmetry\par Motor strength 5/5 UE and LE\par

## 2023-02-10 NOTE — ASSESSMENT
[FreeTextEntry1] : Assessment/Plan:\par  52 year old female w/ relapsing multiple sclerosis (dx'd 7/2022), on Ocrevus since 11/2022. \par \par Relapses:\par  5/2022- numbness b/l feet (likely thoracic myelitis)\par  7/14/2022: R ON\par 12/5/2022- Relapse of R ON\par \par Yocasta had a relapse with recurrent optic neuritis OD early Dec 2022, s/p IVMP and PLEX with improvement in vision (though not 100%)- this occurred 2 weeks after second split dose of Ocrelizumab. I suspect the relapse occurred as the Ocrevus did not reach full efficacy (this is not a treatment failure). I recommend she continue Ocrevus as scheduled. She continues to complain of intermittent blurry vision OS- dry eyes vs migraine related. She continues to experience migraines 2-4 x /week. \par \par Plan: \par 1. Diagnostic Plan/Imaging: \par - Plan to repeat MRI brain w/w/o in 3 month following start of DMT (due 3/2023)- ordered.\par - Although I suspect her vision loss OD was 2/2 optic neuritis, with her hx of blood clots and miscarriages, I would like to rule out anti phospholipid syndrome (labs ordered)\par \par 2. Disease Modifying therapy plan:\par Continue Ocrevus infusion 600 mg [id number]\par Ocrevus labs every 6 months\par \par 3. Symptomatic therapy plan:\par () Muscle spasms- Stretching exercises. Flexeril as needed\par () Vitamin D3 and B12 supplementations. \par \par 4. Migraine headaches:\par Freq: 2 -4  x /week\par [] Will prescribe emgality every 4 weeks SQ injection. Reviewed side effects.\par [] Continue nortriptyline 20 mg qhs for preventative therapy. Reviewed side effects. Side effects to Topamax (reaction: tongue swelling)\par [] Switch rizatriptan to eletriptan for abortive therapy. Reviewed side effects.\par [] Continue magnesium, riboflavin and coenzyme Q10 supplementations\par \par \par Return to clinic 3 months\par \par \par The above plan was discussed with YOCASTA PATRICK in great detail. YOCASTA PATRICK verbalized understanding and agrees with plan as detailed above. Patient was provided education and counselling on current diagnosis/symptoms, diagnostic work up, treatment options and potential side effects of any prescribed therapy/therapies. She was advised to call our clinic at [phone] for any new or worsening symptoms, or with any questions or concerns. In case of acute onset of neurological symptoms or worsening presentation, patient was advised to present to nearest emergency room for further evaluation. YOCASTA PATRICK expressed understanding and all her questions/concerns were addressed.\par \saira Trujillo M.D.

## 2023-02-13 LAB
ANA PAT FLD IF-IMP: ABNORMAL
ANA SER IF-ACNC: ABNORMAL
ANCA AB SER-IMP: ABNORMAL
C-ANCA SER-ACNC: NEGATIVE
CRP SERPL-MCNC: <3 MG/L
ENA RNP AB SER IA-ACNC: <0.2 AL
ENA SM AB SER IA-ACNC: <0.2 AL
ENA SS-A AB SER IA-ACNC: <0.2 AL
ENA SS-B AB SER IA-ACNC: <0.2 AL
ERYTHROCYTE [SEDIMENTATION RATE] IN BLOOD BY WESTERGREN METHOD: 11 MM/HR
P-ANCA TITR SER IF: NEGATIVE

## 2023-02-13 RX ORDER — GALCANEZUMAB 120 MG/ML
120 INJECTION, SOLUTION SUBCUTANEOUS
Qty: 3 | Refills: 3 | Status: ACTIVE | COMMUNITY
Start: 2023-02-10 | End: 1900-01-01

## 2023-02-13 RX ORDER — GALCANEZUMAB 120 MG/ML
120 INJECTION, SOLUTION SUBCUTANEOUS
Qty: 2 | Refills: 0 | Status: ACTIVE | COMMUNITY
Start: 2023-02-10 | End: 1900-01-01

## 2023-02-15 DIAGNOSIS — R76.8 OTHER SPECIFIED ABNORMAL IMMUNOLOGICAL FINDINGS IN SERUM: ICD-10-CM

## 2023-02-15 LAB
B2 GLYCOPROT1 IGG SER-ACNC: <5 SGU
B2 GLYCOPROT1 IGM SER-ACNC: 8.9 SMU
CARDIOLIPIN IGM SER-MCNC: 60.4 MPL
DSDNA AB SER-ACNC: <12 IU/ML

## 2023-02-16 ENCOUNTER — RESULT REVIEW (OUTPATIENT)
Age: 53
End: 2023-02-16

## 2023-02-16 ENCOUNTER — LABORATORY RESULT (OUTPATIENT)
Age: 53
End: 2023-02-16

## 2023-02-16 ENCOUNTER — APPOINTMENT (OUTPATIENT)
Dept: RHEUMATOLOGY | Facility: CLINIC | Age: 53
End: 2023-02-16
Payer: COMMERCIAL

## 2023-02-16 VITALS
SYSTOLIC BLOOD PRESSURE: 114 MMHG | OXYGEN SATURATION: 98 % | RESPIRATION RATE: 16 BRPM | HEART RATE: 84 BPM | BODY MASS INDEX: 34.71 KG/M2 | DIASTOLIC BLOOD PRESSURE: 83 MMHG | TEMPERATURE: 98.1 F | WEIGHT: 229 LBS | HEIGHT: 68 IN

## 2023-02-16 DIAGNOSIS — M25.50 PAIN IN UNSPECIFIED JOINT: ICD-10-CM

## 2023-02-16 LAB — CARDIOLIPIN IGM SER-MCNC: <5 GPL

## 2023-02-16 PROCEDURE — 99205 OFFICE O/P NEW HI 60 MIN: CPT

## 2023-03-06 ENCOUNTER — EMERGENCY (EMERGENCY)
Facility: HOSPITAL | Age: 53
LOS: 1 days | Discharge: HOME | End: 2023-03-06
Attending: EMERGENCY MEDICINE | Admitting: EMERGENCY MEDICINE
Payer: COMMERCIAL

## 2023-03-06 VITALS
RESPIRATION RATE: 16 BRPM | SYSTOLIC BLOOD PRESSURE: 124 MMHG | HEART RATE: 97 BPM | TEMPERATURE: 98 F | OXYGEN SATURATION: 100 % | DIASTOLIC BLOOD PRESSURE: 75 MMHG

## 2023-03-06 VITALS
RESPIRATION RATE: 16 BRPM | OXYGEN SATURATION: 100 % | HEART RATE: 89 BPM | DIASTOLIC BLOOD PRESSURE: 73 MMHG | SYSTOLIC BLOOD PRESSURE: 105 MMHG | TEMPERATURE: 99 F

## 2023-03-06 DIAGNOSIS — Z90.49 ACQUIRED ABSENCE OF OTHER SPECIFIED PARTS OF DIGESTIVE TRACT: Chronic | ICD-10-CM

## 2023-03-06 DIAGNOSIS — Z98.84 BARIATRIC SURGERY STATUS: Chronic | ICD-10-CM

## 2023-03-06 PROCEDURE — 99284 EMERGENCY DEPT VISIT MOD MDM: CPT

## 2023-03-06 RX ORDER — DIAZEPAM 5 MG
1 TABLET ORAL
Qty: 5 | Refills: 0
Start: 2023-03-06 | End: 2023-03-10

## 2023-03-06 RX ORDER — ACETAMINOPHEN 500 MG
650 TABLET ORAL ONCE
Refills: 0 | Status: COMPLETED | OUTPATIENT
Start: 2023-03-06 | End: 2023-03-06

## 2023-03-06 RX ADMIN — Medication 650 MILLIGRAM(S): at 09:45

## 2023-03-06 NOTE — ED ADULT TRIAGE NOTE - CHIEF COMPLAINT QUOTE
Pt. c/o lower back pain radiating into right groin and down leg x 5 days. Denies dysuria, hematuria, n/v or fevers. Pmhx: MS

## 2023-03-06 NOTE — ED PROVIDER NOTE - NSFOLLOWUPINSTRUCTIONS_ED_ALL_ED_FT
Advance activity as tolerated.  Continue all previously prescribed medications as directed unless otherwise instructed.  You may take 5mg of valium each night for the next five nights with pain control. Follow up with your primary care physician in 48-72 hours- bring copies of your results.  Return to the ER for worsening or persistent symptoms, and/or ANY NEW OR CONCERNING SYMPTOMS. If you have issues obtaining follow up, please call: [phone] to obtain a doctor or specialist who takes your insurance in your area.  You may call [phone] to make an appointment with the internal medicine clinic.

## 2023-03-06 NOTE — ED ADULT NURSE NOTE - HOW OFTEN DO YOU HAVE A DRINK CONTAINING ALCOHOL?
[facility]), Obstetrics and Gynecology  [address] [place] [address]  Phone: [phone]  Fax: [phone]

## 2023-03-06 NOTE — ED PROVIDER NOTE - OBJECTIVE STATEMENT
53 yo F with PMHx of MS here with right groin 51 yo F with PMHx of MS here with right groin pain with radiation down her right leg x 5 days. The pt says that she has had this pain before but it is now exacerbated. Denies hx of trauma. Denies f/c, paresthesias, weakness, n/v, abdominal pain, hematuria.

## 2023-03-06 NOTE — ED PROVIDER NOTE - PATIENT PORTAL LINK FT
You can access the FollowMyHealth Patient Portal offered by [facility] by registering at the following website: [email or web address]. By joining [facility]’s FollowMyHealth portal, you will also be able to view your health information using other applications (apps) compatible with our system.

## 2023-03-06 NOTE — ED PROVIDER NOTE - CLINICAL SUMMARY MEDICAL DECISION MAKING FREE TEXT BOX
WIll provide pain relief in the form of tylenol and muscle relaxant as an outpatient. PE is wnl; Neuro intact.

## 2023-03-06 NOTE — ED PROVIDER NOTE - DISPOSITION TYPE
DISCHARGE SUMMARY  To be completed within 30 days of last clinical contact      Melyssa Zheng : 1937 MRN: [id number]      Date of Intake: 22 Date of Last Session: 2023    Chief Complaint:\"I lost two sons, a , two sister in laws, my brother, granddaughter, and two additional sons over the years.\"    Admission Diagnosis:depression with anxiety    D/C Diagnosis Codes: Depression with anxiety  (primary encounter diagnosis)    The above named patient was discharged from my care on 2023 for the following reason(s): Patient Has Completed Treatment (mutual agreement)    Treatment Provided: (check all that apply) Individual    Summary of Patient Progress Toward Goals in the Treatment Plan: Patient came to therapy to process the grief over the loss of her  and sons. At the second session she relayed acceptance of this loss with continued sadness but the ability to cope and manage.     Current Outpatient Medications   Medication Sig Dispense Refill   • apixaBAN (Eliquis) 5 MG Tab Take 1 tablet by mouth every 12 hours. 180 tablet 3   • buPROPion XL (WELLBUTRIN XL) 300 MG 24 hr tablet Take 1 tablet by mouth daily. 90 tablet 1   • furosemide (LASIX) 40 MG tablet Take one tablet daily in the morning. 90 tablet 1   • clindamycin (CLEOCIN) 150 MG capsule TAKE 4 CAPSULES BY MOUTH ONE HOUR BEFORE PROCEDURE 4 capsule 0   • levothyroxine 50 MCG tablet Take 1 tablet daily. 90 tablet 3   • metFORMIN (GLUCOPHAGE-XR) 500 MG 24 hr tablet Take 1 tablet by mouth daily (with breakfast). 90 tablet 3   • metoPROLOL succinate (Toprol XL) 50 MG 24 hr tablet Take 3 tablets by mouth daily. 270 tablet 1   • omeprazole (PriLOSEC) 40 MG capsule Take 1 capsule by mouth daily. 90 capsule 3   • potassium chloride (KLOR-CON) 10 MEQ ER tablet Take one tablet daily. 90 tablet 3   • simvastatin (ZOCOR) 20 MG tablet Take 1 tablet by mouth nightly. 90 tablet 3   • terbinafine (LamISIL AT) 1 % cream Apply 1 application  topically 2 times daily. 42 g 0   • tolterodine (DETROL LA) 4 MG 24 hr capsule Take 1 capsule by mouth daily. 90 capsule 3   • tiotropium-olodaterol (Stiolto Respimat) 2.5-2.5 MCG/ACT inhaler Inhale 2 puffs into the lungs daily. Take daily in AM 1 each 11   • amLODIPine (NORVASC) 10 MG tablet Take one tablet daily. 90 tablet 1   • tramadol-acetaminophen (ULTRACET) 37.5-325 MG per tablet Take 1-2 tablets by mouth every 6 hours as needed for Pain (mild pain). 20 tablet 0   • modafinil (PROVIGIL) 100 MG tablet Take 1.5 tablets by mouth daily. 45 tablet 0   • venlafaxine XR (Effexor XR) 75 MG 24 hr capsule Take 3 capsules by mouth daily. 270 capsule 1   • albuterol 108 (90 Base) MCG/ACT inhaler Inhale 2 puffs into the lungs every 4 hours as needed for Shortness of Breath, Wheezing or Other (cough). 3 each 1   • nystatin-triamcinolone (MYCOLOG II) 828181-5.1 UNIT/GM-% cream Apply thin film to affected area 2-3 times daily. 60 g 3   • Cholecalciferol (VITAMIN D) 400 UNITS tablet Take 400 Units by mouth daily.       No current facility-administered medications for this visit.       Discharge Recommendations: (Include names and addresses of facilities, persons or programs the patient was referred to following discharge.) continue to engage in skills to manage grief    Remaining Discharge Needs: (Include treatment issues not addressed.) return to therapy if needed      Mali Wiggins PSYD Date: 1/11/2023   Time: 1:44 PM   DISCHARGE

## 2023-03-08 ENCOUNTER — NON-APPOINTMENT (OUTPATIENT)
Age: 53
End: 2023-03-08

## 2023-03-10 ENCOUNTER — APPOINTMENT (OUTPATIENT)
Dept: ORTHOPEDIC SURGERY | Facility: CLINIC | Age: 53
End: 2023-03-10
Payer: COMMERCIAL

## 2023-03-10 VITALS
HEART RATE: 95 BPM | OXYGEN SATURATION: 98 % | TEMPERATURE: 97.3 F | WEIGHT: 230 LBS | BODY MASS INDEX: 34.07 KG/M2 | HEIGHT: 69 IN

## 2023-03-10 DIAGNOSIS — M54.50 LOW BACK PAIN, UNSPECIFIED: ICD-10-CM

## 2023-03-10 PROCEDURE — 99203 OFFICE O/P NEW LOW 30 MIN: CPT

## 2023-03-10 PROCEDURE — 72100 X-RAY EXAM L-S SPINE 2/3 VWS: CPT

## 2023-03-10 NOTE — PHYSICAL EXAM
[Antalgic] : antalgic [de-identified] : Examination of the lumbar spine reveals no midline tenderness palpation, step-offs, or skin lesions. Decreased range of motion with respect to flexion, extension, lateral bending, and rotation. No tenderness to palpation of the sciatic notch. No tenderness palpation of the bilateral greater trochanters. No pain with passive internal/external rotation of the hips. No instability of bilateral lower extremities.  Negative STORM. Negative straight leg raise bilaterally. No bowstring. Negative femoral stretch. 5 out of 5 iliopsoas, hip abductors, hips adductors, quadriceps, hamstrings, gastrocsoleus, tibialis anterior, extensor hallucis longus, peroneals. Grossly intact sensation to light touch bilateral lower extremities. 1+ patellar and Achilles reflexes. Downgoing Babinski. No clonus. Intact proprioception. Palpable pulses. No skin lesion and no edema on the right and left lower extremities. [de-identified] : AP lateral lumbar x-rays with some mild spondylosis without instability or aggressive lesions

## 2023-03-10 NOTE — HISTORY OF PRESENT ILLNESS
[de-identified] : 52 year old female with a history of MS presents for initial evaluation of low back pain x 3 weeks. No injury. She complains of constant sharp and stabbing right sided low back pain which radiates down the right leg to the foot. She has associated N/T to the right leg. She went to the ER 3/6/23, had no images done. She was prescribed Diazepam which she is taking at nighttime. She ambulates with the assistance of a cane after she was discharged from the ER. Denies prior injury.

## 2023-03-10 NOTE — DISCUSSION/SUMMARY
[de-identified] : Given her increasing radicular complaints and history of MS we will obtain a lumbar MRI.  She also try muscle relaxant and a little physical therapy.  Follow-up afterwards.

## 2023-03-12 NOTE — PHYSICAL EXAM
[General Appearance - In No Acute Distress] : in no acute distress [General Appearance - Alert] : alert [General Appearance - Well Nourished] : well nourished [Sclera] : the sclera and conjunctiva were normal [Nasal Cavity] : the nasal mucosa and septum were normal [Respiration, Rhythm And Depth] : normal respiratory rhythm and effort [Auscultation Breath Sounds / Voice Sounds] : lungs were clear to auscultation bilaterally [Heart Sounds] : normal S1 and S2 [Heart Sounds Gallop] : no gallops [Murmurs] : no murmurs [Musculoskeletal - Swelling] : no joint swelling seen [] : no rash [Sensation] : the sensory exam was normal to light touch and pinprick [Motor Exam] : the motor exam was normal [Oriented To Time, Place, And Person] : oriented to person, place, and time

## 2023-03-15 ENCOUNTER — APPOINTMENT (OUTPATIENT)
Dept: RADIOLOGY | Facility: CLINIC | Age: 53
End: 2023-03-15
Payer: COMMERCIAL

## 2023-03-15 ENCOUNTER — OUTPATIENT (OUTPATIENT)
Dept: OUTPATIENT SERVICES | Facility: HOSPITAL | Age: 53
LOS: 1 days | End: 2023-03-15
Payer: COMMERCIAL

## 2023-03-15 ENCOUNTER — RESULT REVIEW (OUTPATIENT)
Age: 53
End: 2023-03-15

## 2023-03-15 DIAGNOSIS — M25.50 PAIN IN UNSPECIFIED JOINT: ICD-10-CM

## 2023-03-15 DIAGNOSIS — Z90.49 ACQUIRED ABSENCE OF OTHER SPECIFIED PARTS OF DIGESTIVE TRACT: Chronic | ICD-10-CM

## 2023-03-15 DIAGNOSIS — Z98.84 BARIATRIC SURGERY STATUS: Chronic | ICD-10-CM

## 2023-03-15 PROCEDURE — 73562 X-RAY EXAM OF KNEE 3: CPT | Mod: 26,50

## 2023-03-15 PROCEDURE — 73522 X-RAY EXAM HIPS BI 3-4 VIEWS: CPT | Mod: 26

## 2023-03-15 PROCEDURE — 73522 X-RAY EXAM HIPS BI 3-4 VIEWS: CPT

## 2023-03-15 PROCEDURE — 73630 X-RAY EXAM OF FOOT: CPT | Mod: 26,50

## 2023-03-15 PROCEDURE — 73030 X-RAY EXAM OF SHOULDER: CPT | Mod: 26,50

## 2023-03-15 PROCEDURE — 73630 X-RAY EXAM OF FOOT: CPT

## 2023-03-15 PROCEDURE — 73030 X-RAY EXAM OF SHOULDER: CPT

## 2023-03-15 PROCEDURE — 73562 X-RAY EXAM OF KNEE 3: CPT

## 2023-03-17 NOTE — ED PROVIDER NOTE - NS ED ATTENDING STATEMENT MOD
Mary Sanchez is a 81 y.o. female presenting for/with:    Chief Complaint   Patient presents with    Hospital Follow Up     [facility] 3/5/23 Sepsis/Ureteral Stone       There were no vitals taken for this visit. Pain Scale: /10  Pain Location:     1. \"Have you been to the ER, urgent care clinic since your last visit? Hospitalized since your last visit? \" Yes [facility]    2. \"Have you seen or consulted any other health care providers outside of the [address] [place] [address] [place] since your last visit? \" Yes Where: [facility]      3. For patients aged 39-70: Has the patient had a colonoscopy / FIT/ Cologuard? NA - based on age      If the patient is female:    4. For patients aged 41-77: Has the patient had a mammogram within the past 2 years? NA - based on age or sex      11. For patients aged 21-65: Has the patient had a pap smear? NA - based on age or sex      Symptom review:  Learning Assessment 6/29/2022   PRIMARY LEARNER Patient   PRIMARY LANGUAGE ENGLISH   LEARNER PREFERENCE PRIMARY DEMONSTRATION   ANSWERED BY self   RELATIONSHIP SELF     Fall Risk Assessment, last 12 mths 3/17/2023   Able to walk? No   Fall in past 12 months? 1   Do you feel unsteady? 1   Are you worried about falling -   Is TUG test greater than 12 seconds? -   Is the gait abnormal? -   Number of falls in past 12 months 2   Fall with injury? 0       3 most recent PHQ Screens 3/17/2023   PHQ Not Done -   Little interest or pleasure in doing things Not at all   Feeling down, depressed, irritable, or hopeless Not at all   Total Score PHQ 2 0     Abuse Screening Questionnaire 3/17/2023   Do you ever feel afraid of your partner? N   Are you in a relationship with someone who physically or mentally threatens you? N   Is it safe for you to go home?  Y       ADL Assessment 3/17/2023   Feeding yourself No Help Needed   Getting from bed to chair No Help Needed   Getting dressed No Help Needed   Bathing or showering No Help Needed   Walk across the room (includes cane/walker) No Help Needed   Using the telphone No Help Needed   Taking your medications Help Needed   Preparing meals Help Needed   Managing money (expenses/bills) Help Needed   Moderately strenuous housework (laundry) Help Needed   Shopping for personal items (toiletries/medicines) Help Needed   Shopping for groceries Help Needed   Driving Help Needed   Climbing a flight of stairs Help Needed   Getting to places beyond walking distances Help Needed      Advance Care Planning 3/17/2023   Patient's Healthcare Decision Maker is: Named in scanned ACP document   Confirm Advance Directive Yes, on file   Patient Would Like to Complete Advance Directive -      Results for orders placed or performed in visit on 03/17/23   AMB POC HEMOGLOBIN (HGB)   Result Value Ref Range    Hemoglobin (POC) 8.3 G/DL Attending Only

## 2023-03-20 ENCOUNTER — NON-APPOINTMENT (OUTPATIENT)
Age: 53
End: 2023-03-20

## 2023-03-20 LAB
B2 GLYCOPROT1 IGA SERPL IA-ACNC: <5 SAU
CCP AB SER IA-ACNC: <8 UNITS
CENTROMERE IGG SER-ACNC: <0.2 CD:130001892
CK SERPL-CCNC: 107 U/L
CRP SERPL-MCNC: <3 MG/L
DEPRECATED CARDIOLIPIN IGA SER: <5 APL
DNA PLOIDY SPEC FC-IMP: NORMAL
ENA SCL70 IGG SER IA-ACNC: <0.2 AL
ERYTHROCYTE [SEDIMENTATION RATE] IN BLOOD BY WESTERGREN METHOD: 51 MM/HR
HCYS SERPL-MCNC: 6.1 UMOL/L
PROT C PPP CHRO-ACNC: 105 %
PROT S AG ACT/NOR PPP IA: 66 %
PS IGA SER QL: <1
PS IGG SER QL: <9 UNITS
PS IGM SER QL: 97 UNITS
PS-PROTHROM CMPLX IGG SERPL IA-ACNC: <9.4 U
PS-PROTHROM CMPLX IGM SERPL IA-ACNC: 64.4 U
PTR INTERP: NORMAL
RF+CCP IGG SER-IMP: NEGATIVE
RHEUMATOID FACT SER QL: <10 IU/ML

## 2023-03-20 RX ORDER — IBUPROFEN 600 MG/1
600 TABLET, FILM COATED ORAL 3 TIMES DAILY
Qty: 90 | Refills: 0 | Status: DISCONTINUED | COMMUNITY
Start: 2022-10-14 | End: 2023-03-20

## 2023-03-20 NOTE — ASSESSMENT
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 Anticardiolipin IgM is thought to have a low-risk thrombosis profile and not as thrombotic as Lupus anticoagulant or the IgG isotype. 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 I will discuss diagnosis with neurologist Dr. Trujillo and neuroophthalmologist Dr. Baum. \par MS-like disease in APS is very difficult to differentiate from true MS, though some observations have been made. MS-like disease from APS:\par -presents with acute onset of atypical symptoms of MS? --- no\par -co-existence of other neurological APS manifestations such as headache or epilepsy? --- has a h/o migraines since 2015, appear to be refractory to conventional tx, but still attribution is difficult\par -history of thrombosis or pregnancy morbidity? ---- yes\par -APS-related white matter lesions are smaller, localized in the subcortical area, stable over time and may improve with anticoagulation? ---- ?\par -normal cell count and absence of oligoclonal bands also suggests APS? ----  spinal tap was not done\par -aPL antibodies in MS-like disease in APS are mostly anticardiolipin IgM, which is also the case with this patient. \par There is no clear treatment for MS-like disease in APS, but the literature seems to suggest that anticoagulation with warfarin may be effective. Other treatments such as steroids, IVIG, rituximab, PLEX are loosely recommended. She has already received steroids and PLEX. What I cannot answer if whether she should continue with Ocrevus. I would be compelled to continue it if you believe it is helping.\par \par Positive TOMMY may be in the setting of MS versus can be seen in APS as well.\par Will check subserologies for SLE and other autoimmune disease since APS is frequently associated with these.\par \par \par RTC in 1month\par

## 2023-03-20 NOTE — HISTORY OF PRESENT ILLNESS
[FreeTextEntry1] : \par Ms. Dangelo is a 52F referred by her neurologist Dr. Trujillo for rheumatologic evaluation.\par \par Diagnosed with MS 7/13/2022 presenting with right vision loss and eye pain, she was diagnosed with optic neuritis. Patient received 5 days of IV Solumedrol 1000mg (7/14-7/18) with symptom improvement and and was d/c with steroid taper for 10 days.  A dedicated T2 FLAIR MRI w/wo contrast was performed 7/18/2022 showing Abnormal T2 prolongation in the periventricular white matter region with seay finger appearance and + T1 dark holes. No infratentorial lesions. MRI C/T 7/14/2022 spine with evidence of dorsal cord lesion at T9 with subtle contrast enhancement. \par 11/23 Ocrevus injections started\par 12/5/2022 recurrent loss of vision requiring admission. PLEX 7 sessions, 5 doses of high dose steroids, tapered off of prednisone 1/27/2023\par Still having recurrent blurry vision episodes\par Following with neurologist Dr. Trujillo and neuroophthalmologist Dr. Baum\par \par Since 7/2022 with joint pain, mostly neck pain, knee pain, calf pain.cramps, ibuprofen naproxen helps, no swelling morning stiffness \par \par Thrombosis: Left DVT beg of 2022, may have been post covid related though occurred about 3 weeks post COVID, on blood thinner Xarelto for about 6 months, but it was then stopped by a ED doctor?\par Obstetric hx: She has had 5 miscarriages (2 x 6 weeks, other 3 further down in 1st trimester) has 3 kids\par \par Labs done by neurologist Dr. Trujillo :\par TOMMY 1:160, aCL IgM 60\par Neg: Sm, RNP, SSA, SSB, DRVVT SCC neg, ACL IgG, B2GP IgG IgM neg. \par \par FH: no blood clotting events, no early CAD, CVI, miscarriages. No personal or family history of autoimmune disease.\par

## 2023-03-20 NOTE — CONSULT LETTER
[Dear  ___] : Dear  [unfilled], [Consult Letter:] : I had the pleasure of evaluating your patient, [unfilled]. [Please see my note below.] : Please see my note below. [Consult Closing:] : Thank you very much for allowing me to participate in the care of this patient.  If you have any questions, please do not hesitate to contact me. [Sincerely,] : Sincerely, [FreeTextEntry2] : Dr. Lydia Trujillo [FreeTextEntry3] : Dr. Tracie Nettles\par

## 2023-03-24 ENCOUNTER — APPOINTMENT (OUTPATIENT)
Dept: MEDICATION MANAGEMENT | Facility: CLINIC | Age: 53
End: 2023-03-24

## 2023-03-31 ENCOUNTER — OUTPATIENT (OUTPATIENT)
Dept: OUTPATIENT SERVICES | Facility: HOSPITAL | Age: 53
LOS: 1 days | End: 2023-03-31
Payer: COMMERCIAL

## 2023-03-31 ENCOUNTER — APPOINTMENT (OUTPATIENT)
Dept: MRI IMAGING | Facility: CLINIC | Age: 53
End: 2023-03-31
Payer: COMMERCIAL

## 2023-03-31 DIAGNOSIS — Z00.8 ENCOUNTER FOR OTHER GENERAL EXAMINATION: ICD-10-CM

## 2023-03-31 DIAGNOSIS — G35 MULTIPLE SCLEROSIS: ICD-10-CM

## 2023-03-31 PROCEDURE — 70553 MRI BRAIN STEM W/O & W/DYE: CPT

## 2023-03-31 PROCEDURE — 70553 MRI BRAIN STEM W/O & W/DYE: CPT | Mod: 26

## 2023-03-31 PROCEDURE — A9585: CPT

## 2023-04-02 LAB
ALBUMIN SERPL ELPH-MCNC: 4.2 G/DL
ALP BLD-CCNC: 120 U/L
ALT SERPL-CCNC: 38 U/L
ANION GAP SERPL CALC-SCNC: 13 MMOL/L
APTT BLD: 35.1 SEC
AST SERPL-CCNC: 41 U/L
BASOPHILS # BLD AUTO: 0.03 K/UL
BASOPHILS NFR BLD AUTO: 0.5 %
BILIRUB SERPL-MCNC: 0.2 MG/DL
BUN SERPL-MCNC: 9 MG/DL
CALCIUM SERPL-MCNC: 9.2 MG/DL
CHLORIDE SERPL-SCNC: 107 MMOL/L
CO2 SERPL-SCNC: 23 MMOL/L
CREAT SERPL-MCNC: 0.51 MG/DL
EGFR: 113 ML/MIN/1.73M2
EOSINOPHIL # BLD AUTO: 0.18 K/UL
EOSINOPHIL NFR BLD AUTO: 3 %
ERYTHROCYTE [SEDIMENTATION RATE] IN BLOOD BY WESTERGREN METHOD: 29 MM/HR
FERRITIN SERPL-MCNC: 14 NG/ML
GLUCOSE SERPL-MCNC: 85 MG/DL
HAPTOGLOB SERPL-MCNC: 194 MG/DL
HCT VFR BLD CALC: 37.5 %
HGB BLD-MCNC: 10.9 G/DL
IMM GRANULOCYTES NFR BLD AUTO: 0.2 %
INR PPP: 1.1 RATIO
IRON SATN MFR SERPL: 8 %
IRON SERPL-MCNC: 36 UG/DL
LDH SERPL-CCNC: 198 U/L
LYMPHOCYTES # BLD AUTO: 1.78 K/UL
LYMPHOCYTES NFR BLD AUTO: 29.9 %
MAN DIFF?: NORMAL
MCHC RBC-ENTMCNC: 24.9 PG
MCHC RBC-ENTMCNC: 29.1 GM/DL
MCV RBC AUTO: 85.8 FL
MONOCYTES # BLD AUTO: 0.35 K/UL
MONOCYTES NFR BLD AUTO: 5.9 %
NEUTROPHILS # BLD AUTO: 3.61 K/UL
NEUTROPHILS NFR BLD AUTO: 60.5 %
PLATELET # BLD AUTO: 338 K/UL
POTASSIUM SERPL-SCNC: 4.4 MMOL/L
PROT SERPL-MCNC: 6.8 G/DL
PT BLD: 12.8 SEC
RBC # BLD: 4.37 M/UL
RBC # BLD: 4.37 M/UL
RBC # FLD: 15.1 %
RETICS # AUTO: 1.8 %
RETICS AGGREG/RBC NFR: 76.9 K/UL
SODIUM SERPL-SCNC: 143 MMOL/L
TIBC SERPL-MCNC: 459 UG/DL
UIBC SERPL-MCNC: 422 UG/DL
VIT B12 SERPL-MCNC: 848 PG/ML
WBC # FLD AUTO: 5.96 K/UL

## 2023-04-03 ENCOUNTER — APPOINTMENT (OUTPATIENT)
Dept: MEDICATION MANAGEMENT | Facility: CLINIC | Age: 53
End: 2023-04-03

## 2023-04-03 ENCOUNTER — OUTPATIENT (OUTPATIENT)
Dept: OUTPATIENT SERVICES | Facility: HOSPITAL | Age: 53
LOS: 1 days | End: 2023-04-03
Payer: COMMERCIAL

## 2023-04-03 DIAGNOSIS — Z90.49 ACQUIRED ABSENCE OF OTHER SPECIFIED PARTS OF DIGESTIVE TRACT: Chronic | ICD-10-CM

## 2023-04-03 DIAGNOSIS — R76.0 RAISED ANTIBODY TITER: ICD-10-CM

## 2023-04-03 DIAGNOSIS — Z98.84 BARIATRIC SURGERY STATUS: Chronic | ICD-10-CM

## 2023-04-03 DIAGNOSIS — Z79.01 LONG TERM (CURRENT) USE OF ANTICOAGULANTS: ICD-10-CM

## 2023-04-03 LAB — DIRECT COOMBS: NORMAL

## 2023-04-03 PROCEDURE — 99211 OFF/OP EST MAY X REQ PHY/QHP: CPT | Mod: 95

## 2023-04-04 ENCOUNTER — APPOINTMENT (OUTPATIENT)
Dept: OPHTHALMOLOGY | Facility: CLINIC | Age: 53
End: 2023-04-04

## 2023-04-04 DIAGNOSIS — R76.0 RAISED ANTIBODY TITER: ICD-10-CM

## 2023-04-04 DIAGNOSIS — Z79.01 LONG TERM (CURRENT) USE OF ANTICOAGULANTS: ICD-10-CM

## 2023-04-07 LAB — CARDIOLIPIN IGM SER-MCNC: 76.5 MPL

## 2023-04-08 ENCOUNTER — OUTPATIENT (OUTPATIENT)
Dept: OUTPATIENT SERVICES | Facility: HOSPITAL | Age: 53
LOS: 1 days | End: 2023-04-08
Payer: COMMERCIAL

## 2023-04-08 ENCOUNTER — APPOINTMENT (OUTPATIENT)
Dept: MEDICATION MANAGEMENT | Facility: CLINIC | Age: 53
End: 2023-04-08

## 2023-04-08 DIAGNOSIS — Z90.49 ACQUIRED ABSENCE OF OTHER SPECIFIED PARTS OF DIGESTIVE TRACT: Chronic | ICD-10-CM

## 2023-04-08 DIAGNOSIS — Z98.84 BARIATRIC SURGERY STATUS: Chronic | ICD-10-CM

## 2023-04-08 PROCEDURE — G0463: CPT

## 2023-04-10 ENCOUNTER — APPOINTMENT (OUTPATIENT)
Dept: MEDICATION MANAGEMENT | Facility: CLINIC | Age: 53
End: 2023-04-10

## 2023-04-10 ENCOUNTER — APPOINTMENT (OUTPATIENT)
Dept: RHEUMATOLOGY | Facility: CLINIC | Age: 53
End: 2023-04-10
Payer: COMMERCIAL

## 2023-04-10 ENCOUNTER — OUTPATIENT (OUTPATIENT)
Dept: OUTPATIENT SERVICES | Facility: HOSPITAL | Age: 53
LOS: 1 days | End: 2023-04-10
Payer: COMMERCIAL

## 2023-04-10 DIAGNOSIS — Z79.01 LONG TERM (CURRENT) USE OF ANTICOAGULANTS: ICD-10-CM

## 2023-04-10 DIAGNOSIS — Z98.84 BARIATRIC SURGERY STATUS: Chronic | ICD-10-CM

## 2023-04-10 DIAGNOSIS — R76.0 RAISED ANTIBODY TITER: ICD-10-CM

## 2023-04-10 DIAGNOSIS — Z90.49 ACQUIRED ABSENCE OF OTHER SPECIFIED PARTS OF DIGESTIVE TRACT: Chronic | ICD-10-CM

## 2023-04-10 PROCEDURE — G0463: CPT

## 2023-04-10 PROCEDURE — 99446 NTRPROF PH1/NTRNET/EHR 5-10: CPT

## 2023-04-11 DIAGNOSIS — D68.61 ANTIPHOSPHOLIPID SYNDROME: ICD-10-CM

## 2023-04-11 DIAGNOSIS — R76.0 RAISED ANTIBODY TITER: ICD-10-CM

## 2023-04-11 DIAGNOSIS — Z79.01 LONG TERM (CURRENT) USE OF ANTICOAGULANTS: ICD-10-CM

## 2023-04-12 DIAGNOSIS — R76.0 RAISED ANTIBODY TITER: ICD-10-CM

## 2023-04-12 DIAGNOSIS — D68.61 ANTIPHOSPHOLIPID SYNDROME: ICD-10-CM

## 2023-04-12 DIAGNOSIS — Z79.01 LONG TERM (CURRENT) USE OF ANTICOAGULANTS: ICD-10-CM

## 2023-04-13 ENCOUNTER — OUTPATIENT (OUTPATIENT)
Dept: OUTPATIENT SERVICES | Facility: HOSPITAL | Age: 53
LOS: 1 days | End: 2023-04-13
Payer: COMMERCIAL

## 2023-04-13 ENCOUNTER — APPOINTMENT (OUTPATIENT)
Dept: MEDICATION MANAGEMENT | Facility: CLINIC | Age: 53
End: 2023-04-13

## 2023-04-13 DIAGNOSIS — Z79.01 LONG TERM (CURRENT) USE OF ANTICOAGULANTS: ICD-10-CM

## 2023-04-13 DIAGNOSIS — R76.0 RAISED ANTIBODY TITER: ICD-10-CM

## 2023-04-13 DIAGNOSIS — Z90.49 ACQUIRED ABSENCE OF OTHER SPECIFIED PARTS OF DIGESTIVE TRACT: Chronic | ICD-10-CM

## 2023-04-13 DIAGNOSIS — Z98.84 BARIATRIC SURGERY STATUS: Chronic | ICD-10-CM

## 2023-04-13 LAB
INR PPP: 1.9
PT BLD: 22.3

## 2023-04-13 PROCEDURE — G0463: CPT

## 2023-04-14 DIAGNOSIS — Z79.01 LONG TERM (CURRENT) USE OF ANTICOAGULANTS: ICD-10-CM

## 2023-04-14 DIAGNOSIS — R76.0 RAISED ANTIBODY TITER: ICD-10-CM

## 2023-04-15 ENCOUNTER — OUTPATIENT (OUTPATIENT)
Dept: OUTPATIENT SERVICES | Facility: HOSPITAL | Age: 53
LOS: 1 days | End: 2023-04-15
Payer: COMMERCIAL

## 2023-04-15 ENCOUNTER — APPOINTMENT (OUTPATIENT)
Dept: MRI IMAGING | Facility: IMAGING CENTER | Age: 53
End: 2023-04-15
Payer: COMMERCIAL

## 2023-04-15 DIAGNOSIS — Z98.84 BARIATRIC SURGERY STATUS: Chronic | ICD-10-CM

## 2023-04-15 DIAGNOSIS — Z00.8 ENCOUNTER FOR OTHER GENERAL EXAMINATION: ICD-10-CM

## 2023-04-15 DIAGNOSIS — Z90.49 ACQUIRED ABSENCE OF OTHER SPECIFIED PARTS OF DIGESTIVE TRACT: Chronic | ICD-10-CM

## 2023-04-15 DIAGNOSIS — M54.50 LOW BACK PAIN, UNSPECIFIED: ICD-10-CM

## 2023-04-15 PROCEDURE — 72148 MRI LUMBAR SPINE W/O DYE: CPT | Mod: 26

## 2023-04-15 PROCEDURE — 72148 MRI LUMBAR SPINE W/O DYE: CPT

## 2023-04-17 ENCOUNTER — APPOINTMENT (OUTPATIENT)
Dept: MEDICATION MANAGEMENT | Facility: CLINIC | Age: 53
End: 2023-04-17

## 2023-04-19 ENCOUNTER — APPOINTMENT (OUTPATIENT)
Dept: MEDICATION MANAGEMENT | Facility: CLINIC | Age: 53
End: 2023-04-19

## 2023-04-19 ENCOUNTER — OUTPATIENT (OUTPATIENT)
Dept: OUTPATIENT SERVICES | Facility: HOSPITAL | Age: 53
LOS: 1 days | End: 2023-04-19
Payer: COMMERCIAL

## 2023-04-19 ENCOUNTER — RX RENEWAL (OUTPATIENT)
Age: 53
End: 2023-04-19

## 2023-04-19 DIAGNOSIS — Z79.01 LONG TERM (CURRENT) USE OF ANTICOAGULANTS: ICD-10-CM

## 2023-04-19 DIAGNOSIS — I82.91 CHRONIC EMBOLISM AND THROMBOSIS OF UNSPECIFIED VEIN: ICD-10-CM

## 2023-04-19 DIAGNOSIS — Z98.84 BARIATRIC SURGERY STATUS: Chronic | ICD-10-CM

## 2023-04-19 DIAGNOSIS — Z90.49 ACQUIRED ABSENCE OF OTHER SPECIFIED PARTS OF DIGESTIVE TRACT: Chronic | ICD-10-CM

## 2023-04-19 LAB
INR PPP: 2
PT BLD: 24.1

## 2023-04-19 PROCEDURE — G0463: CPT

## 2023-04-19 RX ORDER — NORTRIPTYLINE HYDROCHLORIDE 10 MG/1
10 CAPSULE ORAL
Qty: 60 | Refills: 6 | Status: ACTIVE | COMMUNITY
Start: 2022-09-01 | End: 1900-01-01

## 2023-04-19 RX ORDER — WARFARIN 2.5 MG/1
2.5 TABLET ORAL
Qty: 270 | Refills: 3 | Status: ACTIVE | COMMUNITY
Start: 2023-03-20 | End: 1900-01-01

## 2023-04-20 DIAGNOSIS — I82.91 CHRONIC EMBOLISM AND THROMBOSIS OF UNSPECIFIED VEIN: ICD-10-CM

## 2023-04-20 DIAGNOSIS — Z79.01 LONG TERM (CURRENT) USE OF ANTICOAGULANTS: ICD-10-CM

## 2023-04-21 ENCOUNTER — NON-APPOINTMENT (OUTPATIENT)
Age: 53
End: 2023-04-21

## 2023-04-27 ENCOUNTER — APPOINTMENT (OUTPATIENT)
Dept: NEUROLOGY | Facility: CLINIC | Age: 53
End: 2023-04-27
Payer: COMMERCIAL

## 2023-04-27 ENCOUNTER — TRANSCRIPTION ENCOUNTER (OUTPATIENT)
Age: 53
End: 2023-04-27

## 2023-04-27 VITALS
HEIGHT: 69 IN | WEIGHT: 228 LBS | DIASTOLIC BLOOD PRESSURE: 86 MMHG | BODY MASS INDEX: 33.77 KG/M2 | HEART RATE: 85 BPM | SYSTOLIC BLOOD PRESSURE: 125 MMHG

## 2023-04-27 LAB
25(OH)D3 SERPL-MCNC: 31.2 NG/ML
ALBUMIN SERPL ELPH-MCNC: 4.3 G/DL
ALP BLD-CCNC: 128 U/L
ALT SERPL-CCNC: 41 U/L
ANION GAP SERPL CALC-SCNC: 12 MMOL/L
AST SERPL-CCNC: 38 U/L
BASOPHILS # BLD AUTO: 0.06 K/UL
BASOPHILS NFR BLD AUTO: 1 %
BILIRUB SERPL-MCNC: 0.2 MG/DL
BUN SERPL-MCNC: 7 MG/DL
CALCIUM SERPL-MCNC: 9.5 MG/DL
CHLORIDE SERPL-SCNC: 108 MMOL/L
CO2 SERPL-SCNC: 23 MMOL/L
CREAT SERPL-MCNC: 0.64 MG/DL
EGFR: 107 ML/MIN/1.73M2
EOSINOPHIL # BLD AUTO: 0.13 K/UL
EOSINOPHIL NFR BLD AUTO: 2.2 %
GLUCOSE SERPL-MCNC: 91 MG/DL
HCT VFR BLD CALC: 35.9 %
HGB BLD-MCNC: 10.7 G/DL
IMM GRANULOCYTES NFR BLD AUTO: 0.2 %
LYMPHOCYTES # BLD AUTO: 2.39 K/UL
LYMPHOCYTES NFR BLD AUTO: 40.6 %
MAN DIFF?: NORMAL
MCHC RBC-ENTMCNC: 24.3 PG
MCHC RBC-ENTMCNC: 29.8 GM/DL
MCV RBC AUTO: 81.6 FL
MONOCYTES # BLD AUTO: 0.31 K/UL
MONOCYTES NFR BLD AUTO: 5.3 %
NEUTROPHILS # BLD AUTO: 2.98 K/UL
NEUTROPHILS NFR BLD AUTO: 50.7 %
PLATELET # BLD AUTO: 396 K/UL
POTASSIUM SERPL-SCNC: 4.1 MMOL/L
PROT SERPL-MCNC: 7.1 G/DL
RBC # BLD: 4.4 M/UL
RBC # FLD: 15.8 %
SODIUM SERPL-SCNC: 143 MMOL/L
WBC # FLD AUTO: 5.88 K/UL

## 2023-04-27 PROCEDURE — 99214 OFFICE O/P EST MOD 30 MIN: CPT

## 2023-04-27 RX ORDER — ENOXAPARIN SODIUM 100 MG/ML
100 INJECTION, SOLUTION SUBCUTANEOUS
Qty: 2 | Refills: 0 | Status: DISCONTINUED | COMMUNITY
Start: 2023-03-20 | End: 2023-04-27

## 2023-04-27 RX ORDER — NICOTINE 21 MG/24HR
14 PATCH, TRANSDERMAL 24 HOURS TRANSDERMAL DAILY
Qty: 30 | Refills: 6 | Status: DISCONTINUED | COMMUNITY
Start: 2022-07-18 | End: 2023-04-27

## 2023-04-27 NOTE — HISTORY OF PRESENT ILLNESS
[FreeTextEntry1] : INTERIM HX 04/27/2023: Diagnosed with APS, seeing rheum and hematology, on coumadin. 3/31/23 brain MRI- stable. She quit smoking 3 months ago. \par Been complaining of R hip and R knee pain. Diffuse body aches.\par MS is stable. \par Migraines unchanged. On emgality since Jan, has decreased intensity of headaches. Frequency 2-4 x/week. Eletriptan helps for abortive therapy, within 30 min. \par right eye visual disturbances unchanged. \par \par \par INTERIM HX 02/10/2023: s/p Ocrevus split dosing 11/9 and 11/23. Admitted to [facility] 12/5-12/22 for worsening vision loss in right eye, "complete cloud over right eye" + eye pain. VA 20/400 on admission and also reportedly worsened to 20/800 OD, color vision 0/12 OD. Treated with IVMP and 7 sessions of PLEX with some improvement in symptoms. MRI imaging or orbits, brain, C/T spine w/w/o contrast obtained in hospital 12/7/2022 and were all stable, no definite optic nerve signal abnormality/enh noted.  Followed up with Dr. Baum 2/8/2023, VA 20/100 OD and 20/30 OS, trace APD OD, no disc edema, nasal hemifield defect OD, OCT normal.\par Pt reports her vision has improved 70% better. No new symptom. Still gets intermittent blurry vision in left eye - ? dry eyes vs migraine related. Still gets migraines 2x /week. 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 She has 3 kids. \par Stopped smoking. \par \par INTERIM HX 10/14/2022: PT returns for a follow up, reports new symptoms. Since our visit last week, she has been reporting daily migraine headaches, neck muscle soreness, body soreness, and especially left knee pain that is affecting her gait. She denies any significant weakness or new sensory symptoms. She has been doing yoga exercises.  \par Of note, PT got a call from insurance company and her Ocrevus has been approved. \par \par INTERIM HX 10/05/2022: Trouble sleeping at night. Migraines are better, once a week. Maxalt as needed and nortriptyline QD. Saw Dr. Baum 9/13/2022 and VA 20/60 OD and 20/30 OS, improvement in visual field on the right. No brand new symptoms. Occasional sees flashes of light in right eye and blue/white spots- this lasted 10 seconds at a time. Still pending Ocrevus approval. She is in good spirits. \par \par INTERIM HX 09/01/2022: Followed up with Dr Baum 8/12/2022 and VA improved from 20/200 OD to 20/50 OD, but with persistent superior visual field loss. She completed the 2nd course of steroids as well. Migraines are still daily. Sumatriptan not working. No brand new symptoms. \par \par Pre-DMT labs (date)- \par CBC w/ diff WNL, \par LFT normal\par Bun and Cr normal, \par Quant TB neg,\par VZV IgG + and IgM + (index 1.14), -> ? past infection.  Denies any recent shingles infection or chicken pox. No recent known exposures. \par Hep B surface Ab -, \par HEp B surface antigen and cord antibody negative.\par JCV ab positive 2.11\par Vitamin D 25.6\par -------------------------------------------------------\par \par HPI (initial visit Aug 04, 2022)- Isabel Dangelo is a 51 year old female with pmhx of migraines and HLD, admitted to [facility] 7/14/2022-7/18/2022 for 4 day hx of progressive vision loss in right eye (predominately involving superior quadrant) and associated pain with eye movements. Her exam was notable for RAPD OD,  superior visual field cut OD with red color desaturation OD. Of note, she also reportedly experienced 1 week of bilateral foot numbness in 5/2022, that then resolved. She denies any other significant episodic neurological symptoms. NO recent vaccination. NO recent infections. Ophthalmology and neurology were consulted. \par \par MRI orbits 7/14/2022 confirmed evidence of right intraorbital segment optic neuritis/perineuritis. \par No dedicated brain MRI initially obtained. \par MRI C/T 7/14/2022 spine with evidence of dorsal cord lesion at T9 with subtle contrast enhancement. Normal cervical spine. \par \par Pt admitted for management of Optic Neuritis and further workup. Patient received 5 days of IV Solumedrol 1000mg (7/14-7/18) with symptom improvement and and was d/c with steroid taper for 10 days. \par \par A dedicated T2 FLAIR MRI w/wo contrast was performed 7/18/2022 showing Abnormal T2 prolongation in the periventricular white matter region with seay finger appearance and + T1 black holes. No infratentorial lesions. \par \par Labs:- ACE neg, SSA/SSB neg, Lyme neg, Syphilis neg, serum NMO and MOG ab neg. DsDNA neg. \par \par Interim Hx  - She reports since the hospitalization the toes on the left have been constantly numb.Also experiencing cramps in legs. Seen by Dr. Baum 7/25/2022- VA 20/200 OD and 20/70 OS, 2+ RAPD, superior altitudinal defect OD. She completed prednisone taper 7/29/2022. Vision is better- 30% better. \par She has a hx of migraine headaches since 2015 - occipital and radiates to top of head. + Phonophobia, + Photophobia. + N/V. Headaches are frequent- almost daily. lasted 3-4 hours. Always at night. Headaches were better on steroids. Ibuprofen helps. She works at a car [employer].\par \par

## 2023-04-27 NOTE — DATA REVIEWED
[de-identified] : 3/31/23 brain MRI- stable\par \par 12/7/2022- MRI imaging or orbits, brain, C/T spine w/w/o contrast obtained in hospital 12/7/2022 and were all stable, no definite optic nerve signal abnormality/enh noted.\par \par MRI orbits 7/14/2022 confirmed evidence of right intraorbital segment optic neuritis/perineuritis. \par No dedicated brain MRI initially obtained. \par MRI C/T 7/14/2022 spine with evidence of dorsal cord lesion at T9 with subtle contrast enhancement. Normal cervical spine. \par \par \par A dedicated T2 FLAIR MRI w/wo contrast was performed 7/18/2022 showing Abnormal T2 prolongation in the periventricular white matter region with seay finger appearance and + T1 dark holes. No infratentorial lesions.

## 2023-04-27 NOTE — ASSESSMENT
[FreeTextEntry1] : Assessment/Plan:\par  52 year old female w/ relapsing multiple sclerosis (dx'd 7/2022), on Ocrevus since 11/2022. 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 \par \par Relapses:\par  5/2022- numbness b/l feet (likely thoracic myelitis)\par  7/14/2022: R ON\par 12/5/2022- Relapse of R ON\par \par Yocasta had a relapse with recurrent optic neuritis OD early Dec 2022, s/p IVMP and PLEX with improvement in vision (though not 100%)- this occurred 2 weeks after second split dose of Ocrelizumab. I suspect the relapse occurred as the Ocrevus did not reach full efficacy (this is not a treatment failure). I recommend she continue Ocrevus as scheduled. She continues to complain of intermittent blurry vision OS- dry eyes vs migraine related?.\par \par Although APS can cause a "MS like disease", I do believe she also has definite multiple sclerosis as well and her visual loss episodes (7'22 and 12'22) were both 2/2 optic neurits and not AION (which would fit more with vascular pathology in APS), given accompanied ocular pain and improvement with steroids. Furthermore, her brain lesions are characteristic for MS and she also has a thoracic cord lesion, which further support diagnosis of MS. I do not see any value in obtaining a spinal tap at this time. \par \par Plan: \par 1. Diagnostic Plan/Imaging: Repeat MRI brain, C/T spine w/o contrast 3/2024 for radiological stability.\par \par 2. Disease Modifying therapy plan:\par Continue Ocrevus infusion 600 mg [id number]\par Ocrevus labs every 6 months (ordered)\par \par 3. Symptomatic therapy plan:\par () Muscle spasms- Stretching exercises. muscle relaxer as needed\par () Vitamin D3 and B12 supplementations. will check Vitamin D level. \par \par 4. Migraine headaches:\par Freq: 2 -4  x /week, intensity has improved\par [] Continue emgality every 4 weeks SQ injection. Reviewed side effects.\par [] Continue nortriptyline 20 mg qhs for preventative therapy. Reviewed side effects. Side effects to Topamax (reaction: tongue swelling)\par [] Continue eletriptan for abortive therapy. Reviewed side effects.\par [] Continue magnesium, riboflavin and coenzyme Q10 supplementations\par \par \par Return to clinic 3 months\par \par \par The above plan was discussed with YOCASTA PATRICK in great detail. YOCASTA PATRICK verbalized understanding and agrees with plan as detailed above. Patient was provided education and counselling on current diagnosis/symptoms, diagnostic work up, treatment options and potential side effects of any prescribed therapy/therapies. She was advised to call our clinic at [phone] for any new or worsening symptoms, or with any questions or concerns. In case of acute onset of neurological symptoms or worsening presentation, patient was advised to present to nearest emergency room for further evaluation. YOCASTA PATRICK expressed understanding and all her questions/concerns were addressed.\par \par Lydia Trujillo M.D.

## 2023-05-06 NOTE — H&P ADULT - VTE RISK ASSESSMENT
Pt ambulated to bedside commode with stand by assist with no distress or difficulty.       Henrietta Milian RN  09/23/22 [id number]
<<--- Click to launch
in a private home with 24/7HHA/spouse

## 2023-05-09 ENCOUNTER — APPOINTMENT (OUTPATIENT)
Dept: RHEUMATOLOGY | Facility: CLINIC | Age: 53
End: 2023-05-09

## 2023-05-11 ENCOUNTER — APPOINTMENT (OUTPATIENT)
Dept: MEDICATION MANAGEMENT | Facility: CLINIC | Age: 53
End: 2023-05-11

## 2023-05-11 ENCOUNTER — OUTPATIENT (OUTPATIENT)
Dept: OUTPATIENT SERVICES | Facility: HOSPITAL | Age: 53
LOS: 1 days | End: 2023-05-11
Payer: COMMERCIAL

## 2023-05-11 DIAGNOSIS — Z79.01 LONG TERM (CURRENT) USE OF ANTICOAGULANTS: ICD-10-CM

## 2023-05-11 DIAGNOSIS — Z90.49 ACQUIRED ABSENCE OF OTHER SPECIFIED PARTS OF DIGESTIVE TRACT: Chronic | ICD-10-CM

## 2023-05-11 DIAGNOSIS — I82.91 CHRONIC EMBOLISM AND THROMBOSIS OF UNSPECIFIED VEIN: ICD-10-CM

## 2023-05-11 DIAGNOSIS — Z98.84 BARIATRIC SURGERY STATUS: Chronic | ICD-10-CM

## 2023-05-11 LAB
INR PPP: 2.4
PT BLD: 28.4

## 2023-05-11 PROCEDURE — G0463: CPT

## 2023-05-15 ENCOUNTER — APPOINTMENT (OUTPATIENT)
Dept: OPHTHALMOLOGY | Facility: CLINIC | Age: 53
End: 2023-05-15

## 2023-05-15 ENCOUNTER — APPOINTMENT (OUTPATIENT)
Dept: ORTHOPEDIC SURGERY | Facility: CLINIC | Age: 53
End: 2023-05-15
Payer: COMMERCIAL

## 2023-05-15 VITALS
OXYGEN SATURATION: 98 % | SYSTOLIC BLOOD PRESSURE: 115 MMHG | TEMPERATURE: 97.8 F | DIASTOLIC BLOOD PRESSURE: 81 MMHG | BODY MASS INDEX: 34.1 KG/M2 | HEIGHT: 68 IN | HEART RATE: 78 BPM | WEIGHT: 225 LBS

## 2023-05-15 DIAGNOSIS — M43.16 SPONDYLOLISTHESIS, LUMBAR REGION: ICD-10-CM

## 2023-05-15 DIAGNOSIS — M54.16 RADICULOPATHY, LUMBAR REGION: ICD-10-CM

## 2023-05-15 PROCEDURE — 99214 OFFICE O/P EST MOD 30 MIN: CPT

## 2023-05-15 NOTE — DISCUSSION/SUMMARY
[de-identified] : We discussed further treatment options both nonsurgical and surgical.  At this point she wished to continue with conservative measures.  We reviewed her MRI in detail.  She will let me know if any changes or worsening of her symptoms.

## 2023-05-15 NOTE — PHYSICAL EXAM
[Cane] : ambulates with cane [Antalgic] : not antalgic [de-identified] : Examination of the lumbar spine reveals no midline tenderness palpation, step-offs, or skin lesions. Decreased range of motion with respect to flexion, extension, lateral bending, and rotation. No tenderness to palpation of the sciatic notch. No tenderness palpation of the bilateral greater trochanters. No pain with passive internal/external rotation of the hips. No instability of bilateral lower extremities.  Negative STORM. Negative straight leg raise bilaterally. No bowstring. Negative femoral stretch. 5 out of 5 iliopsoas, hip abductors, hips adductors, quadriceps, hamstrings, gastrocsoleus, tibialis anterior, extensor hallucis longus, peroneals. Grossly intact sensation to light touch bilateral lower extremities. 1+ patellar and Achilles reflexes. Downgoing Babinski. No clonus. Intact proprioception. Palpable pulses. No skin lesion and no edema on the right and left lower extremities. [de-identified] : AP lateral lumbar x-rays with some mild spondylosis without instability or aggressive lesions\par \par Lumbar MRI reveals some degenerative changes.  Very mild L4-5 spondylolisthesis

## 2023-05-15 NOTE — HISTORY OF PRESENT ILLNESS
[de-identified] : Ms. YOCASTA PATRICK  is a 52 year old female who presents to the office for a follow-up visit.  She is here to review her MRI results.  Overall, she is doing much better than she did last time.  Negative\par

## 2023-05-19 ENCOUNTER — APPOINTMENT (OUTPATIENT)
Dept: NEUROLOGY | Facility: CLINIC | Age: 53
End: 2023-05-19

## 2023-05-24 ENCOUNTER — APPOINTMENT (OUTPATIENT)
Dept: NEUROLOGY | Facility: CLINIC | Age: 53
End: 2023-05-24
Payer: COMMERCIAL

## 2023-05-24 PROCEDURE — 96415 CHEMO IV INFUSION ADDL HR: CPT

## 2023-05-24 PROCEDURE — 96413 CHEMO IV INFUSION 1 HR: CPT

## 2023-06-05 ENCOUNTER — OUTPATIENT (OUTPATIENT)
Dept: OUTPATIENT SERVICES | Facility: HOSPITAL | Age: 53
LOS: 1 days | End: 2023-06-05
Payer: COMMERCIAL

## 2023-06-05 ENCOUNTER — APPOINTMENT (OUTPATIENT)
Dept: MEDICATION MANAGEMENT | Facility: CLINIC | Age: 53
End: 2023-06-05

## 2023-06-05 DIAGNOSIS — Z79.01 LONG TERM (CURRENT) USE OF ANTICOAGULANTS: ICD-10-CM

## 2023-06-05 DIAGNOSIS — I82.91 CHRONIC EMBOLISM AND THROMBOSIS OF UNSPECIFIED VEIN: ICD-10-CM

## 2023-06-05 DIAGNOSIS — Z98.84 BARIATRIC SURGERY STATUS: Chronic | ICD-10-CM

## 2023-06-05 DIAGNOSIS — Z90.49 ACQUIRED ABSENCE OF OTHER SPECIFIED PARTS OF DIGESTIVE TRACT: Chronic | ICD-10-CM

## 2023-06-05 LAB
INR PPP: 2.4
PT BLD: 29

## 2023-06-05 PROCEDURE — G0463: CPT

## 2023-06-07 ENCOUNTER — APPOINTMENT (OUTPATIENT)
Dept: RHEUMATOLOGY | Facility: CLINIC | Age: 53
End: 2023-06-07
Payer: COMMERCIAL

## 2023-06-07 VITALS
WEIGHT: 225 LBS | HEART RATE: 80 BPM | OXYGEN SATURATION: 97 % | BODY MASS INDEX: 34.1 KG/M2 | DIASTOLIC BLOOD PRESSURE: 76 MMHG | SYSTOLIC BLOOD PRESSURE: 121 MMHG | HEIGHT: 68 IN

## 2023-06-07 PROCEDURE — 99214 OFFICE O/P EST MOD 30 MIN: CPT

## 2023-06-12 ENCOUNTER — NON-APPOINTMENT (OUTPATIENT)
Age: 53
End: 2023-06-12

## 2023-06-12 LAB
B2 GLYCOPROT1 IGA SERPL IA-ACNC: <5 SAU
B2 GLYCOPROT1 IGG SER-ACNC: <5 SGU
B2 GLYCOPROT1 IGM SER-ACNC: 7.9 SMU
CARDIOLIPIN IGM SER-MCNC: 56.1 MPL
CARDIOLIPIN IGM SER-MCNC: <5 GPL
CONFIRM: 44.4 SEC
DEPRECATED CARDIOLIPIN IGA SER: <5 APL
DRVVT IMM 1:2 NP PPP: ABNORMAL
DRVVT SCREEN TO CONFIRM RATIO: 1.22 RATIO
PS IGA SER QL: <1
PS IGG SER QL: <9 UNITS
PS IGM SER QL: 78 UNITS
SCREEN DRVVT: 64.4 SEC
SILICA CLOTTING TIME INTERPRETATION: NORMAL
SILICA CLOTTING TIME S/C: 1.15 RATIO

## 2023-06-16 LAB
PS-PROTHROM CMPLX IGG SERPL IA-ACNC: 10.9 U
PS-PROTHROM CMPLX IGM SERPL IA-ACNC: 103 U

## 2023-06-20 NOTE — ASSESSMENT
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 Anticardiolipin IgM is thought to have a low-risk thrombosis profile and not as thrombotic as Lupus anticoagulant or the IgG isotype. Nevertheless, I would still strongly recommend to keep her on warfarin for thrombotic APS \par Interested in APS Action enrollment - consider next visit.\par \par After a review of her chart as well as a thorough review of the literature available,  it is possible that she has a MS-like condition and optic neuritis secondary to APS, however her neurology for likely primary MS.\par MS-like disease in APS is very difficult to differentiate from true MS, though some observations have been made. MS-like disease from APS:\par -presents with acute onset of atypical symptoms of MS? --- no\par -co-existence of other neurological APS manifestations such as headache or epilepsy? --- has a h/o migraines since 2015, appear to be refractory to conventional tx, but still attribution is difficult\par -history of thrombosis or pregnancy morbidity? ---- yes\par -APS-related white matter lesions are smaller, localized in the subcortical area, stable over time and may improve with anticoagulation? ---- no\par -normal cell count and absence of oligoclonal bands also suggests APS? ----  spinal tap was not done\par -aPL antibodies in MS-like disease in APS are mostly anticardiolipin IgM, which is also the case with this patient. \par There is no clear treatment for MS-like disease in APS, but the literature seems to suggest that anticoagulation with warfarin may be effective. Other treatments such as steroids, IVIG, rituximab, PLEX are loosely recommended. She has already received steroids and PLEX.\par Management of MS and ON per neurology and neuroophthalmology\par \par \par RTC in 6 months\par

## 2023-06-20 NOTE — HISTORY OF PRESENT ILLNESS
[FreeTextEntry1] : \par Ms. Dangelo is a 52F coming for f/u for APS and MSA\par \par # MS\par Diagnosed with MS 7/13/2022 presenting with right vision loss and eye pain, she was diagnosed with optic neuritis. Patient received 5 days of IV Solumedrol 1000mg (7/14-7/18) with symptom improvement and and was d/c with steroid taper for 10 days.  A dedicated T2 FLAIR MRI w/wo contrast was performed 7/18/2022 showing Abnormal T2 prolongation in the periventricular white matter region with seay finger appearance and + T1 dark holes. No infratentorial lesions. MRI C/T 7/14/2022 spine with evidence of dorsal cord lesion at T9 with subtle contrast enhancement. \par 11/23 Ocrevus injections started\par 12/5/2022 recurrent loss of vision requiring admission. PLEX 7 sessions, 5 doses of high dose steroids, tapered off of prednisone 1/27/2023\par Still having recurrent blurry vision episodes\par Following with neurologist Dr. Trujillo and neuroophthalmologist Dr. Baum\par \par Since 7/2022 with joint pain, mostly neck pain, knee pain, calf pain.cramps, ibuprofen naproxen helps, no swelling morning stiffness \par \par # APS\par Thrombosis: Left DVT beg of 2022, may have been post covid related though occurred about 3 weeks post COVID, on blood thinner Xarelto for about 6 months, but it was then stopped by a ED doctor?\par Obstetric hx: She has had 5 miscarriages (2 x 6 weeks, other 3 further down in 1st trimester) has 3 kids\par Started on warfarin in 3/2023\par \par Labs done by neurologist Dr. Trujillo :\par TOMMY 1:160, aCL IgM 60\par Neg: Sm, RNP, SSA, SSB, DRVVT SCC neg, ACL IgG, B2GP IgG IgM neg. \par \par FH: no blood clotting events, no early CAD, CVI, miscarriages. No personal or family history of autoimmune disease.\par

## 2023-06-20 NOTE — PHYSICAL EXAM
[General Appearance - Alert] : alert [General Appearance - In No Acute Distress] : in no acute distress [General Appearance - Well Nourished] : well nourished [Sclera] : the sclera and conjunctiva were normal [Nasal Cavity] : the nasal mucosa and septum were normal [Respiration, Rhythm And Depth] : normal respiratory rhythm and effort [Auscultation Breath Sounds / Voice Sounds] : lungs were clear to auscultation bilaterally [Heart Sounds] : normal S1 and S2 [Heart Sounds Gallop] : no gallops [Murmurs] : no murmurs [Musculoskeletal - Swelling] : no joint swelling seen [] : no rash [Sensation] : the sensory exam was normal to light touch and pinprick [Motor Exam] : the motor exam was normal [Oriented To Time, Place, And Person] : oriented to person, place, and time

## 2023-06-26 ENCOUNTER — NON-APPOINTMENT (OUTPATIENT)
Age: 53
End: 2023-06-26

## 2023-06-26 ENCOUNTER — APPOINTMENT (OUTPATIENT)
Dept: OPHTHALMOLOGY | Facility: CLINIC | Age: 53
End: 2023-06-26
Payer: COMMERCIAL

## 2023-06-26 PROCEDURE — 92133 CPTRZD OPH DX IMG PST SGM ON: CPT

## 2023-06-26 PROCEDURE — 99214 OFFICE O/P EST MOD 30 MIN: CPT

## 2023-06-26 PROCEDURE — 92083 EXTENDED VISUAL FIELD XM: CPT

## 2023-06-27 ENCOUNTER — APPOINTMENT (OUTPATIENT)
Dept: MEDICATION MANAGEMENT | Facility: CLINIC | Age: 53
End: 2023-06-27

## 2023-06-27 ENCOUNTER — OUTPATIENT (OUTPATIENT)
Dept: OUTPATIENT SERVICES | Facility: HOSPITAL | Age: 53
LOS: 1 days | End: 2023-06-27
Payer: COMMERCIAL

## 2023-06-27 DIAGNOSIS — Z79.01 LONG TERM (CURRENT) USE OF ANTICOAGULANTS: ICD-10-CM

## 2023-06-27 DIAGNOSIS — I82.91 CHRONIC EMBOLISM AND THROMBOSIS OF UNSPECIFIED VEIN: ICD-10-CM

## 2023-06-27 DIAGNOSIS — Z98.84 BARIATRIC SURGERY STATUS: Chronic | ICD-10-CM

## 2023-06-27 DIAGNOSIS — Z90.49 ACQUIRED ABSENCE OF OTHER SPECIFIED PARTS OF DIGESTIVE TRACT: Chronic | ICD-10-CM

## 2023-06-27 LAB
INR PPP: 2.4
PT BLD: 29

## 2023-06-27 PROCEDURE — G0463: CPT

## 2023-07-25 ENCOUNTER — OUTPATIENT (OUTPATIENT)
Dept: OUTPATIENT SERVICES | Facility: HOSPITAL | Age: 53
LOS: 1 days | End: 2023-07-25
Payer: COMMERCIAL

## 2023-07-25 ENCOUNTER — APPOINTMENT (OUTPATIENT)
Dept: MEDICATION MANAGEMENT | Facility: CLINIC | Age: 53
End: 2023-07-25

## 2023-07-25 DIAGNOSIS — Z90.49 ACQUIRED ABSENCE OF OTHER SPECIFIED PARTS OF DIGESTIVE TRACT: Chronic | ICD-10-CM

## 2023-07-25 DIAGNOSIS — Z79.01 LONG TERM (CURRENT) USE OF ANTICOAGULANTS: ICD-10-CM

## 2023-07-25 DIAGNOSIS — I82.91 CHRONIC EMBOLISM AND THROMBOSIS OF UNSPECIFIED VEIN: ICD-10-CM

## 2023-07-25 LAB
INR PPP: 2.1
PT BLD: 25.6

## 2023-07-25 PROCEDURE — G0463: CPT

## 2023-08-22 ENCOUNTER — APPOINTMENT (OUTPATIENT)
Dept: MEDICATION MANAGEMENT | Facility: CLINIC | Age: 53
End: 2023-08-22

## 2023-08-22 ENCOUNTER — OUTPATIENT (OUTPATIENT)
Dept: OUTPATIENT SERVICES | Facility: HOSPITAL | Age: 53
LOS: 1 days | End: 2023-08-22
Payer: COMMERCIAL

## 2023-08-22 DIAGNOSIS — Z79.01 LONG TERM (CURRENT) USE OF ANTICOAGULANTS: ICD-10-CM

## 2023-08-22 DIAGNOSIS — Z90.49 ACQUIRED ABSENCE OF OTHER SPECIFIED PARTS OF DIGESTIVE TRACT: Chronic | ICD-10-CM

## 2023-08-22 DIAGNOSIS — I82.91 CHRONIC EMBOLISM AND THROMBOSIS OF UNSPECIFIED VEIN: ICD-10-CM

## 2023-08-22 DIAGNOSIS — Z98.84 BARIATRIC SURGERY STATUS: Chronic | ICD-10-CM

## 2023-08-22 LAB
INR PPP: 2.7
PT BLD: 32.8

## 2023-08-22 PROCEDURE — G0463: CPT

## 2023-09-11 ENCOUNTER — APPOINTMENT (OUTPATIENT)
Dept: NEUROLOGY | Facility: CLINIC | Age: 53
End: 2023-09-11
Payer: COMMERCIAL

## 2023-09-11 VITALS
DIASTOLIC BLOOD PRESSURE: 87 MMHG | HEART RATE: 67 BPM | HEIGHT: 68 IN | SYSTOLIC BLOOD PRESSURE: 121 MMHG | WEIGHT: 211 LBS | BODY MASS INDEX: 31.98 KG/M2

## 2023-09-11 DIAGNOSIS — G43.909 MIGRAINE, UNSPECIFIED, NOT INTRACTABLE, W/OUT STATUS MIGRAINOSUS: ICD-10-CM

## 2023-09-11 PROCEDURE — 99214 OFFICE O/P EST MOD 30 MIN: CPT

## 2023-09-11 RX ORDER — CYCLOBENZAPRINE HYDROCHLORIDE 5 MG/1
5 TABLET, FILM COATED ORAL
Qty: 60 | Refills: 0 | Status: DISCONTINUED | COMMUNITY
Start: 2022-10-14 | End: 2023-09-11

## 2023-09-11 RX ORDER — TIZANIDINE 2 MG/1
2 TABLET ORAL
Qty: 60 | Refills: 3 | Status: ACTIVE | COMMUNITY
Start: 2023-03-10 | End: 1900-01-01

## 2023-09-20 ENCOUNTER — APPOINTMENT (OUTPATIENT)
Dept: MEDICATION MANAGEMENT | Facility: CLINIC | Age: 53
End: 2023-09-20

## 2023-09-20 ENCOUNTER — OUTPATIENT (OUTPATIENT)
Dept: OUTPATIENT SERVICES | Facility: HOSPITAL | Age: 53
LOS: 1 days | End: 2023-09-20
Payer: COMMERCIAL

## 2023-09-20 DIAGNOSIS — I82.91 CHRONIC EMBOLISM AND THROMBOSIS OF UNSPECIFIED VEIN: ICD-10-CM

## 2023-09-20 DIAGNOSIS — Z98.84 BARIATRIC SURGERY STATUS: Chronic | ICD-10-CM

## 2023-09-20 DIAGNOSIS — Z79.01 LONG TERM (CURRENT) USE OF ANTICOAGULANTS: ICD-10-CM

## 2023-09-20 DIAGNOSIS — Z90.49 ACQUIRED ABSENCE OF OTHER SPECIFIED PARTS OF DIGESTIVE TRACT: Chronic | ICD-10-CM

## 2023-09-20 LAB
INR PPP: 2.3
PT BLD: 27.4

## 2023-09-20 PROCEDURE — G0463: CPT

## 2023-09-25 ENCOUNTER — APPOINTMENT (OUTPATIENT)
Dept: RHEUMATOLOGY | Facility: CLINIC | Age: 53
End: 2023-09-25
Payer: COMMERCIAL

## 2023-09-25 VITALS
OXYGEN SATURATION: 98 % | BODY MASS INDEX: 32.58 KG/M2 | SYSTOLIC BLOOD PRESSURE: 125 MMHG | HEIGHT: 68 IN | TEMPERATURE: 98.1 F | DIASTOLIC BLOOD PRESSURE: 86 MMHG | RESPIRATION RATE: 16 BRPM | WEIGHT: 215 LBS | HEART RATE: 71 BPM

## 2023-09-25 PROCEDURE — 99214 OFFICE O/P EST MOD 30 MIN: CPT

## 2023-10-23 LAB
ALBUMIN SERPL ELPH-MCNC: 4.1 G/DL
ALP BLD-CCNC: 127 U/L
ALT SERPL-CCNC: 47 U/L
ANION GAP SERPL CALC-SCNC: 11 MMOL/L
AST SERPL-CCNC: 40 U/L
BASOPHILS # BLD AUTO: 0.07 K/UL
BASOPHILS NFR BLD AUTO: 1.3 %
BILIRUB SERPL-MCNC: 0.2 MG/DL
BUN SERPL-MCNC: 7 MG/DL
CALCIUM SERPL-MCNC: 9.2 MG/DL
CHLORIDE SERPL-SCNC: 108 MMOL/L
CO2 SERPL-SCNC: 24 MMOL/L
CREAT SERPL-MCNC: 0.52 MG/DL
EGFR: 112 ML/MIN/1.73M2
EOSINOPHIL # BLD AUTO: 0.11 K/UL
EOSINOPHIL NFR BLD AUTO: 2.1 %
GLUCOSE SERPL-MCNC: 125 MG/DL
HCT VFR BLD CALC: 37.2 %
HGB BLD-MCNC: 11.5 G/DL
IMM GRANULOCYTES NFR BLD AUTO: 0.2 %
LYMPHOCYTES # BLD AUTO: 2.14 K/UL
LYMPHOCYTES NFR BLD AUTO: 40.5 %
MAN DIFF?: NORMAL
MCHC RBC-ENTMCNC: 24.9 PG
MCHC RBC-ENTMCNC: 30.9 GM/DL
MCV RBC AUTO: 80.7 FL
MONOCYTES # BLD AUTO: 0.34 K/UL
MONOCYTES NFR BLD AUTO: 6.4 %
NEUTROPHILS # BLD AUTO: 2.61 K/UL
NEUTROPHILS NFR BLD AUTO: 49.5 %
PLATELET # BLD AUTO: 310 K/UL
POTASSIUM SERPL-SCNC: 4.3 MMOL/L
PROT SERPL-MCNC: 6.8 G/DL
RBC # BLD: 4.61 M/UL
RBC # FLD: 18.3 %
SODIUM SERPL-SCNC: 143 MMOL/L
WBC # FLD AUTO: 5.28 K/UL

## 2023-10-24 ENCOUNTER — OUTPATIENT (OUTPATIENT)
Dept: OUTPATIENT SERVICES | Facility: HOSPITAL | Age: 53
LOS: 1 days | End: 2023-10-24
Payer: COMMERCIAL

## 2023-10-24 ENCOUNTER — APPOINTMENT (OUTPATIENT)
Dept: MEDICATION MANAGEMENT | Facility: CLINIC | Age: 53
End: 2023-10-24

## 2023-10-24 DIAGNOSIS — Z79.01 LONG TERM (CURRENT) USE OF ANTICOAGULANTS: ICD-10-CM

## 2023-10-24 DIAGNOSIS — I82.91 CHRONIC EMBOLISM AND THROMBOSIS OF UNSPECIFIED VEIN: ICD-10-CM

## 2023-10-24 DIAGNOSIS — Z90.49 ACQUIRED ABSENCE OF OTHER SPECIFIED PARTS OF DIGESTIVE TRACT: Chronic | ICD-10-CM

## 2023-10-24 LAB
DEPRECATED KAPPA LC FREE/LAMBDA SER: 1.86 RATIO
IGA SER QL IEP: 267 MG/DL
IGG SER QL IEP: 979 MG/DL
IGM SER QL IEP: 148 MG/DL
INR PPP: 1.1
KAPPA LC CSF-MCNC: 1.22 MG/DL
KAPPA LC SERPL-MCNC: 2.27 MG/DL
PT BLD: 13

## 2023-10-24 PROCEDURE — G0463: CPT

## 2023-10-31 ENCOUNTER — OUTPATIENT (OUTPATIENT)
Dept: OUTPATIENT SERVICES | Facility: HOSPITAL | Age: 53
LOS: 1 days | End: 2023-10-31
Payer: COMMERCIAL

## 2023-10-31 ENCOUNTER — APPOINTMENT (OUTPATIENT)
Dept: MEDICATION MANAGEMENT | Facility: CLINIC | Age: 53
End: 2023-10-31

## 2023-10-31 DIAGNOSIS — Z79.01 LONG TERM (CURRENT) USE OF ANTICOAGULANTS: ICD-10-CM

## 2023-10-31 DIAGNOSIS — I82.91 CHRONIC EMBOLISM AND THROMBOSIS OF UNSPECIFIED VEIN: ICD-10-CM

## 2023-10-31 DIAGNOSIS — R76.0 RAISED ANTIBODY TITER: ICD-10-CM

## 2023-10-31 DIAGNOSIS — Z90.49 ACQUIRED ABSENCE OF OTHER SPECIFIED PARTS OF DIGESTIVE TRACT: Chronic | ICD-10-CM

## 2023-10-31 DIAGNOSIS — Z98.84 BARIATRIC SURGERY STATUS: Chronic | ICD-10-CM

## 2023-10-31 LAB
INR PPP: 1.6
PT BLD: 19.4

## 2023-10-31 PROCEDURE — G0463: CPT

## 2023-11-07 ENCOUNTER — OUTPATIENT (OUTPATIENT)
Dept: OUTPATIENT SERVICES | Facility: HOSPITAL | Age: 53
LOS: 1 days | End: 2023-11-07
Payer: COMMERCIAL

## 2023-11-07 ENCOUNTER — APPOINTMENT (OUTPATIENT)
Dept: MEDICATION MANAGEMENT | Facility: CLINIC | Age: 53
End: 2023-11-07

## 2023-11-07 DIAGNOSIS — I82.91 CHRONIC EMBOLISM AND THROMBOSIS OF UNSPECIFIED VEIN: ICD-10-CM

## 2023-11-07 DIAGNOSIS — Z98.84 BARIATRIC SURGERY STATUS: Chronic | ICD-10-CM

## 2023-11-07 DIAGNOSIS — Z79.01 LONG TERM (CURRENT) USE OF ANTICOAGULANTS: ICD-10-CM

## 2023-11-07 DIAGNOSIS — Z90.49 ACQUIRED ABSENCE OF OTHER SPECIFIED PARTS OF DIGESTIVE TRACT: Chronic | ICD-10-CM

## 2023-11-07 LAB
INR PPP: 2.3
PT BLD: 27.5

## 2023-11-07 PROCEDURE — G0463: CPT

## 2023-11-14 ENCOUNTER — APPOINTMENT (OUTPATIENT)
Dept: MEDICATION MANAGEMENT | Facility: CLINIC | Age: 53
End: 2023-11-14

## 2023-11-14 LAB
INR PPP: 1.9
PT BLD: 22.9

## 2023-11-15 ENCOUNTER — APPOINTMENT (OUTPATIENT)
Dept: NEUROLOGY | Facility: CLINIC | Age: 53
End: 2023-11-15
Payer: COMMERCIAL

## 2023-11-15 PROCEDURE — 96415 CHEMO IV INFUSION ADDL HR: CPT

## 2023-11-15 PROCEDURE — 96413 CHEMO IV INFUSION 1 HR: CPT

## 2023-11-15 PROCEDURE — S0028: CPT | Mod: JZ

## 2023-11-28 ENCOUNTER — OUTPATIENT (OUTPATIENT)
Dept: OUTPATIENT SERVICES | Facility: HOSPITAL | Age: 53
LOS: 1 days | End: 2023-11-28
Payer: COMMERCIAL

## 2023-11-28 ENCOUNTER — APPOINTMENT (OUTPATIENT)
Dept: MEDICATION MANAGEMENT | Facility: CLINIC | Age: 53
End: 2023-11-28

## 2023-11-28 DIAGNOSIS — Z79.01 LONG TERM (CURRENT) USE OF ANTICOAGULANTS: ICD-10-CM

## 2023-11-28 DIAGNOSIS — I82.91 CHRONIC EMBOLISM AND THROMBOSIS OF UNSPECIFIED VEIN: ICD-10-CM

## 2023-11-28 DIAGNOSIS — Z90.49 ACQUIRED ABSENCE OF OTHER SPECIFIED PARTS OF DIGESTIVE TRACT: Chronic | ICD-10-CM

## 2023-11-28 DIAGNOSIS — Z98.84 BARIATRIC SURGERY STATUS: Chronic | ICD-10-CM

## 2023-11-28 PROCEDURE — G0463: CPT

## 2023-11-29 DIAGNOSIS — Z79.01 LONG TERM (CURRENT) USE OF ANTICOAGULANTS: ICD-10-CM

## 2023-11-29 DIAGNOSIS — I82.91 CHRONIC EMBOLISM AND THROMBOSIS OF UNSPECIFIED VEIN: ICD-10-CM

## 2023-11-29 LAB
INR PPP: 1.9
PT BLD: 22.5

## 2023-12-21 ENCOUNTER — OUTPATIENT (OUTPATIENT)
Dept: OUTPATIENT SERVICES | Facility: HOSPITAL | Age: 53
LOS: 1 days | End: 2023-12-21
Payer: COMMERCIAL

## 2023-12-21 ENCOUNTER — APPOINTMENT (OUTPATIENT)
Dept: MEDICATION MANAGEMENT | Facility: CLINIC | Age: 53
End: 2023-12-21

## 2023-12-21 DIAGNOSIS — I82.91 CHRONIC EMBOLISM AND THROMBOSIS OF UNSPECIFIED VEIN: ICD-10-CM

## 2023-12-21 DIAGNOSIS — Z90.49 ACQUIRED ABSENCE OF OTHER SPECIFIED PARTS OF DIGESTIVE TRACT: Chronic | ICD-10-CM

## 2023-12-21 DIAGNOSIS — Z79.01 LONG TERM (CURRENT) USE OF ANTICOAGULANTS: ICD-10-CM

## 2023-12-21 DIAGNOSIS — Z98.84 BARIATRIC SURGERY STATUS: Chronic | ICD-10-CM

## 2023-12-21 LAB
INR PPP: 2.1
PT BLD: 24.8

## 2023-12-21 PROCEDURE — G0463: CPT

## 2024-01-08 ENCOUNTER — APPOINTMENT (OUTPATIENT)
Dept: OPHTHALMOLOGY | Facility: CLINIC | Age: 54
End: 2024-01-08
Payer: COMMERCIAL

## 2024-01-08 ENCOUNTER — NON-APPOINTMENT (OUTPATIENT)
Age: 54
End: 2024-01-08

## 2024-01-08 PROCEDURE — 92133 CPTRZD OPH DX IMG PST SGM ON: CPT

## 2024-01-08 PROCEDURE — 92083 EXTENDED VISUAL FIELD XM: CPT

## 2024-01-08 PROCEDURE — 99214 OFFICE O/P EST MOD 30 MIN: CPT

## 2024-01-09 ENCOUNTER — APPOINTMENT (OUTPATIENT)
Dept: MEDICATION MANAGEMENT | Facility: CLINIC | Age: 54
End: 2024-01-09

## 2024-01-09 ENCOUNTER — OUTPATIENT (OUTPATIENT)
Dept: OUTPATIENT SERVICES | Facility: HOSPITAL | Age: 54
LOS: 1 days | End: 2024-01-09
Payer: COMMERCIAL

## 2024-01-09 DIAGNOSIS — I82.91 CHRONIC EMBOLISM AND THROMBOSIS OF UNSPECIFIED VEIN: ICD-10-CM

## 2024-01-09 DIAGNOSIS — Z98.84 BARIATRIC SURGERY STATUS: Chronic | ICD-10-CM

## 2024-01-09 DIAGNOSIS — Z90.49 ACQUIRED ABSENCE OF OTHER SPECIFIED PARTS OF DIGESTIVE TRACT: Chronic | ICD-10-CM

## 2024-01-09 DIAGNOSIS — Z79.01 LONG TERM (CURRENT) USE OF ANTICOAGULANTS: ICD-10-CM

## 2024-01-09 LAB
INR PPP: 1.94
PT BLD: 21.5

## 2024-01-09 PROCEDURE — G0463: CPT

## 2024-01-10 DIAGNOSIS — Z79.01 LONG TERM (CURRENT) USE OF ANTICOAGULANTS: ICD-10-CM

## 2024-01-10 DIAGNOSIS — I82.91 CHRONIC EMBOLISM AND THROMBOSIS OF UNSPECIFIED VEIN: ICD-10-CM

## 2024-01-18 ENCOUNTER — APPOINTMENT (OUTPATIENT)
Dept: RHEUMATOLOGY | Facility: CLINIC | Age: 54
End: 2024-01-18
Payer: COMMERCIAL

## 2024-01-18 VITALS
HEART RATE: 98 BPM | WEIGHT: 210 LBS | BODY MASS INDEX: 31.83 KG/M2 | DIASTOLIC BLOOD PRESSURE: 77 MMHG | SYSTOLIC BLOOD PRESSURE: 131 MMHG | OXYGEN SATURATION: 98 % | HEIGHT: 68 IN

## 2024-01-18 DIAGNOSIS — D68.61 ANTIPHOSPHOLIPID SYNDROME: ICD-10-CM

## 2024-01-18 PROCEDURE — 99214 OFFICE O/P EST MOD 30 MIN: CPT | Mod: GC

## 2024-01-21 LAB
CARDIOLIPIN IGM SER-MCNC: 35.2 MPL
CONFIRM: 46.2 SEC
DRVVT 1H NP PPP: 38.4 SEC
DRVVT IMM 1:2 NP PPP: NORMAL
DRVVT SCREEN TO CONFIRM RATIO: 1.14 RATIO
PS IGA SER QL: 1
PS IGG SER QL: <9 UNITS
PS IGM SER QL: 104 UNITS
SCREEN DRVVT: 62.4 SEC
SILICA CLOTTING TIME INTERPRETATION: NORMAL
SILICA CLOTTING TIME S/C: 1.08 RATIO

## 2024-01-22 ENCOUNTER — APPOINTMENT (OUTPATIENT)
Dept: MEDICATION MANAGEMENT | Facility: CLINIC | Age: 54
End: 2024-01-22

## 2024-01-22 ENCOUNTER — APPOINTMENT (OUTPATIENT)
Dept: NEUROLOGY | Facility: CLINIC | Age: 54
End: 2024-01-22

## 2024-01-26 ENCOUNTER — APPOINTMENT (OUTPATIENT)
Dept: MEDICATION MANAGEMENT | Facility: CLINIC | Age: 54
End: 2024-01-26

## 2024-01-28 LAB
PS-PROTHROM CMPLX IGG SERPL IA-ACNC: <10 UNITS
PS-PROTHROM CMPLX IGM SERPL IA-ACNC: 73 UNITS

## 2024-01-29 NOTE — ASSESSMENT
[FreeTextEntry1] : 53 F with a history of multiple sclerosis with optic neuritis with ongoing intermittent visual symptoms, periventricular white matter lesions, and cervical spine lesion found to have positive TOMMY 1:160 and positive aCL IgM 60s.  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 Anticardiolipin IgM is thought to have a low-risk thrombosis profile and not as thrombotic as the Lupus anticoagulant or the IgG isotype. Nevertheless, I would still strongly recommend to keep her on warfarin for thrombotic APS  After a review of her chart as well as a thorough review of the literature available, it is possible that she has an MS-like condition and optic neuritis secondary to APS, however her neurology for likely primary MS. MS-like disease in APS is very difficult to differentiate from true MS, though some observations have been made. MS-like disease from APS: -presents with acute onset of atypical symptoms of MS? --- no -co-existence of other neurological APS manifestations such as headache or epilepsy? --- has h/o migraines since 2015, appear to be refractory to conventional tx, but still, attribution is difficult -history of thrombosis or pregnancy morbidity? ---- yes -APS-related white matter lesions are smaller, localized in the subcortical area, stable over time, and may improve with anticoagulation? ---- no -normal cell count and absence of oligoclonal bands also suggest APS? ---- spinal tap was not done -aPL antibodies in MS-like disease in APS are mostly anticardiolipin IgM, which is also the case with this patient. There is no clear treatment for MS-like disease in APS, but the literature seems to suggest that anticoagulation with warfarin may be effective. Other treatments such as steroids, IVIG, rituximab, PLEX are loosely recommended. She has already received steroids and PLEX. Management of MS and ON per neurology and neuro-ophthalmology  - Lab reviewed Phosphatidyl Serine AB IGM 78, Cardiolipin Ab IgM 56 (persistently >40), Phosphatidylserine/Prothrombin Ab IgM 103 (was 64 on 2/23) - INR 1.9, advise to reach out to the coumadin clinic, will increase the dose after the colonoscopy next week   D/w Dr. Yoon Sanabria MD Rheumatology Fellow PGY-4

## 2024-02-01 ENCOUNTER — OUTPATIENT (OUTPATIENT)
Dept: OUTPATIENT SERVICES | Facility: HOSPITAL | Age: 54
LOS: 1 days | End: 2024-02-01
Payer: COMMERCIAL

## 2024-02-01 ENCOUNTER — APPOINTMENT (OUTPATIENT)
Dept: MEDICATION MANAGEMENT | Facility: CLINIC | Age: 54
End: 2024-02-01

## 2024-02-01 DIAGNOSIS — Z98.84 BARIATRIC SURGERY STATUS: Chronic | ICD-10-CM

## 2024-02-01 DIAGNOSIS — Z79.01 LONG TERM (CURRENT) USE OF ANTICOAGULANTS: ICD-10-CM

## 2024-02-01 DIAGNOSIS — I82.91 CHRONIC EMBOLISM AND THROMBOSIS OF UNSPECIFIED VEIN: ICD-10-CM

## 2024-02-01 DIAGNOSIS — Z90.49 ACQUIRED ABSENCE OF OTHER SPECIFIED PARTS OF DIGESTIVE TRACT: Chronic | ICD-10-CM

## 2024-02-01 LAB
INR PPP: 2.1
PT BLD: 25.6

## 2024-02-01 PROCEDURE — G0463: CPT

## 2024-02-02 DIAGNOSIS — Z79.01 LONG TERM (CURRENT) USE OF ANTICOAGULANTS: ICD-10-CM

## 2024-02-02 DIAGNOSIS — I82.91 CHRONIC EMBOLISM AND THROMBOSIS OF UNSPECIFIED VEIN: ICD-10-CM

## 2024-02-15 ENCOUNTER — OUTPATIENT (OUTPATIENT)
Dept: OUTPATIENT SERVICES | Facility: HOSPITAL | Age: 54
LOS: 1 days | End: 2024-02-15
Payer: COMMERCIAL

## 2024-02-15 ENCOUNTER — APPOINTMENT (OUTPATIENT)
Dept: MEDICATION MANAGEMENT | Facility: CLINIC | Age: 54
End: 2024-02-15

## 2024-02-15 DIAGNOSIS — Z90.49 ACQUIRED ABSENCE OF OTHER SPECIFIED PARTS OF DIGESTIVE TRACT: Chronic | ICD-10-CM

## 2024-02-15 DIAGNOSIS — I82.91 CHRONIC EMBOLISM AND THROMBOSIS OF UNSPECIFIED VEIN: ICD-10-CM

## 2024-02-15 DIAGNOSIS — Z79.01 LONG TERM (CURRENT) USE OF ANTICOAGULANTS: ICD-10-CM

## 2024-02-15 DIAGNOSIS — Z98.84 BARIATRIC SURGERY STATUS: Chronic | ICD-10-CM

## 2024-02-15 LAB
INR PPP: 1.8
PT BLD: 21.9

## 2024-02-15 PROCEDURE — G0463: CPT

## 2024-02-16 DIAGNOSIS — Z79.01 LONG TERM (CURRENT) USE OF ANTICOAGULANTS: ICD-10-CM

## 2024-02-16 DIAGNOSIS — I82.91 CHRONIC EMBOLISM AND THROMBOSIS OF UNSPECIFIED VEIN: ICD-10-CM

## 2024-02-20 ENCOUNTER — APPOINTMENT (OUTPATIENT)
Dept: MEDICATION MANAGEMENT | Facility: CLINIC | Age: 54
End: 2024-02-20

## 2024-02-20 NOTE — ED PROVIDER NOTE - NS ED ROS FT
ROS   GENERAL: No fever, no chills, no malaise, no fatigue   EYE: See HPI  ENT: No earache, no coryza, no sore throat   NECK: No stiffness, no swollen glands, no dysphagia   RESPIRATORY: No cough, no dyspnea, no pleuritic chest pain   HEART: no chest pain, no palpitations, no edema, no jvd   NEUROLOGY: See HPI  SKIN: No rash, no evidence of trauma  All other ROS are negative [id number]

## 2024-02-22 ENCOUNTER — APPOINTMENT (OUTPATIENT)
Dept: MEDICATION MANAGEMENT | Facility: CLINIC | Age: 54
End: 2024-02-22

## 2024-02-22 ENCOUNTER — OUTPATIENT (OUTPATIENT)
Dept: OUTPATIENT SERVICES | Facility: HOSPITAL | Age: 54
LOS: 1 days | End: 2024-02-22
Payer: COMMERCIAL

## 2024-02-22 DIAGNOSIS — I82.91 CHRONIC EMBOLISM AND THROMBOSIS OF UNSPECIFIED VEIN: ICD-10-CM

## 2024-02-22 DIAGNOSIS — Z79.01 LONG TERM (CURRENT) USE OF ANTICOAGULANTS: ICD-10-CM

## 2024-02-22 LAB
INR PPP: 2.7
PT BLD: 32

## 2024-02-22 PROCEDURE — G0463: CPT

## 2024-02-23 DIAGNOSIS — I82.91 CHRONIC EMBOLISM AND THROMBOSIS OF UNSPECIFIED VEIN: ICD-10-CM

## 2024-02-23 DIAGNOSIS — Z79.01 LONG TERM (CURRENT) USE OF ANTICOAGULANTS: ICD-10-CM

## 2024-03-05 ENCOUNTER — APPOINTMENT (OUTPATIENT)
Dept: MEDICATION MANAGEMENT | Facility: CLINIC | Age: 54
End: 2024-03-05

## 2024-03-07 ENCOUNTER — APPOINTMENT (OUTPATIENT)
Dept: MEDICATION MANAGEMENT | Facility: CLINIC | Age: 54
End: 2024-03-07

## 2024-03-07 ENCOUNTER — OUTPATIENT (OUTPATIENT)
Dept: OUTPATIENT SERVICES | Facility: HOSPITAL | Age: 54
LOS: 1 days | End: 2024-03-07
Payer: COMMERCIAL

## 2024-03-07 DIAGNOSIS — Z90.49 ACQUIRED ABSENCE OF OTHER SPECIFIED PARTS OF DIGESTIVE TRACT: Chronic | ICD-10-CM

## 2024-03-07 DIAGNOSIS — Z98.84 BARIATRIC SURGERY STATUS: Chronic | ICD-10-CM

## 2024-03-07 DIAGNOSIS — I82.91 CHRONIC EMBOLISM AND THROMBOSIS OF UNSPECIFIED VEIN: ICD-10-CM

## 2024-03-07 DIAGNOSIS — Z79.01 LONG TERM (CURRENT) USE OF ANTICOAGULANTS: ICD-10-CM

## 2024-03-07 LAB
INR PPP: 3.1
PT BLD: 37.5

## 2024-03-07 PROCEDURE — G0463: CPT

## 2024-03-08 DIAGNOSIS — I82.91 CHRONIC EMBOLISM AND THROMBOSIS OF UNSPECIFIED VEIN: ICD-10-CM

## 2024-03-08 DIAGNOSIS — Z79.01 LONG TERM (CURRENT) USE OF ANTICOAGULANTS: ICD-10-CM

## 2024-03-14 ENCOUNTER — OUTPATIENT (OUTPATIENT)
Dept: OUTPATIENT SERVICES | Facility: HOSPITAL | Age: 54
LOS: 1 days | End: 2024-03-14
Payer: COMMERCIAL

## 2024-03-14 ENCOUNTER — APPOINTMENT (OUTPATIENT)
Dept: MEDICATION MANAGEMENT | Facility: CLINIC | Age: 54
End: 2024-03-14

## 2024-03-14 DIAGNOSIS — Z90.49 ACQUIRED ABSENCE OF OTHER SPECIFIED PARTS OF DIGESTIVE TRACT: Chronic | ICD-10-CM

## 2024-03-14 DIAGNOSIS — I82.91 CHRONIC EMBOLISM AND THROMBOSIS OF UNSPECIFIED VEIN: ICD-10-CM

## 2024-03-14 DIAGNOSIS — Z98.84 BARIATRIC SURGERY STATUS: Chronic | ICD-10-CM

## 2024-03-14 DIAGNOSIS — Z79.01 LONG TERM (CURRENT) USE OF ANTICOAGULANTS: ICD-10-CM

## 2024-03-14 LAB
INR PPP: 2.1
PT BLD: 25.7

## 2024-03-14 PROCEDURE — G0463: CPT

## 2024-03-15 DIAGNOSIS — I82.91 CHRONIC EMBOLISM AND THROMBOSIS OF UNSPECIFIED VEIN: ICD-10-CM

## 2024-03-15 DIAGNOSIS — Z79.01 LONG TERM (CURRENT) USE OF ANTICOAGULANTS: ICD-10-CM

## 2024-03-19 ENCOUNTER — APPOINTMENT (OUTPATIENT)
Dept: MEDICATION MANAGEMENT | Facility: CLINIC | Age: 54
End: 2024-03-19

## 2024-03-26 ENCOUNTER — APPOINTMENT (OUTPATIENT)
Dept: MEDICATION MANAGEMENT | Facility: CLINIC | Age: 54
End: 2024-03-26

## 2024-04-04 ENCOUNTER — OUTPATIENT (OUTPATIENT)
Dept: OUTPATIENT SERVICES | Facility: HOSPITAL | Age: 54
LOS: 1 days | End: 2024-04-04
Payer: COMMERCIAL

## 2024-04-04 ENCOUNTER — APPOINTMENT (OUTPATIENT)
Dept: MEDICATION MANAGEMENT | Facility: CLINIC | Age: 54
End: 2024-04-04

## 2024-04-04 DIAGNOSIS — Z79.01 LONG TERM (CURRENT) USE OF ANTICOAGULANTS: ICD-10-CM

## 2024-04-04 DIAGNOSIS — Z98.84 BARIATRIC SURGERY STATUS: Chronic | ICD-10-CM

## 2024-04-04 DIAGNOSIS — I82.91 CHRONIC EMBOLISM AND THROMBOSIS OF UNSPECIFIED VEIN: ICD-10-CM

## 2024-04-04 DIAGNOSIS — Z90.49 ACQUIRED ABSENCE OF OTHER SPECIFIED PARTS OF DIGESTIVE TRACT: Chronic | ICD-10-CM

## 2024-04-04 LAB
INR PPP: 2.4
PT BLD: 28.7

## 2024-04-04 PROCEDURE — G0463: CPT

## 2024-04-05 DIAGNOSIS — I82.91 CHRONIC EMBOLISM AND THROMBOSIS OF UNSPECIFIED VEIN: ICD-10-CM

## 2024-04-05 DIAGNOSIS — Z79.01 LONG TERM (CURRENT) USE OF ANTICOAGULANTS: ICD-10-CM

## 2024-04-09 ENCOUNTER — APPOINTMENT (OUTPATIENT)
Dept: MEDICATION MANAGEMENT | Facility: CLINIC | Age: 54
End: 2024-04-09

## 2024-04-16 ENCOUNTER — APPOINTMENT (OUTPATIENT)
Dept: MEDICATION MANAGEMENT | Facility: CLINIC | Age: 54
End: 2024-04-16

## 2024-04-22 NOTE — H&P ADULT - NS ATTEND AMEND GEN_ALL_CORE FT
Pt is following up with Dr. Ascencio, cardiology.  Discussed results in office today with patient.  Encourage regular exercise, nutrition and weight loss.  Continue medications as directed.  Will fax results to cardiology for further review.   I agree with the above H&P from [facility]. In addition:    HPI: 52 yoF w/ PMHx of Migraines, HLD, COVID in 12/21, dx'd with MS 6 months ago, hospitalized for Rt eye optic neuritis tx'd with IV steroids x 5 days and then taper x 10 days. Pt now presents with cloudy vision in Rt eye since 12/1. Episodes would last ~30minutes, occurring ~3x per day until this morning. She woke up around 5:30am and has noticed the cloudy vision in Rt eye has been constant since. Pt also admits since this AM she has been experiencing pain (up to 10/10 severity) in her Rt eye with certain EOM's, like when looking laterally & superiorly, feels fine looking downward. Pt admits she had a similar presentation when she was hospitalized for optic neuritis. Pt admits she has a h/o ~2 migraines per week since she was 42 yo, usually they start as a tension in the back of her neck and radiate to the back of her head/ temples and jaw, lasting ~24 hrs. Improves with medications, rest and "blacking everything out". Pt c/o muscle pains, intermittent pain in her Lt knee & Lt shoulder at varying times for years now. Also has a stiff feeling in her wrists & thumbs. Since 10/16 she feels a pulling pain in her Rt sided occipital area, lasting ~30mins. Also since this past summer she has been feeling unsteady when getting up, has fallen twice then which she attributed to not seeing well then. Denies chest pain, dyspnea, diarrhea, fever, chills, headaches at this time.    Labs: Reviewed  Imaging: Reviewed  EKG: Reviewed    PHYSICAL EXAM:  GENERAL: NAD, comfortable appearing  CHEST/LUNG: Clear to auscultation bilaterally; No wheezes, rales or rhonchi  HEART: Regular rate and rhythm; No murmurs, rubs, or gallops, (+)S1, S2  ABDOMEN: Soft, Nontender, Nondistended; Normal Bowel sounds   EXTREMITIES:  2+ Peripheral Pulses, No clubbing, cyanosis, or edema  Neuro: EOM intact but painful with movement, Able to move all extremities equally, R vision decreased compared to L    A/P: 52 yoF w/ PMHx of Migraines, HLD, COVID in 12/21, dx'd with MS 6 months ago now presents with recurrent optic neuritis admitted for IV Solu-medrol. Neurology following with recs: IV Solu-medrol 1G daily with protonix. MRI w/ and w/o contrast. FS. Neuro checks Q6H

## 2024-04-29 DIAGNOSIS — G35 MULTIPLE SCLEROSIS: ICD-10-CM

## 2024-04-29 RX ORDER — FAMOTIDINE 20 MG/1
20 TABLET, FILM COATED ORAL
Refills: 0 | Status: ACTIVE | OUTPATIENT
Start: 2024-04-29

## 2024-04-29 RX ORDER — METHYLPREDNISOLONE 125 MG/2ML
125 INJECTION, POWDER, LYOPHILIZED, FOR SOLUTION INTRAMUSCULAR; INTRAVENOUS
Refills: 0 | Status: ACTIVE | OUTPATIENT
Start: 2024-04-29

## 2024-04-30 ENCOUNTER — APPOINTMENT (OUTPATIENT)
Dept: MEDICATION MANAGEMENT | Facility: CLINIC | Age: 54
End: 2024-04-30

## 2024-05-02 ENCOUNTER — APPOINTMENT (OUTPATIENT)
Dept: MEDICATION MANAGEMENT | Facility: CLINIC | Age: 54
End: 2024-05-02

## 2024-05-02 ENCOUNTER — OUTPATIENT (OUTPATIENT)
Dept: OUTPATIENT SERVICES | Facility: HOSPITAL | Age: 54
LOS: 1 days | End: 2024-05-02
Payer: COMMERCIAL

## 2024-05-02 DIAGNOSIS — Z79.01 LONG TERM (CURRENT) USE OF ANTICOAGULANTS: ICD-10-CM

## 2024-05-02 DIAGNOSIS — I82.91 CHRONIC EMBOLISM AND THROMBOSIS OF UNSPECIFIED VEIN: ICD-10-CM

## 2024-05-02 DIAGNOSIS — Z98.84 BARIATRIC SURGERY STATUS: Chronic | ICD-10-CM

## 2024-05-02 DIAGNOSIS — Z90.49 ACQUIRED ABSENCE OF OTHER SPECIFIED PARTS OF DIGESTIVE TRACT: Chronic | ICD-10-CM

## 2024-05-02 PROCEDURE — G0463: CPT

## 2024-05-03 DIAGNOSIS — Z79.01 LONG TERM (CURRENT) USE OF ANTICOAGULANTS: ICD-10-CM

## 2024-05-03 DIAGNOSIS — I82.91 CHRONIC EMBOLISM AND THROMBOSIS OF UNSPECIFIED VEIN: ICD-10-CM

## 2024-05-15 ENCOUNTER — APPOINTMENT (OUTPATIENT)
Dept: NEUROLOGY | Facility: CLINIC | Age: 54
End: 2024-05-15

## 2024-05-17 RX ORDER — OCRELIZUMAB 300 MG/10ML
300 INJECTION INTRAVENOUS
Qty: 2 | Refills: 1 | Status: ACTIVE | COMMUNITY
Start: 2023-10-24 | End: 1900-01-01

## 2024-05-17 RX ORDER — OCRELIZUMAB 300 MG/10ML
300 INJECTION INTRAVENOUS
Qty: 2 | Refills: 1 | Status: DISCONTINUED | COMMUNITY
Start: 2022-10-12 | End: 2024-05-17

## 2024-05-29 ENCOUNTER — APPOINTMENT (OUTPATIENT)
Dept: NEUROLOGY | Facility: CLINIC | Age: 54
End: 2024-05-29
Payer: COMMERCIAL

## 2024-05-29 ENCOUNTER — APPOINTMENT (OUTPATIENT)
Dept: MEDICATION MANAGEMENT | Facility: CLINIC | Age: 54
End: 2024-05-29

## 2024-05-29 ENCOUNTER — OUTPATIENT (OUTPATIENT)
Dept: OUTPATIENT SERVICES | Facility: HOSPITAL | Age: 54
LOS: 1 days | End: 2024-05-29
Payer: COMMERCIAL

## 2024-05-29 VITALS — DIASTOLIC BLOOD PRESSURE: 76 MMHG | SYSTOLIC BLOOD PRESSURE: 118 MMHG | RESPIRATION RATE: 18 BRPM | HEART RATE: 59 BPM

## 2024-05-29 VITALS — DIASTOLIC BLOOD PRESSURE: 73 MMHG | SYSTOLIC BLOOD PRESSURE: 116 MMHG | RESPIRATION RATE: 18 BRPM | HEART RATE: 50 BPM

## 2024-05-29 VITALS — HEART RATE: 63 BPM | SYSTOLIC BLOOD PRESSURE: 123 MMHG | DIASTOLIC BLOOD PRESSURE: 82 MMHG | RESPIRATION RATE: 18 BRPM

## 2024-05-29 VITALS — HEART RATE: 62 BPM | SYSTOLIC BLOOD PRESSURE: 121 MMHG | RESPIRATION RATE: 18 BRPM | DIASTOLIC BLOOD PRESSURE: 75 MMHG

## 2024-05-29 VITALS — RESPIRATION RATE: 18 BRPM | HEART RATE: 60 BPM | DIASTOLIC BLOOD PRESSURE: 71 MMHG | SYSTOLIC BLOOD PRESSURE: 115 MMHG

## 2024-05-29 VITALS — HEART RATE: 75 BPM | DIASTOLIC BLOOD PRESSURE: 57 MMHG | SYSTOLIC BLOOD PRESSURE: 98 MMHG | RESPIRATION RATE: 18 BRPM

## 2024-05-29 DIAGNOSIS — Z79.01 LONG TERM (CURRENT) USE OF ANTICOAGULANTS: ICD-10-CM

## 2024-05-29 DIAGNOSIS — Z98.84 BARIATRIC SURGERY STATUS: Chronic | ICD-10-CM

## 2024-05-29 DIAGNOSIS — Z90.49 ACQUIRED ABSENCE OF OTHER SPECIFIED PARTS OF DIGESTIVE TRACT: Chronic | ICD-10-CM

## 2024-05-29 DIAGNOSIS — I82.91 CHRONIC EMBOLISM AND THROMBOSIS OF UNSPECIFIED VEIN: ICD-10-CM

## 2024-05-29 LAB
INR PPP: 2.6
PT BLD: 31.8

## 2024-05-29 PROCEDURE — G0463: CPT

## 2024-05-29 PROCEDURE — 96415 CHEMO IV INFUSION ADDL HR: CPT

## 2024-05-29 PROCEDURE — 96413 CHEMO IV INFUSION 1 HR: CPT

## 2024-05-29 NOTE — HISTORY OF PRESENT ILLNESS
[6] : 6 [Denies] : Denies [No] : No [Yes] : Yes [22g] : 22g [Start Time: ___] : Medication Start Time: [unfilled] [Medication Name: ___] : Medication Name: [unfilled] [End Time: ___] : Medication End Time: [unfilled] [IV discontinued. Intact. No signs or symptoms of IV complications noted. Time: ___] : IV discontinued. Intact. No signs or symptoms of IV complications noted. Time: [unfilled] [Patient  instructed to seek medical attention with signs and symptoms of adverse effects] : Patient  instructed to seek medical attention with signs and symptoms of adverse effects [Patient left unit in no acute distress] : Patient left unit in no acute distress [Medications administered as ordered and tolerated well.] : Medications administered as ordered and tolerated well. [de-identified] : bilateral calves [de-identified] : GAMALIEL GARNER [de-identified] : Generic: ocrelizumab   Dose: 600 mg   Infusion rate:                         40 _ml/hr for 30_mins                       80_ml/hr for 30_mins                       120_ml/hr for 30_mins                       160_ml/hr for 30_ mins                    200  _ml/hr for remainder of treatment         NDC#: 40156-700-60   Lot#: [id number]   Exp: 01/2026   Was this medication buy and bill? No   Specialty Pharmacy: [facility]   Dx: Relapsing remitting multiple sclerosis (G35)   MD Prescriber: Dr. Trujillo      CPT: 43301, 51366   Pre-medication: Administered at 0920   Acetaminophen: 650mg Route: PO   Famotidine 20mg Route: PO   Diphenhydramine: _50_mg Route: _PO_   NDC#:   Lot #:   Exp:   Methylprednisone: 125__mg Route: IVP   NDC# 0062-3820-76   Lot# [id number]   Exp: 07/2026     Observation: Patient A&Ox4, patient made aware of 1 hour post infusion observation. Patient refused the observation period.   Next Appointment: 6 months- Sindy titused              [de-identified] : [id number]

## 2024-05-30 DIAGNOSIS — Z79.01 LONG TERM (CURRENT) USE OF ANTICOAGULANTS: ICD-10-CM

## 2024-05-30 DIAGNOSIS — I82.91 CHRONIC EMBOLISM AND THROMBOSIS OF UNSPECIFIED VEIN: ICD-10-CM

## 2024-06-25 ENCOUNTER — APPOINTMENT (OUTPATIENT)
Dept: MEDICATION MANAGEMENT | Facility: CLINIC | Age: 54
End: 2024-06-25

## 2024-06-25 ENCOUNTER — OUTPATIENT (OUTPATIENT)
Dept: OUTPATIENT SERVICES | Facility: HOSPITAL | Age: 54
LOS: 1 days | End: 2024-06-25
Payer: COMMERCIAL

## 2024-06-25 DIAGNOSIS — I82.91 CHRONIC EMBOLISM AND THROMBOSIS OF UNSPECIFIED VEIN: ICD-10-CM

## 2024-06-25 DIAGNOSIS — Z90.49 ACQUIRED ABSENCE OF OTHER SPECIFIED PARTS OF DIGESTIVE TRACT: Chronic | ICD-10-CM

## 2024-06-25 DIAGNOSIS — Z79.01 LONG TERM (CURRENT) USE OF ANTICOAGULANTS: ICD-10-CM

## 2024-06-25 LAB
INR PPP: 3.4
PT BLD: 40.9

## 2024-06-25 PROCEDURE — G0463: CPT

## 2024-06-26 DIAGNOSIS — I82.91 CHRONIC EMBOLISM AND THROMBOSIS OF UNSPECIFIED VEIN: ICD-10-CM

## 2024-06-26 DIAGNOSIS — Z79.01 LONG TERM (CURRENT) USE OF ANTICOAGULANTS: ICD-10-CM

## 2024-07-03 ENCOUNTER — APPOINTMENT (OUTPATIENT)
Dept: RHEUMATOLOGY | Facility: CLINIC | Age: 54
End: 2024-07-03

## 2024-07-03 VITALS
RESPIRATION RATE: 16 BRPM | OXYGEN SATURATION: 98 % | SYSTOLIC BLOOD PRESSURE: 114 MMHG | WEIGHT: 203 LBS | DIASTOLIC BLOOD PRESSURE: 74 MMHG | HEIGHT: 68 IN | HEART RATE: 69 BPM | BODY MASS INDEX: 30.77 KG/M2 | TEMPERATURE: 98.1 F

## 2024-07-03 DIAGNOSIS — D68.61 ANTIPHOSPHOLIPID SYNDROME: ICD-10-CM

## 2024-07-03 PROCEDURE — G2211 COMPLEX E/M VISIT ADD ON: CPT | Mod: NC

## 2024-07-03 PROCEDURE — 99214 OFFICE O/P EST MOD 30 MIN: CPT

## 2024-07-05 ENCOUNTER — APPOINTMENT (OUTPATIENT)
Dept: OPHTHALMOLOGY | Facility: CLINIC | Age: 54
End: 2024-07-05
Payer: COMMERCIAL

## 2024-07-05 ENCOUNTER — NON-APPOINTMENT (OUTPATIENT)
Age: 54
End: 2024-07-05

## 2024-07-05 PROCEDURE — 92083 EXTENDED VISUAL FIELD XM: CPT

## 2024-07-05 PROCEDURE — 99214 OFFICE O/P EST MOD 30 MIN: CPT

## 2024-07-05 PROCEDURE — 92133 CPTRZD OPH DX IMG PST SGM ON: CPT

## 2024-07-17 ENCOUNTER — APPOINTMENT (OUTPATIENT)
Dept: MRI IMAGING | Facility: CLINIC | Age: 54
End: 2024-07-17

## 2024-07-17 PROCEDURE — A9585: CPT | Mod: JW

## 2024-07-17 PROCEDURE — 70553 MRI BRAIN STEM W/O & W/DYE: CPT

## 2024-07-21 ENCOUNTER — NON-APPOINTMENT (OUTPATIENT)
Age: 54
End: 2024-07-21

## 2024-07-22 ENCOUNTER — APPOINTMENT (OUTPATIENT)
Dept: NEUROLOGY | Facility: CLINIC | Age: 54
End: 2024-07-22
Payer: COMMERCIAL

## 2024-07-22 VITALS
DIASTOLIC BLOOD PRESSURE: 82 MMHG | HEART RATE: 78 BPM | SYSTOLIC BLOOD PRESSURE: 120 MMHG | WEIGHT: 198 LBS | HEIGHT: 68 IN | BODY MASS INDEX: 30.01 KG/M2

## 2024-07-22 DIAGNOSIS — G35 MULTIPLE SCLEROSIS: ICD-10-CM

## 2024-07-22 DIAGNOSIS — G43.909 MIGRAINE, UNSPECIFIED, NOT INTRACTABLE, W/OUT STATUS MIGRAINOSUS: ICD-10-CM

## 2024-07-22 LAB
ALBUMIN SERPL ELPH-MCNC: 4.5 G/DL
ALP BLD-CCNC: 131 U/L
ALT SERPL-CCNC: 54 U/L
ANION GAP SERPL CALC-SCNC: 12 MMOL/L
AST SERPL-CCNC: 58 U/L
BASOPHILS # BLD AUTO: 0.03 K/UL
BASOPHILS NFR BLD AUTO: 0.5 %
BILIRUB SERPL-MCNC: 0.2 MG/DL
BUN SERPL-MCNC: 8 MG/DL
CALCIUM SERPL-MCNC: 9 MG/DL
CHLORIDE SERPL-SCNC: 108 MMOL/L
CO2 SERPL-SCNC: 23 MMOL/L
CREAT SERPL-MCNC: 0.68 MG/DL
CRP SERPL-MCNC: 3 MG/L
DEPRECATED KAPPA LC FREE/LAMBDA SER: 1.51 RATIO
EGFR: 105 ML/MIN/1.73M2
EOSINOPHIL # BLD AUTO: 0.09 K/UL
EOSINOPHIL NFR BLD AUTO: 1.4 %
ERYTHROCYTE [SEDIMENTATION RATE] IN BLOOD BY WESTERGREN METHOD: 21 MM/HR
GLUCOSE SERPL-MCNC: 83 MG/DL
HCT VFR BLD CALC: 40.9 %
HGB BLD-MCNC: 13.1 G/DL
IGA SER QL IEP: 251 MG/DL
IGG SER QL IEP: 909 MG/DL
IGM SER QL IEP: 132 MG/DL
IMM GRANULOCYTES NFR BLD AUTO: 0.3 %
KAPPA LC CSF-MCNC: 1.37 MG/DL
KAPPA LC SERPL-MCNC: 2.07 MG/DL
LYMPHOCYTES # BLD AUTO: 1.92 K/UL
LYMPHOCYTES NFR BLD AUTO: 30.4 %
MAN DIFF?: NORMAL
MCHC RBC-ENTMCNC: 26.6 PG
MCHC RBC-ENTMCNC: 32 GM/DL
MCV RBC AUTO: 83 FL
MONOCYTES # BLD AUTO: 0.34 K/UL
MONOCYTES NFR BLD AUTO: 5.4 %
NEUTROPHILS # BLD AUTO: 3.92 K/UL
NEUTROPHILS NFR BLD AUTO: 62 %
PLATELET # BLD AUTO: 306 K/UL
POTASSIUM SERPL-SCNC: 4.1 MMOL/L
PROT SERPL-MCNC: 7.6 G/DL
RBC # BLD: 4.93 M/UL
RBC # FLD: 19.4 %
SODIUM SERPL-SCNC: 143 MMOL/L
WBC # FLD AUTO: 6.32 K/UL

## 2024-07-22 PROCEDURE — 99214 OFFICE O/P EST MOD 30 MIN: CPT

## 2024-07-22 PROCEDURE — G2211 COMPLEX E/M VISIT ADD ON: CPT | Mod: NC

## 2024-07-22 RX ORDER — GALCANEZUMAB 120 MG/ML
120 INJECTION, SOLUTION SUBCUTANEOUS
Qty: 2 | Refills: 0 | Status: ACTIVE | COMMUNITY
Start: 2024-07-22 | End: 1900-01-01

## 2024-07-22 RX ORDER — RIMEGEPANT SULFATE 75 MG/75MG
75 TABLET, ORALLY DISINTEGRATING ORAL
Qty: 1 | Refills: 11 | Status: ACTIVE | COMMUNITY
Start: 2024-07-22 | End: 1900-01-01

## 2024-07-22 NOTE — ASSESSMENT
[FreeTextEntry1] : Assessment/Plan:  53 year old female w/ relapsing multiple sclerosis (dx'd 7/2022), on Ocrevus since 11/2022. Of note, also diagnosed with APS and on coumadin. She remains clinically and radiologically stable from MS standpoint.    Relapses:  5/2022- numbness b/l feet (likely thoracic myelitis)  7/14/2022: R ON 12/5/2022- Relapse of R ON   Isabel had a relapse with recurrent optic neuritis OD early Dec 2022, s/p IVMP and PLEX with improvement in vision (though not 100%)- this occurred 2 weeks after second split dose of Ocrelizumab. I suspect the relapse occurred as the Ocrevus did not reach full efficacy (this is not a treatment failure). I recommend she continue Ocrevus as scheduled. She continues to complain of intermittent blurry vision OS- dry eyes vs migraine related?.   Although APS can cause a "MS like disease", I do believe she also has definite multiple sclerosis as well and her visual loss episodes (7'22 and 12'22) were both 2/2 optic neurits and not AION (which would fit more with vascular pathology in APS), given accompanied ocular pain and improvement with steroids. Furthermore, her brain lesions are characteristic for MS and she also has a thoracic cord lesion, which further support diagnosis of MS. I do not see any value in obtaining a spinal tap at this time.  She remains stable, no new or worsening MS symptoms. Recent worsening migraine headaches since she stopped emgality injections.   Plan: 1. Diagnostic Plan/Imaging: MRI C/T spine w/o contrast for radiological stability. Plan to repeat MR brain and C spine w/o contrast again in 1 year (7/2025)  2. Disease Modifying therapy plan: Continue Ocrevus infusion 600 mg [id number] Ocrevus labs every 6 months    3. Symptomatic therapy plan: () Vitamin D3 and B12 supplementations.   4. Migraine headaches: Freq: 4 x /week [] Resume emgality every 4 weeks SQ injection. Reviewed side effects. (It is unclear whether the hives she developed after the last emgality infusion was truly an allergic reaction). Given that it has been > 3 months since last shot, would repeat loading dose of emgality.  [] Can remain off nortriptyline. [] Failed 2 triptans (eletriptan and sumatriptan). Will prescribe nurtec 75 mg QD as needed for headaches. Reviewed s/e.  5. Mild transaminitis- This could be drug induced. Liver USG nl per patient. Continue to follow with PCP.  6. Myalgias- non specific, unclear etiology, may or may not be related to her infusion. Her symptoms began after last Ocrevus infusion. Will check muscle enzymes. Encouraged hydration.   Return to clinic 3 months  Lydia Trujillo M.D.

## 2024-07-22 NOTE — HISTORY OF PRESENT ILLNESS
[FreeTextEntry1] : INTERIM HX 07/22/2024: MR brain 7/17/24- stable.  Seen by Dr Bamu 7/5-  20/60 OD and 20/40 OS, persistent temporal visual field defect OD. Since last Ocrevus infusion 4/2024, she has been complaining of pain in her legs affecting the thighs and the lower legs. She endorses tightness/ soreness sensation in her legs. She tried Tylenol and tizanidine with no improvement. She has started new medication (Tirzepatide-Zep bound) for weight lost prescribed by her PCP and has lost 5 pounds.  Still getting PT 4x/week.  Migraines still not controlled (4x/week), however she is not tolerating migraine medication. Didn't tolerate Emgality due to local allergic reaction, Triptans (tiredness), ad amitriptyline (fogginess). Currently takes Tylenol only with some relive.     INTERIM HX 09/11/2023: R hip arthritis better with PT, walks with cane. Gets cramping in calf muscles, early morning and afternoon, not daily. Takes tizanidine PRN, works better than flexeril, takes it every other day. Migraines 3 x / week, last 2 hours, getting better, eletriptan helps.   INTERIM HX 04/27/2023: Diagnosed with APS, seeing rheum and hematology, on coumadin. 3/31/23 brain MRI- stable. She quit smoking 3 months ago.  Been complaining of R hip and R knee pain. Diffuse body aches. MS is stable.  Migraines unchanged. On emgality since Jan, has decreased intensity of headaches. Frequency 2-4 x/week. Eletriptan helps for abortive therapy, within 30 min.  right eye visual disturbances unchanged.    INTERIM HX 02/10/2023: s/p Ocrevus split dosing 11/9 and 11/23. Admitted to [facility] 12/5-12/22 for worsening vision loss in right eye, "complete cloud over right eye" + eye pain. VA 20/400 on admission and also reportedly worsened to 20/800 OD, color vision 0/12 OD. Treated with IVMP and 7 sessions of PLEX with some improvement in symptoms. MRI imaging or orbits, brain, C/T spine w/w/o contrast obtained in hospital 12/7/2022 and were all stable, no definite optic nerve signal abnormality/enh noted.  Followed up with Dr. Baum 2/8/2023, VA 20/100 OD and 20/30 OS, trace APD OD, no disc edema, nasal hemifield defect OD, OCT normal. Pt reports her vision has improved 70% better. No new symptom. Still gets intermittent blurry vision in left eye - ? dry eyes vs migraine related. Still gets migraines 2x /week. Maxalt takes long time to kick in. 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 She has 3 kids.  Stopped smoking.   INTERIM HX 10/14/2022: PT returns for a follow up, reports new symptoms. Since our visit last week, she has been reporting daily migraine headaches, neck muscle soreness, body soreness, and especially left knee pain that is affecting her gait. She denies any significant weakness or new sensory symptoms. She has been doing yoga exercises.   Of note, PT got a call from insurance company and her Ocrevus has been approved.   INTERIM HX 10/05/2022: Trouble sleeping at night. Migraines are better, once a week. Maxalt as needed and nortriptyline QD. Saw Dr. Baum 9/13/2022 and VA 20/60 OD and 20/30 OS, improvement in visual field on the right. No brand new symptoms. Occasional sees flashes of light in right eye and blue/white spots- this lasted 10 seconds at a time. Still pending Ocrevus approval. She is in good spirits.   INTERIM HX 09/01/2022: Followed up with Dr Baum 8/12/2022 and VA improved from 20/200 OD to 20/50 OD, but with persistent superior visual field loss. She completed the 2nd course of steroids as well. Migraines are still daily. Sumatriptan not working. No brand new symptoms.   Pre-DMT labs (date)-  CBC w/ diff WNL,  LFT normal Bun and Cr normal,  Quant TB neg, VZV IgG + and IgM + (index 1.14), -> ? past infection.  Denies any recent shingles infection or chicken pox. No recent known exposures.  Hep B surface Ab -,  HEp B surface antigen and cord antibody negative. JCV ab positive 2.11 Vitamin D 25.6 -------------------------------------------------------  HPI (initial visit Aug 04, 2022)- Isabel Dangelo is a 51 year old female with pmhx of migraines and HLD, admitted to [facility] 7/14/2022-7/18/2022 for 4 day hx of progressive vision loss in right eye (predominately involving superior quadrant) and associated pain with eye movements. Her exam was notable for RAPD OD,  superior visual field cut OD with red color desaturation OD. Of note, she also reportedly experienced 1 week of bilateral foot numbness in 5/2022, that then resolved. She denies any other significant episodic neurological symptoms. NO recent vaccination. NO recent infections. Ophthalmology and neurology were consulted.   MRI orbits 7/14/2022 confirmed evidence of right intraorbital segment optic neuritis/perineuritis.  No dedicated brain MRI initially obtained.  MRI C/T 7/14/2022 spine with evidence of dorsal cord lesion at T9 with subtle contrast enhancement. Normal cervical spine.   Pt admitted for management of Optic Neuritis and further workup. Patient received 5 days of IV Solumedrol 1000mg (7/14-7/18) with symptom improvement and and was d/c with steroid taper for 10 days.   A dedicated T2 FLAIR MRI w/wo contrast was performed 7/18/2022 showing Abnormal T2 prolongation in the periventricular white matter region with seay finger appearance and + T1 black holes. No infratentorial lesions.   Labs:- ACE neg, SSA/SSB neg, Lyme neg, Syphilis neg, serum NMO and MOG ab neg. DsDNA neg.   Interim Hx  - She reports since the hospitalization the toes on the left have been constantly numb.Also experiencing cramps in legs. Seen by Dr. Baum 7/25/2022- VA 20/200 OD and 20/70 OS, 2+ RAPD, superior altitudinal defect OD. She completed prednisone taper 7/29/2022. Vision is better- 30% better.  She has a hx of migraine headaches since 2015 - occipital and radiates to top of head. + Phonophobia, + Photophobia. + N/V. Headaches are frequent- almost daily. lasted 3-4 hours. Always at night. Headaches were better on steroids. Ibuprofen helps. She works at a car [employer].

## 2024-07-22 NOTE — DATA REVIEWED
[de-identified] : MR brain 7/17/2024: Stable exam.  3/31/23 brain MRI- stable  12/7/2022- MRI imaging or orbits, brain, C/T spine w/w/o contrast obtained in hospital 12/7/2022 and were all stable, no definite optic nerve signal abnormality/enh noted.  MRI orbits 7/14/2022 confirmed evidence of right intraorbital segment optic neuritis/perineuritis.  No dedicated brain MRI initially obtained.  MRI C/T 7/14/2022 spine with evidence of dorsal cord lesion at T9 with subtle contrast enhancement. Normal cervical spine.    A dedicated T2 FLAIR MRI w/wo contrast was performed 7/18/2022 showing Abnormal T2 prolongation in the periventricular white matter region with seay finger appearance and + T1 dark holes. No infratentorial lesions.

## 2024-07-23 ENCOUNTER — TRANSCRIPTION ENCOUNTER (OUTPATIENT)
Age: 54
End: 2024-07-23

## 2024-07-23 ENCOUNTER — APPOINTMENT (OUTPATIENT)
Dept: MEDICATION MANAGEMENT | Facility: CLINIC | Age: 54
End: 2024-07-23

## 2024-07-23 LAB
ALDOLASE SERPL-CCNC: 8.1 U/L
CK SERPL-CCNC: 202 U/L
INR PPP: 3.2
MYOGLOBIN SERPL-MCNC: 33 NG/ML
PT BLD: 38.2

## 2024-07-30 ENCOUNTER — APPOINTMENT (OUTPATIENT)
Dept: MRI IMAGING | Facility: CLINIC | Age: 54
End: 2024-07-30
Payer: COMMERCIAL

## 2024-07-30 PROCEDURE — 72146 MRI CHEST SPINE W/O DYE: CPT

## 2024-07-30 PROCEDURE — 72141 MRI NECK SPINE W/O DYE: CPT

## 2024-08-02 ENCOUNTER — TRANSCRIPTION ENCOUNTER (OUTPATIENT)
Age: 54
End: 2024-08-02

## 2024-08-14 ENCOUNTER — APPOINTMENT (OUTPATIENT)
Dept: MEDICATION MANAGEMENT | Facility: CLINIC | Age: 54
End: 2024-08-14

## 2024-08-14 LAB
INR PPP: 2
PT BLD: 23.8

## 2024-09-10 ENCOUNTER — APPOINTMENT (OUTPATIENT)
Dept: MEDICATION MANAGEMENT | Facility: CLINIC | Age: 54
End: 2024-09-10

## 2024-09-10 ENCOUNTER — OUTPATIENT (OUTPATIENT)
Dept: OUTPATIENT SERVICES | Facility: HOSPITAL | Age: 54
LOS: 1 days | End: 2024-09-10
Payer: COMMERCIAL

## 2024-09-10 DIAGNOSIS — I82.91 CHRONIC EMBOLISM AND THROMBOSIS OF UNSPECIFIED VEIN: ICD-10-CM

## 2024-09-10 DIAGNOSIS — Z90.49 ACQUIRED ABSENCE OF OTHER SPECIFIED PARTS OF DIGESTIVE TRACT: Chronic | ICD-10-CM

## 2024-09-10 DIAGNOSIS — Z98.84 BARIATRIC SURGERY STATUS: Chronic | ICD-10-CM

## 2024-09-10 DIAGNOSIS — Z79.01 LONG TERM (CURRENT) USE OF ANTICOAGULANTS: ICD-10-CM

## 2024-09-10 LAB
INR PPP: 2.3
PT BLD: 27.4

## 2024-09-10 PROCEDURE — G0463: CPT

## 2024-09-11 DIAGNOSIS — I82.91 CHRONIC EMBOLISM AND THROMBOSIS OF UNSPECIFIED VEIN: ICD-10-CM

## 2024-09-11 DIAGNOSIS — Z79.01 LONG TERM (CURRENT) USE OF ANTICOAGULANTS: ICD-10-CM

## 2024-09-23 ENCOUNTER — APPOINTMENT (OUTPATIENT)
Dept: NEUROLOGY | Facility: CLINIC | Age: 54
End: 2024-09-23
Payer: COMMERCIAL

## 2024-09-23 VITALS
DIASTOLIC BLOOD PRESSURE: 76 MMHG | BODY MASS INDEX: 28.79 KG/M2 | HEART RATE: 67 BPM | WEIGHT: 190 LBS | HEIGHT: 68 IN | SYSTOLIC BLOOD PRESSURE: 107 MMHG

## 2024-09-23 DIAGNOSIS — G43.909 MIGRAINE, UNSPECIFIED, NOT INTRACTABLE, W/OUT STATUS MIGRAINOSUS: ICD-10-CM

## 2024-09-23 DIAGNOSIS — G35 MULTIPLE SCLEROSIS: ICD-10-CM

## 2024-09-23 PROCEDURE — 99214 OFFICE O/P EST MOD 30 MIN: CPT

## 2024-09-23 PROCEDURE — G2211 COMPLEX E/M VISIT ADD ON: CPT | Mod: NC

## 2024-09-23 NOTE — PHYSICAL EXAM
[FreeTextEntry1] : AAOx3 Trace RAPD OD, EOM intact Motor strength 5/5 in both upper and lower extremities  REFLEXES: 2/4 throughout, bilateral flexor plantar COORD: Normal finger to nose and heel to shin, no tremor, no dysmetria. Gait is antalgic

## 2024-09-23 NOTE — ASSESSMENT
[FreeTextEntry1] : Assessment/Plan:  53 year old female w/ relapsing multiple sclerosis (dx'd 7/2022), on Ocrevus since 11/2022. Of note, also diagnosed with APS and on coumadin. She remains clinically and radiologically stable from MS standpoint.   Relapses:  5/2022- numbness b/l feet (likely thoracic myelitis)  7/14/2022: R ON 12/5/2022- Relapse of R ON   Isabel had a relapse with recurrent optic neuritis OD early Dec 2022, s/p IVMP and PLEX with improvement in vision (though not 100%)- this occurred 2 weeks after second split dose of Ocrelizumab. I suspect the relapse occurred as the Ocrevus did not reach full efficacy (this is not a treatment failure). I recommend she continue Ocrevus as scheduled. She continues to complain of intermittent blurry vision OS- dry eyes vs migraine related?.   Although APS can cause a "MS like disease", I do believe she also has definite multiple sclerosis as well and her visual loss episodes (7'22 and 12'22) were both 2/2 optic neurits and not AION (which would fit more with vascular pathology in APS), given accompanied ocular pain and improvement with steroids. Furthermore, her brain lesions are characteristic for MS and she also has a thoracic cord lesion, which further support diagnosis of MS. I do not see any value in obtaining a spinal tap at this time.   Plan: 1. Diagnostic Plan/Imaging: Plan to repeat MR brain and C spine w/o contrast again in 1 year (7/2025)  2. Disease Modifying therapy plan: Continue Ocrevus infusion 600 mg [id number] Ocrevus labs every 6 months   3. Symptomatic therapy plan: () Vitamin D3 and B12 supplementations.   4. Migraine headaches: Freq: 2-3 x /week c/b medication overuse headaches [] Resume emgality every 4 weeks SQ injection. Reviewed side effects. (It is unclear whether the hives she developed after the last emgality infusion was truly an allergic reaction). Given that it has been > 3 months since last shot, would repeat loading dose of emgality. *Needs PA* [] Continue nortriptyline for now. [] Failed 2 triptans (eletriptan and sumatriptan). Continue nurtec 75 mg QD as needed for headaches. Reviewed s/e. [] Advised cutting down on Tylenol use  5. Mild transaminitis- This could be drug induced (? Tylenol). Liver USG nl per patient. Continue to follow with PCP.  6. Myalgias- non specific. Will continue to monitor.   Return to clinic 6 months  Lydia Trujillo M.D.

## 2024-09-23 NOTE — DATA REVIEWED
[de-identified] : MR C/T spine w/w/o 7/30/2024 (personally reviewed)- Normal cord, prior T9 dorsal cord lesion not entirely visible on current scan  3/31/23 brain MRI- stable  12/7/2022- MRI imaging or orbits, brain, C/T spine w/w/o contrast obtained in hospital 12/7/2022 and were all stable, no definite optic nerve signal abnormality/enh noted.  MRI orbits 7/14/2022 confirmed evidence of right intraorbital segment optic neuritis/perineuritis.  No dedicated brain MRI initially obtained.  MRI C/T 7/14/2022 spine with evidence of dorsal cord lesion at T9 with subtle contrast enhancement. Normal cervical spine.    A dedicated T2 FLAIR MRI w/wo contrast was performed 7/18/2022 showing Abnormal T2 prolongation in the periventricular white matter region with seay finger appearance and + T1 dark holes. No infratentorial lesions.

## 2024-09-23 NOTE — HISTORY OF PRESENT ILLNESS
[FreeTextEntry1] : INTERIM HX 09/23/2024: She is stable from MS standpoint. Continues to get headaches, though mildly better now 2-3x/week, never got emgality due to insurance issues. Taking nurtec PRN, also taking a lot of tylenol for headaches.  MR C/T spine w/w/o 7/30/2024 (personally reviewed)- Normal cord, prior T9 dorsal cord lesion not entirely visible on current scan  INTERIM HX 07/22/2024: MR brain 7/17/24- stable. Seen by Dr Baum 7/5-  20/60 OD and 20/40 OS, persistent temporal visual field defect OD. Since last Ocrevus infusion 4/2024, she has been complaining of pain in her legs affecting the thighs and the lower legs. She endorses tightness/ soreness sensation in her legs. She tried Tylenol and tizanidine with no improvement. She has started new medication (Tirzepatide-Zep bound) for weight lost prescribed by her PCP and has lost 5 pounds. Still getting PT 4x/week. Migraines still not controlled (4x/week), however she is not tolerating migraine medication. Didn't tolerate Emgality due to local allergic reaction, Triptans (tiredness), ad amitriptyline (fogginess). Currently takes Tylenol only with some relive.  INTERIM HX 09/11/2023: R hip arthritis better with PT, walks with cane. Gets cramping in calf muscles, early morning and afternoon, not daily. Takes tizanidine PRN, works better than flexeril, takes it every other day. Migraines 3 x / week, last 2 hours, getting better, eletriptan helps.   INTERIM HX 04/27/2023: Diagnosed with APS, seeing rheum and hematology, on coumadin. 3/31/23 brain MRI- stable. She quit smoking 3 months ago.  Been complaining of R hip and R knee pain. Diffuse body aches. MS is stable.  Migraines unchanged. On emgality since Jan, has decreased intensity of headaches. Frequency 2-4 x/week. Eletriptan helps for abortive therapy, within 30 min.  right eye visual disturbances unchanged.    INTERIM HX 02/10/2023: s/p Ocrevus split dosing 11/9 and 11/23. Admitted to [facility] 12/5-12/22 for worsening vision loss in right eye, "complete cloud over right eye" + eye pain. VA 20/400 on admission and also reportedly worsened to 20/800 OD, color vision 0/12 OD. Treated with IVMP and 7 sessions of PLEX with some improvement in symptoms. MRI imaging or orbits, brain, C/T spine w/w/o contrast obtained in hospital 12/7/2022 and were all stable, no definite optic nerve signal abnormality/enh noted.  Followed up with Dr. Baum 2/8/2023, VA 20/100 OD and 20/30 OS, trace APD OD, no disc edema, nasal hemifield defect OD, OCT normal. Pt reports her vision has improved 70% better. No new symptom. Still gets intermittent blurry vision in left eye - ? dry eyes vs migraine related. Still gets migraines 2x /week. Maxalt takes long time to kick in. 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 She has 3 kids.  Stopped smoking.   INTERIM HX 10/14/2022: PT returns for a follow up, reports new symptoms. Since our visit last week, she has been reporting daily migraine headaches, neck muscle soreness, body soreness, and especially left knee pain that is affecting her gait. She denies any significant weakness or new sensory symptoms. She has been doing yoga exercises.   Of note, PT got a call from insurance company and her Ocrevus has been approved.   INTERIM HX 10/05/2022: Trouble sleeping at night. Migraines are better, once a week. Maxalt as needed and nortriptyline QD. Saw Dr. Baum 9/13/2022 and VA 20/60 OD and 20/30 OS, improvement in visual field on the right. No brand new symptoms. Occasional sees flashes of light in right eye and blue/white spots- this lasted 10 seconds at a time. Still pending Ocrevus approval. She is in good spirits.   INTERIM HX 09/01/2022: Followed up with Dr Baum 8/12/2022 and VA improved from 20/200 OD to 20/50 OD, but with persistent superior visual field loss. She completed the 2nd course of steroids as well. Migraines are still daily. Sumatriptan not working. No brand new symptoms.   Pre-DMT labs (date)-  CBC w/ diff WNL,  LFT normal Bun and Cr normal,  Quant TB neg, VZV IgG + and IgM + (index 1.14), -> ? past infection.  Denies any recent shingles infection or chicken pox. No recent known exposures.  Hep B surface Ab -,  HEp B surface antigen and cord antibody negative. JCV ab positive 2.11 Vitamin D 25.6 -------------------------------------------------------  HPI (initial visit Aug 04, 2022)- Isabel Dangelo is a 51 year old female with pmhx of migraines and HLD, admitted to [facility] 7/14/2022-7/18/2022 for 4 day hx of progressive vision loss in right eye (predominately involving superior quadrant) and associated pain with eye movements. Her exam was notable for RAPD OD,  superior visual field cut OD with red color desaturation OD. Of note, she also reportedly experienced 1 week of bilateral foot numbness in 5/2022, that then resolved. She denies any other significant episodic neurological symptoms. NO recent vaccination. NO recent infections. Ophthalmology and neurology were consulted.   MRI orbits 7/14/2022 confirmed evidence of right intraorbital segment optic neuritis/perineuritis.  No dedicated brain MRI initially obtained.  MRI C/T 7/14/2022 spine with evidence of dorsal cord lesion at T9 with subtle contrast enhancement. Normal cervical spine.   Pt admitted for management of Optic Neuritis and further workup. Patient received 5 days of IV Solumedrol 1000mg (7/14-7/18) with symptom improvement and and was d/c with steroid taper for 10 days.   A dedicated T2 FLAIR MRI w/wo contrast was performed 7/18/2022 showing Abnormal T2 prolongation in the periventricular white matter region with seay finger appearance and + T1 black holes. No infratentorial lesions.   Labs:- ACE neg, SSA/SSB neg, Lyme neg, Syphilis neg, serum NMO and MOG ab neg. DsDNA neg.   Interim Hx  - She reports since the hospitalization the toes on the left have been constantly numb.Also experiencing cramps in legs. Seen by Dr. Baum 7/25/2022- VA 20/200 OD and 20/70 OS, 2+ RAPD, superior altitudinal defect OD. She completed prednisone taper 7/29/2022. Vision is better- 30% better.  She has a hx of migraine headaches since 2015 - occipital and radiates to top of head. + Phonophobia, + Photophobia. + N/V. Headaches are frequent- almost daily. lasted 3-4 hours. Always at night. Headaches were better on steroids. Ibuprofen helps. She works at a car dealership.

## 2024-10-08 ENCOUNTER — OUTPATIENT (OUTPATIENT)
Dept: OUTPATIENT SERVICES | Facility: HOSPITAL | Age: 54
LOS: 1 days | End: 2024-10-08
Payer: COMMERCIAL

## 2024-10-08 ENCOUNTER — APPOINTMENT (OUTPATIENT)
Dept: MEDICATION MANAGEMENT | Facility: CLINIC | Age: 54
End: 2024-10-08

## 2024-10-08 DIAGNOSIS — Z90.49 ACQUIRED ABSENCE OF OTHER SPECIFIED PARTS OF DIGESTIVE TRACT: Chronic | ICD-10-CM

## 2024-10-08 DIAGNOSIS — Z98.84 BARIATRIC SURGERY STATUS: Chronic | ICD-10-CM

## 2024-10-08 DIAGNOSIS — Z79.01 LONG TERM (CURRENT) USE OF ANTICOAGULANTS: ICD-10-CM

## 2024-10-08 DIAGNOSIS — I48.91 UNSPECIFIED ATRIAL FIBRILLATION: ICD-10-CM

## 2024-10-08 LAB
INR PPP: 2
PT BLD: 23.7

## 2024-10-08 PROCEDURE — G0463: CPT

## 2024-10-09 DIAGNOSIS — I48.91 UNSPECIFIED ATRIAL FIBRILLATION: ICD-10-CM

## 2024-10-09 DIAGNOSIS — Z79.01 LONG TERM (CURRENT) USE OF ANTICOAGULANTS: ICD-10-CM

## 2024-11-05 ENCOUNTER — OUTPATIENT (OUTPATIENT)
Dept: OUTPATIENT SERVICES | Facility: HOSPITAL | Age: 54
LOS: 1 days | End: 2024-11-05
Payer: COMMERCIAL

## 2024-11-05 ENCOUNTER — LABORATORY RESULT (OUTPATIENT)
Age: 54
End: 2024-11-05

## 2024-11-05 ENCOUNTER — APPOINTMENT (OUTPATIENT)
Dept: MEDICATION MANAGEMENT | Facility: CLINIC | Age: 54
End: 2024-11-05

## 2024-11-05 DIAGNOSIS — Z90.49 ACQUIRED ABSENCE OF OTHER SPECIFIED PARTS OF DIGESTIVE TRACT: Chronic | ICD-10-CM

## 2024-11-05 DIAGNOSIS — Z98.84 BARIATRIC SURGERY STATUS: Chronic | ICD-10-CM

## 2024-11-05 DIAGNOSIS — I82.91 CHRONIC EMBOLISM AND THROMBOSIS OF UNSPECIFIED VEIN: ICD-10-CM

## 2024-11-05 DIAGNOSIS — Z79.01 LONG TERM (CURRENT) USE OF ANTICOAGULANTS: ICD-10-CM

## 2024-11-05 LAB
INR PPP: 1.8
PT BLD: 21

## 2024-11-05 PROCEDURE — G0463: CPT

## 2024-11-06 DIAGNOSIS — Z79.01 LONG TERM (CURRENT) USE OF ANTICOAGULANTS: ICD-10-CM

## 2024-11-06 DIAGNOSIS — I82.91 CHRONIC EMBOLISM AND THROMBOSIS OF UNSPECIFIED VEIN: ICD-10-CM

## 2024-11-19 ENCOUNTER — APPOINTMENT (OUTPATIENT)
Dept: MEDICATION MANAGEMENT | Facility: CLINIC | Age: 54
End: 2024-11-19

## 2024-11-19 ENCOUNTER — OUTPATIENT (OUTPATIENT)
Dept: OUTPATIENT SERVICES | Facility: HOSPITAL | Age: 54
LOS: 1 days | End: 2024-11-19
Payer: COMMERCIAL

## 2024-11-19 DIAGNOSIS — I82.91 CHRONIC EMBOLISM AND THROMBOSIS OF UNSPECIFIED VEIN: ICD-10-CM

## 2024-11-19 DIAGNOSIS — Z98.84 BARIATRIC SURGERY STATUS: Chronic | ICD-10-CM

## 2024-11-19 DIAGNOSIS — Z79.01 LONG TERM (CURRENT) USE OF ANTICOAGULANTS: ICD-10-CM

## 2024-11-19 DIAGNOSIS — Z90.49 ACQUIRED ABSENCE OF OTHER SPECIFIED PARTS OF DIGESTIVE TRACT: Chronic | ICD-10-CM

## 2024-11-19 LAB
INR PPP: 2.5 RATIO
POCT-PROTHROMBIN TIME: 29.6 SECS

## 2024-11-19 PROCEDURE — G0463: CPT

## 2024-12-03 ENCOUNTER — OUTPATIENT (OUTPATIENT)
Dept: OUTPATIENT SERVICES | Facility: HOSPITAL | Age: 54
LOS: 1 days | End: 2024-12-03
Payer: COMMERCIAL

## 2024-12-03 ENCOUNTER — APPOINTMENT (OUTPATIENT)
Dept: MEDICATION MANAGEMENT | Facility: CLINIC | Age: 54
End: 2024-12-03

## 2024-12-03 DIAGNOSIS — Z79.01 LONG TERM (CURRENT) USE OF ANTICOAGULANTS: ICD-10-CM

## 2024-12-03 DIAGNOSIS — I82.91 CHRONIC EMBOLISM AND THROMBOSIS OF UNSPECIFIED VEIN: ICD-10-CM

## 2024-12-03 DIAGNOSIS — Z90.49 ACQUIRED ABSENCE OF OTHER SPECIFIED PARTS OF DIGESTIVE TRACT: Chronic | ICD-10-CM

## 2024-12-03 DIAGNOSIS — Z98.84 BARIATRIC SURGERY STATUS: Chronic | ICD-10-CM

## 2024-12-03 LAB
INR PPP: 2.4 RATIO
POCT-PROTHROMBIN TIME: 28.8 SECS

## 2024-12-03 PROCEDURE — G0463: CPT

## 2024-12-03 RX ORDER — WARFARIN 5 MG/1
5 TABLET ORAL
Qty: 90 | Refills: 3 | Status: ACTIVE | COMMUNITY
Start: 2024-12-03 | End: 1900-01-01

## 2024-12-04 DIAGNOSIS — Z79.01 LONG TERM (CURRENT) USE OF ANTICOAGULANTS: ICD-10-CM

## 2024-12-04 DIAGNOSIS — I82.91 CHRONIC EMBOLISM AND THROMBOSIS OF UNSPECIFIED VEIN: ICD-10-CM

## 2024-12-30 ENCOUNTER — OUTPATIENT (OUTPATIENT)
Dept: OUTPATIENT SERVICES | Facility: HOSPITAL | Age: 54
LOS: 1 days | End: 2024-12-30
Payer: COMMERCIAL

## 2024-12-30 ENCOUNTER — APPOINTMENT (OUTPATIENT)
Dept: MEDICATION MANAGEMENT | Facility: CLINIC | Age: 54
End: 2024-12-30

## 2024-12-30 DIAGNOSIS — Z98.84 BARIATRIC SURGERY STATUS: Chronic | ICD-10-CM

## 2024-12-30 DIAGNOSIS — Z79.01 LONG TERM (CURRENT) USE OF ANTICOAGULANTS: ICD-10-CM

## 2024-12-30 DIAGNOSIS — D68.61 ANTIPHOSPHOLIPID SYNDROME: ICD-10-CM

## 2024-12-30 DIAGNOSIS — I82.91 CHRONIC EMBOLISM AND THROMBOSIS OF UNSPECIFIED VEIN: ICD-10-CM

## 2024-12-30 DIAGNOSIS — Z90.49 ACQUIRED ABSENCE OF OTHER SPECIFIED PARTS OF DIGESTIVE TRACT: Chronic | ICD-10-CM

## 2024-12-30 PROCEDURE — G0463: CPT

## 2024-12-31 DIAGNOSIS — Z79.01 LONG TERM (CURRENT) USE OF ANTICOAGULANTS: ICD-10-CM

## 2024-12-31 DIAGNOSIS — I82.91 CHRONIC EMBOLISM AND THROMBOSIS OF UNSPECIFIED VEIN: ICD-10-CM

## 2024-12-31 DIAGNOSIS — D68.61 ANTIPHOSPHOLIPID SYNDROME: ICD-10-CM

## 2025-01-07 ENCOUNTER — APPOINTMENT (OUTPATIENT)
Dept: MEDICATION MANAGEMENT | Facility: CLINIC | Age: 55
End: 2025-01-07

## 2025-01-07 ENCOUNTER — OUTPATIENT (OUTPATIENT)
Dept: OUTPATIENT SERVICES | Facility: HOSPITAL | Age: 55
LOS: 1 days | End: 2025-01-07
Payer: COMMERCIAL

## 2025-01-07 DIAGNOSIS — I82.91 CHRONIC EMBOLISM AND THROMBOSIS OF UNSPECIFIED VEIN: ICD-10-CM

## 2025-01-07 DIAGNOSIS — Z98.84 BARIATRIC SURGERY STATUS: Chronic | ICD-10-CM

## 2025-01-07 DIAGNOSIS — Z90.49 ACQUIRED ABSENCE OF OTHER SPECIFIED PARTS OF DIGESTIVE TRACT: Chronic | ICD-10-CM

## 2025-01-07 DIAGNOSIS — Z79.01 LONG TERM (CURRENT) USE OF ANTICOAGULANTS: ICD-10-CM

## 2025-01-07 LAB
INR PPP: 1.9 RATIO
POCT-PROTHROMBIN TIME: 23.1 SECS

## 2025-01-07 PROCEDURE — 98012 SYNCH AUDIO-ONLY EST SF 10: CPT

## 2025-01-07 RX ORDER — WARFARIN 7.5 MG/1
7.5 TABLET ORAL DAILY
Qty: 90 | Refills: 3 | Status: ACTIVE | COMMUNITY
Start: 2025-01-07 | End: 1900-01-01

## 2025-01-08 ENCOUNTER — APPOINTMENT (OUTPATIENT)
Dept: OPHTHALMOLOGY | Facility: CLINIC | Age: 55
End: 2025-01-08
Payer: COMMERCIAL

## 2025-01-08 ENCOUNTER — NON-APPOINTMENT (OUTPATIENT)
Age: 55
End: 2025-01-08

## 2025-01-08 DIAGNOSIS — I82.91 CHRONIC EMBOLISM AND THROMBOSIS OF UNSPECIFIED VEIN: ICD-10-CM

## 2025-01-08 DIAGNOSIS — Z79.01 LONG TERM (CURRENT) USE OF ANTICOAGULANTS: ICD-10-CM

## 2025-01-08 PROCEDURE — 99214 OFFICE O/P EST MOD 30 MIN: CPT

## 2025-01-08 PROCEDURE — 92083 EXTENDED VISUAL FIELD XM: CPT

## 2025-01-08 PROCEDURE — 92015 DETERMINE REFRACTIVE STATE: CPT

## 2025-01-08 PROCEDURE — 92133 CPTRZD OPH DX IMG PST SGM ON: CPT

## 2025-01-14 ENCOUNTER — APPOINTMENT (OUTPATIENT)
Dept: MEDICATION MANAGEMENT | Facility: CLINIC | Age: 55
End: 2025-01-14

## 2025-01-14 ENCOUNTER — OUTPATIENT (OUTPATIENT)
Dept: OUTPATIENT SERVICES | Facility: HOSPITAL | Age: 55
LOS: 1 days | End: 2025-01-14
Payer: COMMERCIAL

## 2025-01-14 DIAGNOSIS — I82.91 CHRONIC EMBOLISM AND THROMBOSIS OF UNSPECIFIED VEIN: ICD-10-CM

## 2025-01-14 DIAGNOSIS — Z90.49 ACQUIRED ABSENCE OF OTHER SPECIFIED PARTS OF DIGESTIVE TRACT: Chronic | ICD-10-CM

## 2025-01-14 DIAGNOSIS — Z98.84 BARIATRIC SURGERY STATUS: Chronic | ICD-10-CM

## 2025-01-14 DIAGNOSIS — Z79.01 LONG TERM (CURRENT) USE OF ANTICOAGULANTS: ICD-10-CM

## 2025-01-14 LAB
INR PPP: 2.4 RATIO
POCT-PROTHROMBIN TIME: 29.3 SECS

## 2025-01-14 PROCEDURE — 98012 SYNCH AUDIO-ONLY EST SF 10: CPT

## 2025-01-15 DIAGNOSIS — Z79.01 LONG TERM (CURRENT) USE OF ANTICOAGULANTS: ICD-10-CM

## 2025-01-15 DIAGNOSIS — I82.91 CHRONIC EMBOLISM AND THROMBOSIS OF UNSPECIFIED VEIN: ICD-10-CM

## 2025-01-21 ENCOUNTER — APPOINTMENT (OUTPATIENT)
Dept: MEDICATION MANAGEMENT | Facility: CLINIC | Age: 55
End: 2025-01-21

## 2025-01-28 ENCOUNTER — OUTPATIENT (OUTPATIENT)
Dept: OUTPATIENT SERVICES | Facility: HOSPITAL | Age: 55
LOS: 1 days | End: 2025-01-28
Payer: COMMERCIAL

## 2025-01-28 ENCOUNTER — APPOINTMENT (OUTPATIENT)
Dept: MEDICATION MANAGEMENT | Facility: CLINIC | Age: 55
End: 2025-01-28

## 2025-01-28 DIAGNOSIS — Z79.01 LONG TERM (CURRENT) USE OF ANTICOAGULANTS: ICD-10-CM

## 2025-01-28 DIAGNOSIS — Z90.49 ACQUIRED ABSENCE OF OTHER SPECIFIED PARTS OF DIGESTIVE TRACT: Chronic | ICD-10-CM

## 2025-01-28 DIAGNOSIS — I82.91 CHRONIC EMBOLISM AND THROMBOSIS OF UNSPECIFIED VEIN: ICD-10-CM

## 2025-01-28 DIAGNOSIS — Z98.84 BARIATRIC SURGERY STATUS: Chronic | ICD-10-CM

## 2025-01-28 LAB
INR PPP: 1.6 RATIO
POCT-PROTHROMBIN TIME: 19.1 SECS

## 2025-01-28 PROCEDURE — 98012 SYNCH AUDIO-ONLY EST SF 10: CPT

## 2025-01-29 DIAGNOSIS — Z79.01 LONG TERM (CURRENT) USE OF ANTICOAGULANTS: ICD-10-CM

## 2025-01-29 DIAGNOSIS — I82.91 CHRONIC EMBOLISM AND THROMBOSIS OF UNSPECIFIED VEIN: ICD-10-CM

## 2025-02-04 ENCOUNTER — APPOINTMENT (OUTPATIENT)
Dept: MEDICATION MANAGEMENT | Facility: CLINIC | Age: 55
End: 2025-02-04

## 2025-02-04 ENCOUNTER — OUTPATIENT (OUTPATIENT)
Dept: OUTPATIENT SERVICES | Facility: HOSPITAL | Age: 55
LOS: 1 days | End: 2025-02-04
Payer: COMMERCIAL

## 2025-02-04 DIAGNOSIS — I48.91 UNSPECIFIED ATRIAL FIBRILLATION: ICD-10-CM

## 2025-02-04 DIAGNOSIS — Z98.84 BARIATRIC SURGERY STATUS: Chronic | ICD-10-CM

## 2025-02-04 DIAGNOSIS — Z90.49 ACQUIRED ABSENCE OF OTHER SPECIFIED PARTS OF DIGESTIVE TRACT: Chronic | ICD-10-CM

## 2025-02-04 DIAGNOSIS — Z79.01 LONG TERM (CURRENT) USE OF ANTICOAGULANTS: ICD-10-CM

## 2025-02-04 LAB
INR PPP: 3.6 RATIO
POCT-PROTHROMBIN TIME: 43.3 SECS

## 2025-02-04 PROCEDURE — 98012 SYNCH AUDIO-ONLY EST SF 10: CPT

## 2025-02-05 DIAGNOSIS — Z79.01 LONG TERM (CURRENT) USE OF ANTICOAGULANTS: ICD-10-CM

## 2025-02-05 DIAGNOSIS — I48.91 UNSPECIFIED ATRIAL FIBRILLATION: ICD-10-CM

## 2025-02-11 ENCOUNTER — APPOINTMENT (OUTPATIENT)
Dept: MEDICATION MANAGEMENT | Facility: CLINIC | Age: 55
End: 2025-02-11

## 2025-02-11 ENCOUNTER — OUTPATIENT (OUTPATIENT)
Dept: OUTPATIENT SERVICES | Facility: HOSPITAL | Age: 55
LOS: 1 days | End: 2025-02-11
Payer: COMMERCIAL

## 2025-02-11 DIAGNOSIS — Z90.49 ACQUIRED ABSENCE OF OTHER SPECIFIED PARTS OF DIGESTIVE TRACT: Chronic | ICD-10-CM

## 2025-02-11 DIAGNOSIS — I82.91 CHRONIC EMBOLISM AND THROMBOSIS OF UNSPECIFIED VEIN: ICD-10-CM

## 2025-02-11 DIAGNOSIS — Z98.84 BARIATRIC SURGERY STATUS: Chronic | ICD-10-CM

## 2025-02-11 DIAGNOSIS — Z79.01 LONG TERM (CURRENT) USE OF ANTICOAGULANTS: ICD-10-CM

## 2025-02-11 LAB
INR PPP: 2.1 RATIO
POCT-PROTHROMBIN TIME: 25.3 SECS

## 2025-02-11 PROCEDURE — 98012 SYNCH AUDIO-ONLY EST SF 10: CPT

## 2025-02-12 DIAGNOSIS — I82.91 CHRONIC EMBOLISM AND THROMBOSIS OF UNSPECIFIED VEIN: ICD-10-CM

## 2025-02-12 DIAGNOSIS — Z79.01 LONG TERM (CURRENT) USE OF ANTICOAGULANTS: ICD-10-CM

## 2025-02-18 ENCOUNTER — APPOINTMENT (OUTPATIENT)
Dept: MEDICATION MANAGEMENT | Facility: CLINIC | Age: 55
End: 2025-02-18

## 2025-02-18 ENCOUNTER — OUTPATIENT (OUTPATIENT)
Dept: OUTPATIENT SERVICES | Facility: HOSPITAL | Age: 55
LOS: 1 days | End: 2025-02-18
Payer: COMMERCIAL

## 2025-02-18 DIAGNOSIS — Z90.49 ACQUIRED ABSENCE OF OTHER SPECIFIED PARTS OF DIGESTIVE TRACT: Chronic | ICD-10-CM

## 2025-02-18 DIAGNOSIS — I82.91 CHRONIC EMBOLISM AND THROMBOSIS OF UNSPECIFIED VEIN: ICD-10-CM

## 2025-02-18 DIAGNOSIS — Z79.01 LONG TERM (CURRENT) USE OF ANTICOAGULANTS: ICD-10-CM

## 2025-02-18 DIAGNOSIS — Z98.84 BARIATRIC SURGERY STATUS: Chronic | ICD-10-CM

## 2025-02-18 LAB
INR PPP: 3.1 RATIO
POCT-PROTHROMBIN TIME: 37.4 SECS

## 2025-02-18 PROCEDURE — 98004 SYNCH AUDIO-VIDEO EST SF 10: CPT

## 2025-02-19 DIAGNOSIS — I82.91 CHRONIC EMBOLISM AND THROMBOSIS OF UNSPECIFIED VEIN: ICD-10-CM

## 2025-02-19 DIAGNOSIS — Z79.01 LONG TERM (CURRENT) USE OF ANTICOAGULANTS: ICD-10-CM

## 2025-02-25 ENCOUNTER — OUTPATIENT (OUTPATIENT)
Dept: OUTPATIENT SERVICES | Facility: HOSPITAL | Age: 55
LOS: 1 days | End: 2025-02-25
Payer: COMMERCIAL

## 2025-02-25 ENCOUNTER — LABORATORY RESULT (OUTPATIENT)
Age: 55
End: 2025-02-25

## 2025-02-25 ENCOUNTER — APPOINTMENT (OUTPATIENT)
Dept: MEDICATION MANAGEMENT | Facility: CLINIC | Age: 55
End: 2025-02-25

## 2025-02-25 DIAGNOSIS — D68.61 ANTIPHOSPHOLIPID SYNDROME: ICD-10-CM

## 2025-02-25 DIAGNOSIS — Z98.84 BARIATRIC SURGERY STATUS: Chronic | ICD-10-CM

## 2025-02-25 DIAGNOSIS — Z90.49 ACQUIRED ABSENCE OF OTHER SPECIFIED PARTS OF DIGESTIVE TRACT: Chronic | ICD-10-CM

## 2025-02-25 DIAGNOSIS — Z79.01 LONG TERM (CURRENT) USE OF ANTICOAGULANTS: ICD-10-CM

## 2025-02-25 PROCEDURE — 98012 SYNCH AUDIO-ONLY EST SF 10: CPT

## 2025-02-26 DIAGNOSIS — Z79.01 LONG TERM (CURRENT) USE OF ANTICOAGULANTS: ICD-10-CM

## 2025-02-26 DIAGNOSIS — D68.61 ANTIPHOSPHOLIPID SYNDROME: ICD-10-CM

## 2025-03-04 ENCOUNTER — APPOINTMENT (OUTPATIENT)
Dept: MEDICATION MANAGEMENT | Facility: CLINIC | Age: 55
End: 2025-03-04

## 2025-03-04 NOTE — HISTORY OF PRESENT ILLNESS
Med refilled   [de-identified] : September, 23 [FreeTextEntry1] : Interval History: Reports bilateral hip pain is much better, she is on PT since October 23. She was seen by Ophto 2 weeks ago, the exam was normal as per the patient but she still c/o intermittent blurry vision, which occurs 3 -4 times daily, lasting about 30 minutes.  On Octrevus, the last dose was on November 23,  she is on warfarin and her INR has been 1.9-2.1 since the last visit

## 2025-03-11 ENCOUNTER — APPOINTMENT (OUTPATIENT)
Dept: RHEUMATOLOGY | Facility: CLINIC | Age: 55
End: 2025-03-11
Payer: COMMERCIAL

## 2025-03-11 ENCOUNTER — APPOINTMENT (OUTPATIENT)
Dept: MEDICATION MANAGEMENT | Facility: CLINIC | Age: 55
End: 2025-03-11

## 2025-03-11 VITALS
HEIGHT: 68 IN | BODY MASS INDEX: 26.83 KG/M2 | HEART RATE: 80 BPM | WEIGHT: 177 LBS | OXYGEN SATURATION: 98 % | SYSTOLIC BLOOD PRESSURE: 129 MMHG | DIASTOLIC BLOOD PRESSURE: 79 MMHG

## 2025-03-11 DIAGNOSIS — H46.9 UNSPECIFIED OPTIC NEURITIS: ICD-10-CM

## 2025-03-11 DIAGNOSIS — D68.61 ANTIPHOSPHOLIPID SYNDROME: ICD-10-CM

## 2025-03-11 DIAGNOSIS — G35 MULTIPLE SCLEROSIS: ICD-10-CM

## 2025-03-11 PROCEDURE — 99215 OFFICE O/P EST HI 40 MIN: CPT

## 2025-03-11 PROCEDURE — G2211 COMPLEX E/M VISIT ADD ON: CPT | Mod: NC

## 2025-03-12 ENCOUNTER — LABORATORY RESULT (OUTPATIENT)
Age: 55
End: 2025-03-12

## 2025-03-12 ENCOUNTER — APPOINTMENT (OUTPATIENT)
Dept: MEDICATION MANAGEMENT | Facility: CLINIC | Age: 55
End: 2025-03-12

## 2025-03-12 ENCOUNTER — OUTPATIENT (OUTPATIENT)
Dept: OUTPATIENT SERVICES | Facility: HOSPITAL | Age: 55
LOS: 1 days | End: 2025-03-12
Payer: COMMERCIAL

## 2025-03-12 DIAGNOSIS — Z79.01 LONG TERM (CURRENT) USE OF ANTICOAGULANTS: ICD-10-CM

## 2025-03-12 DIAGNOSIS — Z90.49 ACQUIRED ABSENCE OF OTHER SPECIFIED PARTS OF DIGESTIVE TRACT: Chronic | ICD-10-CM

## 2025-03-12 DIAGNOSIS — Z98.84 BARIATRIC SURGERY STATUS: Chronic | ICD-10-CM

## 2025-03-12 LAB
25(OH)D3 SERPL-MCNC: 44.4 NG/ML
ALBUMIN SERPL ELPH-MCNC: 4.3 G/DL
ALP BLD-CCNC: 115 U/L
ALT SERPL-CCNC: 22 U/L
ANION GAP SERPL CALC-SCNC: 12 MMOL/L
APTT BLD: 53.8 SEC
AST SERPL-CCNC: 28 U/L
BASOPHILS # BLD AUTO: 0.05 K/UL
BASOPHILS NFR BLD AUTO: 0.6 %
BILIRUB SERPL-MCNC: 0.3 MG/DL
BUN SERPL-MCNC: 9 MG/DL
C3 SERPL-MCNC: 140 MG/DL
C4 SERPL-MCNC: 32 MG/DL
CALCIUM SERPL-MCNC: 9.2 MG/DL
CHLORIDE SERPL-SCNC: 109 MMOL/L
CK SERPL-CCNC: 140 U/L
CO2 SERPL-SCNC: 24 MMOL/L
CONFIRM: 47.8 SEC
CREAT SERPL-MCNC: 0.61 MG/DL
CREAT SPEC-SCNC: 177 MG/DL
CREAT/PROT UR: 0.1 RATIO
DRVVT IMM 1:2 NP PPP: ABNORMAL
DRVVT SCREEN TO CONFIRM RATIO: 1.33 RATIO
EGFRCR SERPLBLD CKD-EPI 2021: 107 ML/MIN/1.73M2
EOSINOPHIL # BLD AUTO: 0.13 K/UL
EOSINOPHIL NFR BLD AUTO: 1.7 %
HCT VFR BLD CALC: 41.6 %
HGB BLD-MCNC: 14.1 G/DL
IMM GRANULOCYTES NFR BLD AUTO: 0.1 %
INR PPP: 2.45 RATIO
LYMPHOCYTES # BLD AUTO: 1.83 K/UL
LYMPHOCYTES NFR BLD AUTO: 23.8 %
MAN DIFF?: NORMAL
MCHC RBC-ENTMCNC: 29.9 PG
MCHC RBC-ENTMCNC: 33.9 G/DL
MCV RBC AUTO: 88.3 FL
MONOCYTES # BLD AUTO: 0.29 K/UL
MONOCYTES NFR BLD AUTO: 3.8 %
NEUTROPHILS # BLD AUTO: 5.39 K/UL
NEUTROPHILS NFR BLD AUTO: 70 %
PLATELET # BLD AUTO: 234 K/UL
POTASSIUM SERPL-SCNC: 4.1 MMOL/L
PROT SERPL-MCNC: 6.9 G/DL
PROT UR-MCNC: 16 MG/DL
PT BLD: 28.6 SEC
RBC # BLD: 4.71 M/UL
RBC # FLD: 14.1 %
SCREEN DRVVT: 82.4 SEC
SILICA CLOTTING TIME INTERPRETATION: ABNORMAL
SILICA CLOTTING TIME S/C: 1.3 RATIO
SODIUM SERPL-SCNC: 146 MMOL/L
WBC # FLD AUTO: 7.7 K/UL

## 2025-03-12 PROCEDURE — 98012 SYNCH AUDIO-ONLY EST SF 10: CPT

## 2025-03-13 DIAGNOSIS — Z79.01 LONG TERM (CURRENT) USE OF ANTICOAGULANTS: ICD-10-CM

## 2025-03-13 LAB
APPEARANCE: CLEAR
B2 GLYCOPROT1 IGA SERPL IA-ACNC: <2 U/ML
B2 GLYCOPROT1 IGG SER-ACNC: <1.4 U/ML
B2 GLYCOPROT1 IGM SER-ACNC: 1.8 U/ML
BACTERIA: NEGATIVE /HPF
BILIRUBIN URINE: NEGATIVE
BLOOD URINE: NEGATIVE
CARDIOLIPIN IGM SER-MCNC: 2.2 MPL U/ML
CARDIOLIPIN IGM SER-MCNC: <1.6 GPL U/ML
CAST: 0 /LPF
COLOR: YELLOW
DEPRECATED CARDIOLIPIN IGA SER: <2 APL U/ML
DSDNA AB SER-ACNC: <1 IU/ML
ENA RNP AB SER IA-ACNC: <0.2 AL
ENA SM AB SER IA-ACNC: <0.2 AL
ENA SS-A AB SER IA-ACNC: <0.2 AL
ENA SS-B AB SER IA-ACNC: <0.2 AL
EPITHELIAL CELLS: 1 /HPF
GLUCOSE QUALITATIVE U: NEGATIVE MG/DL
KETONES URINE: NEGATIVE MG/DL
LEUKOCYTE ESTERASE URINE: ABNORMAL
MICROSCOPIC-UA: NORMAL
NITRITE URINE: NEGATIVE
PH URINE: 7.5
PROTEIN URINE: NORMAL MG/DL
RED BLOOD CELLS URINE: 3 /HPF
RPR SER-TITR: NORMAL
SPECIFIC GRAVITY URINE: 1.02
UROBILINOGEN URINE: 1 MG/DL
WHITE BLOOD CELLS URINE: 2 /HPF

## 2025-03-14 NOTE — PROGRESS NOTE ADULT - PROBLEM SELECTOR PLAN 4
continue statin
continue statin
no
Optic neuritis, otherwise rest of neurologic symptoms stable
continue statin
Optic neuritis, otherwise rest of neurologic symptoms stable
Currently controlled  -Continue trazodone qhs, nortryptline, rizatriptan prn    #Cold like symptoms   - Sunday reporting cold symptoms.   - RVP pending
Optic neuritis, otherwise rest of neurologic symptoms stable
continue statin
Optic neuritis, otherwise rest of neurologic symptoms stable
continue statin
Currently controlled  -Continue trazodone qhs, nortryptline, rizatriptan prn    #Cold like symptoms   - Sunday reporting cold symptoms.   - RVP pending
Optic neuritis, otherwise rest of neurologic symptoms stable
Optic neuritis, otherwise rest of neurologic symptoms stable
Currently controlled  -Continue trazodone qhs, nortryptline, rizatriptan prn

## 2025-03-17 LAB
PS IGA SER QL: 2
PS IGG SER QL: <9 UNITS
PS IGM SER QL: 46 UNITS

## 2025-03-18 ENCOUNTER — APPOINTMENT (OUTPATIENT)
Dept: MEDICATION MANAGEMENT | Facility: CLINIC | Age: 55
End: 2025-03-18

## 2025-03-25 ENCOUNTER — OUTPATIENT (OUTPATIENT)
Dept: OUTPATIENT SERVICES | Facility: HOSPITAL | Age: 55
LOS: 1 days | End: 2025-03-25
Payer: COMMERCIAL

## 2025-03-25 ENCOUNTER — APPOINTMENT (OUTPATIENT)
Dept: MEDICATION MANAGEMENT | Facility: CLINIC | Age: 55
End: 2025-03-25

## 2025-03-25 ENCOUNTER — LABORATORY RESULT (OUTPATIENT)
Age: 55
End: 2025-03-25

## 2025-03-25 DIAGNOSIS — Z79.01 LONG TERM (CURRENT) USE OF ANTICOAGULANTS: ICD-10-CM

## 2025-03-25 DIAGNOSIS — Z98.84 BARIATRIC SURGERY STATUS: Chronic | ICD-10-CM

## 2025-03-25 DIAGNOSIS — I82.91 CHRONIC EMBOLISM AND THROMBOSIS OF UNSPECIFIED VEIN: ICD-10-CM

## 2025-03-25 DIAGNOSIS — Z90.49 ACQUIRED ABSENCE OF OTHER SPECIFIED PARTS OF DIGESTIVE TRACT: Chronic | ICD-10-CM

## 2025-03-25 PROCEDURE — 98012 SYNCH AUDIO-ONLY EST SF 10: CPT

## 2025-03-26 DIAGNOSIS — Z79.01 LONG TERM (CURRENT) USE OF ANTICOAGULANTS: ICD-10-CM

## 2025-03-26 DIAGNOSIS — I82.91 CHRONIC EMBOLISM AND THROMBOSIS OF UNSPECIFIED VEIN: ICD-10-CM

## 2025-04-01 ENCOUNTER — LABORATORY RESULT (OUTPATIENT)
Age: 55
End: 2025-04-01

## 2025-04-01 ENCOUNTER — APPOINTMENT (OUTPATIENT)
Dept: MEDICATION MANAGEMENT | Facility: CLINIC | Age: 55
End: 2025-04-01

## 2025-04-01 ENCOUNTER — OUTPATIENT (OUTPATIENT)
Dept: OUTPATIENT SERVICES | Facility: HOSPITAL | Age: 55
LOS: 1 days | End: 2025-04-01
Payer: COMMERCIAL

## 2025-04-01 DIAGNOSIS — Z98.84 BARIATRIC SURGERY STATUS: Chronic | ICD-10-CM

## 2025-04-01 DIAGNOSIS — Z90.49 ACQUIRED ABSENCE OF OTHER SPECIFIED PARTS OF DIGESTIVE TRACT: Chronic | ICD-10-CM

## 2025-04-01 DIAGNOSIS — Z79.01 LONG TERM (CURRENT) USE OF ANTICOAGULANTS: ICD-10-CM

## 2025-04-01 DIAGNOSIS — I82.91 CHRONIC EMBOLISM AND THROMBOSIS OF UNSPECIFIED VEIN: ICD-10-CM

## 2025-04-01 PROCEDURE — 98012 SYNCH AUDIO-ONLY EST SF 10: CPT

## 2025-04-02 DIAGNOSIS — Z79.01 LONG TERM (CURRENT) USE OF ANTICOAGULANTS: ICD-10-CM

## 2025-04-02 DIAGNOSIS — I82.91 CHRONIC EMBOLISM AND THROMBOSIS OF UNSPECIFIED VEIN: ICD-10-CM

## 2025-04-08 ENCOUNTER — APPOINTMENT (OUTPATIENT)
Dept: MEDICATION MANAGEMENT | Facility: CLINIC | Age: 55
End: 2025-04-08

## 2025-04-15 ENCOUNTER — OUTPATIENT (OUTPATIENT)
Dept: OUTPATIENT SERVICES | Facility: HOSPITAL | Age: 55
LOS: 1 days | End: 2025-04-15
Payer: COMMERCIAL

## 2025-04-15 ENCOUNTER — APPOINTMENT (OUTPATIENT)
Dept: MEDICATION MANAGEMENT | Facility: CLINIC | Age: 55
End: 2025-04-15

## 2025-04-15 ENCOUNTER — LABORATORY RESULT (OUTPATIENT)
Age: 55
End: 2025-04-15

## 2025-04-15 DIAGNOSIS — Z79.01 LONG TERM (CURRENT) USE OF ANTICOAGULANTS: ICD-10-CM

## 2025-04-15 DIAGNOSIS — Z90.49 ACQUIRED ABSENCE OF OTHER SPECIFIED PARTS OF DIGESTIVE TRACT: Chronic | ICD-10-CM

## 2025-04-15 DIAGNOSIS — I82.91 CHRONIC EMBOLISM AND THROMBOSIS OF UNSPECIFIED VEIN: ICD-10-CM

## 2025-04-15 DIAGNOSIS — Z98.84 BARIATRIC SURGERY STATUS: Chronic | ICD-10-CM

## 2025-04-15 PROCEDURE — 98012 SYNCH AUDIO-ONLY EST SF 10: CPT

## 2025-04-16 DIAGNOSIS — I82.91 CHRONIC EMBOLISM AND THROMBOSIS OF UNSPECIFIED VEIN: ICD-10-CM

## 2025-04-16 DIAGNOSIS — Z79.01 LONG TERM (CURRENT) USE OF ANTICOAGULANTS: ICD-10-CM

## 2025-04-22 ENCOUNTER — APPOINTMENT (OUTPATIENT)
Dept: MEDICATION MANAGEMENT | Facility: CLINIC | Age: 55
End: 2025-04-22

## 2025-04-23 DIAGNOSIS — G35 MULTIPLE SCLEROSIS: ICD-10-CM

## 2025-04-24 ENCOUNTER — APPOINTMENT (OUTPATIENT)
Dept: MEDICATION MANAGEMENT | Facility: CLINIC | Age: 55
End: 2025-04-24

## 2025-04-29 ENCOUNTER — APPOINTMENT (OUTPATIENT)
Dept: MEDICATION MANAGEMENT | Facility: CLINIC | Age: 55
End: 2025-04-29

## 2025-05-01 ENCOUNTER — APPOINTMENT (OUTPATIENT)
Dept: MEDICATION MANAGEMENT | Facility: CLINIC | Age: 55
End: 2025-05-01

## 2025-05-06 ENCOUNTER — APPOINTMENT (OUTPATIENT)
Dept: MEDICATION MANAGEMENT | Facility: CLINIC | Age: 55
End: 2025-05-06

## 2025-05-06 ENCOUNTER — OUTPATIENT (OUTPATIENT)
Dept: OUTPATIENT SERVICES | Facility: HOSPITAL | Age: 55
LOS: 1 days | End: 2025-05-06
Payer: COMMERCIAL

## 2025-05-06 ENCOUNTER — LABORATORY RESULT (OUTPATIENT)
Age: 55
End: 2025-05-06

## 2025-05-06 DIAGNOSIS — Z98.84 BARIATRIC SURGERY STATUS: Chronic | ICD-10-CM

## 2025-05-06 DIAGNOSIS — I82.91 CHRONIC EMBOLISM AND THROMBOSIS OF UNSPECIFIED VEIN: ICD-10-CM

## 2025-05-06 DIAGNOSIS — Z90.49 ACQUIRED ABSENCE OF OTHER SPECIFIED PARTS OF DIGESTIVE TRACT: Chronic | ICD-10-CM

## 2025-05-06 DIAGNOSIS — Z79.01 LONG TERM (CURRENT) USE OF ANTICOAGULANTS: ICD-10-CM

## 2025-05-06 PROCEDURE — 98012 SYNCH AUDIO-ONLY EST SF 10: CPT

## 2025-05-07 DIAGNOSIS — I82.91 CHRONIC EMBOLISM AND THROMBOSIS OF UNSPECIFIED VEIN: ICD-10-CM

## 2025-05-07 DIAGNOSIS — Z79.01 LONG TERM (CURRENT) USE OF ANTICOAGULANTS: ICD-10-CM

## 2025-05-13 ENCOUNTER — APPOINTMENT (OUTPATIENT)
Dept: MEDICATION MANAGEMENT | Facility: CLINIC | Age: 55
End: 2025-05-13

## 2025-05-13 ENCOUNTER — OUTPATIENT (OUTPATIENT)
Dept: OUTPATIENT SERVICES | Facility: HOSPITAL | Age: 55
LOS: 1 days | End: 2025-05-13
Payer: COMMERCIAL

## 2025-05-13 ENCOUNTER — LABORATORY RESULT (OUTPATIENT)
Age: 55
End: 2025-05-13

## 2025-05-13 DIAGNOSIS — Z90.49 ACQUIRED ABSENCE OF OTHER SPECIFIED PARTS OF DIGESTIVE TRACT: Chronic | ICD-10-CM

## 2025-05-13 DIAGNOSIS — I82.91 CHRONIC EMBOLISM AND THROMBOSIS OF UNSPECIFIED VEIN: ICD-10-CM

## 2025-05-13 DIAGNOSIS — Z79.01 LONG TERM (CURRENT) USE OF ANTICOAGULANTS: ICD-10-CM

## 2025-05-13 PROCEDURE — 98012 SYNCH AUDIO-ONLY EST SF 10: CPT

## 2025-05-14 DIAGNOSIS — I82.91 CHRONIC EMBOLISM AND THROMBOSIS OF UNSPECIFIED VEIN: ICD-10-CM

## 2025-05-14 DIAGNOSIS — Z79.01 LONG TERM (CURRENT) USE OF ANTICOAGULANTS: ICD-10-CM

## 2025-05-20 ENCOUNTER — OUTPATIENT (OUTPATIENT)
Dept: OUTPATIENT SERVICES | Facility: HOSPITAL | Age: 55
LOS: 1 days | End: 2025-05-20
Payer: COMMERCIAL

## 2025-05-20 ENCOUNTER — APPOINTMENT (OUTPATIENT)
Dept: MEDICATION MANAGEMENT | Facility: CLINIC | Age: 55
End: 2025-05-20

## 2025-05-20 ENCOUNTER — LABORATORY RESULT (OUTPATIENT)
Age: 55
End: 2025-05-20

## 2025-05-20 DIAGNOSIS — I82.91 CHRONIC EMBOLISM AND THROMBOSIS OF UNSPECIFIED VEIN: ICD-10-CM

## 2025-05-20 DIAGNOSIS — Z90.49 ACQUIRED ABSENCE OF OTHER SPECIFIED PARTS OF DIGESTIVE TRACT: Chronic | ICD-10-CM

## 2025-05-20 DIAGNOSIS — Z79.01 LONG TERM (CURRENT) USE OF ANTICOAGULANTS: ICD-10-CM

## 2025-05-20 DIAGNOSIS — Z98.84 BARIATRIC SURGERY STATUS: Chronic | ICD-10-CM

## 2025-05-20 PROCEDURE — 98012 SYNCH AUDIO-ONLY EST SF 10: CPT

## 2025-05-21 DIAGNOSIS — Z79.01 LONG TERM (CURRENT) USE OF ANTICOAGULANTS: ICD-10-CM

## 2025-05-21 DIAGNOSIS — G35 MULTIPLE SCLEROSIS: ICD-10-CM

## 2025-05-21 DIAGNOSIS — I82.91 CHRONIC EMBOLISM AND THROMBOSIS OF UNSPECIFIED VEIN: ICD-10-CM

## 2025-05-21 RX ORDER — OCRELIZUMAB 300 MG/10ML
300 INJECTION INTRAVENOUS
Qty: 2 | Refills: 1 | Status: ACTIVE | COMMUNITY
Start: 2025-05-21 | End: 1900-01-01

## 2025-05-27 ENCOUNTER — APPOINTMENT (OUTPATIENT)
Dept: MEDICATION MANAGEMENT | Facility: CLINIC | Age: 55
End: 2025-05-27

## 2025-06-03 ENCOUNTER — APPOINTMENT (OUTPATIENT)
Dept: MEDICATION MANAGEMENT | Facility: CLINIC | Age: 55
End: 2025-06-03

## 2025-06-03 ENCOUNTER — APPOINTMENT (OUTPATIENT)
Dept: RHEUMATOLOGY | Facility: CLINIC | Age: 55
End: 2025-06-03
Payer: COMMERCIAL

## 2025-06-03 VITALS
BODY MASS INDEX: 25.76 KG/M2 | HEART RATE: 73 BPM | OXYGEN SATURATION: 99 % | HEIGHT: 68 IN | SYSTOLIC BLOOD PRESSURE: 125 MMHG | DIASTOLIC BLOOD PRESSURE: 85 MMHG | WEIGHT: 170 LBS

## 2025-06-03 DIAGNOSIS — D68.61 ANTIPHOSPHOLIPID SYNDROME: ICD-10-CM

## 2025-06-03 DIAGNOSIS — M79.10 MYALGIA, UNSPECIFIED SITE: ICD-10-CM

## 2025-06-03 PROCEDURE — G2211 COMPLEX E/M VISIT ADD ON: CPT | Mod: NC

## 2025-06-03 PROCEDURE — 99213 OFFICE O/P EST LOW 20 MIN: CPT

## 2025-06-04 ENCOUNTER — OUTPATIENT (OUTPATIENT)
Dept: OUTPATIENT SERVICES | Facility: HOSPITAL | Age: 55
LOS: 1 days | End: 2025-06-04

## 2025-06-04 ENCOUNTER — APPOINTMENT (OUTPATIENT)
Dept: MEDICATION MANAGEMENT | Facility: CLINIC | Age: 55
End: 2025-06-04

## 2025-06-04 DIAGNOSIS — Z98.84 BARIATRIC SURGERY STATUS: Chronic | ICD-10-CM

## 2025-06-04 DIAGNOSIS — Z90.49 ACQUIRED ABSENCE OF OTHER SPECIFIED PARTS OF DIGESTIVE TRACT: Chronic | ICD-10-CM

## 2025-06-04 DIAGNOSIS — I82.91 CHRONIC EMBOLISM AND THROMBOSIS OF UNSPECIFIED VEIN: ICD-10-CM

## 2025-06-04 DIAGNOSIS — Z79.01 LONG TERM (CURRENT) USE OF ANTICOAGULANTS: ICD-10-CM

## 2025-06-04 LAB
ALBUMIN SERPL ELPH-MCNC: 4.5 G/DL
ALP BLD-CCNC: 101 U/L
ALT SERPL-CCNC: 30 U/L
ANION GAP SERPL CALC-SCNC: 16 MMOL/L
AST SERPL-CCNC: 33 U/L
BASOPHILS # BLD AUTO: 0.05 K/UL
BASOPHILS NFR BLD AUTO: 0.7 %
BILIRUB SERPL-MCNC: 0.2 MG/DL
BUN SERPL-MCNC: 7 MG/DL
CALCIUM SERPL-MCNC: 9.5 MG/DL
CD16+CD56+ CELLS # BLD: 131 CELLS/UL
CD16+CD56+ CELLS NFR BLD: 5 %
CD19 CELLS NFR BLD: 352 CELLS/UL
CD3 CELLS # BLD: 2370 CELLS/UL
CD3 CELLS NFR BLD: 82 %
CD3+CD4+ CELLS # BLD: 1657 CELLS/UL
CD3+CD4+ CELLS NFR BLD: 57 %
CD3+CD4+ CELLS/CD3+CD8+ CLL SPEC: 2.36 RATIO
CD3+CD8+ CELLS # SPEC: 704 CELLS/UL
CD3+CD8+ CELLS NFR BLD: 24 %
CELLS.CD3-CD19+/CELLS IN BLOOD: 12 %
CHLORIDE SERPL-SCNC: 107 MMOL/L
CHOLEST SERPL-MCNC: 168 MG/DL
CK SERPL-CCNC: 109 U/L
CO2 SERPL-SCNC: 20 MMOL/L
CREAT SERPL-MCNC: 0.65 MG/DL
DEPRECATED KAPPA LC FREE/LAMBDA SER: 1.35 RATIO
EGFRCR SERPLBLD CKD-EPI 2021: 105 ML/MIN/1.73M2
EOSINOPHIL # BLD AUTO: 0.11 K/UL
EOSINOPHIL NFR BLD AUTO: 1.6 %
HCT VFR BLD CALC: 40.8 %
HDLC SERPL-MCNC: 48 MG/DL
HGB BLD-MCNC: 13.2 G/DL
IGA SERPL-MCNC: 212 MG/DL
IGG SERPL-MCNC: 867 MG/DL
IGM SERPL-MCNC: 138 MG/DL
IMM GRANULOCYTES NFR BLD AUTO: 0.3 %
INR PPP: 1.95 RATIO
KAPPA LC CSF-MCNC: 1.46 MG/DL
KAPPA LC SERPL-MCNC: 1.97 MG/DL
LDLC SERPL-MCNC: 89 MG/DL
LYMPHOCYTES # BLD AUTO: 2.83 K/UL
LYMPHOCYTES NFR BLD AUTO: 40.4 %
MAGNESIUM SERPL-MCNC: 2.1 MG/DL
MAN DIFF?: NORMAL
MCHC RBC-ENTMCNC: 29.9 PG
MCHC RBC-ENTMCNC: 32.4 G/DL
MCV RBC AUTO: 92.5 FL
MONOCYTES # BLD AUTO: 0.36 K/UL
MONOCYTES NFR BLD AUTO: 5.1 %
MYOGLOBIN SERPL-MCNC: <21 NG/ML
NEUTROPHILS # BLD AUTO: 3.64 K/UL
NEUTROPHILS NFR BLD AUTO: 51.9 %
NONHDLC SERPL-MCNC: 120 MG/DL
PLATELET # BLD AUTO: 255 K/UL
POTASSIUM SERPL-SCNC: 4.7 MMOL/L
PROT SERPL-MCNC: 7 G/DL
PT BLD: 22.9 SEC
RBC # BLD: 4.41 M/UL
RBC # FLD: 14.6 %
SODIUM SERPL-SCNC: 143 MMOL/L
TRIGL SERPL-MCNC: 185 MG/DL
VIABILITY: NORMAL
WBC # FLD AUTO: 7.01 K/UL

## 2025-06-04 PROCEDURE — 98012 SYNCH AUDIO-ONLY EST SF 10: CPT

## 2025-06-05 DIAGNOSIS — I82.91 CHRONIC EMBOLISM AND THROMBOSIS OF UNSPECIFIED VEIN: ICD-10-CM

## 2025-06-05 DIAGNOSIS — Z79.01 LONG TERM (CURRENT) USE OF ANTICOAGULANTS: ICD-10-CM

## 2025-06-07 LAB — HMGCR ANTIBODY, IGG: <3 UNITS

## 2025-06-10 ENCOUNTER — APPOINTMENT (OUTPATIENT)
Dept: MEDICATION MANAGEMENT | Facility: CLINIC | Age: 55
End: 2025-06-10

## 2025-06-17 ENCOUNTER — APPOINTMENT (OUTPATIENT)
Dept: MEDICATION MANAGEMENT | Facility: CLINIC | Age: 55
End: 2025-06-17

## 2025-06-17 ENCOUNTER — APPOINTMENT (OUTPATIENT)
Dept: NEUROLOGY | Facility: CLINIC | Age: 55
End: 2025-06-17

## 2025-06-18 ENCOUNTER — APPOINTMENT (OUTPATIENT)
Dept: NEUROLOGY | Facility: CLINIC | Age: 55
End: 2025-06-18
Payer: COMMERCIAL

## 2025-06-18 PROCEDURE — 96413 CHEMO IV INFUSION 1 HR: CPT

## 2025-06-18 PROCEDURE — 96415 CHEMO IV INFUSION ADDL HR: CPT

## 2025-06-24 ENCOUNTER — APPOINTMENT (OUTPATIENT)
Dept: MEDICATION MANAGEMENT | Facility: CLINIC | Age: 55
End: 2025-06-24

## 2025-06-24 ENCOUNTER — OUTPATIENT (OUTPATIENT)
Dept: OUTPATIENT SERVICES | Facility: HOSPITAL | Age: 55
LOS: 1 days | End: 2025-06-24

## 2025-06-24 DIAGNOSIS — Z90.49 ACQUIRED ABSENCE OF OTHER SPECIFIED PARTS OF DIGESTIVE TRACT: Chronic | ICD-10-CM

## 2025-06-24 DIAGNOSIS — I82.91 CHRONIC EMBOLISM AND THROMBOSIS OF UNSPECIFIED VEIN: ICD-10-CM

## 2025-06-24 DIAGNOSIS — Z79.01 LONG TERM (CURRENT) USE OF ANTICOAGULANTS: ICD-10-CM

## 2025-06-24 DIAGNOSIS — Z98.84 BARIATRIC SURGERY STATUS: Chronic | ICD-10-CM

## 2025-06-24 PROCEDURE — 98012 SYNCH AUDIO-ONLY EST SF 10: CPT

## 2025-06-25 DIAGNOSIS — Z79.01 LONG TERM (CURRENT) USE OF ANTICOAGULANTS: ICD-10-CM

## 2025-06-25 DIAGNOSIS — I82.91 CHRONIC EMBOLISM AND THROMBOSIS OF UNSPECIFIED VEIN: ICD-10-CM

## 2025-07-01 ENCOUNTER — APPOINTMENT (OUTPATIENT)
Dept: MEDICATION MANAGEMENT | Facility: CLINIC | Age: 55
End: 2025-07-01

## 2025-07-08 ENCOUNTER — APPOINTMENT (OUTPATIENT)
Dept: MEDICATION MANAGEMENT | Facility: CLINIC | Age: 55
End: 2025-07-08

## 2025-07-11 ENCOUNTER — APPOINTMENT (OUTPATIENT)
Dept: NEUROLOGY | Facility: CLINIC | Age: 55
End: 2025-07-11

## 2025-07-11 PROCEDURE — 96413 CHEMO IV INFUSION 1 HR: CPT

## 2025-07-11 PROCEDURE — 96415 CHEMO IV INFUSION ADDL HR: CPT

## 2025-07-14 ENCOUNTER — NON-APPOINTMENT (OUTPATIENT)
Age: 55
End: 2025-07-14

## 2025-07-14 ENCOUNTER — APPOINTMENT (OUTPATIENT)
Dept: OPHTHALMOLOGY | Facility: CLINIC | Age: 55
End: 2025-07-14
Payer: COMMERCIAL

## 2025-07-14 PROCEDURE — 92133 CPTRZD OPH DX IMG PST SGM ON: CPT

## 2025-07-14 PROCEDURE — 99214 OFFICE O/P EST MOD 30 MIN: CPT

## 2025-07-14 PROCEDURE — 92083 EXTENDED VISUAL FIELD XM: CPT

## 2025-07-16 ENCOUNTER — APPOINTMENT (OUTPATIENT)
Dept: MEDICATION MANAGEMENT | Facility: CLINIC | Age: 55
End: 2025-07-16

## 2025-07-16 ENCOUNTER — LABORATORY RESULT (OUTPATIENT)
Age: 55
End: 2025-07-16

## 2025-07-16 ENCOUNTER — OUTPATIENT (OUTPATIENT)
Dept: OUTPATIENT SERVICES | Facility: HOSPITAL | Age: 55
LOS: 1 days | End: 2025-07-16
Payer: COMMERCIAL

## 2025-07-16 DIAGNOSIS — I82.91 CHRONIC EMBOLISM AND THROMBOSIS OF UNSPECIFIED VEIN: ICD-10-CM

## 2025-07-16 DIAGNOSIS — Z98.84 BARIATRIC SURGERY STATUS: Chronic | ICD-10-CM

## 2025-07-16 DIAGNOSIS — Z90.49 ACQUIRED ABSENCE OF OTHER SPECIFIED PARTS OF DIGESTIVE TRACT: Chronic | ICD-10-CM

## 2025-07-16 DIAGNOSIS — Z79.01 LONG TERM (CURRENT) USE OF ANTICOAGULANTS: ICD-10-CM

## 2025-07-16 PROCEDURE — 98012 SYNCH AUDIO-ONLY EST SF 10: CPT

## 2025-07-17 DIAGNOSIS — I82.91 CHRONIC EMBOLISM AND THROMBOSIS OF UNSPECIFIED VEIN: ICD-10-CM

## 2025-07-17 DIAGNOSIS — Z79.01 LONG TERM (CURRENT) USE OF ANTICOAGULANTS: ICD-10-CM

## 2025-07-22 ENCOUNTER — APPOINTMENT (OUTPATIENT)
Dept: MEDICATION MANAGEMENT | Facility: CLINIC | Age: 55
End: 2025-07-22

## 2025-07-23 ENCOUNTER — APPOINTMENT (OUTPATIENT)
Dept: MRI IMAGING | Facility: CLINIC | Age: 55
End: 2025-07-23
Payer: COMMERCIAL

## 2025-07-23 ENCOUNTER — OUTPATIENT (OUTPATIENT)
Dept: OUTPATIENT SERVICES | Facility: HOSPITAL | Age: 55
LOS: 1 days | End: 2025-07-23
Payer: COMMERCIAL

## 2025-07-23 DIAGNOSIS — Z90.49 ACQUIRED ABSENCE OF OTHER SPECIFIED PARTS OF DIGESTIVE TRACT: Chronic | ICD-10-CM

## 2025-07-23 DIAGNOSIS — Z00.8 ENCOUNTER FOR OTHER GENERAL EXAMINATION: ICD-10-CM

## 2025-07-23 DIAGNOSIS — G35 MULTIPLE SCLEROSIS: ICD-10-CM

## 2025-07-23 DIAGNOSIS — Z98.84 BARIATRIC SURGERY STATUS: Chronic | ICD-10-CM

## 2025-07-23 PROCEDURE — 72141 MRI NECK SPINE W/O DYE: CPT | Mod: 26

## 2025-07-23 PROCEDURE — 70551 MRI BRAIN STEM W/O DYE: CPT

## 2025-07-23 PROCEDURE — 70551 MRI BRAIN STEM W/O DYE: CPT | Mod: 26

## 2025-07-23 PROCEDURE — 76377 3D RENDER W/INTRP POSTPROCES: CPT | Mod: 26

## 2025-07-23 PROCEDURE — 76377 3D RENDER W/INTRP POSTPROCES: CPT

## 2025-07-23 PROCEDURE — 72141 MRI NECK SPINE W/O DYE: CPT

## 2025-07-29 ENCOUNTER — APPOINTMENT (OUTPATIENT)
Dept: MEDICATION MANAGEMENT | Facility: CLINIC | Age: 55
End: 2025-07-29

## 2025-07-31 ENCOUNTER — TRANSCRIPTION ENCOUNTER (OUTPATIENT)
Age: 55
End: 2025-07-31

## 2025-08-05 ENCOUNTER — LABORATORY RESULT (OUTPATIENT)
Age: 55
End: 2025-08-05

## 2025-08-05 ENCOUNTER — APPOINTMENT (OUTPATIENT)
Dept: MEDICATION MANAGEMENT | Facility: CLINIC | Age: 55
End: 2025-08-05

## 2025-08-05 ENCOUNTER — OUTPATIENT (OUTPATIENT)
Dept: OUTPATIENT SERVICES | Facility: HOSPITAL | Age: 55
LOS: 1 days | End: 2025-08-05
Payer: COMMERCIAL

## 2025-08-05 DIAGNOSIS — I82.91 CHRONIC EMBOLISM AND THROMBOSIS OF UNSPECIFIED VEIN: ICD-10-CM

## 2025-08-05 DIAGNOSIS — Z79.01 LONG TERM (CURRENT) USE OF ANTICOAGULANTS: ICD-10-CM

## 2025-08-05 DIAGNOSIS — Z98.84 BARIATRIC SURGERY STATUS: Chronic | ICD-10-CM

## 2025-08-05 PROCEDURE — 98012 SYNCH AUDIO-ONLY EST SF 10: CPT

## 2025-08-06 DIAGNOSIS — I82.91 CHRONIC EMBOLISM AND THROMBOSIS OF UNSPECIFIED VEIN: ICD-10-CM

## 2025-08-06 DIAGNOSIS — Z79.01 LONG TERM (CURRENT) USE OF ANTICOAGULANTS: ICD-10-CM

## 2025-08-13 ENCOUNTER — APPOINTMENT (OUTPATIENT)
Dept: NEUROLOGY | Facility: CLINIC | Age: 55
End: 2025-08-13
Payer: COMMERCIAL

## 2025-08-13 VITALS
HEIGHT: 68 IN | WEIGHT: 172 LBS | HEART RATE: 50 BPM | OXYGEN SATURATION: 100 % | BODY MASS INDEX: 26.07 KG/M2 | RESPIRATION RATE: 16 BRPM

## 2025-08-13 DIAGNOSIS — G43.909 MIGRAINE, UNSPECIFIED, NOT INTRACTABLE, W/OUT STATUS MIGRAINOSUS: ICD-10-CM

## 2025-08-13 PROCEDURE — G2211 COMPLEX E/M VISIT ADD ON: CPT | Mod: NC

## 2025-08-13 PROCEDURE — 99214 OFFICE O/P EST MOD 30 MIN: CPT

## 2025-08-13 RX ORDER — BACLOFEN 10 MG/1
10 TABLET ORAL
Qty: 30 | Refills: 3 | Status: ACTIVE | COMMUNITY
Start: 2025-08-13 | End: 1900-01-01

## 2025-08-14 ENCOUNTER — TRANSCRIPTION ENCOUNTER (OUTPATIENT)
Age: 55
End: 2025-08-14

## 2025-08-14 LAB
APPEARANCE: CLEAR
BILIRUBIN URINE: NEGATIVE
BLOOD URINE: NEGATIVE
COLOR: NORMAL
GLUCOSE QUALITATIVE U: NEGATIVE MG/DL
KETONES URINE: ABNORMAL MG/DL
LEUKOCYTE ESTERASE URINE: NEGATIVE
NITRITE URINE: NEGATIVE
PH URINE: 5.5
PROTEIN URINE: NEGATIVE MG/DL
SPECIFIC GRAVITY URINE: 1.03
UROBILINOGEN URINE: 0.2 MG/DL

## 2025-08-18 ENCOUNTER — TRANSCRIPTION ENCOUNTER (OUTPATIENT)
Age: 55
End: 2025-08-18

## 2025-08-18 DIAGNOSIS — G35 MULTIPLE SCLEROSIS: ICD-10-CM

## 2025-08-18 DIAGNOSIS — N31.9 NEUROMUSCULAR DYSFUNCTION OF BLADDER, UNSPECIFIED: ICD-10-CM

## 2025-08-19 ENCOUNTER — APPOINTMENT (OUTPATIENT)
Dept: MEDICATION MANAGEMENT | Facility: CLINIC | Age: 55
End: 2025-08-19

## 2025-08-22 ENCOUNTER — TRANSCRIPTION ENCOUNTER (OUTPATIENT)
Age: 55
End: 2025-08-22

## 2025-08-22 RX ORDER — GALCANEZUMAB 120 MG/ML
120 INJECTION, SOLUTION SUBCUTANEOUS
Qty: 1 | Refills: 5 | Status: ACTIVE | COMMUNITY
Start: 2025-08-13

## 2025-08-22 RX ORDER — GALCANEZUMAB 120 MG/ML
120 INJECTION, SOLUTION SUBCUTANEOUS
Qty: 2 | Refills: 0 | Status: ACTIVE | COMMUNITY
Start: 2025-08-13

## 2025-08-26 ENCOUNTER — APPOINTMENT (OUTPATIENT)
Dept: MEDICATION MANAGEMENT | Facility: CLINIC | Age: 55
End: 2025-08-26

## 2025-08-26 ENCOUNTER — LABORATORY RESULT (OUTPATIENT)
Age: 55
End: 2025-08-26

## 2025-08-26 ENCOUNTER — OUTPATIENT (OUTPATIENT)
Dept: OUTPATIENT SERVICES | Facility: HOSPITAL | Age: 55
LOS: 1 days | End: 2025-08-26
Payer: COMMERCIAL

## 2025-08-26 DIAGNOSIS — Z90.49 ACQUIRED ABSENCE OF OTHER SPECIFIED PARTS OF DIGESTIVE TRACT: Chronic | ICD-10-CM

## 2025-08-26 DIAGNOSIS — Z79.01 LONG TERM (CURRENT) USE OF ANTICOAGULANTS: ICD-10-CM

## 2025-08-26 DIAGNOSIS — I82.91 CHRONIC EMBOLISM AND THROMBOSIS OF UNSPECIFIED VEIN: ICD-10-CM

## 2025-08-26 DIAGNOSIS — Z98.84 BARIATRIC SURGERY STATUS: Chronic | ICD-10-CM

## 2025-08-26 PROCEDURE — 98012 SYNCH AUDIO-ONLY EST SF 10: CPT

## 2025-08-27 DIAGNOSIS — I82.91 CHRONIC EMBOLISM AND THROMBOSIS OF UNSPECIFIED VEIN: ICD-10-CM

## 2025-08-27 DIAGNOSIS — Z79.01 LONG TERM (CURRENT) USE OF ANTICOAGULANTS: ICD-10-CM

## 2025-09-02 ENCOUNTER — APPOINTMENT (OUTPATIENT)
Dept: RHEUMATOLOGY | Facility: CLINIC | Age: 55
End: 2025-09-02
Payer: COMMERCIAL

## 2025-09-02 ENCOUNTER — APPOINTMENT (OUTPATIENT)
Dept: MEDICATION MANAGEMENT | Facility: CLINIC | Age: 55
End: 2025-09-02

## 2025-09-02 VITALS
SYSTOLIC BLOOD PRESSURE: 129 MMHG | DIASTOLIC BLOOD PRESSURE: 71 MMHG | HEART RATE: 54 BPM | BODY MASS INDEX: 25.16 KG/M2 | HEIGHT: 68 IN | WEIGHT: 166 LBS | OXYGEN SATURATION: 98 %

## 2025-09-02 DIAGNOSIS — M79.10 MYALGIA, UNSPECIFIED SITE: ICD-10-CM

## 2025-09-02 DIAGNOSIS — M25.512 PAIN IN LEFT SHOULDER: ICD-10-CM

## 2025-09-02 DIAGNOSIS — D68.61 ANTIPHOSPHOLIPID SYNDROME: ICD-10-CM

## 2025-09-02 PROCEDURE — 99213 OFFICE O/P EST LOW 20 MIN: CPT

## 2025-09-09 ENCOUNTER — APPOINTMENT (OUTPATIENT)
Dept: MEDICATION MANAGEMENT | Facility: CLINIC | Age: 55
End: 2025-09-09

## 2025-09-10 ENCOUNTER — APPOINTMENT (OUTPATIENT)
Dept: UROLOGY | Facility: CLINIC | Age: 55
End: 2025-09-10
Payer: COMMERCIAL

## 2025-09-10 VITALS
HEIGHT: 68 IN | WEIGHT: 166 LBS | TEMPERATURE: 97.1 F | OXYGEN SATURATION: 97 % | DIASTOLIC BLOOD PRESSURE: 71 MMHG | BODY MASS INDEX: 25.16 KG/M2 | SYSTOLIC BLOOD PRESSURE: 107 MMHG | HEART RATE: 96 BPM

## 2025-09-10 DIAGNOSIS — R39.15 URGENCY OF URINATION: ICD-10-CM

## 2025-09-10 DIAGNOSIS — R33.9 RETENTION OF URINE, UNSPECIFIED: ICD-10-CM

## 2025-09-10 PROCEDURE — G2211 COMPLEX E/M VISIT ADD ON: CPT | Mod: NC

## 2025-09-10 PROCEDURE — 99204 OFFICE O/P NEW MOD 45 MIN: CPT

## 2025-09-14 PROBLEM — N39.0 ACUTE UTI: Status: ACTIVE | Noted: 2025-09-14 | Resolved: 2025-10-14

## 2025-09-14 LAB — BACTERIA UR CULT: ABNORMAL

## 2025-09-16 ENCOUNTER — LABORATORY RESULT (OUTPATIENT)
Age: 55
End: 2025-09-16

## 2025-09-16 ENCOUNTER — APPOINTMENT (OUTPATIENT)
Dept: MEDICATION MANAGEMENT | Facility: CLINIC | Age: 55
End: 2025-09-16

## 2025-09-16 LAB
INR PPP: 1.7 RATIO
POCT-PROTHROMBIN TIME: 20.1 SECS